# Patient Record
Sex: FEMALE | Race: WHITE | NOT HISPANIC OR LATINO | Employment: FULL TIME | URBAN - METROPOLITAN AREA
[De-identification: names, ages, dates, MRNs, and addresses within clinical notes are randomized per-mention and may not be internally consistent; named-entity substitution may affect disease eponyms.]

---

## 2017-05-15 ENCOUNTER — ALLSCRIPTS OFFICE VISIT (OUTPATIENT)
Dept: OTHER | Facility: OTHER | Age: 57
End: 2017-05-15

## 2017-05-15 DIAGNOSIS — I10 ESSENTIAL (PRIMARY) HYPERTENSION: ICD-10-CM

## 2017-05-15 DIAGNOSIS — M77.10 LATERAL EPICONDYLITIS OF ELBOW: ICD-10-CM

## 2017-05-15 DIAGNOSIS — I87.2 VENOUS INSUFFICIENCY (CHRONIC) (PERIPHERAL): ICD-10-CM

## 2017-05-15 DIAGNOSIS — Z12.31 ENCOUNTER FOR SCREENING MAMMOGRAM FOR MALIGNANT NEOPLASM OF BREAST: ICD-10-CM

## 2017-05-17 ENCOUNTER — GENERIC CONVERSION - ENCOUNTER (OUTPATIENT)
Dept: OTHER | Facility: OTHER | Age: 57
End: 2017-05-17

## 2017-11-16 ENCOUNTER — TRANSCRIBE ORDERS (OUTPATIENT)
Dept: LAB | Facility: CLINIC | Age: 57
End: 2017-11-16

## 2017-11-16 ENCOUNTER — APPOINTMENT (OUTPATIENT)
Dept: LAB | Facility: CLINIC | Age: 57
End: 2017-11-16
Payer: COMMERCIAL

## 2017-11-16 ENCOUNTER — GENERIC CONVERSION - ENCOUNTER (OUTPATIENT)
Dept: OTHER | Facility: OTHER | Age: 57
End: 2017-11-16

## 2017-11-16 DIAGNOSIS — I10 ESSENTIAL (PRIMARY) HYPERTENSION: ICD-10-CM

## 2017-11-16 DIAGNOSIS — I87.2 VENOUS INSUFFICIENCY (CHRONIC) (PERIPHERAL): ICD-10-CM

## 2017-11-16 LAB
ALBUMIN SERPL BCP-MCNC: 3.4 G/DL (ref 3.5–5)
ALP SERPL-CCNC: 63 U/L (ref 46–116)
ALT SERPL W P-5'-P-CCNC: 70 U/L (ref 12–78)
ANION GAP SERPL CALCULATED.3IONS-SCNC: 8 MMOL/L (ref 4–13)
AST SERPL W P-5'-P-CCNC: 32 U/L (ref 5–45)
BACTERIA UR QL AUTO: ABNORMAL /HPF
BILIRUB SERPL-MCNC: 0.3 MG/DL (ref 0.2–1)
BILIRUB UR QL STRIP: ABNORMAL
BUN SERPL-MCNC: 15 MG/DL (ref 5–25)
CALCIUM SERPL-MCNC: 9.1 MG/DL (ref 8.3–10.1)
CHLORIDE SERPL-SCNC: 102 MMOL/L (ref 100–108)
CHOLEST SERPL-MCNC: 172 MG/DL (ref 50–200)
CLARITY UR: CLEAR
CO2 SERPL-SCNC: 31 MMOL/L (ref 21–32)
COLOR UR: YELLOW
CREAT SERPL-MCNC: 0.8 MG/DL (ref 0.6–1.3)
GFR SERPL CREATININE-BSD FRML MDRD: 83 ML/MIN/1.73SQ M
GLUCOSE P FAST SERPL-MCNC: 108 MG/DL (ref 65–99)
GLUCOSE UR STRIP-MCNC: NEGATIVE MG/DL
HDLC SERPL-MCNC: 47 MG/DL (ref 40–60)
HGB UR QL STRIP.AUTO: ABNORMAL
KETONES UR STRIP-MCNC: NEGATIVE MG/DL
LDLC SERPL CALC-MCNC: 78 MG/DL (ref 0–100)
LEUKOCYTE ESTERASE UR QL STRIP: NEGATIVE
NITRITE UR QL STRIP: NEGATIVE
NON-SQ EPI CELLS URNS QL MICRO: ABNORMAL /HPF
PH UR STRIP.AUTO: 5 [PH] (ref 4.5–8)
POTASSIUM SERPL-SCNC: 3.1 MMOL/L (ref 3.5–5.3)
PROT SERPL-MCNC: 6.6 G/DL (ref 6.4–8.2)
PROT UR STRIP-MCNC: ABNORMAL MG/DL
RBC #/AREA URNS AUTO: ABNORMAL /HPF
SODIUM SERPL-SCNC: 141 MMOL/L (ref 136–145)
SP GR UR STRIP.AUTO: >=1.03 (ref 1–1.03)
TRIGL SERPL-MCNC: 233 MG/DL
UROBILINOGEN UR QL STRIP.AUTO: 0.2 E.U./DL
WBC #/AREA URNS AUTO: ABNORMAL /HPF

## 2017-11-16 PROCEDURE — 36415 COLL VENOUS BLD VENIPUNCTURE: CPT

## 2017-11-16 PROCEDURE — 80061 LIPID PANEL: CPT

## 2017-11-16 PROCEDURE — 80053 COMPREHEN METABOLIC PANEL: CPT

## 2017-11-16 PROCEDURE — 81001 URINALYSIS AUTO W/SCOPE: CPT

## 2017-11-20 ENCOUNTER — ALLSCRIPTS OFFICE VISIT (OUTPATIENT)
Dept: OTHER | Facility: OTHER | Age: 57
End: 2017-11-20

## 2017-11-27 DIAGNOSIS — R31.9 HEMATURIA: ICD-10-CM

## 2017-11-27 DIAGNOSIS — E87.6 HYPOKALEMIA: ICD-10-CM

## 2017-11-27 DIAGNOSIS — R73.01 IMPAIRED FASTING GLUCOSE: ICD-10-CM

## 2017-11-28 NOTE — PROGRESS NOTES
Assessment    1  History of Current every day smoker (305 1) (F17 200)   · 1/2 pack per day  2  Hematuria (599 70) (R31 9)   3  Hypokalemia (276 8) (E87 6)   4  Impaired fasting glucose (790 21) (R73 01)    Plan  Hematuria    · 1 Mahin Fernandes MD, Carmen Medel  (Urology) Co-Management  *  Status: Active  Requested for: 02JMW4858  Care Summary provided  : Yes  Hypokalemia    · (1) POTASSIUM; Status:Active; Requested for:27Nov2017;   Impaired fasting glucose    · (1) HEMOGLOBIN A1C; Status:Active; Requested for:27Nov2017;   Need for influenza vaccination    · Fluzone Quadrivalent 0 5 ML Intramuscular Suspension Prefilled Syringe  Other screening mammogram    · * MAMMO SCREENING BILATERAL W CAD; Status:Temporary Deferral - Pt requests deferral;   1/20/2018    Discussion/Summary  Discussion Summary:   See urology for hematuriabloodworkper patients requesthealthy and exercise  Chief Complaint  Chief Complaint Chronic Condition [facility]: Patient is here today for follow up of chronic conditions described in HPI  History of Present Illness  HPI: patient is here for a regular follow upunder controlmammogram  bw done, potassium was lowhas recurring hematuria, no symptoms      Review of Systems  Complete-Female:  Constitutional: no fever,-- not feeling poorly,-- no chills-- and-- not feeling tired  Eyes: no eye pain,-- no eyesight problems,-- eyes not red-- and-- no purulent discharge from the eyes  ENT: no earache,-- no nosebleeds,-- no hearing loss-- and-- no nasal discharge  Cardiovascular: the heart rate was not slow,-- no chest pain,-- the heart rate was not fast-- and-- no palpitations  Respiratory: no shortness of breath,-- no cough,-- no wheezing-- and-- no shortness of breath during exertion  Gastrointestinal: no abdominal pain,-- no nausea,-- no constipation-- and-- no diarrhea  Genitourinary: no dysuria  Musculoskeletal: no arthralgias,-- no joint swelling,-- no myalgias-- and-- no joint stiffness  Integumentary: no rashes,-- no itching,-- no skin lesions-- and-- no skin wound  Neurological: no headache,-- no numbness,-- no confusion,-- no dizziness,-- no convulsions-- and-- no fainting  Psychiatric: no anxiety,-- no sleep disturbances-- and-- no depression  Active Problems  1  Abdominal pain (789 00) (R10 9)   2  Abnormal blood chemistry (790 6) (R79 9)   3  Anxiety disorder (300 00) (F41 9)   4  Asthma (493 90) (J45 909)   5  Atypical chest pain (786 59) (R07 89)   6  Backache (724 5) (M54 9)   7  Blurry vision (368 8) (H53 8)   8  Cervical cancer screening (V76 2) (Z12 4)   9  Cervical spinal stenosis (723 0) (M48 02)   10  Cervical spinal stenosis (723 0) (M48 02)   11  Cervicalgia (723 1) (M54 2)   12  Change in stool habits (787 99) (R19 4)   13  Chest pain on respiration (786 52) (R07 1)   14  Encounter for screening colonoscopy (V76 51) (Z12 11)   15  Epicondylitis, lateral (tennis elbow) (726 32) (M77 10)   16  Gallbladder disease (575 9) (K82 9)   17  Headache (784 0) (R51)   18  Hematuria (599 70) (R31 9)   19  History of gastroesophageal reflux (GERD) (V12 79) (Z87 19)   20  Hypertension (401 9) (I10)   21  Limb pain (729 5) (M79 609)   22  Other screening mammogram (V76 12) (Z12 31)   23  Postoperative examination (V67 00) (Z09)   24  Urinary tract infection (599 0) (N39 0)   25  Venous insufficiency (459 81) (I87 2)   26  Visit for pre-operative examination (V7 84) (Z01 818)   27  Visit for routine gyn exam (V7 31) (Z01 419)    Past Medical History  1  History of heartburn (V12 79) (Z87 898)   2  History of hypertension (V12 59) (Z86 79)   3  History of hypertension (V12 59) (Z86 79)   4  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)  Active Problems And Past Medical History Reviewed: The active problems and past medical history were reviewed and updated today  Surgical History  1  History of Arthroscopy Knee Left   2  History of  Section   3   History of Elbow Surgery Repair   4  History of Knee Arthroscopy (Therapeutic)   5  History of Neck Surgery  Surgical History Reviewed: The surgical history was reviewed and updated today  Family History  Mother    1  Family history of Brain Cancer (V16 8)   2  Family history of Mother  At Age [de-identified]  Sister    1  Family history of Breast Cancer (V16 3)   4  Family history of Breast Cancer (V16 3)  Maternal Aunt    5  Family history of Breast Cancer (V16 3)   6  Family history of Breast Cancer (V16 3)  Family History    7  Family history of Epilepsy And Recurrent Seizures (V17 2)   8  Family history of Maternal Grandfather Is    5  Family history of Maternal Grandmother Is    8  Family history of Paternal Grandfather Is    6  Family history of Paternal Grandmother Is   Family History Reviewed: The family history was reviewed and updated today  Social History     · Denied: History of Drug Use   · Marital History - Currently    · Never Drank Alcohol   · Working Full Time  Social History Reviewed: The social history was reviewed and updated today  The social history was reviewed and is unchanged  Current Meds   1  AmLODIPine Besylate 2 5 MG Oral Tablet; take 1 tablet by mouth every day; Therapy: 00HPX6099 to (Grace Lozada)  Requested for: 25IRN0607; Last Rx:2017 Ordered   2  Atenolol 50 MG Oral Tablet; take 1 tablet every day; Therapy: 2012 to (Evaluate:27Rtb9934)  Requested for: 12TNO0091; Last Rx:2017 Ordered   3  Escitalopram Oxalate 10 MG Oral Tablet; take 1 tablet every day; Therapy: 00Hfq0085 to (Evaluate:40Eiq0308)  Requested for: 54AEE8721; Last Rx:2017 Ordered   4  HydroCHLOROthiazide 12 5 MG Oral Capsule; TAKE ONE CAPSULE BY MOUTH EVERY DAY; Therapy: 70YGN6888 to (Evaluate:40Sei2422)  Requested for: 85Bas0665; Last Rx:2017 Ordered   5  Multivitamins Oral Capsule; TAKE 1 CAPSULE DAILY; Therapy: 49XCM4485 to Recorded   6   Omeprazole 20 MG Oral Capsule Delayed Release; TAKE 1 CAPSULE DAILY EVERY MORNING BEFORE BREAKFAST; Therapy: 07SRL7501 to (Evaluate:11Nov2017)  Requested for: 37YWN2655; Last Rx:32Qce2215 Ordered  Medication List Reviewed: The medication list was reviewed and updated today  Allergies  1  Robaxin TABS   2  Tramadol  3  No Known Environmental Allergies   4  No Known Food Allergies    Vitals  Vital Signs    Recorded: 20Nov2017 03:44PM   Heart Rate 78   Respiration 16   Systolic 067   Diastolic 99   Height 5 ft 8 in   Weight 180 lb 0 2 oz   BMI Calculated 27 37   BSA Calculated 1 95   O2 Saturation 98       Physical Exam   Constitutional  General appearance: No acute distress, well appearing and well nourished  Eyes  Conjunctiva and lids: No swelling, erythema or discharge  Pupils and irises: Equal, round and reactive to light  Ears, Nose, Mouth, and Throat  External inspection of ears and nose: Normal    Otoscopic examination: Tympanic membranes translucent with normal light reflex  Canals patent without erythema  Nasal mucosa, septum, and turbinates: Normal without edema or erythema  Oropharynx: Normal with no erythema, edema, exudate or lesions  Pulmonary  Respiratory effort: No increased work of breathing or signs of respiratory distress  Auscultation of lungs: Clear to auscultation  Cardiovascular  Palpation of heart: Normal PMI, no thrills  Auscultation of heart: Normal rate and rhythm, normal S1 and S2, without murmurs  Examination of extremities for edema and/or varicosities: Normal    Carotid pulses: Normal    Abdomen  Abdomen: Non-tender, no masses  Liver and spleen: No hepatomegaly or splenomegaly  Lymphatic  Palpation of lymph nodes in neck: No lymphadenopathy  Musculoskeletal  Gait and station: Normal    Digits and nails: Normal without clubbing or cyanosis  Inspection/palpation of joints, bones, and muscles: Normal    Skin  Skin and subcutaneous tissue: Normal without rashes or lesions  Neurologic  Cranial nerves: Cranial nerves 2-12 intact  Psychiatric  Orientation to person, place, and time: Normal    Mood and affect: Normal          Results/Data  PHQ-2 Adult Depression Screening 20Nov2017 03:47PM User, Vikram     Test Name Result Flag Reference   PHQ-2 Adult Depression Score 0       Over the last two weeks, how often have you been bothered by any of the following problems? Little interest or pleasure in doing things: Not at all - 0 Feeling down, depressed, or hopeless: Not at all - 0   PHQ-2 Adult Depression Screening Negative         Health Management  Cervical cancer screening   (1) THIN PREP PAP WITH IMAGING; every 1 year; Next Due: 92Yhk3102; Overdue    Future Appointments    Date/Time Provider Specialty Site   12/04/2017 02:45 PM HARMAN Ivy   Urology [facility] [address]       Signatures   Electronically signed by : Brandon Mills; Nov 27 2017 10:42AM EST                       (Author)    Electronically signed by : HARMAN Hinson ; Nov 27 2017 10:46AM EST                       (Review)

## 2017-12-04 ENCOUNTER — ALLSCRIPTS OFFICE VISIT (OUTPATIENT)
Dept: OTHER | Facility: OTHER | Age: 57
End: 2017-12-04

## 2017-12-04 LAB
BILIRUB UR QL STRIP: NORMAL
CLARITY UR: NORMAL
COLOR UR: YELLOW
GLUCOSE (HISTORICAL): NORMAL
HGB UR QL STRIP.AUTO: NORMAL
KETONES UR STRIP-MCNC: NORMAL MG/DL
LEUKOCYTE ESTERASE UR QL STRIP: NORMAL
NITRITE UR QL STRIP: NORMAL
PH UR STRIP.AUTO: 5 [PH]
PROT UR STRIP-MCNC: NORMAL MG/DL
SP GR UR STRIP.AUTO: 1.01
UROBILINOGEN UR QL STRIP.AUTO: NORMAL

## 2017-12-06 NOTE — CONSULTS
Assessment  1  Hematuria (599 70) (R31 9)    Plan  Hematuria    · Cystourethroscopy - POC; Status:Active - Perform Order; Requested for:63Wmb9065;    Perform: In Office; 425 6350; Ordered;Ordered By:Rosenthal, Severo Pae;   · Urine Dip Non-Automated- POC; Status:Complete - Retrospective By ProtocolAuthorization;   Done: 89RRZ3604 02:57PM   Performed: In Office; 425 6350; Last Updated Giovanni Jamison; 12/4/2017 2:59:16 PM;Ordered;For:Hematuria; Ordered By:Carson Lomeli;   · US KIDNEY AND BLADDER; Status:Active; Requested for:61Dat3132;    Perform:[facility]; Due:04Xxm9717; Last Updated By:Sam Hudson; 12/4/2017 3:24:13 PM;Ordered;Ordered By:Rosenthal, Severo Pae; Discussion/Summary  Discussion Summary:   Microscopic hematuria, current smoker  discussed risk factors and hematuria evaluation  She understands and agrees with workup  I would check an ultrasound of the urinary tract in addition to cystoscopy  We discussed smoking cessation and she has decided to quit  Patient's Capacity to Self-Care: Patient is able to Self-Care  Goals and Barriers: The patient has the current Goals: Rule out malignancy  The patent has the current Barriers: None  Self Referrals:   Self Referrals: Adilene Lewis      Chief Complaint  Chief Complaint Free Text Note Form: Patient presents for hematuria      History of Present Illness  HPI: 81-year-old woman referred for evaluation of microscopic hematuria  She denies any gross hematuria  She occasionally sees darker urine but is not sure whether this is blood  She is a former and current smoker  She reports history of kidney stone about 8 years ago  No urinary tract infections  She had an evaluation which sounds to be for hematuria about 8 years ago including cystoscopy  This was apparently normal  She has no other complaints at this time        Review of Systems  Complete-Female Urology:  Constitutional: No fever, no chills, feels well, no tiredness, no recent weight gain or weight loss  Respiratory: No complaints of shortness of breath, no wheezing, no cough, no SOB on exertion, no orthopnea, no PND  Cardiovascular: No complaints of slow heart rate, no fast heart rate, no chest pain, no palpitations, no leg claudication, no lower extremity edema  Gastrointestinal: No complaints of abdominal pain, no constipation, no nausea or vomiting, no diarrhea, no bloody stools  Genitourinary: Empty sensation-- and-- stream quality good, but-- as noted in HPI,-- no dysuria,-- no urinary hesitancy,-- no feelings of urinary urgency,-- no incontinence,-- no hematuria-- and-- no nocturia  Musculoskeletal: No complaints of arthralgias, no myalgias, no joint swelling or stiffness, no limb pain or swelling  Integumentary: No complaints of skin rash or lesions, no itching, no skin wounds, no breast pain or lump  Hematologic/Lymphatic: No complaints of swollen glands, no swollen glands in the neck, does not bleed easily, does not bruise easily  Neurological: No complaints of headache, no confusion, no convulsions, no numbness, no dizziness or fainting, no tingling, no limb weakness, no difficulty walking  ROS Reviewed:   ROS reviewed  Active Problems  1  Abdominal pain (789 00) (R10 9)   2  Abnormal blood chemistry (790 6) (R79 9)   3  Anxiety disorder (300 00) (F41 9)   4  Asthma (493 90) (J45 909)   5  Atypical chest pain (786 59) (R07 89)   6  Backache (724 5) (M54 9)   7  Blurry vision (368 8) (H53 8)   8  Cervical cancer screening (V76 2) (Z12 4)   9  Cervical spinal stenosis (723 0) (M48 02)   10  Cervical spinal stenosis (723 0) (M48 02)   11  Cervicalgia (723 1) (M54 2)   12  Change in stool habits (787 99) (R19 4)   13  Chest pain on respiration (786 52) (R07 1)   14  Encounter for screening colonoscopy (V76 51) (Z12 11)   15  Epicondylitis, lateral (tennis elbow) (726 32) (M77 10)   16  Gallbladder disease (575 9) (K82 9)   17  Headache (784 0) (R51)   18  Hematuria (599 70) (R31 9)   19  History of gastroesophageal reflux (GERD) (V12 79) (Z87 19)   20  Hypertension (401 9) (I10)   21  Hypokalemia (276 8) (E87 6)   22  Impaired fasting glucose (790 21) (R73 01)   23  Limb pain (729 5) (M79 609)   24  Need for influenza vaccination (V04 81) (Z23)   25  Other screening mammogram (V76 12) (Z12 31)   26  Postoperative examination (V67 00) (Z09)   27  Urinary tract infection (599 0) (N39 0)   28  Venous insufficiency (459 81) (I87 2)   29  Visit for pre-operative examination (V72 84) (Z01 818)   30  Visit for routine gyn exam (V72 31) (Z01 419)    Past Medical History  1  History of arthritis (V13 4) (Z87 39)   2  History of heartburn (V12 79) (Z87 898)   3  History of hypertension (V12 59) (Z86 79)   4  History of hypertension (V12 59) (Z86 79)   5  History of kidney stones (V13 01) (Z87 442)   6  History of orthopedic surgery (V45 89) (Z98 890)   7  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)   8  History of Spinal fusion, 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)  Active Problems And Past Medical History Reviewed: The active problems and past medical history were reviewed and updated today  Surgical History  1  History of Arthroscopy Knee Left   2  History of  Section   3  History of Elbow Surgery Repair   4  History of Knee Arthroscopy (Therapeutic)   5  History of Neck Surgery  Surgical History Reviewed: The surgical history was reviewed and updated today  Family History  Mother    1  Family history of Brain Cancer (V16 8)   2  Family history of Mother  At Age [de-identified]  Sister    1  Family history of Breast Cancer (V16 3)   4  Family history of Breast Cancer (V16 3)  Maternal Aunt    5  Family history of Breast Cancer (V16 3)   6  Family history of Breast Cancer (V16 3)  Family History    7  Family history of Epilepsy And Recurrent Seizures (V17 2)   8  Family history of Maternal Grandfather Is    5   Family history of Maternal Grandmother Is    8  Family history of Paternal Grandfather Is    6  Family history of Paternal Grandmother Is   Family History Reviewed: The family history was reviewed and updated today  Social History     · Current every day smoker (305 1) (F17 200)   · Denied: History of Drug Use   · Marital History - Currently    · Never Drank Alcohol   · Working Full Time  Social History Reviewed: The social history was reviewed and updated today  Current Meds   1  AmLODIPine Besylate 2 5 MG Oral Tablet; take 1 tablet by mouth every day; Therapy: 85USV5879 to (Rupa Hernandez)  Requested for: 45LNV9874; Last Rx:2017 Ordered   2  Atenolol 50 MG Oral Tablet; take 1 tablet every day; Therapy: 2012 to (Evaluate:95Cds2979)  Requested for: 52PQX2471; Last Rx:2017 Ordered   3  Escitalopram Oxalate 10 MG Oral Tablet; take 1 tablet every day; Therapy: 98Joa0805 to (Evaluate:00Cmg7990)  Requested for: 48YLP5966; Last Rx:2017 Ordered   4  HydroCHLOROthiazide 12 5 MG Oral Capsule; TAKE ONE CAPSULE BY MOUTH EVERY DAY; Therapy: 98FQE2972 to (Evaluate:84Nnh1222)  Requested for: 18Hve2082; Last Rx:14Otg3188 Ordered   5  Multivitamins Oral Capsule; TAKE 1 CAPSULE DAILY; Therapy: 82XQT1079 to Recorded   6  Omeprazole 20 MG Oral Capsule Delayed Release; TAKE 1 CAPSULE DAILY EVERY MORNING BEFORE BREAKFAST; Therapy: 40OVL6285 to (Evaluate:2017)  Requested for: 05ZOC7116; Last Rx:00Slf0573 Ordered  Medication List Reviewed: The medication list was reviewed and updated today  Allergies  1  Robaxin TABS   2  Tramadol  3  No Known Environmental Allergies   4  No Known Food Allergies    Vitals  Vital Signs    Recorded: 64VSI5776 02:58PM   Height 5 ft 8 in   Weight 179 lb    BMI Calculated 27 22   BSA Calculated 1 95       Physical Exam   Constitutional  General appearance: No acute distress, well appearing and well nourished     Pulmonary  Respiratory effort: No increased work of breathing or signs of respiratory distress  Cardiovascular  Examination of extremities for edema and/or varicosities: Normal    Abdomen  Abdomen: Non-tender, no masses  Musculoskeletal  Gait and station: Normal    Skin  Skin and subcutaneous tissue: Normal without rashes or lesions  Lymphatic  Palpation of lymph nodes in groin: No lymphadenopathy  Neurologic  Sensation: No sensory loss  Additional Exam:   no CVA tenderness  Results/Data  Urine Dip Non-Automated- POC 19VYZ5212 02:57PM Rosalinda Garibay     Test Name Result Flag Reference   Color Yellow       Clarity Transparent     Leukocytes +++     Nitrite -     Blood +++     Bilirubin -     Urobilinogen -     Protein -     Ph 5 0     Specific Gravity 1 010     Ketone -     Glucose -         Future Appointments    Date/Time Provider Specialty Site   12/29/2017 08:30 AM HARMAN Sanchez   Urology [facility] [address]       Signatures   Electronically signed by : HARMAN Noriega ; Dec  4 2017  4:26PM EST                       (Author)

## 2017-12-18 ENCOUNTER — APPOINTMENT (OUTPATIENT)
Dept: LAB | Age: 57
End: 2017-12-18
Payer: COMMERCIAL

## 2017-12-18 ENCOUNTER — TRANSCRIBE ORDERS (OUTPATIENT)
Dept: ADMINISTRATIVE | Age: 57
End: 2017-12-18

## 2017-12-18 ENCOUNTER — HOSPITAL ENCOUNTER (OUTPATIENT)
Dept: RADIOLOGY | Age: 57
Discharge: HOME | End: 2017-12-18
Payer: COMMERCIAL

## 2017-12-18 DIAGNOSIS — R31.9 HEMATURIA: ICD-10-CM

## 2017-12-18 DIAGNOSIS — E87.6 HYPOKALEMIA: ICD-10-CM

## 2017-12-18 DIAGNOSIS — R73.01 IMPAIRED FASTING GLUCOSE: ICD-10-CM

## 2017-12-18 LAB
EST. AVERAGE GLUCOSE BLD GHB EST-MCNC: 100 MG/DL
HBA1C MFR BLD: 5.1 % (ref 4.2–6.3)
POTASSIUM SERPL-SCNC: 3.7 MMOL/L (ref 3.5–5.3)

## 2017-12-18 PROCEDURE — 84132 ASSAY OF SERUM POTASSIUM: CPT

## 2017-12-18 PROCEDURE — 36415 COLL VENOUS BLD VENIPUNCTURE: CPT

## 2017-12-18 PROCEDURE — 76770 US EXAM ABDO BACK WALL COMP: CPT

## 2017-12-18 PROCEDURE — 83036 HEMOGLOBIN GLYCOSYLATED A1C: CPT

## 2017-12-19 ENCOUNTER — GENERIC CONVERSION - ENCOUNTER (OUTPATIENT)
Dept: OTHER | Facility: OTHER | Age: 57
End: 2017-12-19

## 2017-12-21 ENCOUNTER — GENERIC CONVERSION - ENCOUNTER (OUTPATIENT)
Dept: OTHER | Facility: OTHER | Age: 57
End: 2017-12-21

## 2017-12-29 ENCOUNTER — LAB REQUISITION (OUTPATIENT)
Dept: LAB | Facility: HOSPITAL | Age: 57
End: 2017-12-29
Payer: COMMERCIAL

## 2017-12-29 ENCOUNTER — ALLSCRIPTS OFFICE VISIT (OUTPATIENT)
Dept: OTHER | Facility: OTHER | Age: 57
End: 2017-12-29

## 2017-12-29 ENCOUNTER — TRANSCRIBE ORDERS (OUTPATIENT)
Dept: ADMINISTRATIVE | Facility: HOSPITAL | Age: 57
End: 2017-12-29

## 2017-12-29 DIAGNOSIS — C90.00 MYELOMA KIDNEY (HCC): Primary | ICD-10-CM

## 2017-12-29 DIAGNOSIS — N08 MYELOMA KIDNEY (HCC): Primary | ICD-10-CM

## 2017-12-29 DIAGNOSIS — R31.9 HEMATURIA: ICD-10-CM

## 2017-12-29 LAB
CLARITY UR: NORMAL
COLOR UR: YELLOW
GLUCOSE (HISTORICAL): NORMAL
HGB UR QL STRIP.AUTO: NORMAL
KETONES UR STRIP-MCNC: NORMAL MG/DL
LEUKOCYTE ESTERASE UR QL STRIP: NORMAL
NITRITE UR QL STRIP: NORMAL
PH UR STRIP.AUTO: 5 [PH]
PROT UR STRIP-MCNC: NORMAL MG/DL
SP GR UR STRIP.AUTO: 1.03

## 2017-12-29 PROCEDURE — 87077 CULTURE AEROBIC IDENTIFY: CPT | Performed by: UROLOGY

## 2017-12-29 PROCEDURE — 87186 SC STD MICRODIL/AGAR DIL: CPT | Performed by: UROLOGY

## 2017-12-29 PROCEDURE — 87086 URINE CULTURE/COLONY COUNT: CPT | Performed by: UROLOGY

## 2017-12-31 LAB
BACTERIA UR CULT: ABNORMAL
BACTERIA UR CULT: ABNORMAL

## 2018-01-02 ENCOUNTER — HOSPITAL ENCOUNTER (OUTPATIENT)
Dept: MAMMOGRAPHY | Facility: HOSPITAL | Age: 58
Discharge: HOME | End: 2018-01-02
Payer: COMMERCIAL

## 2018-01-02 DIAGNOSIS — Z12.31 ENCOUNTER FOR SCREENING MAMMOGRAM FOR MALIGNANT NEOPLASM OF BREAST: ICD-10-CM

## 2018-01-02 PROCEDURE — 77067 SCR MAMMO BI INCL CAD: CPT

## 2018-01-04 ENCOUNTER — HOSPITAL ENCOUNTER (OUTPATIENT)
Dept: RADIOLOGY | Age: 58
Discharge: HOME | End: 2018-01-04
Payer: COMMERCIAL

## 2018-01-04 DIAGNOSIS — N28.89 OTHER SPECIFIED DISORDERS OF KIDNEY AND URETER: ICD-10-CM

## 2018-01-04 DIAGNOSIS — R10.2 PELVIC AND PERINEAL PAIN: ICD-10-CM

## 2018-01-04 PROCEDURE — 74178 CT ABD&PLV WO CNTR FLWD CNTR: CPT

## 2018-01-04 RX ADMIN — IOHEXOL 100 ML: 350 INJECTION, SOLUTION INTRAVENOUS at 15:16

## 2018-01-10 ENCOUNTER — ALLSCRIPTS OFFICE VISIT (OUTPATIENT)
Dept: OTHER | Facility: OTHER | Age: 58
End: 2018-01-10

## 2018-01-11 ENCOUNTER — ALLSCRIPTS OFFICE VISIT (OUTPATIENT)
Dept: OTHER | Facility: OTHER | Age: 58
End: 2018-01-11

## 2018-01-12 NOTE — PROGRESS NOTES
Plan   Pelvic pain in female    · * US PELVIS COMPLETE (TRANSABDOMINAL AND TRANSVAGINAL); Status:Hold For -    Scheduling; Requested PABLO:[id number]; Perform:[facility]; BDU:[id number];OLYLLPF; For:Pelvic pain in female; Ordered By:Ronnell Hall; Visit for routine gyn exam    · (B) PAP WITH HPV PLUS; Status:Active; Requested RIQ:[id number]; Perform:[employer]; OBH:[id number]; For:Visit for routine gyn exam; Ordered By:Nichelle Hall;  : 2004    Discussion/Summary   health maintenance visit healthy adult female Currently, she eats a healthy diet  the risks and benefits of cervical cancer screening were discussed HPV and Pap Co-testing Done Today Testing was done today for declines  Breast cancer screening: the risks and benefits of breast cancer screening were discussed, monthly self breast exam was advised and mammogram is current  Colorectal cancer screening: the risks and benefits of colorectal cancer screening were discussed and colorectal cancer screening is current  Osteoporosis screening: bone mineral density testing is not indicated  The immunizations are up to date  Advice and education were given regarding nutrition, weight bearing exercise, calcium supplements and vitamin D supplements  Patient discussion: discussed with the patient  Reviewed normal exam today  Pap with HPV done today  normal breast exam today  Monthly SBE, yearly mammograms  send for pelvic ultrasound to evaluate pelvic pain that has progressively gotten worse over the last month  Reviewed with patient may be linked to urinary/kidney problems or a GI problem  coconut oil for vaginal dryness, not safe with condoms  continue to follow up with urologist to evaluate urinary problems  up with PCP for regular check ups and blood work  in one year for annual exam or sooner as needed  The patient has the current Goals: Have pelvic ultrasound performed   The patent has the current Barriers: None              Patient is able to Self-Care  The treatment plan was reviewed with the patient/guardian  The patient/guardian understands and agrees with the treatment plan                Self Referrals: No      Chief Complaint   Pt here for annual well woman exam      History of Present Illness   HPI: Pt is a new patient here for annual well woman exam  Last exam 2015 Pt states no history of abnormal paps in past  Will do pap with HPV today  post menopausal with last menses being 13 years ago  Pt denies any vaginal bleeding since  Pt states she was able to get through menopause with relatively minimal symptoms  Pt denies any hot flashes, night sweats, or mood swings  in a mutually exclusive relationship with a male partner () and denies the need for STD testing today  practices SBE monthly  Last mammogram 1/2/18 - [facility] 1 negative, routine screening in one year, pt has a strong family history of breast cancer  complain of pelvic pain in lower pelvic area for the last month states it is midline pelvic pain, nothing makes it worse or better, was relieved of pain by NSAIDS but was advised can cause GI upset so she has since stopped taking NSAIDS  Pt states she feels as though the pain has always been there for years but over the last month has been worse  Pt describes it as a cramping/contraction like feeling  Described as feeling like she is in labor  states has been seeing urologist for blood in urine, no other urinary symptoms, being evaluated for kidney stones  Has appointment with urologist tomorrow  Pt also has complaints of nausea and diarrhea which they believe to be related to kidney stone, if not will be seeing GI   colonoscopy pt stated was 3 years ago and due for another colonoscopy in 2 years, last on file 2010  follows with PCP for regular check ups and blood work             GYN , Adult Female [facility]: The patient is being seen for a health maintenance evaluation   The last health maintenance visit was 2 year(s) ago  Social History: Household members include spouse  She is   Work status: working full time-- and-- occupation:   The patient is a current cigarette smoker and has never used smokeless tobacco  She has smoked for 15 year(s)  She is ready to quit using tobacco, is quitting on her own and has significatly cut back since /GI problems, working on quitting  She reports frequent alcohol use and drinking 5 drinks per week  The patient has no concerns about alcohol abuse  She has never used illicit drugs  General Health: The patient's health since the last visit is described as good  She has regular dental visits  -- She denies vision problems  -- She denies hearing loss  Immunizations status: up to date  Lifestyle:  She consumes a diverse and healthy diet  -- She does not have any weight concerns  -- She does not exercise regularly  -- She uses tobacco  The patient is a current cigarette smoker  Tobacco Use Duration: 1 cigarette pack(s) per day-- and-- 15 pack year(s) of cigarette use  She is ready to quit using tobacco-- and-- does not want resources to help with quitting  Smoking cessation handouts not given  -- She consumes alcohol  She reports frequent alcohol use-- and-- drinking 5 drinks per week  Alcohol concern: The patient has no concerns about alcohol abuse  -- She denies drug use  Reproductive health: the patient is postmenopausal--   she reports no menstrual problems  Menstrual history:  age at menarche was 16  LMP: the last menstrual period was 2005  -- she uses no contraception  -- she is sexually active  She is monogamous with a male partner-- and-- denies any issues  -- pregnancy history: G 3P 1,-- 1  Screening: cancer screening reviewed and current  metabolic screening reviewed and current  risk screening reviewed and current        Review of Systems   postmenopausal bleeding, but-- no pelvic pressure,-- no vaginal pain,-- no vaginal discharge,-- no vaginal itching,-- no vaginal lump or mass,-- no vaginal odor,-- no nonmenstrual bleeding,-- no vulvar pain,-- no vulvar itching,-- no vulvar lump or mass,-- no labial swelling,-- no dysuria,-- no bladder pain,-- no burning sensation during urination,-- no change in urinary frequency,-- no feelings of urinary urgency,-- no flank pain,-- no abnormal urethral discharge,-- urine is not foul-smelling-- and-- no urinary hesitancy--       The patient presents with complaints of gradual onset of constant episodes of moderate bilateral pelvic pain, described as dull  Symptoms are improved by non-opioid analgesics  Symptoms are worsening  The patient presents with complaints of hematuria (per patient be evaluating by urologist)                  Constitutional: No fever, no chills, feels well, no tiredness, no recent weight gain or loss  Cardiovascular: no complaints of slow or fast heart rate, no chest pain, no palpitations, no leg claudication or lower extremity edema  Respiratory: no complaints of shortness of breath, no wheezing, no dyspnea on exertion, no orthopnea or PND  Breasts: no complaints of breast pain, breast lump or nipple discharge  Gastrointestinal: no complaints of abdominal pain, no constipation, no nausea or diarrhea, no vomiting, no bloody stools  Genitourinary: no complaints of dysuria, no incontinence, no pelvic pain, no dysmenorrhea, no vaginal discharge or abnormal vaginal bleeding  Musculoskeletal: no complaints of arthralgia, no myalgia, no joint swelling or stiffness, no limb pain or swelling  Integumentary: no complaints of skin rash or lesion, no itching or dry skin, no skin wounds  Neurological: no complaints of headache, no confusion, no numbness or tingling, no dizziness or fainting  ROS reviewed  OB History   Pregnancy History (Brief):      Prior pregnancies: : 3   Para: 1 (full-term)-- and-- 1 (living)      Delivery type:  section    C/S 5 Male      Active Problems   1  Abdominal pain (789 00) (R10 9)   2  Abnormal blood chemistry (790 6) (R79 9)   3  Anxiety disorder (300 00) (F41 9)   4  Asthma (493 90) (J45 909)   5  Atypical chest pain (786 59) (R07 89)   6  Backache (724 5) (M54 9)   7  Blurry vision (368 8) (H53 8)   8  Cervical cancer screening (V76 2) (Z12 4)   9  Cervical spinal stenosis (723 0) (M48 02)   10  Cervical spinal stenosis (723 0) (M48 02)   11  Cervicalgia (723 1) (M54 2)   12  Change in stool habits (787 99) (R19 4)   13  Chest pain on respiration (786 52) (R07 1)   14  Encounter for screening colonoscopy (V76 51) (Z12 11)   15  Epicondylitis, lateral (tennis elbow) (726 32) (M77 10)   16  Gallbladder disease (575 9) (K82 9)   17  Headache (784 0) (R51)   18  Hematuria (599 70) (R31 9)   19  History of gastroesophageal reflux (GERD) (V12 79) (Z87 19)   20  Hypertension (401 9) (I10)   21  Hypokalemia (276 8) (E87 6)   22  Impaired fasting glucose (790 21) (R73 01)   23  Limb pain (729 5) (M79 609)   24  Need for influenza vaccination (V04 81) (Z23)   25  Other screening mammogram (V76 12) (Z12 31)   26  Pelvic pain in female (625 9) (R10 2)   27  Postoperative examination (V67 00) (Z09)   28  Renal mass (593 9) (N28 89)   29  Urinary tract infection (599 0) (N39 0)   30  Venous insufficiency (459 81) (I87 2)   31  Visit for pre-operative examination (V72 84) (Z01 818)   32   Visit for routine gyn exam (V72 31) (Z01 419)    Past Medical History    · History of arthritis (V13 4) (Z87 39)   · History of heartburn (V12 79) (P45 055)   · History of hypertension (V12 59) (Z86 79)   · History of hypertension (V12 59) (Z86 79)   · History of kidney stones (V13 01) (Z87 442)   · History of orthopedic surgery (V45 89) (Z98 890)   · 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)   · History of Spinal fusion, 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)     The active problems and past medical history were reviewed and updated today  Surgical History    · History of Arthroscopy Knee Left   · History of  Section   · History of Diagnostic Cystoscopy   · History of Elbow Surgery Repair   · History of Knee Arthroscopy (Therapeutic)   · History of Neck Surgery     The surgical history was reviewed and updated today  Family History   Mother    · Family history of Brain Cancer (V16 8)   · Family history of Mother  At Age [de-identified]  Sister    · Family history of Breast Cancer (V16 3)   · Family history of Breast Cancer (V16 3)  Aunt    · Family history of malignant neoplasm of breast (V16 3) (Z80 3)  Maternal Aunt    · Family history of Breast Cancer (V16 3)   · Family history of Breast Cancer (V16 3)   · Family history of malignant neoplasm of breast (V16 3) (Z80 3)  Family History    · Family history of Epilepsy And Recurrent Seizures (V17 2)   · Family history of Maternal Grandfather Is    · Family history of Maternal Grandmother Is    · Family history of Paternal Grandfather Is    · Family history of Paternal Grandmother Is      The family history was reviewed and updated today  Social History    · Current every day smoker (305 1) (F17 200)   · Denied: History of Drug Use   · Marital History - Currently    · Never Drank Alcohol   · Working Full Time   · [address] [employer]  The social history was reviewed and updated today  The social history was reviewed and is unchanged  Current Meds    1  AmLODIPine Besylate 2 5 MG Oral Tablet; take 1 tablet by mouth every day; Therapy: 40QCQ9250 to (Evaluate:2018)  Requested for: 82QXD8541; Last     Rx:2018 Ordered   2  Atenolol 50 MG Oral Tablet; take 1 tablet every day; Therapy: 12LRO8049 to (Evaluate:2018)  Requested for: 26ZNO3278; Last     Rx:2018 Ordered   3   Escitalopram Oxalate 10 MG Oral Tablet; take 1 tablet every day; Therapy: 47Hrm1655 to (Evaluate:08Qzj3317)  Requested for: 35IRA8676; Last     PP:04KVS6731 Ordered   4  HydroCHLOROthiazide 12 5 MG Oral Capsule; TAKE ONE CAPSULE BY MOUTH     EVERY DAY; Therapy: 12BQR6317 to (Evaluate:72Aqx4487)  Requested for: 48CTC7841; Last     Rx:06Nkl4376 Ordered   5  Multivitamins Oral Capsule; TAKE 1 CAPSULE DAILY; Therapy: 17KEE1961 to Recorded   6  Omeprazole 20 MG Oral Capsule Delayed Release; TAKE 1 CAPSULE DAILY EVERY     MORNING BEFORE BREAKFAST; Therapy: 61HKL6878 to (Evaluate:11Nov2017)  Requested for: 47RFB0351; Last     Rx:25Hzg9552 Ordered    Allergies   1  Robaxin TABS   2  Tramadol  3  No Known Environmental Allergies   4  No Known Food Allergies    Vitals    Recorded: 47XAD9662 22:47JD   Systolic 101, LUE, Sitting   Diastolic 98, LUE, Sitting   Height 5 ft 8 in   Weight 179 lb    BMI Calculated 27 22   BSA Calculated 1 95     Physical Exam        Constitutional      General appearance: No acute distress, well appearing and well nourished  Neck      Neck: Normal, supple, trachea midline, no masses  Thyroid: Normal, no thyromegaly  Pulmonary      Respiratory effort: No increased work of breathing or signs of respiratory distress  Auscultation of lungs: Clear to auscultation  Cardiovascular      Auscultation of heart: Normal rate and rhythm, normal S1 and S2, no murmurs  Peripheral vascular exam: Normal pulses Throughout  Genitourinary      External genitalia: Normal and no lesions appreciated  Vagina: Normal, no lesions or dryness appreciated  Urethra: Normal        Urethral meatus: Normal        Bladder: Normal, soft, non-tender and no prolapse or masses appreciated  Cervix: Normal, no palpable masses  Uterus: Normal, non-tender, not enlarged, and no palpable masses  Adnexa/parametria: Normal, non-tender and no fullness or masses appreciated         Anus, perineum, and rectum: Normal sphincter tone, no masses, and no prolapse  Chest      Breasts: Normal and no dimpling or skin changes noted  Abdomen      Abdomen: Normal, non-tender, and no organomegaly noted  Liver and spleen: No hepatomegaly or splenomegaly  Examination for hernias: No hernias appreciated  Stool sample for occult blood: Negative  Lymphatic      Palpation of lymph nodes in neck, axillae, groin and/or other locations: No lymphadenopathy or masses noted  Skin      Skin and subcutaneous tissue: Normal skin turgor and no rashes  Palpation of skin and subcutaneous tissue: Normal        Psychiatric      Orientation to person, place, and time: Normal        Mood and affect: Normal        Attending Note   Collaborating Physician Note: Collaborating Note: I agree with the Advanced Practitioner note   I discussed the case with the Advanced Practitioner and reviewed the AP note      Future Appointments      Date/Time Provider Specialty Site   01/11/2018 02:00 PM Janette Ferguson [address] [place] [address] [address]rology [address]     Signatures    Electronically signed by : Colman Olszewski, [address] [place] [address]; Neil 10 2018  4:41PM EST                       (Author)     Electronically signed by : Yuriy Salcido MD; Neil 10 2018  6:27PM EST                       (Author)

## 2018-01-13 VITALS
HEART RATE: 78 BPM | OXYGEN SATURATION: 98 % | HEIGHT: 68 IN | WEIGHT: 180.01 LBS | DIASTOLIC BLOOD PRESSURE: 99 MMHG | BODY MASS INDEX: 27.28 KG/M2 | SYSTOLIC BLOOD PRESSURE: 140 MMHG | RESPIRATION RATE: 16 BRPM

## 2018-01-13 VITALS
BODY MASS INDEX: 27.44 KG/M2 | WEIGHT: 181.04 LBS | RESPIRATION RATE: 16 BRPM | HEART RATE: 64 BPM | DIASTOLIC BLOOD PRESSURE: 78 MMHG | SYSTOLIC BLOOD PRESSURE: 138 MMHG | HEIGHT: 68 IN

## 2018-01-15 LAB
HPV 18 (HISTORICAL): NOT DETECTED
HPV HIGH RISK 16/18 (HISTORICAL): NOT DETECTED
HPV16 (HISTORICAL): NOT DETECTED
PAP (HISTORICAL): NORMAL

## 2018-01-17 ENCOUNTER — GENERIC CONVERSION - ENCOUNTER (OUTPATIENT)
Dept: OTHER | Facility: OTHER | Age: 58
End: 2018-01-17

## 2018-01-17 NOTE — PROGRESS NOTES
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)    Plan   Microhematuria    · Follow-up visit in 1 year Evaluation and Treatment  Follow-up  Status: Hold For -    Scheduling  Requested for: 33FLQ6857   Ordered; For: Microhematuria; Ordered By: Janette Ferguson Performed:  Due: 75WES5604   · (1) URINALYSIS (will reflex a microscopy if leukocytes, occult blood, protein or nitrites are    not within normal limits); Status:Hold For - Exact Date; Requested for:Approx [id number];       Perform:[facility]; TWQ:99NFC5653;UGENNBA; For:Microhematuria; Ordered By:Jensen Anaya; Discussion/Summary   Discussion Summary:    Microscopic hematuria, angiomyolipoma   is a 66 y/o female being managed by Dr Rubin Coelho  Her CT scan was reviewed and the renal masses seen on prior renal ultrasound are consistent with angiomyolipomas  There were no other abnormal findings  She has now completed a full negative hematuria workup  She will return in 1 year for follow-up with urinalysis  She was instructed to call with any issues  All questions answered  Chief Complaint   Chief Complaint Free Text Note Form: Patient presents for hematuria and renal mass      History of Present Illness   HPI: 52-year-old female recently evaluated for microscopic hematuria presents today to review her recent CT scan results  She states her initial workup was completed for diffuse abdominal pain  She denies any gross hematuria  She denies any lower urinary tract symptoms or dysuria  She underwent a full hematuria workup with no abnormal findings  Her renal ultrasound did show bilateral renal masses  This prompted obtaining a CT scan  Review of Systems   Complete-Female Urology:      Constitutional: No fever, no chills, feels well, no tiredness, no recent weight gain or weight loss  Respiratory: No complaints of shortness of breath, no wheezing, no cough, no SOB on exertion, no orthopnea, no PND  Cardiovascular: No complaints of slow heart rate, no fast heart rate, no chest pain, no palpitations, no leg claudication, no lower extremity edema  Gastrointestinal: No complaints of abdominal pain, no constipation, no nausea or vomiting, no diarrhea, no bloody stools  Genitourinary: Empty sensation-- and-- stream quality good, but-- no dysuria,-- no urinary hesitancy,-- no feelings of urinary urgency,-- no incontinence,-- no hematuria-- and-- no nocturia  Musculoskeletal: No complaints of arthralgias, no myalgias, no joint swelling or stiffness, no limb pain or swelling  Integumentary: No complaints of skin rash or lesions, no itching, no skin wounds, no breast pain or lump  Hematologic/Lymphatic: No complaints of swollen glands, no swollen glands in the neck, does not bleed easily, does not bruise easily  Neurological: No complaints of headache, no confusion, no convulsions, no numbness, no dizziness or fainting, no tingling, no limb weakness, no difficulty walking  ROS Reviewed:    ROS reviewed  Active Problems   1  Abdominal pain (789 00) (R10 9)   2  Abnormal blood chemistry (790 6) (R79 9)   3  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)   4  Anxiety disorder (300 00) (F41 9)   5  Asthma (493 90) (J45 909)   6  Atypical chest pain (786 59) (R07 89)   7  Backache (724 5) (M54 9)   8  Blurry vision (368 8) (H53 8)   9  Cervical cancer screening (V76 2) (Z12 4)   10  Cervical spinal stenosis (723 0) (M48 02)   11  Cervical spinal stenosis (723 0) (M48 02)   12  Cervicalgia (723 1) (M54 2)   13  Change in stool habits (787 99) (R19 4)   14  Chest pain on respiration (786 52) (R07 1)   15  Encounter for screening colonoscopy (V76 51) (Z12 11)   16  Epicondylitis, lateral (tennis elbow) (726 32) (M77 10)   17  Gallbladder disease (575 9) (K82 9)   18  Headache (784 0) (R51)   19  History of gastroesophageal reflux (GERD) (V12 79) (Z87 19)   20  Hypertension (401 9) (I10)   21  Hypokalemia (276 8) (E87 6)   22  Impaired fasting glucose (790 21) (R73 01)   23  Limb pain (729 5) (M79 609)   24  Microhematuria (599 72) (R31 29)   25  Need for influenza vaccination (V04 81) (Z23)   26  Other screening mammogram (V76 12) (Z12 31)   27  Pelvic pain in female (625 9) (R10 2)   28  Postoperative examination (V67 00) (Z09)   29  Urinary tract infection (599 0) (N39 0)   30  Venous insufficiency (459 81) (I87 2)   31  Visit for pre-operative examination (V72 84) (Z01 818)   32  Visit for routine gyn exam (V72 31) (Z01 419)    Past Medical History   1  History of arthritis (V13 4) (Z87 39)   2  History of heartburn (V12 79) (Z87 898)   3  History of hypertension (V12 59) (Z86 79)   4  History of hypertension (V12 59) (Z86 79)   5  History of kidney stones (V13 01) (Z87 442)   6  History of orthopedic surgery (V45 89) (Z98 890)   7  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)   8  History of Spinal fusion, 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)  Active Problems And Past Medical History Reviewed: The active problems and past medical history were reviewed and updated today  Surgical History   1  History of Arthroscopy Knee Left   2  History of  Section   3  History of Diagnostic Cystoscopy   4  History of Elbow Surgery Repair   5  History of Knee Arthroscopy (Therapeutic)   6  History of Neck Surgery  Surgical History Reviewed: The surgical history was reviewed and updated today  Family History   Mother    1  Family history of Brain Cancer (V16 8)   2  Family history of Mother  At Age [de-identified]  Sister    1  Family history of Breast Cancer (V16 3)   4  Family history of Breast Cancer (V16 3)  Aunt    5  Family history of malignant neoplasm of breast (V16 3) (Z80 3)  Maternal Aunt    6  Family history of Breast Cancer (V16 3)   7  Family history of Breast Cancer (V16 3)   8  Family history of malignant neoplasm of breast (V16 3) (Z80 3)  Family History    9   Family history of Epilepsy And Recurrent Seizures (V17 2)   10  Family history of Maternal Grandfather Is    6  Family history of Maternal Grandmother Is    15  Family history of Paternal Grandfather Is    15  Family history of Paternal Grandmother Is   Family History Reviewed: The family history was reviewed and updated today  Social History    · Current every day smoker (305 1) (F17 200)   · Denied: History of Drug Use   · Marital History - Currently    · Never Drank Alcohol   · Working Full Time  Social History Reviewed: The social history was reviewed and updated today  The social history was reviewed and is unchanged  Current Meds    1  AmLODIPine Besylate 2 5 MG Oral Tablet; take 1 tablet by mouth every day; Therapy: 98LRV2712 to (Evaluate:2018)  Requested for: 27WUH3913; Last     Rx:2018 Ordered   2  Atenolol 50 MG Oral Tablet; take 1 tablet every day; Therapy: 04VQB6137 to (Evaluate:2018)  Requested for: 44AYB6292; Last     Rx:2018 Ordered   3  Escitalopram Oxalate 10 MG Oral Tablet; take 1 tablet every day; Therapy: 51Zok6580 to (Evaluate:2018)  Requested for: 31SEP1143; Last     BS:13TCT1532 Ordered   4  HydroCHLOROthiazide 12 5 MG Oral Capsule; TAKE ONE CAPSULE BY MOUTH EVERY     DAY; Therapy: 66EOB2701 to (Evaluate:2018)  Requested for: 07VAW8655; Last     Rx:70Onc7881 Ordered   5  Multivitamins Oral Capsule; TAKE 1 CAPSULE DAILY; Therapy: 96GRT9344 to Recorded   6  Omeprazole 20 MG Oral Capsule Delayed Release; TAKE 1 CAPSULE DAILY EVERY     MORNING BEFORE BREAKFAST; Therapy: 47UIH9178 to (Evaluate:2017)  Requested for: 01CFR5711; Last     Rx:22Hwu8962 Ordered  Medication List Reviewed: The medication list was reviewed and updated today  Allergies   1  Robaxin TABS   2  Tramadol  3  No Known Environmental Allergies   4   No Known Food Allergies    Vitals   Vital Signs    Recorded: 04TQD8811 02:23PM Heart Rate 70   Systolic 871   Diastolic 84   Height 5 ft 8 in   Weight 179 lb 4 oz   BMI Calculated 27 26   BSA Calculated 1 95     Physical Exam        Constitutional      General appearance: No acute distress, well appearing and well nourished  Pulmonary      Respiratory effort: No increased work of breathing or signs of respiratory distress  Cardiovascular      Palpation of heart: Normal PMI, no thrills  Examination of extremities for edema and/or varicosities: Normal        Abdomen      Abdomen: Non-tender, no masses  Musculoskeletal      Gait and station: Normal        Skin      Skin and subcutaneous tissue: Normal without rashes or lesions         Signatures    Electronically signed by : Juarez Rivera, [address] [place] [address]; Jan 11 2018  2:35PM EST                       (Author)     Electronically signed by : HARMAN Lazaro ; Jan 16 2018  9:04AM EST

## 2018-01-19 ENCOUNTER — HOSPITAL ENCOUNTER (OUTPATIENT)
Dept: RADIOLOGY | Age: 58
Discharge: HOME | End: 2018-01-19
Payer: COMMERCIAL

## 2018-01-19 DIAGNOSIS — R10.2 PELVIC AND PERINEAL PAIN: ICD-10-CM

## 2018-01-19 PROCEDURE — 76830 TRANSVAGINAL US NON-OB: CPT

## 2018-01-19 PROCEDURE — 76856 US EXAM PELVIC COMPLETE: CPT

## 2018-01-23 VITALS
HEART RATE: 70 BPM | BODY MASS INDEX: 26.83 KG/M2 | HEIGHT: 68 IN | DIASTOLIC BLOOD PRESSURE: 100 MMHG | SYSTOLIC BLOOD PRESSURE: 160 MMHG | WEIGHT: 177 LBS

## 2018-01-23 VITALS
WEIGHT: 179.25 LBS | SYSTOLIC BLOOD PRESSURE: 162 MMHG | HEIGHT: 68 IN | HEART RATE: 70 BPM | DIASTOLIC BLOOD PRESSURE: 84 MMHG | BODY MASS INDEX: 27.17 KG/M2

## 2018-01-23 VITALS — HEIGHT: 68 IN | WEIGHT: 179 LBS | BODY MASS INDEX: 27.13 KG/M2

## 2018-01-23 VITALS
HEIGHT: 68 IN | DIASTOLIC BLOOD PRESSURE: 98 MMHG | BODY MASS INDEX: 27.13 KG/M2 | SYSTOLIC BLOOD PRESSURE: 160 MMHG | WEIGHT: 179 LBS

## 2018-01-23 NOTE — RESULT NOTES
Message   Notify the pt normal mammogram no evidence of malignancy; see ob/gyn for routine exam and recheck screening mammogram in 1 year ; follow up with me as scheduled        Verified Results  * [facility] SCREENING BILATERAL W CAD 02Jan2018 06:11AM Lupe Avila Order Number: [id number]    - Patient Instructions: To schedule this appointment, please contact Central Scheduling at [phone]  Do not wear any perfume, powder, lotion or deodorant on breast or underarm area  Please bring your doctors order, referral (if needed) and insurance information with you on the day of the test  Failure to bring this information may result in this test being rescheduled  Arrive 15 minutes prior to your appointment time to register  On the day of your test, please bring any prior mammogram or breast studies with you that were not performed at a [facility]  Failure to bring prior exams may result in your test needing to be rescheduled  Test Name Result Flag Reference   MAMMO SCREENING BILATERAL W CAD (Report)     Patient History:   Patient is postmenopausal    Family history of breast cancer at age 48 in sister,    premenopausal breast cancer at age 45 in sister, breast cancer at   age 61 in maternal aunt, breast cancer at age 61 in maternal    aunt, breast cancer at age 61 in maternal aunt, breast cancer at    age 50 in maternal cousin, breast cancer at age 52 in maternal    cousin, breast cancer at age 46 in maternal cousin, breast cancer   at age 48 or over in paternal grandmother, prostate cancer at    age 54 in maternal uncle  Benign - WICUS BREAST GUIDANCE of the right breast, February 16, 2009  Benign -WBUS BREAST GUIDANCE of the left breast, February 16, 2009  Patient is an every day smoker  Patient's BMI is 28 9  Reason for exam: screening, asymptomatic       Mammo Screening Bilateral W CAD: January 2, 2018 - Check In #:    [de-identified]   Bilateral MLO and CC view(s) were taken  Technologist: PRINCESS Medel (R)(M)   Prior study comparison: March 13, 2015, bilateral digital    screening mammogram performed at [facility] 22 January 21, 2013, bilateral digital screening mammogram    performed at [facility] 22 November 9, 2011, bilateral WB digitl bilat nik, performed at [address] [place] [address]  There are scattered fibroglandular densities  No dominant soft tissue mass, architectural distortion or    suspicious calcifications are noted in either breast  The skin    and nipple contours are within normal limits  No evidence of malignancy  No significant changes when compared with prior studies  ACR BI-RADSï¾® Assessments: BiRad:1 - Negative     Recommendation:   Routine screening mammogram of both breasts in 1 year  Analyzed by CAD     The patient is scheduled in a reminder system for screening    mammography  8-10% of cancers will be missed on mammography  Management of a    palpable abnormality must be based on clinical grounds  Patients   will be notified of their results via letter from our facility  Accredited by [employer] of [employer] and FDA  Transcription Location: [facility] 98: [id number]     Risk Value(s):   Tyrer-Cuzick 10 Year: 10 300%, Tyrer-Cuzick Lifetime: 29 500%,    Myriad Table: 5 6%, YAAKOV 5 Year: 7 5%, NCI Lifetime: 39 7%, MRS    : Based on personal and/or family history,    consideration of hereditary risk assessment may be warranted     Signed by:   Nenita Wei MD   1/2/18       Plan  Need for influenza vaccination    · Fluzone Quadrivalent 0 5 ML Intramuscular Suspension Prefilled Syringe  Other screening mammogram    · * MAMMO SCREENING BILATERAL W CAD; Status:Temporary Deferral - Pt requests  deferral;    1/20/2018

## 2018-01-23 NOTE — MISCELLANEOUS
Message  Patient called requesting U/S results  Recent U/S showed bilateral renal hyperechoic lesions  Patient aware, will follow up as scheduled with Dr Jung Rangel    1  Abdominal pain (789 00) (R10 9)   2  Abnormal blood chemistry (790 6) (R79 9)   3  Anxiety disorder (300 00) (F41 9)   4  Asthma (493 90) (J45 909)   5  Atypical chest pain (786 59) (R07 89)   6  Backache (724 5) (M54 9)   7  Blurry vision (368 8) (H53 8)   8  Cervical cancer screening (V76 2) (Z12 4)   9  Cervical spinal stenosis (723 0) (M48 02)   10  Cervical spinal stenosis (723 0) (M48 02)   11  Cervicalgia (723 1) (M54 2)   12  Change in stool habits (787 99) (R19 4)   13  Chest pain on respiration (786 52) (R07 1)   14  Encounter for screening colonoscopy (V76 51) (Z12 11)   15  Epicondylitis, lateral (tennis elbow) (726 32) (M77 10)   16  Gallbladder disease (575 9) (K82 9)   17  Headache (784 0) (R51)   18  Hematuria (599 70) (R31 9)   19  History of gastroesophageal reflux (GERD) (V12 79) (Z87 19)   20  Hypertension (401 9) (I10)   21  Hypokalemia (276 8) (E87 6)   22  Impaired fasting glucose (790 21) (R73 01)   23  Limb pain (729 5) (M79 609)   24  Need for influenza vaccination (V04 81) (Z23)   25  Other screening mammogram (V76 12) (Z12 31)   26  Postoperative examination (V67 00) (Z09)   27  Urinary tract infection (599 0) (N39 0)   28  Venous insufficiency (459 81) (I87 2)   29  Visit for pre-operative examination (V72 84) (Z01 818)   30  Visit for routine gyn exam (V72 31) (Z01 419)    Current Meds   1  AmLODIPine Besylate 2 5 MG Oral Tablet (Norvasc); take 1 tablet by mouth every day; Therapy: 84JQJ9307 to (Evaluate:01Dtg6803)  Requested for: 02Iwt2482; Last   Rx:89Pzv0938 Ordered   2  Atenolol 50 MG Oral Tablet; take 1 tablet every day; Therapy: 19QNI4768 to (Evaluate:10Jan2018)  Requested for: 36Knr5535; Last   Rx:16Dgm6036 Ordered   3   Escitalopram Oxalate 10 MG Oral Tablet (Lexapro); take 1 tablet every day; Therapy: 08Apr2013 to (Evaluate:10Jan2018)  Requested for: 19Tsi3918; Last   Rx:44Smn2193 Ordered   4  HydroCHLOROthiazide 12 5 MG Oral Capsule; TAKE ONE CAPSULE BY MOUTH   EVERY DAY; Therapy: 92QMO2375 to (Evaluate:38Jqa5104)  Requested for: 98PPE2754; Last   Rx:69Scu5981 Ordered   5  Multivitamins Oral Capsule; TAKE 1 CAPSULE DAILY; Therapy: 70STT4598 to Recorded   6  Omeprazole 20 MG Oral Capsule Delayed Release; TAKE 1 CAPSULE DAILY EVERY   MORNING BEFORE BREAKFAST; Therapy: 15UVA4517 to (Evaluate:11Nov2017)  Requested for: 43PFO3595; Last   Rx:57Jrl0753 Ordered    Allergies    1  Robaxin TABS   2  Tramadol    3  No Known Environmental Allergies   4   No Known Food Allergies    Signatures   Electronically signed by : Alva Sherman RN; Dec 21 2017  4:15PM EST                       (Author)

## 2018-01-23 NOTE — MISCELLANEOUS
Provider Comments  Provider Comments:   Pt was a NOS  LMOM to call back and make new appointment        Signatures   Electronically signed by : Sharee Cook DO; Jan 17 2018  9:17PM EST                       (Author)

## 2018-01-23 NOTE — RESULT NOTES
Verified Results  (1) POTASSIUM 31FOZ1331 02:52PM [employer] [facility] Order Number: [id number]     Test Name Result Flag Reference   POTASSIUM 3 7 mmol/L  3 5-5 3     (1) HEMOGLOBIN A1C 44HQV0064 02:52PM [employer] [facility] [facility] Order Number: [id number]     Test Name Result Flag Reference   HEMOGLOBIN A1C 5 1 %  4 2-6 3   EST  AVG   GLUCOSE 100 mg/dl

## 2018-02-07 DIAGNOSIS — I10 ESSENTIAL HYPERTENSION: Primary | ICD-10-CM

## 2018-02-07 RX ORDER — HYDROCHLOROTHIAZIDE 12.5 MG/1
CAPSULE, GELATIN COATED ORAL
Qty: 30 CAPSULE | Refills: 1 | Status: SHIPPED | OUTPATIENT
Start: 2018-02-07 | End: 2018-04-05 | Stop reason: SDUPTHER

## 2018-02-12 DIAGNOSIS — I10 ESSENTIAL HYPERTENSION: Primary | ICD-10-CM

## 2018-02-12 RX ORDER — ESCITALOPRAM OXALATE 10 MG/1
TABLET ORAL
Qty: 30 TABLET | Refills: 0 | Status: SHIPPED | OUTPATIENT
Start: 2018-02-12 | End: 2018-03-12 | Stop reason: SDUPTHER

## 2018-02-12 RX ORDER — AMLODIPINE BESYLATE 2.5 MG/1
TABLET ORAL
Qty: 30 TABLET | Refills: 0 | Status: SHIPPED | OUTPATIENT
Start: 2018-02-12 | End: 2018-03-12 | Stop reason: SDUPTHER

## 2018-02-12 RX ORDER — ATENOLOL 50 MG/1
TABLET ORAL
Qty: 30 TABLET | Refills: 0 | Status: SHIPPED | OUTPATIENT
Start: 2018-02-12 | End: 2018-03-12 | Stop reason: SDUPTHER

## 2018-03-12 DIAGNOSIS — I10 ESSENTIAL HYPERTENSION: ICD-10-CM

## 2018-03-12 RX ORDER — ATENOLOL 50 MG/1
TABLET ORAL
Qty: 30 TABLET | Refills: 0 | Status: SHIPPED | OUTPATIENT
Start: 2018-03-12 | End: 2018-04-14 | Stop reason: SDUPTHER

## 2018-03-12 RX ORDER — ESCITALOPRAM OXALATE 10 MG/1
TABLET ORAL
Qty: 30 TABLET | Refills: 0 | Status: SHIPPED | OUTPATIENT
Start: 2018-03-12 | End: 2018-04-14 | Stop reason: SDUPTHER

## 2018-03-12 RX ORDER — AMLODIPINE BESYLATE 2.5 MG/1
TABLET ORAL
Qty: 30 TABLET | Refills: 0 | Status: SHIPPED | OUTPATIENT
Start: 2018-03-12 | End: 2018-04-14 | Stop reason: SDUPTHER

## 2018-04-05 DIAGNOSIS — I10 ESSENTIAL HYPERTENSION: ICD-10-CM

## 2018-04-05 RX ORDER — HYDROCHLOROTHIAZIDE 12.5 MG/1
CAPSULE, GELATIN COATED ORAL
Qty: 30 CAPSULE | Refills: 1 | Status: SHIPPED | OUTPATIENT
Start: 2018-04-05 | End: 2018-07-11 | Stop reason: SDUPTHER

## 2018-04-14 DIAGNOSIS — I10 ESSENTIAL HYPERTENSION: ICD-10-CM

## 2018-04-15 RX ORDER — ESCITALOPRAM OXALATE 10 MG/1
TABLET ORAL
Qty: 30 TABLET | Refills: 0 | Status: SHIPPED | OUTPATIENT
Start: 2018-04-15 | End: 2018-05-16 | Stop reason: SDUPTHER

## 2018-04-15 RX ORDER — AMLODIPINE BESYLATE 2.5 MG/1
TABLET ORAL
Qty: 30 TABLET | Refills: 0 | Status: SHIPPED | OUTPATIENT
Start: 2018-04-15 | End: 2018-05-16 | Stop reason: SDUPTHER

## 2018-04-15 RX ORDER — ATENOLOL 50 MG/1
TABLET ORAL
Qty: 30 TABLET | Refills: 0 | Status: SHIPPED | OUTPATIENT
Start: 2018-04-15 | End: 2018-05-16 | Stop reason: SDUPTHER

## 2018-05-16 DIAGNOSIS — I10 ESSENTIAL HYPERTENSION: ICD-10-CM

## 2018-05-16 RX ORDER — ESCITALOPRAM OXALATE 10 MG/1
TABLET ORAL
Qty: 30 TABLET | Refills: 0 | Status: SHIPPED | OUTPATIENT
Start: 2018-05-16 | End: 2018-06-15 | Stop reason: SDUPTHER

## 2018-05-16 RX ORDER — ATENOLOL 50 MG/1
TABLET ORAL
Qty: 30 TABLET | Refills: 0 | Status: SHIPPED | OUTPATIENT
Start: 2018-05-16 | End: 2018-06-15 | Stop reason: SDUPTHER

## 2018-05-16 RX ORDER — AMLODIPINE BESYLATE 2.5 MG/1
TABLET ORAL
Qty: 30 TABLET | Refills: 0 | Status: SHIPPED | OUTPATIENT
Start: 2018-05-16 | End: 2018-06-15 | Stop reason: SDUPTHER

## 2018-06-15 DIAGNOSIS — I10 ESSENTIAL HYPERTENSION: ICD-10-CM

## 2018-06-15 RX ORDER — ATENOLOL 50 MG/1
TABLET ORAL
Qty: 30 TABLET | Refills: 0 | Status: SHIPPED | OUTPATIENT
Start: 2018-06-15 | End: 2018-07-13 | Stop reason: SDUPTHER

## 2018-06-15 RX ORDER — AMLODIPINE BESYLATE 2.5 MG/1
TABLET ORAL
Qty: 30 TABLET | Refills: 0 | Status: SHIPPED | OUTPATIENT
Start: 2018-06-15 | End: 2018-07-13 | Stop reason: SDUPTHER

## 2018-06-15 RX ORDER — ESCITALOPRAM OXALATE 10 MG/1
TABLET ORAL
Qty: 30 TABLET | Refills: 0 | Status: SHIPPED | OUTPATIENT
Start: 2018-06-15 | End: 2018-07-13 | Stop reason: SDUPTHER

## 2018-07-11 DIAGNOSIS — I10 ESSENTIAL HYPERTENSION: ICD-10-CM

## 2018-07-11 RX ORDER — HYDROCHLOROTHIAZIDE 12.5 MG/1
CAPSULE, GELATIN COATED ORAL
Qty: 30 CAPSULE | Refills: 1 | Status: SHIPPED | OUTPATIENT
Start: 2018-07-11 | End: 2018-09-09 | Stop reason: SDUPTHER

## 2018-07-13 DIAGNOSIS — I10 ESSENTIAL HYPERTENSION: ICD-10-CM

## 2018-07-13 RX ORDER — AMLODIPINE BESYLATE 2.5 MG/1
TABLET ORAL
Qty: 30 TABLET | Refills: 0 | Status: SHIPPED | OUTPATIENT
Start: 2018-07-13 | End: 2018-08-14 | Stop reason: SDUPTHER

## 2018-07-13 RX ORDER — ESCITALOPRAM OXALATE 10 MG/1
TABLET ORAL
Qty: 30 TABLET | Refills: 0 | Status: SHIPPED | OUTPATIENT
Start: 2018-07-13 | End: 2018-08-14 | Stop reason: SDUPTHER

## 2018-07-13 RX ORDER — ATENOLOL 50 MG/1
TABLET ORAL
Qty: 30 TABLET | Refills: 0 | Status: SHIPPED | OUTPATIENT
Start: 2018-07-13 | End: 2018-08-14 | Stop reason: SDUPTHER

## 2018-07-26 ENCOUNTER — TRANSCRIBE ORDERS (OUTPATIENT)
Dept: NEUROSURGERY | Facility: CLINIC | Age: 58
End: 2018-07-26

## 2018-07-26 DIAGNOSIS — M54.2 NECK PAIN: Primary | ICD-10-CM

## 2018-08-01 ENCOUNTER — TRANSCRIBE ORDERS (OUTPATIENT)
Dept: NEUROSURGERY | Facility: CLINIC | Age: 58
End: 2018-08-01

## 2018-08-01 ENCOUNTER — OFFICE VISIT (OUTPATIENT)
Dept: NEUROSURGERY | Facility: CLINIC | Age: 58
End: 2018-08-01
Payer: COMMERCIAL

## 2018-08-01 VITALS
HEART RATE: 76 BPM | SYSTOLIC BLOOD PRESSURE: 138 MMHG | BODY MASS INDEX: 26.98 KG/M2 | RESPIRATION RATE: 16 BRPM | WEIGHT: 178 LBS | TEMPERATURE: 97.9 F | HEIGHT: 68 IN | DIASTOLIC BLOOD PRESSURE: 90 MMHG

## 2018-08-01 DIAGNOSIS — R29.898 WEAKNESS OF LEFT HAND: ICD-10-CM

## 2018-08-01 DIAGNOSIS — M54.12 RADICULOPATHY, CERVICAL: ICD-10-CM

## 2018-08-01 DIAGNOSIS — Z01.818 PRE-PROCEDURAL EXAMINATION: Primary | ICD-10-CM

## 2018-08-01 DIAGNOSIS — M54.2 CERVICALGIA: Primary | ICD-10-CM

## 2018-08-01 DIAGNOSIS — Z98.890 H/O CERVICAL SPINE SURGERY: ICD-10-CM

## 2018-08-01 DIAGNOSIS — M54.2 NECK PAIN: ICD-10-CM

## 2018-08-01 PROCEDURE — 99203 OFFICE O/P NEW LOW 30 MIN: CPT | Performed by: PHYSICIAN ASSISTANT

## 2018-08-01 NOTE — LETTER
August 1, 2018     Kermit Perez  [address] [facility] [address] [place] [address]    Patient: Medina Patten   YOB: 1960   Date of Visit: 8/1/2018       Dear Dr Daniel Chacko:     Medina Patten self referred herself for evaluation  Below are my notes for this consultation  If you have questions, please do not hesitate to call me  I look forward to following your patient along with you  Sincerely,        Sujatha Grimes PA-C        CC: No Recipients  Sujatha Grimes PA-C  8/1/2018  8:21 AM  Sign at close encounter  Assessment/Plan:    Very pleasant 81-year-old female, presents with complaint of left neck pain, numbness and tingling of her 4th and 5th digits of her left hand, headache, weakness of her left hand  Occasional pain radiating into her left arm  She reports the onset of these symptoms over the last 6 months with progressively worsened  She also makes notes that her headaches and lightheaded sensation which she had had associated with this condition in the past has returned  She reports no traumatic event associated with the onset of these new symptoms  She reports she was seen at" [facility]" for bronchitis, they did chest x-ray and contacted her suggesting she should be re-evaluated by Neurosurgery, as she had "broken loosening screws" about the area of her prior neck surgery  She reports regardless of activity sitting standing or recumbent is there is no improvement resolution of pain  She points to her left trapezius and midline of her left posterior neck is an area of pain  She reports she has weakness when she compared her left hand to her right hand  She is right-hand dominant  She denies gait or balance disturbance, bowel or bladder incontinence  She denies difficulty with dropping things, difficulty managing knife and fork, difficulty with buttons  She reports range of motion to her neck remains unchanged from her postoperative range of motion      On examination today reflexes are intact and symmetric for the upper extremities  Motor examination for the upper extremities is 5 x 5 with the exception of her  strength in her left hand, which is 4 5 x 5 (slightly decreased) sensation is intact throughout the upper extremities for sharp and dull  Her neck is supple there is some diffuse tenderness palpation over the left trapezius  She has had no management since the onset of these symptoms approximately 6 months ago  She takes no regular medications for this than occasional Aleve  Course of physical therapy is advised and consult has been placed  Consultation with pain management is advised and a consult has requested with Dr Georgette Gabriel  Imaging to include flexion-extension films of the cervical spine have been requested  MRI cervical spine with without contrast is also requested  Further follow-up with Neurosurgery is planned post imaging/therapy  Diagnoses and all orders for this visit:    Cervicalgia  -     MRI cervical spine with and without contrast; Future  -     XR spine cervical complete 6+ vw flex/ext/obl; Future  -     Ambulatory referral to Physical Therapy; Future  -     Ambulatory referral to Pain Management; Future    Neck pain  -     Ambulatory referral to Neurosurgery  -     MRI cervical spine with and without contrast; Future  -     XR spine cervical complete 6+ vw flex/ext/obl; Future  -     Ambulatory referral to Physical Therapy; Future  -     Ambulatory referral to Pain Management; Future    Radiculopathy, cervical  -     MRI cervical spine with and without contrast; Future  -     XR spine cervical complete 6+ vw flex/ext/obl; Future  -     Ambulatory referral to Physical Therapy; Future  -     Ambulatory referral to Pain Management; Future    Weakness of left hand  -     MRI cervical spine with and without contrast; Future  -     XR spine cervical complete 6+ vw flex/ext/obl;  Future  -     Ambulatory referral to Physical Therapy; Future  -     Ambulatory referral to Pain Management; Future    H/O cervical spine surgery  -     MRI cervical spine with and without contrast; Future  -     XR spine cervical complete 6+ vw flex/ext/obl; Future  -     Ambulatory referral to Physical Therapy; Future  -     Ambulatory referral to Pain Management; Future    Other orders  -     Pediatric Multivitamins-Fl (MULTIVITAMINS/FL PO); Take 1 capsule by mouth daily          No Follow-up on file  Subjective:      Patient ID: Sergio Sutton is a 63 y o  female  Very pleasant 60-year-old female, self-referred, known to the practice as noted below  She had initial presentation to [facility] early December 2013, with complaint of chronic neck pain and left arm pain/left hand pain which with progressively worsening and referred headaches  She had initial conservative therapy including epidural steroid injections and medical management she also had a left ulnar nerve decompression without improvement  She had surgery; C3-T1 posterior lateral mass screw instrumentation and fusion  C3-C4 and C7-T1 laminectomy decompression  C5-C6 and C6-C7 bilateral facet osteotomies for correction of cervical deformity, loss of cervical lordosis  Local autograft harvest for posterior lateral fusion  This was performed by Dr Jaqui Pak 12/24/13    With her last imaging, cervical spine, 6/27/14 and clinical visit 6/30/14 Dr Jaqui Pak, revealed internal fixators with appropriate placement there was some question of a T1 screw fracture which Dr Jaqui Pak felt was of no clinical significance as there was evidence of appropriate fusion/stabilization, and the patient reported she was doing very well, pain-free, occasional trapezius spasm  At this point Dr Jaqui Pak released her to return to all usual activities without restriction    She continues to follow with her primary care provider for management of hypertension, GERD, and anxiety depression      She continues to smoke at least a half a pack of cigarettes a day and at least a 20+ year history of this  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  The following portions of the patient's history were reviewed and updated as appropriate: allergies, current medications, past family history, past medical history, past social history and past surgical history  Review of Systems   HENT: Negative  Eyes: Negative  Respiratory: Negative  Cardiovascular: Negative  Gastrointestinal: Negative  Endocrine: Negative  Genitourinary: Negative  Musculoskeletal: Positive for neck pain (RADIATES TO LEFT ARM AND HAND) and neck stiffness  Skin: Negative  Neurological: Positive for weakness (LEFT HAND), light-headedness, numbness (AND TINGLING LEFT ARM  AND HAND) and headaches  Hematological: Negative  Psychiatric/Behavioral: Negative  Objective:    Physical Exam   Constitutional: She is oriented to person, place, and time  She appears well-developed and well-nourished  HENT:   Head: Normocephalic and atraumatic  Neck: Normal range of motion  Neck supple  Cardiovascular: Normal rate, regular rhythm and normal heart sounds  Pulmonary/Chest: Effort normal and breath sounds normal    Musculoskeletal: Normal range of motion  Neurological: She is alert and oriented to person, place, and time  She has normal reflexes  Gait normal    Reflex Scores:       Tricep reflexes are 2+ on the right side and 2+ on the left side  Bicep reflexes are 2+ on the right side and 2+ on the left side  Skin: Skin is warm and dry  Surgical site; posterior neck, well-healed   Psychiatric: She has a normal mood and affect  Neurologic Exam     Mental Status   Oriented to person, place, and time     Level of consciousness: alert    Motor Exam   Muscle bulk: normal  Overall muscle tone: normal  Right arm pronator drift: absent  Left arm pronator drift: absent    Strength   Right deltoid: 5/5  Left deltoid: 5/5  Right biceps: 5/5  Left biceps: 5/5  Right triceps: 5/5  Left triceps: 5/5  Right wrist flexion: 5/5  Left wrist flexion: 5/5  Right wrist extension: 5/5  Left wrist extension: 5/5  Right interossei: 5/5  Left interossei: 4/5    Sensory Exam   Light touch normal    Pinprick normal      Gait, Coordination, and Reflexes     Gait  Gait: normal    Reflexes   Right biceps: 2+  Left biceps: 2+  Right triceps: 2+  Left triceps: 2+  Right Henao: absent  Left Henao: absent

## 2018-08-01 NOTE — PROGRESS NOTES
Assessment/Plan:    Very pleasant 60-year-old female, presents with complaint of left neck pain, numbness and tingling of her 4th and 5th digits of her left hand, headache, weakness of her left hand  Occasional pain radiating into her left arm  She reports the onset of these symptoms over the last 6 months with progressively worsened  She also makes notes that her headaches and lightheaded sensation which she had had associated with this condition in the past has returned  She reports no traumatic event associated with the onset of these new symptoms  She reports she was seen at" [facility]" for bronchitis, they did chest x-ray and contacted her suggesting she should be re-evaluated by Neurosurgery, as she had "broken loosening screws" about the area of her prior neck surgery  She reports regardless of activity sitting standing or recumbent is there is no improvement resolution of pain  She points to her left trapezius and midline of her left posterior neck is an area of pain  She reports she has weakness when she compared her left hand to her right hand  She is right-hand dominant  She denies gait or balance disturbance, bowel or bladder incontinence  She denies difficulty with dropping things, difficulty managing knife and fork, difficulty with buttons  She reports range of motion to her neck remains unchanged from her postoperative range of motion  On examination today reflexes are intact and symmetric for the upper extremities  Motor examination for the upper extremities is 5 x 5 with the exception of her  strength in her left hand, which is 4 5 x 5 (slightly decreased) sensation is intact throughout the upper extremities for sharp and dull  Her neck is supple there is some diffuse tenderness palpation over the left trapezius  She has had no management since the onset of these symptoms approximately 6 months ago    She takes no regular medications for this than occasional Aleve     Course of physical therapy is advised and consult has been placed  Consultation with pain management is advised and a consult has requested with Dr Elfego Beverly  Imaging to include flexion-extension films of the cervical spine have been requested  MRI cervical spine with without contrast is also requested  Further follow-up with Neurosurgery is planned post imaging/therapy  Diagnoses and all orders for this visit:    Cervicalgia  -     MRI cervical spine with and without contrast; Future  -     XR spine cervical complete 6+ vw flex/ext/obl; Future  -     Ambulatory referral to Physical Therapy; Future  -     Ambulatory referral to Pain Management; Future    Neck pain  -     Ambulatory referral to Neurosurgery  -     MRI cervical spine with and without contrast; Future  -     XR spine cervical complete 6+ vw flex/ext/obl; Future  -     Ambulatory referral to Physical Therapy; Future  -     Ambulatory referral to Pain Management; Future    Radiculopathy, cervical  -     MRI cervical spine with and without contrast; Future  -     XR spine cervical complete 6+ vw flex/ext/obl; Future  -     Ambulatory referral to Physical Therapy; Future  -     Ambulatory referral to Pain Management; Future    Weakness of left hand  -     MRI cervical spine with and without contrast; Future  -     XR spine cervical complete 6+ vw flex/ext/obl; Future  -     Ambulatory referral to Physical Therapy; Future  -     Ambulatory referral to Pain Management; Future    H/O cervical spine surgery  -     MRI cervical spine with and without contrast; Future  -     XR spine cervical complete 6+ vw flex/ext/obl; Future  -     Ambulatory referral to Physical Therapy; Future  -     Ambulatory referral to Pain Management; Future    Other orders  -     Pediatric Multivitamins-Fl (MULTIVITAMINS/FL PO); Take 1 capsule by mouth daily          No Follow-up on file  Subjective:      Patient ID: Teresa Pérez is a 63 y o  female  Very pleasant 52-year-old female, self-referred, known to the practice as noted below  She had initial presentation to [address] [place] [address] early December 2013, with complaint of chronic neck pain and left arm pain/left hand pain which with progressively worsening and referred headaches  She had initial conservative therapy including epidural steroid injections and medical management she also had a left ulnar nerve decompression without improvement  She had surgery; C3-T1 posterior lateral mass screw instrumentation and fusion  C3-C4 and C7-T1 laminectomy decompression  C5-C6 and C6-C7 bilateral facet osteotomies for correction of cervical deformity, loss of cervical lordosis  Local autograft harvest for posterior lateral fusion  This was performed by Dr Arlet Morales 12/24/13    With her last imaging, cervical spine, 6/27/14 and clinical visit 6/30/14 Dr Arlet Morales, revealed internal fixators with appropriate placement there was some question of a T1 screw fracture which Dr Arlet Morales felt was of no clinical significance as there was evidence of appropriate fusion/stabilization, and the patient reported she was doing very well, pain-free, occasional trapezius spasm  At this point Dr Arlet Morales released her to return to all usual activities without restriction    She continues to follow with her primary care provider for management of hypertension, GERD, and anxiety depression  She continues to smoke at least a half a pack of cigarettes a day and at least a 20+ year history of this  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  The following portions of the patient's history were reviewed and updated as appropriate: allergies, current medications, past family history, past medical history, past social history and past surgical history  Review of Systems   HENT: Negative  Eyes: Negative  Respiratory: Negative  Cardiovascular: Negative  Gastrointestinal: Negative  Endocrine: Negative  Genitourinary: Negative  Musculoskeletal: Positive for neck pain (RADIATES TO LEFT ARM AND HAND) and neck stiffness  Skin: Negative  Neurological: Positive for weakness (LEFT HAND), light-headedness, numbness (AND TINGLING LEFT ARM  AND HAND) and headaches  Hematological: Negative  Psychiatric/Behavioral: Negative  Objective:    Physical Exam   Constitutional: She is oriented to person, place, and time  She appears well-developed and well-nourished  HENT:   Head: Normocephalic and atraumatic  Neck: Normal range of motion  Neck supple  Cardiovascular: Normal rate, regular rhythm and normal heart sounds  Pulmonary/Chest: Effort normal and breath sounds normal    Musculoskeletal: Normal range of motion  Neurological: She is alert and oriented to person, place, and time  She has normal reflexes  Gait normal    Reflex Scores:       Tricep reflexes are 2+ on the right side and 2+ on the left side  Bicep reflexes are 2+ on the right side and 2+ on the left side  Skin: Skin is warm and dry  Surgical site; posterior neck, well-healed   Psychiatric: She has a normal mood and affect  Neurologic Exam     Mental Status   Oriented to person, place, and time     Level of consciousness: alert    Motor Exam   Muscle bulk: normal  Overall muscle tone: normal  Right arm pronator drift: absent  Left arm pronator drift: absent    Strength   Right deltoid: 5/5  Left deltoid: 5/5  Right biceps: 5/5  Left biceps: 5/5  Right triceps: 5/5  Left triceps: 5/5  Right wrist flexion: 5/5  Left wrist flexion: 5/5  Right wrist extension: 5/5  Left wrist extension: 5/5  Right interossei: 5/5  Left interossei: 4/5    Sensory Exam   Light touch normal    Pinprick normal      Gait, Coordination, and Reflexes     Gait  Gait: normal    Reflexes   Right biceps: 2+  Left biceps: 2+  Right triceps: 2+  Left triceps: 2+  Right Henao: absent  Left Henao: absent

## 2018-08-14 DIAGNOSIS — I10 ESSENTIAL HYPERTENSION: ICD-10-CM

## 2018-08-14 RX ORDER — AMLODIPINE BESYLATE 2.5 MG/1
TABLET ORAL
Qty: 30 TABLET | Refills: 0 | Status: SHIPPED | OUTPATIENT
Start: 2018-08-14 | End: 2018-09-10 | Stop reason: SDUPTHER

## 2018-08-14 RX ORDER — ATENOLOL 50 MG/1
TABLET ORAL
Qty: 30 TABLET | Refills: 0 | Status: SHIPPED | OUTPATIENT
Start: 2018-08-14 | End: 2018-09-10 | Stop reason: SDUPTHER

## 2018-08-14 RX ORDER — ESCITALOPRAM OXALATE 10 MG/1
TABLET ORAL
Qty: 30 TABLET | Refills: 0 | Status: SHIPPED | OUTPATIENT
Start: 2018-08-14 | End: 2018-09-10 | Stop reason: SDUPTHER

## 2018-09-09 DIAGNOSIS — I10 ESSENTIAL HYPERTENSION: ICD-10-CM

## 2018-09-09 RX ORDER — HYDROCHLOROTHIAZIDE 12.5 MG/1
CAPSULE, GELATIN COATED ORAL
Qty: 30 CAPSULE | Refills: 1 | Status: SHIPPED | OUTPATIENT
Start: 2018-09-09 | End: 2018-11-14 | Stop reason: SDUPTHER

## 2018-09-10 DIAGNOSIS — I10 ESSENTIAL HYPERTENSION: ICD-10-CM

## 2018-09-10 RX ORDER — AMLODIPINE BESYLATE 2.5 MG/1
TABLET ORAL
Qty: 30 TABLET | Refills: 0 | Status: SHIPPED | OUTPATIENT
Start: 2018-09-10 | End: 2018-10-20 | Stop reason: SDUPTHER

## 2018-09-10 RX ORDER — ATENOLOL 50 MG/1
TABLET ORAL
Qty: 30 TABLET | Refills: 0 | Status: SHIPPED | OUTPATIENT
Start: 2018-09-10 | End: 2018-10-19 | Stop reason: SDUPTHER

## 2018-09-10 RX ORDER — ESCITALOPRAM OXALATE 10 MG/1
TABLET ORAL
Qty: 30 TABLET | Refills: 0 | Status: SHIPPED | OUTPATIENT
Start: 2018-09-10 | End: 2018-10-19 | Stop reason: SDUPTHER

## 2018-10-19 DIAGNOSIS — I10 ESSENTIAL HYPERTENSION: ICD-10-CM

## 2018-10-20 DIAGNOSIS — I10 ESSENTIAL HYPERTENSION: ICD-10-CM

## 2018-10-20 RX ORDER — ATENOLOL 50 MG/1
TABLET ORAL
Qty: 30 TABLET | Refills: 0 | Status: SHIPPED | OUTPATIENT
Start: 2018-10-20 | End: 2018-11-17 | Stop reason: SDUPTHER

## 2018-10-20 RX ORDER — ESCITALOPRAM OXALATE 10 MG/1
TABLET ORAL
Qty: 30 TABLET | Refills: 0 | Status: SHIPPED | OUTPATIENT
Start: 2018-10-20 | End: 2018-11-17 | Stop reason: SDUPTHER

## 2018-10-20 RX ORDER — AMLODIPINE BESYLATE 2.5 MG/1
TABLET ORAL
Qty: 30 TABLET | Refills: 0 | Status: SHIPPED | OUTPATIENT
Start: 2018-10-20 | End: 2018-11-17 | Stop reason: SDUPTHER

## 2018-11-14 DIAGNOSIS — I10 ESSENTIAL HYPERTENSION: ICD-10-CM

## 2018-11-14 RX ORDER — HYDROCHLOROTHIAZIDE 12.5 MG/1
CAPSULE, GELATIN COATED ORAL
Qty: 30 CAPSULE | Refills: 1 | Status: SHIPPED | OUTPATIENT
Start: 2018-11-14 | End: 2018-12-14 | Stop reason: SDUPTHER

## 2018-11-17 DIAGNOSIS — I10 ESSENTIAL HYPERTENSION: ICD-10-CM

## 2018-11-18 RX ORDER — ESCITALOPRAM OXALATE 10 MG/1
TABLET ORAL
Qty: 30 TABLET | Refills: 0 | Status: SHIPPED | OUTPATIENT
Start: 2018-11-18 | End: 2018-12-14 | Stop reason: SDUPTHER

## 2018-11-18 RX ORDER — AMLODIPINE BESYLATE 2.5 MG/1
TABLET ORAL
Qty: 30 TABLET | Refills: 0 | Status: SHIPPED | OUTPATIENT
Start: 2018-11-18 | End: 2018-12-14 | Stop reason: SDUPTHER

## 2018-11-18 RX ORDER — ATENOLOL 50 MG/1
TABLET ORAL
Qty: 30 TABLET | Refills: 0 | Status: SHIPPED | OUTPATIENT
Start: 2018-11-18 | End: 2018-12-14 | Stop reason: SDUPTHER

## 2018-11-19 NOTE — TELEPHONE ENCOUNTER
Kylie, please advise when patient is to be seen since you last saw in 11/2017  Thank you  Patient is requesting refills

## 2018-12-14 ENCOUNTER — OFFICE VISIT (OUTPATIENT)
Dept: INTERNAL MEDICINE CLINIC | Facility: CLINIC | Age: 58
End: 2018-12-14
Payer: COMMERCIAL

## 2018-12-14 VITALS
RESPIRATION RATE: 16 BRPM | SYSTOLIC BLOOD PRESSURE: 150 MMHG | WEIGHT: 174.2 LBS | DIASTOLIC BLOOD PRESSURE: 100 MMHG | OXYGEN SATURATION: 97 % | TEMPERATURE: 98.4 F | HEART RATE: 81 BPM | HEIGHT: 68 IN | BODY MASS INDEX: 26.4 KG/M2

## 2018-12-14 DIAGNOSIS — I10 ESSENTIAL HYPERTENSION: ICD-10-CM

## 2018-12-14 DIAGNOSIS — Z00.00 HEALTHCARE MAINTENANCE: Primary | ICD-10-CM

## 2018-12-14 DIAGNOSIS — R19.7 DIARRHEA, UNSPECIFIED TYPE: ICD-10-CM

## 2018-12-14 DIAGNOSIS — Z12.39 SCREENING FOR MALIGNANT NEOPLASM OF BREAST: ICD-10-CM

## 2018-12-14 DIAGNOSIS — R79.89 LOW VITAMIN D LEVEL: ICD-10-CM

## 2018-12-14 PROCEDURE — 99396 PREV VISIT EST AGE 40-64: CPT | Performed by: NURSE PRACTITIONER

## 2018-12-14 RX ORDER — AMLODIPINE BESYLATE 2.5 MG/1
2.5 TABLET ORAL DAILY
Qty: 90 TABLET | Refills: 0 | Status: SHIPPED | OUTPATIENT
Start: 2018-12-14 | End: 2019-03-16 | Stop reason: SDUPTHER

## 2018-12-14 RX ORDER — ATENOLOL 50 MG/1
50 TABLET ORAL DAILY
Qty: 90 TABLET | Refills: 0 | Status: SHIPPED | OUTPATIENT
Start: 2018-12-14 | End: 2019-03-16 | Stop reason: SDUPTHER

## 2018-12-14 RX ORDER — HYDROCHLOROTHIAZIDE 12.5 MG/1
12.5 CAPSULE, GELATIN COATED ORAL DAILY
Qty: 90 CAPSULE | Refills: 0 | Status: SHIPPED | OUTPATIENT
Start: 2018-12-14 | End: 2020-09-25 | Stop reason: SDUPTHER

## 2018-12-14 RX ORDER — ESCITALOPRAM OXALATE 10 MG/1
10 TABLET ORAL DAILY
Qty: 90 TABLET | Refills: 0 | Status: SHIPPED | OUTPATIENT
Start: 2018-12-14 | End: 2019-03-16 | Stop reason: SDUPTHER

## 2018-12-14 NOTE — PROGRESS NOTES
NAME: Quinten Farah  AGE: 63 y o  SEX: female  : 1960     DATE: 18    Assessment and Plan     Problem List Items Addressed This Visit     None      Visit Diagnoses     Healthcare maintenance    -  Primary    Relevant Orders    Comprehensive metabolic panel    Lipid panel    CBC and differential    Essential hypertension        Relevant Medications    hydrochlorothiazide (MICROZIDE) 12 5 mg capsule    escitalopram (LEXAPRO) 10 mg tablet    atenolol (TENORMIN) 50 mg tablet    amLODIPine (NORVASC) 2 5 mg tablet    Other Relevant Orders    Comprehensive metabolic panel    Lipid panel    CBC and differential    Screening for malignant neoplasm of breast        Relevant Orders    Mammo screening bilateral w cad    Low vitamin D level        Relevant Orders    Vitamin D 25 hydroxy    Diarrhea, unspecified type        declines seeing GI for now, will try antidiarrheal diet, cut back on fat and dairy and no raw foods  imodium prn is ok            · Patient Counseling:   · Nutrition: Stressed importance of a well balanced diet, moderation of sodium/saturated fat, caloric balance and sufficient intake of fiber  · Exercise: Stressed the importance of regular exercise with a goal of 150 minutes per week  · Dental Health: Discussed daily flossing and brushing and regular dental visits   ·   · Discussed the patient's BMI with him    The BMI is above average; BMI management plan is completed     Follow up in a year      Chief Complaint     Chief Complaint   Patient presents with    Well Check     Patient here for annual wellness exam          History of Present Illness     HPI    Well Adult Physical   Patient here for a comprehensive physical exam     She also co chronic diarrhea, saw GI a long time ago for "gallbladder issues" no nausea or vomiting  she has epigastric pain and diarrhea whenever she eats    Diet and Physical Activity  Diet: well balanced diet  Weight concerns: Patient is overweight (BMI 25 0-29  9)  Exercise: frequently      Depression Screen  PHQ-9 Depression Screening    PHQ-9:    Frequency of the following problems over the past two weeks:       Little interest or pleasure in doing things:  0 - not at all  Feeling down, depressed, or hopeless:  0 - not at all  PHQ-2 Score:  0         General Health  Hearing: Normal:  bilateral  Vision: no vision problems  Dental: regular dental visits        The following portions of the patient's history were reviewed and updated as appropriate: allergies, current medications, past family history, past medical history, past social history, past surgical history and problem list     Review of Systems   Review of Systems   Constitutional: Negative  HENT: Negative  Eyes: Negative  Respiratory: Negative  Cardiovascular: Negative  Gastrointestinal: Negative  Musculoskeletal: Negative  Neurological: Negative            Past Medical History     Past Medical History:   Diagnosis Date    Cervical disc disorder          Past Surgical History     Past Surgical History:   Procedure Laterality Date    CERVICAL FUSION  2013    cC3-C4, C4-C5, C5 C6, C6-C7, C7-T1     SECTION      ELBOW SURGERY      KNEE SURGERY Bilateral 20YRS AGO       Social History     Social History     Social History    Marital status: /Civil Union     Spouse name: N/A    Number of children: N/A    Years of education: N/A     Social History Main Topics    Smoking status: Current Some Day Smoker     Packs/day: 0 50     Years: 20 00    Smokeless tobacco: Never Used    Alcohol use Yes      Comment: SOCIAL    Drug use: Unknown    Sexual activity: Not Asked     Other Topics Concern    None     Social History Narrative    None         Family History     Family History   Problem Relation Age of Onset    Cancer Mother     Heart disease Father          Current Medications     Current Outpatient Prescriptions:     amLODIPine (NORVASC) 2 5 mg tablet, Take 1 tablet (2 5 mg total) by mouth daily, Disp: 90 tablet, Rfl: 0    atenolol (TENORMIN) 50 mg tablet, Take 1 tablet (50 mg total) by mouth daily, Disp: 90 tablet, Rfl: 0    escitalopram (LEXAPRO) 10 mg tablet, Take 1 tablet (10 mg total) by mouth daily, Disp: 90 tablet, Rfl: 0    hydrochlorothiazide (MICROZIDE) 12 5 mg capsule, Take 1 capsule (12 5 mg total) by mouth daily, Disp: 90 capsule, Rfl: 0    Pediatric Multivitamins-Fl (MULTIVITAMINS/FL PO), Take 1 capsule by mouth daily, Disp: , Rfl:     Allergies   No Known Allergies    Objective     Vitals:    12/14/18 0923   BP: 150/100   Pulse: 81   Resp: 16   Temp: 98 4 °F (36 9 °C)   SpO2: 97%       Physical Exam  Physical Exam   Constitutional: She is oriented to person, place, and time  She appears well-developed and well-nourished  HENT:   Head: Normocephalic and atraumatic  Eyes: Pupils are equal, round, and reactive to light  Conjunctivae are normal    Neck: Normal range of motion  Neck supple  Cardiovascular: Normal rate and regular rhythm  Pulmonary/Chest: Effort normal and breath sounds normal    Abdominal: Soft  Bowel sounds are normal    Neurological: She is alert and oriented to person, place, and time  Nursing note and vitals reviewed  Health Maintenance     Health Maintenance   Topic    Hepatitis C Screening     Pneumococcal PPSV23 Medium Risk Adult (1 of 1 - PPSV23)    PAP SMEAR     Depression Screening PHQ     CRC Screening: Colonoscopy     DTaP,Tdap,and Td Vaccines (2 - Td)    INFLUENZA VACCINE        Immunization status: up to date and documented    Immunization History   Administered Date(s) Administered     Influenza (IM) Preservative Free 11/01/2012    Influenza 11/20/2017, 11/22/2018    Influenza Quadrivalent Preservative Free 3 years and older IM 11/20/2017    Influenza TIV (IM) 11/22/2013    Tdap 03/16/2011

## 2019-01-12 ENCOUNTER — LAB (OUTPATIENT)
Dept: LAB | Age: 59
End: 2019-01-12
Payer: COMMERCIAL

## 2019-01-12 DIAGNOSIS — Z00.00 HEALTHCARE MAINTENANCE: ICD-10-CM

## 2019-01-12 DIAGNOSIS — R79.89 LOW VITAMIN D LEVEL: ICD-10-CM

## 2019-01-12 DIAGNOSIS — I10 ESSENTIAL HYPERTENSION: ICD-10-CM

## 2019-01-12 LAB
25(OH)D3 SERPL-MCNC: 11.1 NG/ML (ref 30–100)
ALBUMIN SERPL BCP-MCNC: 3.6 G/DL (ref 3.5–5)
ALP SERPL-CCNC: 53 U/L (ref 46–116)
ALT SERPL W P-5'-P-CCNC: 23 U/L (ref 12–78)
ANION GAP SERPL CALCULATED.3IONS-SCNC: 10 MMOL/L (ref 4–13)
AST SERPL W P-5'-P-CCNC: 20 U/L (ref 5–45)
BASOPHILS # BLD AUTO: 0.04 THOUSANDS/ΜL (ref 0–0.1)
BASOPHILS NFR BLD AUTO: 0 % (ref 0–1)
BILIRUB SERPL-MCNC: 0.85 MG/DL (ref 0.2–1)
BUN SERPL-MCNC: 11 MG/DL (ref 5–25)
CALCIUM SERPL-MCNC: 8.5 MG/DL (ref 8.3–10.1)
CHLORIDE SERPL-SCNC: 102 MMOL/L (ref 100–108)
CHOLEST SERPL-MCNC: 269 MG/DL (ref 50–200)
CO2 SERPL-SCNC: 28 MMOL/L (ref 21–32)
CREAT SERPL-MCNC: 0.74 MG/DL (ref 0.6–1.3)
EOSINOPHIL # BLD AUTO: 0.03 THOUSAND/ΜL (ref 0–0.61)
EOSINOPHIL NFR BLD AUTO: 0 % (ref 0–6)
ERYTHROCYTE [DISTWIDTH] IN BLOOD BY AUTOMATED COUNT: 12 % (ref 11.6–15.1)
GFR SERPL CREATININE-BSD FRML MDRD: 90 ML/MIN/1.73SQ M
GLUCOSE P FAST SERPL-MCNC: 106 MG/DL (ref 65–99)
HCT VFR BLD AUTO: 41 % (ref 34.8–46.1)
HDLC SERPL-MCNC: 49 MG/DL (ref 40–60)
HGB BLD-MCNC: 14.8 G/DL (ref 11.5–15.4)
IMM GRANULOCYTES # BLD AUTO: 0.01 THOUSAND/UL (ref 0–0.2)
IMM GRANULOCYTES NFR BLD AUTO: 0 % (ref 0–2)
LYMPHOCYTES # BLD AUTO: 2.33 THOUSANDS/ΜL (ref 0.6–4.47)
LYMPHOCYTES NFR BLD AUTO: 26 % (ref 14–44)
MCH RBC QN AUTO: 36.2 PG (ref 26.8–34.3)
MCHC RBC AUTO-ENTMCNC: 36.1 G/DL (ref 31.4–37.4)
MCV RBC AUTO: 100 FL (ref 82–98)
MONOCYTES # BLD AUTO: 0.83 THOUSAND/ΜL (ref 0.17–1.22)
MONOCYTES NFR BLD AUTO: 9 % (ref 4–12)
NEUTROPHILS # BLD AUTO: 5.71 THOUSANDS/ΜL (ref 1.85–7.62)
NEUTS SEG NFR BLD AUTO: 65 % (ref 43–75)
NONHDLC SERPL-MCNC: 220 MG/DL
NRBC BLD AUTO-RTO: 0 /100 WBCS
PLATELET # BLD AUTO: 268 THOUSANDS/UL (ref 149–390)
PMV BLD AUTO: 9.1 FL (ref 8.9–12.7)
POTASSIUM SERPL-SCNC: 3.3 MMOL/L (ref 3.5–5.3)
PROT SERPL-MCNC: 6.8 G/DL (ref 6.4–8.2)
RBC # BLD AUTO: 4.09 MILLION/UL (ref 3.81–5.12)
SODIUM SERPL-SCNC: 140 MMOL/L (ref 136–145)
TRIGL SERPL-MCNC: 837 MG/DL
WBC # BLD AUTO: 8.95 THOUSAND/UL (ref 4.31–10.16)

## 2019-01-12 PROCEDURE — 36415 COLL VENOUS BLD VENIPUNCTURE: CPT

## 2019-01-12 PROCEDURE — 82306 VITAMIN D 25 HYDROXY: CPT

## 2019-01-12 PROCEDURE — 80061 LIPID PANEL: CPT

## 2019-01-12 PROCEDURE — 85025 COMPLETE CBC W/AUTO DIFF WBC: CPT

## 2019-01-12 PROCEDURE — 80053 COMPREHEN METABOLIC PANEL: CPT

## 2019-01-15 DIAGNOSIS — E78.1 HYPERTRIGLYCERIDEMIA: ICD-10-CM

## 2019-01-15 DIAGNOSIS — R79.89 LOW VITAMIN D LEVEL: ICD-10-CM

## 2019-01-15 DIAGNOSIS — E87.6 LOW BLOOD POTASSIUM: Primary | ICD-10-CM

## 2019-01-15 RX ORDER — ERGOCALCIFEROL 1.25 MG/1
50000 CAPSULE ORAL WEEKLY
Qty: 12 CAPSULE | Refills: 0 | Status: SHIPPED | OUTPATIENT
Start: 2019-01-15 | End: 2019-04-16 | Stop reason: SDUPTHER

## 2019-01-15 RX ORDER — OMEGA-3-ACID ETHYL ESTERS 1 G/1
2 CAPSULE, LIQUID FILLED ORAL 2 TIMES DAILY
Qty: 120 CAPSULE | Refills: 0 | Status: SHIPPED | OUTPATIENT
Start: 2019-01-15 | End: 2021-02-03

## 2019-03-16 DIAGNOSIS — I10 ESSENTIAL HYPERTENSION: ICD-10-CM

## 2019-03-18 RX ORDER — ATENOLOL 50 MG/1
50 TABLET ORAL DAILY
Qty: 90 TABLET | Refills: 0 | Status: SHIPPED | OUTPATIENT
Start: 2019-03-18 | End: 2019-06-14 | Stop reason: SDUPTHER

## 2019-03-18 RX ORDER — ESCITALOPRAM OXALATE 10 MG/1
10 TABLET ORAL DAILY
Qty: 90 TABLET | Refills: 0 | Status: SHIPPED | OUTPATIENT
Start: 2019-03-18 | End: 2019-06-14 | Stop reason: SDUPTHER

## 2019-03-18 RX ORDER — AMLODIPINE BESYLATE 2.5 MG/1
2.5 TABLET ORAL DAILY
Qty: 90 TABLET | Refills: 0 | Status: SHIPPED | OUTPATIENT
Start: 2019-03-18 | End: 2019-06-14 | Stop reason: SDUPTHER

## 2019-04-05 ENCOUNTER — HOSPITAL ENCOUNTER (OUTPATIENT)
Dept: RADIOLOGY | Age: 59
Discharge: HOME | End: 2019-04-05
Payer: COMMERCIAL

## 2019-04-05 VITALS — WEIGHT: 172 LBS | HEIGHT: 68 IN | BODY MASS INDEX: 26.07 KG/M2

## 2019-04-05 DIAGNOSIS — Z12.39 SCREENING FOR MALIGNANT NEOPLASM OF BREAST: ICD-10-CM

## 2019-04-05 PROCEDURE — 77067 SCR MAMMO BI INCL CAD: CPT

## 2019-04-16 DIAGNOSIS — R79.89 LOW VITAMIN D LEVEL: ICD-10-CM

## 2019-04-16 RX ORDER — ERGOCALCIFEROL 1.25 MG/1
CAPSULE ORAL
Qty: 12 CAPSULE | Refills: 0 | Status: SHIPPED | OUTPATIENT
Start: 2019-04-16 | End: 2019-07-12 | Stop reason: SDUPTHER

## 2019-04-18 DIAGNOSIS — I10 ESSENTIAL HYPERTENSION: ICD-10-CM

## 2019-04-18 RX ORDER — HYDROCHLOROTHIAZIDE 12.5 MG/1
CAPSULE, GELATIN COATED ORAL
Qty: 30 CAPSULE | Refills: 1 | Status: SHIPPED | OUTPATIENT
Start: 2019-04-18 | End: 2019-06-14 | Stop reason: SDUPTHER

## 2019-06-14 DIAGNOSIS — I10 ESSENTIAL HYPERTENSION: ICD-10-CM

## 2019-06-15 RX ORDER — AMLODIPINE BESYLATE 2.5 MG/1
TABLET ORAL
Qty: 90 TABLET | Refills: 0 | Status: SHIPPED | OUTPATIENT
Start: 2019-06-15 | End: 2019-07-14 | Stop reason: SDUPTHER

## 2019-06-15 RX ORDER — ESCITALOPRAM OXALATE 10 MG/1
TABLET ORAL
Qty: 90 TABLET | Refills: 0 | Status: SHIPPED | OUTPATIENT
Start: 2019-06-15 | End: 2019-07-14 | Stop reason: SDUPTHER

## 2019-06-15 RX ORDER — HYDROCHLOROTHIAZIDE 12.5 MG/1
CAPSULE, GELATIN COATED ORAL
Qty: 30 CAPSULE | Refills: 1 | Status: SHIPPED | OUTPATIENT
Start: 2019-06-15 | End: 2019-07-14 | Stop reason: SDUPTHER

## 2019-06-15 RX ORDER — ATENOLOL 50 MG/1
TABLET ORAL
Qty: 90 TABLET | Refills: 0 | Status: SHIPPED | OUTPATIENT
Start: 2019-06-15 | End: 2019-07-14 | Stop reason: SDUPTHER

## 2019-07-12 DIAGNOSIS — R79.89 LOW VITAMIN D LEVEL: ICD-10-CM

## 2019-07-12 RX ORDER — ERGOCALCIFEROL 1.25 MG/1
CAPSULE ORAL
Qty: 4 CAPSULE | Refills: 2 | Status: SHIPPED | OUTPATIENT
Start: 2019-07-12 | End: 2019-08-07 | Stop reason: SDUPTHER

## 2019-07-14 DIAGNOSIS — I10 ESSENTIAL HYPERTENSION: ICD-10-CM

## 2019-07-14 RX ORDER — HYDROCHLOROTHIAZIDE 12.5 MG/1
CAPSULE, GELATIN COATED ORAL
Qty: 30 CAPSULE | Refills: 1 | Status: SHIPPED | OUTPATIENT
Start: 2019-07-14 | End: 2019-08-09 | Stop reason: SDUPTHER

## 2019-07-14 RX ORDER — ATENOLOL 50 MG/1
TABLET ORAL
Qty: 30 TABLET | Refills: 2 | Status: SHIPPED | OUTPATIENT
Start: 2019-07-14 | End: 2019-10-12 | Stop reason: SDUPTHER

## 2019-07-14 RX ORDER — AMLODIPINE BESYLATE 2.5 MG/1
TABLET ORAL
Qty: 30 TABLET | Refills: 2 | Status: SHIPPED | OUTPATIENT
Start: 2019-07-14 | End: 2019-10-10 | Stop reason: SDUPTHER

## 2019-07-14 RX ORDER — ESCITALOPRAM OXALATE 10 MG/1
TABLET ORAL
Qty: 30 TABLET | Refills: 2 | Status: SHIPPED | OUTPATIENT
Start: 2019-07-14 | End: 2019-10-09 | Stop reason: SDUPTHER

## 2019-08-07 DIAGNOSIS — R79.89 LOW VITAMIN D LEVEL: ICD-10-CM

## 2019-08-07 RX ORDER — ERGOCALCIFEROL 1.25 MG/1
CAPSULE ORAL
Qty: 4 CAPSULE | Refills: 2 | Status: SHIPPED | OUTPATIENT
Start: 2019-08-07 | End: 2021-02-22 | Stop reason: SDUPTHER

## 2019-08-09 DIAGNOSIS — I10 ESSENTIAL HYPERTENSION: ICD-10-CM

## 2019-08-09 RX ORDER — HYDROCHLOROTHIAZIDE 12.5 MG/1
CAPSULE, GELATIN COATED ORAL
Qty: 30 CAPSULE | Refills: 1 | Status: SHIPPED | OUTPATIENT
Start: 2019-08-09 | End: 2019-09-11 | Stop reason: SDUPTHER

## 2019-09-11 DIAGNOSIS — I10 ESSENTIAL HYPERTENSION: ICD-10-CM

## 2019-09-11 RX ORDER — HYDROCHLOROTHIAZIDE 12.5 MG/1
CAPSULE, GELATIN COATED ORAL
Qty: 30 CAPSULE | Refills: 1 | Status: SHIPPED | OUTPATIENT
Start: 2019-09-11 | End: 2019-10-12 | Stop reason: SDUPTHER

## 2019-10-09 DIAGNOSIS — I10 ESSENTIAL HYPERTENSION: ICD-10-CM

## 2019-10-09 RX ORDER — ESCITALOPRAM OXALATE 10 MG/1
TABLET ORAL
Qty: 90 TABLET | Refills: 0 | Status: SHIPPED | OUTPATIENT
Start: 2019-10-09 | End: 2020-01-02

## 2019-10-10 DIAGNOSIS — I10 ESSENTIAL HYPERTENSION: ICD-10-CM

## 2019-10-10 RX ORDER — AMLODIPINE BESYLATE 2.5 MG/1
TABLET ORAL
Qty: 90 TABLET | Refills: 0 | Status: SHIPPED | OUTPATIENT
Start: 2019-10-10 | End: 2020-01-02

## 2019-10-12 DIAGNOSIS — I10 ESSENTIAL HYPERTENSION: ICD-10-CM

## 2019-10-12 RX ORDER — HYDROCHLOROTHIAZIDE 12.5 MG/1
CAPSULE, GELATIN COATED ORAL
Qty: 30 CAPSULE | Refills: 1 | Status: SHIPPED | OUTPATIENT
Start: 2019-10-12 | End: 2019-11-11 | Stop reason: SDUPTHER

## 2019-10-12 RX ORDER — ATENOLOL 50 MG/1
TABLET ORAL
Qty: 90 TABLET | Refills: 0 | Status: SHIPPED | OUTPATIENT
Start: 2019-10-12 | End: 2020-01-02

## 2019-11-11 DIAGNOSIS — I10 ESSENTIAL HYPERTENSION: ICD-10-CM

## 2019-11-11 RX ORDER — HYDROCHLOROTHIAZIDE 12.5 MG/1
CAPSULE, GELATIN COATED ORAL
Qty: 30 CAPSULE | Refills: 1 | Status: SHIPPED | OUTPATIENT
Start: 2019-11-11 | End: 2019-12-08 | Stop reason: SDUPTHER

## 2019-12-08 DIAGNOSIS — I10 ESSENTIAL HYPERTENSION: ICD-10-CM

## 2019-12-09 RX ORDER — HYDROCHLOROTHIAZIDE 12.5 MG/1
CAPSULE, GELATIN COATED ORAL
Qty: 30 CAPSULE | Refills: 1 | Status: SHIPPED | OUTPATIENT
Start: 2019-12-09 | End: 2020-01-07

## 2020-01-01 DIAGNOSIS — I10 ESSENTIAL HYPERTENSION: ICD-10-CM

## 2020-01-02 RX ORDER — AMLODIPINE BESYLATE 2.5 MG/1
TABLET ORAL
Qty: 90 TABLET | Refills: 0 | Status: SHIPPED | OUTPATIENT
Start: 2020-01-02 | End: 2020-04-01

## 2020-01-02 RX ORDER — ATENOLOL 50 MG/1
TABLET ORAL
Qty: 90 TABLET | Refills: 0 | Status: SHIPPED | OUTPATIENT
Start: 2020-01-02 | End: 2020-04-01

## 2020-01-02 RX ORDER — ESCITALOPRAM OXALATE 10 MG/1
TABLET ORAL
Qty: 90 TABLET | Refills: 0 | Status: SHIPPED | OUTPATIENT
Start: 2020-01-02 | End: 2020-04-01

## 2020-01-06 ENCOUNTER — OFFICE VISIT (OUTPATIENT)
Dept: PULMONOLOGY | Facility: CLINIC | Age: 60
End: 2020-01-06
Payer: COMMERCIAL

## 2020-01-06 VITALS
BODY MASS INDEX: 24.55 KG/M2 | HEIGHT: 68 IN | SYSTOLIC BLOOD PRESSURE: 142 MMHG | TEMPERATURE: 98.1 F | OXYGEN SATURATION: 97 % | HEART RATE: 71 BPM | WEIGHT: 162 LBS | DIASTOLIC BLOOD PRESSURE: 80 MMHG

## 2020-01-06 DIAGNOSIS — J45.20 MILD INTERMITTENT ASTHMA WITHOUT COMPLICATION: ICD-10-CM

## 2020-01-06 DIAGNOSIS — R09.82 ALLERGIC RHINITIS WITH POSTNASAL DRIP: Primary | ICD-10-CM

## 2020-01-06 DIAGNOSIS — K21.9 GASTROESOPHAGEAL REFLUX DISEASE, ESOPHAGITIS PRESENCE NOT SPECIFIED: ICD-10-CM

## 2020-01-06 DIAGNOSIS — Z72.0 TOBACCO ABUSE: ICD-10-CM

## 2020-01-06 DIAGNOSIS — R05.9 COUGH: ICD-10-CM

## 2020-01-06 DIAGNOSIS — J30.9 ALLERGIC RHINITIS WITH POSTNASAL DRIP: Primary | ICD-10-CM

## 2020-01-06 DIAGNOSIS — G47.33 OSA (OBSTRUCTIVE SLEEP APNEA): ICD-10-CM

## 2020-01-06 PROCEDURE — 3079F DIAST BP 80-89 MM HG: CPT | Performed by: INTERNAL MEDICINE

## 2020-01-06 PROCEDURE — 99406 BEHAV CHNG SMOKING 3-10 MIN: CPT | Performed by: INTERNAL MEDICINE

## 2020-01-06 PROCEDURE — 3008F BODY MASS INDEX DOCD: CPT | Performed by: INTERNAL MEDICINE

## 2020-01-06 PROCEDURE — 3077F SYST BP >= 140 MM HG: CPT | Performed by: INTERNAL MEDICINE

## 2020-01-06 PROCEDURE — 99244 OFF/OP CNSLTJ NEW/EST MOD 40: CPT | Performed by: INTERNAL MEDICINE

## 2020-01-06 RX ORDER — ALBUTEROL SULFATE 90 UG/1
AEROSOL, METERED RESPIRATORY (INHALATION)
COMMUNITY
Start: 2019-12-07 | End: 2020-09-01 | Stop reason: SDUPTHER

## 2020-01-06 RX ORDER — FLUTICASONE PROPIONATE 50 MCG
1 SPRAY, SUSPENSION (ML) NASAL DAILY
Qty: 1 BOTTLE | Refills: 6 | Status: SHIPPED | OUTPATIENT
Start: 2020-01-06 | End: 2020-10-19

## 2020-01-06 RX ORDER — BENZONATATE 100 MG/1
CAPSULE ORAL
COMMUNITY
Start: 2019-12-16 | End: 2020-09-01

## 2020-01-06 NOTE — ASSESSMENT & PLAN NOTE
Start Flonase b i d , in the future during her allergy season to consider Singulair and antihistamine

## 2020-01-06 NOTE — ASSESSMENT & PLAN NOTE
Counseled more than 5 minutes to quit smoking  Patient did not want to use nicotine patches due to hypertension issues before, also she did not want Chantix  She denies history of depression or suicidal thoughts  I offered nicotine gum and she will try that over-the-counter  We discussed how to have a plan and make a decision and set a date to quit, and how to deal with craving/anxiety related to smoking cessation

## 2020-01-06 NOTE — PROGRESS NOTES
Consultation - Pulmonary Medicine   Tabitha Paulino 63 y o  female MRN: [id number]        Physician Requesting Consult: Leti Rubin PA-C at patient [facility]  Reason for Consult: cough    Cough  Most likely this is prolonged post viral infectious cough  But also to consider postnasal drips  I will start with Flonase and evaluate, if no improvement the next step will be inhaled corticosteroids with or without LABA  And also the cough is persistent then we will check CT scan  Mild intermittent asthma  Continue current albuterol p r n  Allergic rhinitis with postnasal drip  Start Flonase b i d , in the future during her allergy season to consider Singulair and antihistamine    NATE (obstructive sleep apnea)  Will send patient for home sleep study    GERD (gastroesophageal reflux disease)  Currently controlled with omeprazole    Tobacco abuse  Counseled more than 5 minutes to quit smoking  Patient did not want to use nicotine patches due to hypertension issues before, also she did not want Chantix  She denies history of depression or suicidal thoughts  I offered nicotine gum and she will try that over-the-counter  We discussed how to have a plan and make a decision and set a date to quit, and how to deal with craving/anxiety related to smoking cessation  Diagnoses and all orders for this visit:    Allergic rhinitis with postnasal drip  -     fluticasone (FLONASE) 50 mcg/act nasal spray; 1 spray into each nostril daily    Tobacco abuse  -     Complete PFT with post bronchodilator; Future    Gastroesophageal reflux disease, esophagitis presence not specified    NATE (obstructive sleep apnea)  -     Home Study; Future    Cough  -     Complete PFT with post bronchodilator; Future    Mild intermittent asthma without complication  -     Complete PFT with post bronchodilator;  Future    Other orders  -     albuterol (PROVENTIL HFA,VENTOLIN HFA) 90 mcg/act inhaler  -     benzonatate (TESSALON PERLES) 100 mg capsule  -     Omeprazole 20 MG TBDD; Take by mouth      ______________________________________________________________________    HPI:    Sanjeev Phillips is a 63 y o  female who presents for evaluation of cough  Patient started to have cough in mid November and at that time it was productive of clear sputum but associated with mild wheezing, body aches, mild shortness of breath only when she coughs without any chest pain or fever or chills, and she mentioned that few other coworkers were coughing whole time  She recalls some exposure to black mold on the window and she was cleaning that but not inhaled  Since then she was treated with 2 courses of prednisone and 2 courses of antibiotics including doxycycline and Levaquin  She improved significantly and her symptoms resolved except for the cough and she continues currently to have mild intermittent cough with some sputum production with variable colors  Denies hemoptysis  Denies fever or chills or night sweats  Denies shortness of breath or dyspnea on exertion  Denies weight loss  Patient has history of mild intermittent asthma p r n  Albuterol that was diagnosed several years ago  She denies any exacerbation over asthma before  She also as seasonal allergies with postnasal drips specially in the spring and summer and currently she has postnasal drips  She is not on any medications for that  Patient has GERD that is controlled with omeprazole  She has hypertension but she is not on lisinopril or other Ace inhibitors  Patient has no history of connective tissue disease or rheumatological disease  Patient has history of obstructive sleep apnea with 2 sleep studies remotely but she did not use CPAP  She continues to have significant snoring and nocturnal choking and awakenings, she has restless leg syndrome, she denies excessive daytime sleepiness though    Patient smokes cigarettes, she smoked for about 28 years, on average 1 pack day, currently she is cutting down  Review of Systems:  Review of Systems   Constitutional: Negative  HENT: Negative  As HPI   Eyes: Negative  Respiratory:        As HPI   Cardiovascular: Negative  Gastrointestinal: Negative  Endocrine: Negative  Genitourinary: Negative  Musculoskeletal: Negative  Skin: Negative  Allergic/Immunologic: Negative  Neurological: Negative  Hematological: Negative  Psychiatric/Behavioral: Negative          Current Medications:    Current Outpatient Medications:     albuterol (PROVENTIL HFA,VENTOLIN HFA) 90 mcg/act inhaler, , Disp: , Rfl:     amLODIPine (NORVASC) 2 5 mg tablet, TAKE 1 TABLET BY MOUTH EVERY DAY, Disp: 90 tablet, Rfl: 0    atenolol (TENORMIN) 50 mg tablet, TAKE 1 TABLET BY MOUTH EVERY DAY, Disp: 90 tablet, Rfl: 0    benzonatate (TESSALON PERLES) 100 mg capsule, , Disp: , Rfl:     ergocalciferol (VITAMIN D2) 50,000 units, TAKE ONE CAPSULE BY MOUTH ONE TIME PER WEEK, Disp: 4 capsule, Rfl: 2    escitalopram (LEXAPRO) 10 mg tablet, TAKE 1 TABLET BY MOUTH EVERY DAY, Disp: 90 tablet, Rfl: 0    hydrochlorothiazide (MICROZIDE) 12 5 mg capsule, Take 1 capsule (12 5 mg total) by mouth daily, Disp: 90 capsule, Rfl: 0    hydrochlorothiazide (MICROZIDE) 12 5 mg capsule, TAKE ONE CAPSULE BY MOUTH EVERY DAY, Disp: 30 capsule, Rfl: 1    omega-3-acid ethyl esters (LOVAZA) 1 g capsule, Take 2 capsules (2 g total) by mouth 2 (two) times a day, Disp: 120 capsule, Rfl: 0    Omeprazole 20 MG TBDD, Take by mouth, Disp: , Rfl:     Pediatric Multivitamins-Fl (MULTIVITAMINS/FL PO), Take 1 capsule by mouth daily, Disp: , Rfl:     Historical Information   Past Medical History:   Diagnosis Date    Cervical disc disorder     Stenosis of cervical spine region      Past Surgical History:   Procedure Laterality Date    BREAST BIOPSY Left 2009    core    BREAST BIOPSY Right 2009    core    CERVICAL FUSION  12/2013    cC3-C4, C4-C5, C5 C6, C6-C7, C7-T1     SECTION  1985    ELBOW SURGERY      KNEE SURGERY Bilateral 20YRS AGO     Social History   Social History     Tobacco Use   Smoking Status Former Smoker    Packs/day: 0 50    Years: 20 00    Pack years: 10 00    Types: Cigarettes    Last attempt to quit: 2000    Years since quittin 0   Smokeless Tobacco Never Used       Occupational history:  No occupational exposure    Family History:   Family History   Problem Relation Age of Onset    Cancer Mother     Heart disease Father     Breast cancer Sister 48    Breast cancer Sister 45    Breast cancer Maternal Aunt 61    Breast cancer Maternal Aunt 61    Breast cancer Maternal Aunt 61    Breast cancer Cousin 50    Breast cancer Cousin 52    Breast cancer Cousin     Prostate cancer Maternal Uncle 55         PhysicalExamination:  Vitals:   /80 (BP Location: Left arm, Patient Position: Sitting, Cuff Size: Standard)   Pulse 71   Temp 98 1 °F (36 7 °C) (Tympanic)   Ht 5' 8" (1 727 m)   Wt 73 5 kg (162 lb)   SpO2 97%   BMI 24 63 kg/m²     General: alert, not in acute distress  HEENT: PERRL, no icteric sclera or cyanosis, no thrush  Neck:  Supple, no lymphadenopathy or thyromegaly, no JVD  Lungs:  Equal breath sounds and clear auscultations bilaterally, no wheezing or crackles  Heart: S1S2 regular, no murmures or gallops  Abdomen: soft, non-tender, bowel sounds  present  Extrimities: no edema, no clubbing or cyanosis  Neuro: Alert and oriented x 3, no focal neurodeficits   Skin: intact, no rashes      Diagnostic Data:  Labs:   I personally reviewed the most recent laboratory data pertinent to today's visit    Lab Results   Component Value Date    WBC 8 95 2019    HGB 14 8 2019    HCT 41 0 2019     (H) 2019     2019     Lab Results   Component Value Date    GLUCOSE 89 2013    CALCIUM 8 5 2019     2013    K 3 3 (L) 2019    CO2 28 2019     2019 BUN 11 01/12/2019    CREATININE 0 74 01/12/2019     No results found for: IGE  Lab Results   Component Value Date    ALT 23 01/12/2019    AST 20 01/12/2019    ALKPHOS 53 01/12/2019           Imaging:  I personally reviewed the images on the [facility] system pertinent to today's visit  Chest x-ray from December 2019 reviewed on a CD brought by patient:  Clear lungs    Chest x-ray from July 2018 reviewed on CD brought by patient:  Raghu Owens MD

## 2020-01-06 NOTE — ASSESSMENT & PLAN NOTE
Most likely this is prolonged post viral infectious cough  But also to consider postnasal drips  I will start with Flonase and evaluate, if no improvement the next step will be inhaled corticosteroids with or without LABA  And also the cough is persistent then we will check CT scan

## 2020-01-06 NOTE — LETTER
January 6, 2020     Kermit Hauser  [address] [facility] [address]    Patient: Vic Ellis   YOB: 1960   Date of Visit: 1/6/2020       Dear Dr Jena Roy: Thank you for referring Dave Briceno to me for evaluation  Below are my notes for this consultation  If you have questions, please do not hesitate to call me  I look forward to following your patient along with you  Sincerely,        Arslan Ruelas MD        CC: Patient Larissa Hayes MD  1/6/2020  3:03 PM  Sign at close encounter    Consultation - Pulmonary Medicine   Vic Ellis 63 y o  female MRN: [id number]        Physician Requesting Consult: Pal Barnes PA-C at patient [facility]  Reason for Consult: cough    Cough  Most likely this is prolonged post viral infectious cough  But also to consider postnasal drips  I will start with Flonase and evaluate, if no improvement the next step will be inhaled corticosteroids with or without LABA  And also the cough is persistent then we will check CT scan  Mild intermittent asthma  Continue current albuterol p r n  Allergic rhinitis with postnasal drip  Start Flonase b i d , in the future during her allergy season to consider Singulair and antihistamine    NATE (obstructive sleep apnea)  Will send patient for home sleep study    GERD (gastroesophageal reflux disease)  Currently controlled with omeprazole    Tobacco abuse  Counseled more than 5 minutes to quit smoking  Patient did not want to use nicotine patches due to hypertension issues before, also she did not want Chantix  She denies history of depression or suicidal thoughts  I offered nicotine gum and she will try that over-the-counter  We discussed how to have a plan and make a decision and set a date to quit, and how to deal with craving/anxiety related to smoking cessation        Diagnoses and all orders for this visit:    Allergic rhinitis with postnasal drip  -     fluticasone (FLONASE) 50 mcg/act nasal spray; 1 spray into each nostril daily    Tobacco abuse  -     Complete PFT with post bronchodilator; Future    Gastroesophageal reflux disease, esophagitis presence not specified    NATE (obstructive sleep apnea)  -     Home Study; Future    Cough  -     Complete PFT with post bronchodilator; Future    Mild intermittent asthma without complication  -     Complete PFT with post bronchodilator; Future    Other orders  -     albuterol (PROVENTIL HFA,VENTOLIN HFA) 90 mcg/act inhaler  -     benzonatate (TESSALON PERLES) 100 mg capsule  -     Omeprazole 20 MG TBDD; Take by mouth      ______________________________________________________________________    HPI:    Pratibha Iglesias is a 63 y o  female who presents for evaluation of cough  Patient started to have cough in mid November and at that time it was productive of clear sputum but associated with mild wheezing, body aches, mild shortness of breath only when she coughs without any chest pain or fever or chills, and she mentioned that few other coworkers were coughing whole time  She recalls some exposure to black mold on the window and she was cleaning that but not inhaled  Since then she was treated with 2 courses of prednisone and 2 courses of antibiotics including doxycycline and Levaquin  She improved significantly and her symptoms resolved except for the cough and she continues currently to have mild intermittent cough with some sputum production with variable colors  Denies hemoptysis  Denies fever or chills or night sweats  Denies shortness of breath or dyspnea on exertion  Denies weight loss  Patient has history of mild intermittent asthma p r n  Albuterol that was diagnosed several years ago  She denies any exacerbation over asthma before  She also as seasonal allergies with postnasal drips specially in the spring and summer and currently she has postnasal drips  She is not on any medications for that    Patient has GERD that is controlled with omeprazole  She has hypertension but she is not on lisinopril or other Ace inhibitors  Patient has no history of connective tissue disease or rheumatological disease  Patient has history of obstructive sleep apnea with 2 sleep studies remotely but she did not use CPAP  She continues to have significant snoring and nocturnal choking and awakenings, she has restless leg syndrome, she denies excessive daytime sleepiness though  Patient smokes cigarettes, she smoked for about 28 years, on average 1 pack day, currently she is cutting down  Review of Systems:  Review of Systems   Constitutional: Negative  HENT: Negative  As HPI   Eyes: Negative  Respiratory:        As HPI   Cardiovascular: Negative  Gastrointestinal: Negative  Endocrine: Negative  Genitourinary: Negative  Musculoskeletal: Negative  Skin: Negative  Allergic/Immunologic: Negative  Neurological: Negative  Hematological: Negative  Psychiatric/Behavioral: Negative          Current Medications:    Current Outpatient Medications:     albuterol (PROVENTIL HFA,VENTOLIN HFA) 90 mcg/act inhaler, , Disp: , Rfl:     amLODIPine (NORVASC) 2 5 mg tablet, TAKE 1 TABLET BY MOUTH EVERY DAY, Disp: 90 tablet, Rfl: 0    atenolol (TENORMIN) 50 mg tablet, TAKE 1 TABLET BY MOUTH EVERY DAY, Disp: 90 tablet, Rfl: 0    benzonatate (TESSALON PERLES) 100 mg capsule, , Disp: , Rfl:     ergocalciferol (VITAMIN D2) 50,000 units, TAKE ONE CAPSULE BY MOUTH ONE TIME PER WEEK, Disp: 4 capsule, Rfl: 2    escitalopram (LEXAPRO) 10 mg tablet, TAKE 1 TABLET BY MOUTH EVERY DAY, Disp: 90 tablet, Rfl: 0    hydrochlorothiazide (MICROZIDE) 12 5 mg capsule, Take 1 capsule (12 5 mg total) by mouth daily, Disp: 90 capsule, Rfl: 0    hydrochlorothiazide (MICROZIDE) 12 5 mg capsule, TAKE ONE CAPSULE BY MOUTH EVERY DAY, Disp: 30 capsule, Rfl: 1    omega-3-acid ethyl esters (LOVAZA) 1 g capsule, Take 2 capsules (2 g total) by mouth 2 (two) times a day, Disp: 120 capsule, Rfl: 0    Omeprazole 20 MG TBDD, Take by mouth, Disp: , Rfl:     Pediatric Multivitamins-Fl (MULTIVITAMINS/FL PO), Take 1 capsule by mouth daily, Disp: , Rfl:     Historical Information   Past Medical History:   Diagnosis Date    Cervical disc disorder     Stenosis of cervical spine region      Past Surgical History:   Procedure Laterality Date    BREAST BIOPSY Left 2009    core    BREAST BIOPSY Right 2009    core    CERVICAL FUSION  2013    cC3-C4, C4-C5, C5 C6, C6-C7, C7-T1     SECTION      ELBOW SURGERY      KNEE SURGERY Bilateral 20YRS AGO     Social History   Social History     Tobacco Use   Smoking Status Former Smoker    Packs/day: 0 50    Years: 20 00    Pack years: 10 00    Types: Cigarettes    Last attempt to quit: 2000    Years since quittin 0   Smokeless Tobacco Never Used       Occupational history:  No occupational exposure    Family History:   Family History   Problem Relation Age of Onset    Cancer Mother     Heart disease Father     Breast cancer Sister 48    Breast cancer Sister 45    Breast cancer Maternal Aunt 61    Breast cancer Maternal Aunt 61    Breast cancer Maternal Aunt 61    Breast cancer Cousin 50    Breast cancer Cousin 52    Breast cancer Cousin     Prostate cancer Maternal Uncle 55         PhysicalExamination:  Vitals:   /80 (BP Location: Left arm, Patient Position: Sitting, Cuff Size: Standard)   Pulse 71   Temp 98 1 °F (36 7 °C) (Tympanic)   Ht 5' 8" (1 727 m)   Wt 73 5 kg (162 lb)   SpO2 97%   BMI 24 63 kg/m²      General: alert, not in acute distress  HEENT: PERRL, no icteric sclera or cyanosis, no thrush  Neck:  Supple, no lymphadenopathy or thyromegaly, no JVD  Lungs:  Equal breath sounds and clear auscultations bilaterally, no wheezing or crackles  Heart: S1S2 regular, no murmures or gallops  Abdomen: soft, non-tender, bowel sounds  present  Extrimities: no edema, no clubbing or cyanosis  Neuro: Alert and oriented x 3, no focal neurodeficits   Skin: intact, no rashes      Diagnostic Data:  Labs:   I personally reviewed the most recent laboratory data pertinent to today's visit    Lab Results   Component Value Date    WBC 8 95 01/12/2019    HGB 14 8 01/12/2019    HCT 41 0 01/12/2019     (H) 01/12/2019     01/12/2019     Lab Results   Component Value Date    GLUCOSE 89 12/12/2013    CALCIUM 8 5 01/12/2019     12/12/2013    K 3 3 (L) 01/12/2019    CO2 28 01/12/2019     01/12/2019    BUN 11 01/12/2019    CREATININE 0 74 01/12/2019     No results found for: IGE  Lab Results   Component Value Date    ALT 23 01/12/2019    AST 20 01/12/2019    ALKPHOS 53 01/12/2019           Imaging:  I personally reviewed the images on the [facility] system pertinent to today's visit  Chest x-ray from December 2019 reviewed on a CD brought by patient:  Clear lungs    Chest x-ray from July 2018 reviewed on CD brought by patient:  Aretha Blanc MD

## 2020-01-07 DIAGNOSIS — I10 ESSENTIAL HYPERTENSION: ICD-10-CM

## 2020-01-07 RX ORDER — HYDROCHLOROTHIAZIDE 12.5 MG/1
CAPSULE, GELATIN COATED ORAL
Qty: 30 CAPSULE | Refills: 0 | Status: SHIPPED | OUTPATIENT
Start: 2020-01-07 | End: 2020-01-29

## 2020-01-09 ENCOUNTER — TELEPHONE (OUTPATIENT)
Dept: SLEEP CENTER | Facility: CLINIC | Age: 60
End: 2020-01-09

## 2020-01-09 NOTE — TELEPHONE ENCOUNTER
----- Message from Tor Arzate MD sent at 1/8/2020  2:19 PM EST -----  Approved  ----- Message -----  From: Alyssa Mcardle: 1/8/2020   1:56 PM EST  To: Sleep Medicine [facility] [place] [facility], #    PLEASE REVIEW FOR APPROVAL OR [phone] [facility]

## 2020-01-24 ENCOUNTER — HOSPITAL ENCOUNTER (OUTPATIENT)
Dept: PULMONOLOGY | Facility: HOSPITAL | Age: 60
Discharge: HOME | End: 2020-01-24
Attending: INTERNAL MEDICINE

## 2020-01-24 RX ORDER — ALBUTEROL SULFATE 2.5 MG/3ML
2.5 SOLUTION RESPIRATORY (INHALATION) ONCE
Status: DISCONTINUED | OUTPATIENT
Start: 2020-01-24 | End: 2020-01-28 | Stop reason: HOSPADM

## 2020-01-29 DIAGNOSIS — I10 ESSENTIAL HYPERTENSION: ICD-10-CM

## 2020-01-29 RX ORDER — HYDROCHLOROTHIAZIDE 12.5 MG/1
CAPSULE, GELATIN COATED ORAL
Qty: 30 CAPSULE | Refills: 0 | Status: SHIPPED | OUTPATIENT
Start: 2020-01-29 | End: 2020-02-29

## 2020-02-29 DIAGNOSIS — I10 ESSENTIAL HYPERTENSION: ICD-10-CM

## 2020-02-29 RX ORDER — HYDROCHLOROTHIAZIDE 12.5 MG/1
CAPSULE, GELATIN COATED ORAL
Qty: 30 CAPSULE | Refills: 0 | Status: SHIPPED | OUTPATIENT
Start: 2020-02-29 | End: 2020-03-14

## 2020-03-14 DIAGNOSIS — I10 ESSENTIAL HYPERTENSION: ICD-10-CM

## 2020-03-14 RX ORDER — HYDROCHLOROTHIAZIDE 12.5 MG/1
CAPSULE, GELATIN COATED ORAL
Qty: 90 CAPSULE | Refills: 1 | Status: SHIPPED | OUTPATIENT
Start: 2020-03-14 | End: 2020-09-01 | Stop reason: SDUPTHER

## 2020-04-01 ENCOUNTER — TELEPHONE (OUTPATIENT)
Dept: PULMONOLOGY | Facility: CLINIC | Age: 60
End: 2020-04-01

## 2020-04-01 DIAGNOSIS — I10 ESSENTIAL HYPERTENSION: ICD-10-CM

## 2020-04-01 RX ORDER — AMLODIPINE BESYLATE 2.5 MG/1
TABLET ORAL
Qty: 90 TABLET | Refills: 0 | Status: SHIPPED | OUTPATIENT
Start: 2020-04-01 | End: 2020-06-27

## 2020-04-01 RX ORDER — ATENOLOL 50 MG/1
TABLET ORAL
Qty: 90 TABLET | Refills: 0 | Status: SHIPPED | OUTPATIENT
Start: 2020-04-01 | End: 2020-06-27

## 2020-04-01 RX ORDER — ESCITALOPRAM OXALATE 10 MG/1
TABLET ORAL
Qty: 90 TABLET | Refills: 0 | Status: SHIPPED | OUTPATIENT
Start: 2020-04-01 | End: 2020-06-27

## 2020-05-08 DIAGNOSIS — I10 ESSENTIAL HYPERTENSION: ICD-10-CM

## 2020-05-08 RX ORDER — HYDROCHLOROTHIAZIDE 12.5 MG/1
CAPSULE, GELATIN COATED ORAL
Qty: 30 CAPSULE | Refills: 0 | Status: SHIPPED | OUTPATIENT
Start: 2020-05-08 | End: 2020-06-10

## 2020-05-28 ENCOUNTER — HOSPITAL ENCOUNTER (OUTPATIENT)
Dept: RADIOLOGY | Age: 60
Discharge: HOME | End: 2020-05-28
Payer: COMMERCIAL

## 2020-05-28 VITALS — HEIGHT: 68 IN | BODY MASS INDEX: 25.01 KG/M2 | WEIGHT: 165 LBS

## 2020-05-28 DIAGNOSIS — Z12.31 ENCOUNTER FOR SCREENING MAMMOGRAM FOR MALIGNANT NEOPLASM OF BREAST: ICD-10-CM

## 2020-05-28 PROCEDURE — 77067 SCR MAMMO BI INCL CAD: CPT

## 2020-05-28 PROCEDURE — 77063 BREAST TOMOSYNTHESIS BI: CPT

## 2020-06-10 DIAGNOSIS — I10 ESSENTIAL HYPERTENSION: ICD-10-CM

## 2020-06-10 RX ORDER — HYDROCHLOROTHIAZIDE 12.5 MG/1
CAPSULE, GELATIN COATED ORAL
Qty: 30 CAPSULE | Refills: 0 | Status: SHIPPED | OUTPATIENT
Start: 2020-06-10 | End: 2020-08-11

## 2020-06-27 DIAGNOSIS — I10 ESSENTIAL HYPERTENSION: ICD-10-CM

## 2020-06-27 RX ORDER — AMLODIPINE BESYLATE 2.5 MG/1
TABLET ORAL
Qty: 90 TABLET | Refills: 0 | Status: SHIPPED | OUTPATIENT
Start: 2020-06-27 | End: 2020-09-25

## 2020-06-27 RX ORDER — ATENOLOL 50 MG/1
TABLET ORAL
Qty: 90 TABLET | Refills: 0 | Status: SHIPPED | OUTPATIENT
Start: 2020-06-27 | End: 2020-09-25

## 2020-06-27 RX ORDER — ESCITALOPRAM OXALATE 10 MG/1
TABLET ORAL
Qty: 90 TABLET | Refills: 0 | Status: SHIPPED | OUTPATIENT
Start: 2020-06-27 | End: 2020-09-25

## 2020-08-11 DIAGNOSIS — I10 ESSENTIAL HYPERTENSION: ICD-10-CM

## 2020-08-11 RX ORDER — HYDROCHLOROTHIAZIDE 12.5 MG/1
CAPSULE, GELATIN COATED ORAL
Qty: 30 CAPSULE | Refills: 0 | Status: SHIPPED | OUTPATIENT
Start: 2020-08-11 | End: 2020-09-01 | Stop reason: SDUPTHER

## 2020-08-31 ENCOUNTER — TELEPHONE (OUTPATIENT)
Dept: INTERNAL MEDICINE CLINIC | Facility: CLINIC | Age: 60
End: 2020-08-31

## 2020-08-31 NOTE — TELEPHONE ENCOUNTER
covid screening---patient has diarrhea  She has had it for a long time and it has not gone away  She was treated here in the past for it and it did not help  The patient is now losing weight

## 2020-09-01 ENCOUNTER — TELEPHONE (OUTPATIENT)
Dept: INTERNAL MEDICINE CLINIC | Facility: CLINIC | Age: 60
End: 2020-09-01

## 2020-09-01 ENCOUNTER — OFFICE VISIT (OUTPATIENT)
Dept: INTERNAL MEDICINE CLINIC | Facility: CLINIC | Age: 60
End: 2020-09-01
Payer: COMMERCIAL

## 2020-09-01 VITALS
DIASTOLIC BLOOD PRESSURE: 86 MMHG | WEIGHT: 153.4 LBS | OXYGEN SATURATION: 97 % | RESPIRATION RATE: 16 BRPM | HEART RATE: 76 BPM | BODY MASS INDEX: 24.08 KG/M2 | SYSTOLIC BLOOD PRESSURE: 146 MMHG | HEIGHT: 67 IN | TEMPERATURE: 97.1 F

## 2020-09-01 DIAGNOSIS — Z23 NEED FOR VACCINATION: ICD-10-CM

## 2020-09-01 DIAGNOSIS — Z00.00 ANNUAL PHYSICAL EXAM: ICD-10-CM

## 2020-09-01 DIAGNOSIS — E78.1 HIGH BLOOD TRIGLYCERIDES: ICD-10-CM

## 2020-09-01 DIAGNOSIS — K21.9 GERD (GASTROESOPHAGEAL REFLUX DISEASE): ICD-10-CM

## 2020-09-01 DIAGNOSIS — J45.909 ASTHMA: ICD-10-CM

## 2020-09-01 DIAGNOSIS — R19.7 DIARRHEA: Primary | ICD-10-CM

## 2020-09-01 DIAGNOSIS — Z72.0 TOBACCO ABUSE: ICD-10-CM

## 2020-09-01 DIAGNOSIS — E55.9 VITAMIN D DEFICIENCY: ICD-10-CM

## 2020-09-01 PROBLEM — R63.4 UNINTENTIONAL WEIGHT LOSS: Status: ACTIVE | Noted: 2020-09-01

## 2020-09-01 PROCEDURE — 90682 RIV4 VACC RECOMBINANT DNA IM: CPT

## 2020-09-01 PROCEDURE — 99214 OFFICE O/P EST MOD 30 MIN: CPT | Performed by: GENERAL ACUTE CARE HOSPITAL

## 2020-09-01 PROCEDURE — 3725F SCREEN DEPRESSION PERFORMED: CPT | Performed by: GENERAL ACUTE CARE HOSPITAL

## 2020-09-01 PROCEDURE — 90471 IMMUNIZATION ADMIN: CPT

## 2020-09-01 RX ORDER — PNV NO.95/FERROUS FUM/FOLIC AC 28MG-0.8MG
1 TABLET ORAL
COMMUNITY
End: 2020-11-16

## 2020-09-01 RX ORDER — ALBUTEROL SULFATE 90 UG/1
1 AEROSOL, METERED RESPIRATORY (INHALATION) EVERY 4 HOURS PRN
Qty: 1 INHALER | Refills: 3 | Status: SHIPPED | OUTPATIENT
Start: 2020-09-01 | End: 2021-05-02

## 2020-09-01 RX ORDER — FAMOTIDINE 20 MG/1
20 TABLET, FILM COATED ORAL 2 TIMES DAILY
Qty: 60 TABLET | Refills: 0 | Status: SHIPPED | OUTPATIENT
Start: 2020-09-01 | End: 2020-10-16

## 2020-09-01 NOTE — ASSESSMENT & PLAN NOTE
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

## 2020-09-01 NOTE — TELEPHONE ENCOUNTER
Spoke with the pt, she stated she literally has 10 to 12 stools per day for greater than two years,  it recently has increased to anything she eats or drinks for the past two years

## 2020-09-01 NOTE — ASSESSMENT & PLAN NOTE
· Lipid panel ordered  · Hemoglobin A1c ordered  · HIV screening ordered  · Hepatitis C screening ordered  · CBC, CMP ordered  · Patient reports mammogram and pap smear are up to date  · Will discuss lung cancer screening at next appointment given patient is a current smoker and over the age of 55

## 2020-09-01 NOTE — PROGRESS NOTES
Assessment/Plan:    Diarrhea  · Patient reports chronic history of diarrhea  · Patient followed with Dr Yonathan Elizabeth, GI in the past for esophogeal strictures, GERD and occasional diarrhea (max 6 BMs a day)  Most recent visit on record in 2013  EGD at the time found no dysplasia or sign of malignancy  · Last reported antibiotic use in December 2019  · Patient takes omeprazole daily for GERD, which can cause chronic diarrhea    Plan:  · Refer to GI  · Labwork ordered which includes - CBC, CMP, stool studies  · Stop taking omeprazole  · Take Pepcid instead  · Follow up in 4 weeks    Need for vaccination  · Patient received her annual flu shot today    High blood triglycerides  · Triglycerides 837 in 1/2019  · Cholesterol 269  · Repeat lipid panel, patient understands this is a fasting test    Vitamin D deficiency  · Vitamin D level was low in 1/2019  · Will recheck vitamin D labwork  · Patient currently taking 2000 U daily     Annual physical exam  · Lipid panel ordered  · Hemoglobin A1c ordered  · HIV screening ordered  · Hepatitis C screening ordered  · CBC, CMP ordered  · Patient reports mammogram and pap smear are up to date  · Will discuss lung cancer screening at next appointment given patient is a current smoker and over the age of 55  Tobacco abuse  · Patient interested in quitting smoking  · She is interested in speaking about Chantix but would like to wait for a future appointment    Asthma  · Feels that symptoms are under control  · Patient did request a refill of albuterol inhaler    Unintentional weight loss  · Reported 30 pound weight loss in the past year, denies chills or night sweats  · Likely secondary to diarrhea  · However, if GI workup is negative, will have to investigate other causes of unintentional weight loss       Diagnoses and all orders for this visit:    Diarrhea  -     CBC and differential; Future  -     Comprehensive metabolic panel; Future  -     TSH w/Reflex;  Future  -     Stool Enteric Bacterial Panel by PCR; Future  -     Ova and parasite examination; Future  -     Celiac Disease Antibody Profile; Future  -     Calprotectin,Fecal; Future  -     Pancreatic elastase, fecal; Future  -     C difficile Toxins A+B, EIA; Future  -     Ambulatory referral to Gastroenterology; Future    High blood triglycerides  -     Lipid panel; Future    Vitamin D deficiency  -     Vitamin D 25 hydroxy; Future    Annual physical exam  -     Hemoglobin A1C; Future  -     HIV 1/2 ANTIGEN/ANTIBODY (4TH GENERATION) W REFLEX [facility]; Future  -     Hepatitis C Ab W/Refl To HCV RNA, Qn, PCR; Future    GERD (gastroesophageal reflux disease)  -     famotidine (PEPCID) 20 mg tablet; Take 1 tablet (20 mg total) by mouth 2 (two) times a day    Asthma  -     albuterol (PROVENTIL HFA,VENTOLIN HFA) 90 mcg/act inhaler; Inhale 1 puff every 4 (four) hours as needed for wheezing    Need for vaccination  -     influenza vaccine, quadrivalent, recombinant, PF, 0 5 mL, for patients 18 yr+ (FLUBLOK)    Tobacco abuse    Other orders  -     Ferrous Sulfate (Iron) 325 (65 Fe) MG TABS; Take 1 tablet by mouth          Subjective:      Patient ID: Thong Menchaca is a 62 y o  female  She presents to the office with complaints of worsening chronic diarrhea  Patient reports several years of intermittent diarrhea  However, around December, 2019, she became ill with fever, coughing, and headache  She was seen at Urgent Care at the time and possibly given antibiotics  Since then, she states her diarrhea has steadily been worsening  She is currently having 10-12 bowel movements a day  She states she consistently needs to use the rest room around 15 minutes after eating  She has had instances where she is unable to make it to the restroom and has soiled her clothing  She also reports a 30 pound weight loss within the past year  She states denser foods like red meat and fibrous vegetables cause her more pain  She denies any blood in her stool   She states she last saw Dr Leanne Woodward, a gastroenterologist, several years ago  Our records show most recent visit in 2013 where EGD found esophogeal strictures but no dysphagia or malignancy  She denies any nausea, vomiting, hematuria, night sweats, chills, dizziness or dysuria  The following portions of the patient's history were reviewed and updated as appropriate: current medications, past family history, past medical history, past social history, past surgical history and problem list     Review of Systems   Constitutional: Positive for appetite change and unexpected weight change  Negative for chills, diaphoresis and fever  Respiratory: Negative for shortness of breath  Cardiovascular: Negative for chest pain and leg swelling  Gastrointestinal: Positive for abdominal pain and diarrhea  Negative for anal bleeding, blood in stool, nausea and vomiting  Genitourinary: Negative for dysuria and hematuria  Musculoskeletal: Negative for myalgias  Skin: Negative for rash  Neurological: Negative for dizziness and headaches  Psychiatric/Behavioral: Negative for agitation  Objective:      /86   Pulse 76   Temp (!) 97 1 °F (36 2 °C) (Temporal)   Resp 16   Ht 5' 7" (1 702 m)   Wt 69 6 kg (153 lb 6 4 oz)   SpO2 97%   BMI 24 03 kg/m²          Physical Exam  Constitutional:       Appearance: Normal appearance  HENT:      Mouth/Throat:      Mouth: Mucous membranes are dry  Cardiovascular:      Rate and Rhythm: Normal rate and regular rhythm  Pulmonary:      Effort: Pulmonary effort is normal       Breath sounds: Normal breath sounds  Abdominal:      General: Bowel sounds are increased  Palpations: Abdomen is soft  Tenderness: There is abdominal tenderness in the left upper quadrant  Musculoskeletal:         General: No swelling or tenderness  Skin:     General: Skin is warm and dry  Neurological:      General: No focal deficit present        Mental Status: She is alert and oriented to person, place, and time     Psychiatric:         Mood and Affect: Mood normal          Behavior: Behavior normal

## 2020-09-01 NOTE — ASSESSMENT & PLAN NOTE
· Vitamin D level was low in 1/2019  · Will recheck vitamin D labwork  · Patient currently taking 2000 U daily

## 2020-09-01 NOTE — ASSESSMENT & PLAN NOTE
· Triglycerides 837 in 1/2019  · Cholesterol 269  · Repeat lipid panel, patient understands this is a fasting test

## 2020-09-01 NOTE — ASSESSMENT & PLAN NOTE
· Patient interested in quitting smoking  · She is interested in speaking about Chantix but would like to wait for a future appointment

## 2020-09-01 NOTE — PATIENT INSTRUCTIONS
Please get blood work done  You will need to fast for the lipid panel  You will need to give a stool sample  Please arrange an appointment with Gastroenterology as soon as possible  An outpatient referral to GI has been provided to you  Stop taking omeprazole  Please take Pepcid instead

## 2020-09-02 NOTE — ASSESSMENT & PLAN NOTE
· Reported 30 pound weight loss in the past year, denies chills or night sweats  · Likely secondary to diarrhea  · However, if GI workup is negative, will have to investigate other causes of unintentional weight loss

## 2020-09-03 ENCOUNTER — APPOINTMENT (OUTPATIENT)
Dept: LAB | Age: 60
End: 2020-09-03
Payer: COMMERCIAL

## 2020-09-03 ENCOUNTER — TELEPHONE (OUTPATIENT)
Dept: INTERNAL MEDICINE CLINIC | Facility: CLINIC | Age: 60
End: 2020-09-03

## 2020-09-03 DIAGNOSIS — Z00.00 ANNUAL PHYSICAL EXAM: ICD-10-CM

## 2020-09-03 DIAGNOSIS — E55.9 VITAMIN D DEFICIENCY: ICD-10-CM

## 2020-09-03 DIAGNOSIS — E87.6 HYPOKALEMIA: ICD-10-CM

## 2020-09-03 DIAGNOSIS — R19.7 DIARRHEA: ICD-10-CM

## 2020-09-03 DIAGNOSIS — D53.9 MACROCYTIC ANEMIA: Primary | ICD-10-CM

## 2020-09-03 DIAGNOSIS — E87.6 HYPOKALEMIA: Primary | ICD-10-CM

## 2020-09-03 DIAGNOSIS — E78.1 HIGH BLOOD TRIGLYCERIDES: ICD-10-CM

## 2020-09-03 LAB
25(OH)D3 SERPL-MCNC: 42.9 NG/ML (ref 30–100)
ALBUMIN SERPL BCP-MCNC: 3.3 G/DL (ref 3.5–5)
ALP SERPL-CCNC: 90 U/L (ref 46–116)
ALT SERPL W P-5'-P-CCNC: 23 U/L (ref 12–78)
ANION GAP SERPL CALCULATED.3IONS-SCNC: 8 MMOL/L (ref 4–13)
AST SERPL W P-5'-P-CCNC: 57 U/L (ref 5–45)
BASOPHILS # BLD AUTO: 0.02 THOUSANDS/ΜL (ref 0–0.1)
BASOPHILS NFR BLD AUTO: 0 % (ref 0–1)
BILIRUB SERPL-MCNC: 0.81 MG/DL (ref 0.2–1)
BUN SERPL-MCNC: 7 MG/DL (ref 5–25)
CALCIUM SERPL-MCNC: 8.6 MG/DL (ref 8.3–10.1)
CHLORIDE SERPL-SCNC: 98 MMOL/L (ref 100–108)
CHOLEST SERPL-MCNC: 201 MG/DL (ref 50–200)
CO2 SERPL-SCNC: 36 MMOL/L (ref 21–32)
CREAT SERPL-MCNC: 0.53 MG/DL (ref 0.6–1.3)
EOSINOPHIL # BLD AUTO: 0.06 THOUSAND/ΜL (ref 0–0.61)
EOSINOPHIL NFR BLD AUTO: 1 % (ref 0–6)
ERYTHROCYTE [DISTWIDTH] IN BLOOD BY AUTOMATED COUNT: 13.7 % (ref 11.6–15.1)
EST. AVERAGE GLUCOSE BLD GHB EST-MCNC: 88 MG/DL
GFR SERPL CREATININE-BSD FRML MDRD: 104 ML/MIN/1.73SQ M
GLUCOSE P FAST SERPL-MCNC: 91 MG/DL (ref 65–99)
HBA1C MFR BLD: 4.7 %
HCT VFR BLD AUTO: 33.3 % (ref 34.8–46.1)
HDLC SERPL-MCNC: 50 MG/DL
HGB BLD-MCNC: 11.7 G/DL (ref 11.5–15.4)
IMM GRANULOCYTES # BLD AUTO: 0.02 THOUSAND/UL (ref 0–0.2)
IMM GRANULOCYTES NFR BLD AUTO: 0 % (ref 0–2)
LYMPHOCYTES # BLD AUTO: 1.79 THOUSANDS/ΜL (ref 0.6–4.47)
LYMPHOCYTES NFR BLD AUTO: 34 % (ref 14–44)
MAGNESIUM SERPL-MCNC: 1.3 MG/DL (ref 1.6–2.6)
MCH RBC QN AUTO: 36.9 PG (ref 26.8–34.3)
MCHC RBC AUTO-ENTMCNC: 35.1 G/DL (ref 31.4–37.4)
MCV RBC AUTO: 105 FL (ref 82–98)
MONOCYTES # BLD AUTO: 0.45 THOUSAND/ΜL (ref 0.17–1.22)
MONOCYTES NFR BLD AUTO: 9 % (ref 4–12)
NEUTROPHILS # BLD AUTO: 2.98 THOUSANDS/ΜL (ref 1.85–7.62)
NEUTS SEG NFR BLD AUTO: 56 % (ref 43–75)
NONHDLC SERPL-MCNC: 151 MG/DL
NRBC BLD AUTO-RTO: 0 /100 WBCS
PLATELET # BLD AUTO: 262 THOUSANDS/UL (ref 149–390)
PMV BLD AUTO: 9.5 FL (ref 8.9–12.7)
POTASSIUM SERPL-SCNC: 2.5 MMOL/L (ref 3.5–5.3)
PROT SERPL-MCNC: 6 G/DL (ref 6.4–8.2)
RBC # BLD AUTO: 3.17 MILLION/UL (ref 3.81–5.12)
SODIUM SERPL-SCNC: 142 MMOL/L (ref 136–145)
TRIGL SERPL-MCNC: 487 MG/DL
TSH SERPL DL<=0.05 MIU/L-ACNC: 3.07 UIU/ML (ref 0.36–3.74)
WBC # BLD AUTO: 5.32 THOUSAND/UL (ref 4.31–10.16)

## 2020-09-03 PROCEDURE — 87493 C DIFF AMPLIFIED PROBE: CPT

## 2020-09-03 PROCEDURE — 87045 FECES CULTURE AEROBIC BACT: CPT

## 2020-09-03 PROCEDURE — 80053 COMPREHEN METABOLIC PANEL: CPT

## 2020-09-03 PROCEDURE — 87389 HIV-1 AG W/HIV-1&-2 AB AG IA: CPT

## 2020-09-03 PROCEDURE — 80061 LIPID PANEL: CPT

## 2020-09-03 PROCEDURE — 84443 ASSAY THYROID STIM HORMONE: CPT

## 2020-09-03 PROCEDURE — 36415 COLL VENOUS BLD VENIPUNCTURE: CPT

## 2020-09-03 PROCEDURE — 82784 ASSAY IGA/IGD/IGG/IGM EACH: CPT

## 2020-09-03 PROCEDURE — 85025 COMPLETE CBC W/AUTO DIFF WBC: CPT

## 2020-09-03 PROCEDURE — 87046 STOOL CULTR AEROBIC BACT EA: CPT

## 2020-09-03 PROCEDURE — 83036 HEMOGLOBIN GLYCOSYLATED A1C: CPT

## 2020-09-03 PROCEDURE — 87427 SHIGA-LIKE TOXIN AG IA: CPT

## 2020-09-03 PROCEDURE — 83516 IMMUNOASSAY NONANTIBODY: CPT

## 2020-09-03 PROCEDURE — 87522 HEPATITIS C REVRS TRNSCRPJ: CPT

## 2020-09-03 PROCEDURE — 82306 VITAMIN D 25 HYDROXY: CPT

## 2020-09-03 PROCEDURE — 87209 SMEAR COMPLEX STAIN: CPT

## 2020-09-03 PROCEDURE — 86255 FLUORESCENT ANTIBODY SCREEN: CPT

## 2020-09-03 PROCEDURE — 83735 ASSAY OF MAGNESIUM: CPT

## 2020-09-03 PROCEDURE — 87177 OVA AND PARASITES SMEARS: CPT

## 2020-09-03 RX ORDER — POTASSIUM CHLORIDE 20 MEQ/1
40 TABLET, EXTENDED RELEASE ORAL DAILY
Qty: 30 TABLET | Refills: 5 | Status: SHIPPED | OUTPATIENT
Start: 2020-09-03 | End: 2020-10-19

## 2020-09-04 ENCOUNTER — TELEPHONE (OUTPATIENT)
Dept: OTHER | Facility: HOSPITAL | Age: 60
End: 2020-09-04

## 2020-09-04 DIAGNOSIS — R19.7 DIARRHEA: ICD-10-CM

## 2020-09-04 DIAGNOSIS — E83.42 HYPOMAGNESEMIA: Primary | ICD-10-CM

## 2020-09-04 LAB
C DIFF TOX GENS STL QL NAA+PROBE: NEGATIVE
ENDOMYSIUM IGA SER QL: NEGATIVE
GLIADIN PEPTIDE IGA SER-ACNC: 2 UNITS (ref 0–19)
GLIADIN PEPTIDE IGG SER-ACNC: 3 UNITS (ref 0–19)
HIV 1+2 AB+HIV1 P24 AG SERPL QL IA: NORMAL
IGA SERPL-MCNC: 94 MG/DL (ref 87–352)
TTG IGA SER-ACNC: <2 U/ML (ref 0–3)
TTG IGG SER-ACNC: <2 U/ML (ref 0–5)

## 2020-09-04 RX ORDER — LOPERAMIDE HYDROCHLORIDE 2 MG/1
2 TABLET ORAL 4 TIMES DAILY PRN
Qty: 30 TABLET | Refills: 0
Start: 2020-09-04 | End: 2020-09-05 | Stop reason: SDUPTHER

## 2020-09-04 NOTE — TELEPHONE ENCOUNTER
Magnesium came back low  Ordered patient magnesium supplements and recheck labwork next week  C-diff negative  Also ordered loperamide  Called and spoke to patient  Advised her to take magnesium, which can sometimes cause worsening diarrhea  Explained she can switch from BID to daily dose if this happens and try loperamide  Patient is agreeable to plan

## 2020-09-05 ENCOUNTER — NURSE TRIAGE (OUTPATIENT)
Dept: OTHER | Facility: OTHER | Age: 60
End: 2020-09-05

## 2020-09-05 ENCOUNTER — DOCUMENTATION (OUTPATIENT)
Dept: INTERNAL MEDICINE CLINIC | Facility: CLINIC | Age: 60
End: 2020-09-05

## 2020-09-05 DIAGNOSIS — R19.7 DIARRHEA, UNSPECIFIED TYPE: ICD-10-CM

## 2020-09-05 DIAGNOSIS — E83.42 HYPOMAGNESEMIA: ICD-10-CM

## 2020-09-05 LAB
HCV RNA SERPL NAA+PROBE-ACNC: NORMAL IU/ML
TEST INFORMATION: NORMAL

## 2020-09-05 RX ORDER — LOPERAMIDE HYDROCHLORIDE 2 MG/1
2 TABLET ORAL 4 TIMES DAILY PRN
Qty: 30 TABLET | Refills: 0 | Status: SHIPPED | OUTPATIENT
Start: 2020-09-05 | End: 2020-10-28 | Stop reason: SDUPTHER

## 2020-09-05 NOTE — TELEPHONE ENCOUNTER
Regarding: Prescriptions  ----- Message from Melvi Bernabe sent at 9/5/2020 12:51 PM EDT -----  "My doctor was supposed to call in 2 medications yesterday that she wanted me to start right away  I went to [facility] to them  and they're not there " It's in her chart and it's classified as print, not e scribed   JOEL

## 2020-09-05 NOTE — TELEPHONE ENCOUNTER
Per Dr Tatianna Lloyd, she sent in the prescriptions for imodium and magnesium oxide, to the preferred pharmacy of the patient  Made pt aware and verbalized understanding

## 2020-09-05 NOTE — TELEPHONE ENCOUNTER
Reason for Disposition   [1] Caller has URGENT medication question about med that PCP or specialist prescribed AND [2] triager unable to answer question    Answer Assessment - Initial Assessment Questions  1  NAME of MEDICATION: "What medicine are you calling about?"      Magnesium and immodium     2  QUESTION: "What is your question?"      "I was supposed to start 2 different medications however they weren't at the pharmacy"     3  PRESCRIBING HCP: "Who prescribed it?" Reason: if prescribed by specialist, call should be referred to that group  [facility]    4  SYMPTOMS: "Do you have any symptoms?"      Diarrhea     5   SEVERITY: If symptoms are present, ask "Are they mild, moderate or severe?"      Mild    Protocols used: MEDICATION QUESTION CALL-ADULT-

## 2020-09-08 ENCOUNTER — APPOINTMENT (OUTPATIENT)
Dept: LAB | Age: 60
End: 2020-09-08
Payer: COMMERCIAL

## 2020-09-08 ENCOUNTER — TRANSCRIBE ORDERS (OUTPATIENT)
Dept: LAB | Age: 60
End: 2020-09-08

## 2020-09-08 ENCOUNTER — TELEPHONE (OUTPATIENT)
Dept: OTHER | Facility: HOSPITAL | Age: 60
End: 2020-09-08

## 2020-09-08 DIAGNOSIS — D53.9 MACROCYTIC ANEMIA: ICD-10-CM

## 2020-09-08 DIAGNOSIS — E87.6 HYPOKALEMIA: ICD-10-CM

## 2020-09-08 DIAGNOSIS — E83.42 HYPOMAGNESEMIA: ICD-10-CM

## 2020-09-08 DIAGNOSIS — E87.6 HYPOKALEMIA: Primary | ICD-10-CM

## 2020-09-08 LAB
MAGNESIUM SERPL-MCNC: 1.8 MG/DL (ref 1.6–2.6)
O+P STL CONC: NORMAL
POTASSIUM SERPL-SCNC: 3.1 MMOL/L (ref 3.5–5.3)

## 2020-09-08 PROCEDURE — 36415 COLL VENOUS BLD VENIPUNCTURE: CPT

## 2020-09-08 PROCEDURE — 83735 ASSAY OF MAGNESIUM: CPT

## 2020-09-08 PROCEDURE — 84132 ASSAY OF SERUM POTASSIUM: CPT

## 2020-09-08 NOTE — TELEPHONE ENCOUNTER
Called patient around 5:00 PM with update on her magnesium and potassium labs today  Advised her to continue taking potassium and that she could decrease her magnesium dose to daily rather than BID  Explained we would get repeat lab work in 1 week  She expresses understanding  She states she is doing well and saw her GI physician today who has ordered other testing  Also explained we ordered folate and B12 lab tests due to macrocytic anemia found on recent CBC

## 2020-09-10 ENCOUNTER — TRANSCRIBE ORDERS (OUTPATIENT)
Dept: ADMINISTRATIVE | Age: 60
End: 2020-09-10

## 2020-09-10 ENCOUNTER — APPOINTMENT (OUTPATIENT)
Dept: LAB | Age: 60
End: 2020-09-10
Payer: COMMERCIAL

## 2020-09-10 DIAGNOSIS — R19.7 DIARRHEA OF PRESUMED INFECTIOUS ORIGIN: Primary | ICD-10-CM

## 2020-09-10 DIAGNOSIS — D53.9 MACROCYTIC ANEMIA: ICD-10-CM

## 2020-09-10 DIAGNOSIS — R19.7 DIARRHEA: ICD-10-CM

## 2020-09-10 DIAGNOSIS — R19.7 DIARRHEA OF PRESUMED INFECTIOUS ORIGIN: ICD-10-CM

## 2020-09-10 PROCEDURE — 82656 EL-1 FECAL QUAL/SEMIQ: CPT

## 2020-09-10 PROCEDURE — 83993 ASSAY FOR CALPROTECTIN FECAL: CPT

## 2020-09-17 LAB — CALPROTECTIN STL-MCNT: 28 UG/G (ref 0–120)

## 2020-09-18 LAB — ELASTASE PANC STL-MCNT: >500 UG ELAST./G

## 2020-09-23 ENCOUNTER — TELEPHONE (OUTPATIENT)
Dept: OTHER | Facility: HOSPITAL | Age: 60
End: 2020-09-23

## 2020-09-25 DIAGNOSIS — K21.9 GERD (GASTROESOPHAGEAL REFLUX DISEASE): ICD-10-CM

## 2020-09-25 DIAGNOSIS — I10 ESSENTIAL HYPERTENSION: ICD-10-CM

## 2020-09-25 RX ORDER — AMLODIPINE BESYLATE 2.5 MG/1
TABLET ORAL
Qty: 90 TABLET | Refills: 1 | Status: SHIPPED | OUTPATIENT
Start: 2020-09-25 | End: 2021-02-22 | Stop reason: SDUPTHER

## 2020-09-25 RX ORDER — ESCITALOPRAM OXALATE 10 MG/1
TABLET ORAL
Qty: 90 TABLET | Refills: 1 | Status: SHIPPED | OUTPATIENT
Start: 2020-09-25 | End: 2021-02-22 | Stop reason: SDUPTHER

## 2020-09-25 RX ORDER — ATENOLOL 50 MG/1
TABLET ORAL
Qty: 90 TABLET | Refills: 1 | Status: SHIPPED | OUTPATIENT
Start: 2020-09-25 | End: 2021-02-22 | Stop reason: SDUPTHER

## 2020-09-25 RX ORDER — HYDROCHLOROTHIAZIDE 12.5 MG/1
12.5 CAPSULE, GELATIN COATED ORAL DAILY
Qty: 90 CAPSULE | Refills: 1 | Status: SHIPPED | OUTPATIENT
Start: 2020-09-25 | End: 2021-02-22 | Stop reason: SDUPTHER

## 2020-09-27 DIAGNOSIS — E83.42 HYPOMAGNESEMIA: ICD-10-CM

## 2020-09-28 ENCOUNTER — TRANSCRIBE ORDERS (OUTPATIENT)
Dept: ADMINISTRATIVE | Age: 60
End: 2020-09-28

## 2020-09-28 ENCOUNTER — APPOINTMENT (OUTPATIENT)
Dept: LAB | Age: 60
End: 2020-09-28
Payer: COMMERCIAL

## 2020-09-28 DIAGNOSIS — R10.13 ABDOMINAL PAIN, EPIGASTRIC: ICD-10-CM

## 2020-09-28 DIAGNOSIS — D64.9 ANEMIA, UNSPECIFIED TYPE: ICD-10-CM

## 2020-09-28 DIAGNOSIS — F10.20 ACUTE ALCOHOLISM (HCC): Primary | ICD-10-CM

## 2020-09-28 DIAGNOSIS — F10.20 ACUTE ALCOHOLISM (HCC): ICD-10-CM

## 2020-09-28 DIAGNOSIS — R19.7 DIARRHEA OF PRESUMED INFECTIOUS ORIGIN: ICD-10-CM

## 2020-09-28 LAB
AMYLASE SERPL-CCNC: 17 IU/L (ref 25–115)
FERRITIN SERPL-MCNC: 398 NG/ML (ref 8–388)
FOLATE SERPL-MCNC: 2.6 NG/ML (ref 3.1–17.5)
INR PPP: 1.09 (ref 0.84–1.19)
IRON SERPL-MCNC: 167 UG/DL (ref 50–170)
LIPASE SERPL-CCNC: 42 U/L (ref 73–393)
MAGNESIUM SERPL-MCNC: 1.5 MG/DL (ref 1.6–2.6)
POTASSIUM SERPL-SCNC: 4.1 MMOL/L (ref 3.5–5.3)
PROTHROMBIN TIME: 14.1 SECONDS (ref 11.6–14.5)
TRANSFERRIN SERPL-MCNC: 256 MG/DL (ref 200–400)
VIT B12 SERPL-MCNC: 167 PG/ML (ref 100–900)

## 2020-09-28 PROCEDURE — 84132 ASSAY OF SERUM POTASSIUM: CPT

## 2020-09-28 PROCEDURE — 82728 ASSAY OF FERRITIN: CPT

## 2020-09-28 PROCEDURE — 82150 ASSAY OF AMYLASE: CPT

## 2020-09-28 PROCEDURE — 85610 PROTHROMBIN TIME: CPT

## 2020-09-28 PROCEDURE — 84466 ASSAY OF TRANSFERRIN: CPT

## 2020-09-28 PROCEDURE — 83540 ASSAY OF IRON: CPT

## 2020-09-28 PROCEDURE — 82746 ASSAY OF FOLIC ACID SERUM: CPT

## 2020-09-28 PROCEDURE — 36415 COLL VENOUS BLD VENIPUNCTURE: CPT

## 2020-09-28 PROCEDURE — 83918 ORGANIC ACIDS TOTAL QUANT: CPT

## 2020-09-28 PROCEDURE — 83690 ASSAY OF LIPASE: CPT

## 2020-09-28 PROCEDURE — 83735 ASSAY OF MAGNESIUM: CPT

## 2020-09-28 PROCEDURE — 82607 VITAMIN B-12: CPT

## 2020-09-29 RX ORDER — MAGNESIUM OXIDE 400 MG/1
TABLET ORAL
Qty: 60 TABLET | Refills: 0 | Status: SHIPPED | OUTPATIENT
Start: 2020-09-29 | End: 2020-10-19

## 2020-10-01 ENCOUNTER — OFFICE VISIT (OUTPATIENT)
Dept: INTERNAL MEDICINE CLINIC | Facility: CLINIC | Age: 60
End: 2020-10-01
Payer: COMMERCIAL

## 2020-10-01 VITALS
OXYGEN SATURATION: 96 % | BODY MASS INDEX: 23.86 KG/M2 | HEART RATE: 68 BPM | SYSTOLIC BLOOD PRESSURE: 126 MMHG | HEIGHT: 67 IN | RESPIRATION RATE: 16 BRPM | WEIGHT: 152 LBS | TEMPERATURE: 97.6 F | DIASTOLIC BLOOD PRESSURE: 84 MMHG

## 2020-10-01 DIAGNOSIS — R19.7 DIARRHEA: Primary | ICD-10-CM

## 2020-10-01 DIAGNOSIS — K21.9 GASTROESOPHAGEAL REFLUX DISEASE, UNSPECIFIED WHETHER ESOPHAGITIS PRESENT: ICD-10-CM

## 2020-10-01 DIAGNOSIS — E78.1 HIGH BLOOD TRIGLYCERIDES: ICD-10-CM

## 2020-10-01 DIAGNOSIS — Z72.0 TOBACCO ABUSE: ICD-10-CM

## 2020-10-01 DIAGNOSIS — Z71.6 ENCOUNTER FOR SMOKING CESSATION COUNSELING: ICD-10-CM

## 2020-10-01 PROBLEM — D64.9 ANEMIA: Status: ACTIVE | Noted: 2020-10-01

## 2020-10-01 PROCEDURE — 4004F PT TOBACCO SCREEN RCVD TLK: CPT | Performed by: INTERNAL MEDICINE

## 2020-10-01 PROCEDURE — 3079F DIAST BP 80-89 MM HG: CPT | Performed by: INTERNAL MEDICINE

## 2020-10-01 PROCEDURE — 99213 OFFICE O/P EST LOW 20 MIN: CPT | Performed by: INTERNAL MEDICINE

## 2020-10-01 PROCEDURE — 3074F SYST BP LT 130 MM HG: CPT | Performed by: INTERNAL MEDICINE

## 2020-10-01 RX ORDER — VARENICLINE TARTRATE 25 MG
KIT ORAL
Qty: 53 TABLET | Refills: 0 | Status: SHIPPED | OUTPATIENT
Start: 2020-10-01

## 2020-10-02 ENCOUNTER — APPOINTMENT (OUTPATIENT)
Dept: LAB | Age: 60
End: 2020-10-02
Payer: COMMERCIAL

## 2020-10-02 DIAGNOSIS — R19.7 DIARRHEA: ICD-10-CM

## 2020-10-02 DIAGNOSIS — R19.7 DIARRHEA OF PRESUMED INFECTIOUS ORIGIN: ICD-10-CM

## 2020-10-02 LAB — HEMOCCULT STL QL IA: NEGATIVE

## 2020-10-02 PROCEDURE — G0328 FECAL BLOOD SCRN IMMUNOASSAY: HCPCS

## 2020-10-03 LAB
METHYLMALONATE SERPL-SCNC: 131 NMOL/L (ref 0–378)
SL AMB DISCLAIMER: NORMAL

## 2020-10-15 ENCOUNTER — TRANSCRIBE ORDERS (OUTPATIENT)
Dept: ADMINISTRATIVE | Facility: HOSPITAL | Age: 60
End: 2020-10-15

## 2020-10-15 DIAGNOSIS — R10.13 EPIGASTRIC PAIN: Primary | ICD-10-CM

## 2020-10-15 DIAGNOSIS — F10.230 ALCOHOL DEPENDENCE WITH UNCOMPLICATED WITHDRAWAL (HCC): ICD-10-CM

## 2020-10-15 DIAGNOSIS — R63.4 ABNORMAL WEIGHT LOSS: ICD-10-CM

## 2020-10-16 RX ORDER — FAMOTIDINE 20 MG/1
TABLET, FILM COATED ORAL
Qty: 60 TABLET | Refills: 0 | Status: SHIPPED | OUTPATIENT
Start: 2020-10-16 | End: 2020-10-18

## 2020-10-18 DIAGNOSIS — E87.6 HYPOKALEMIA: ICD-10-CM

## 2020-10-18 DIAGNOSIS — E83.42 HYPOMAGNESEMIA: ICD-10-CM

## 2020-10-18 DIAGNOSIS — K21.9 GERD (GASTROESOPHAGEAL REFLUX DISEASE): ICD-10-CM

## 2020-10-18 RX ORDER — FAMOTIDINE 20 MG/1
TABLET, FILM COATED ORAL
Qty: 60 TABLET | Refills: 0 | Status: SHIPPED | OUTPATIENT
Start: 2020-10-18 | End: 2020-12-03

## 2020-10-19 DIAGNOSIS — R09.82 ALLERGIC RHINITIS WITH POSTNASAL DRIP: ICD-10-CM

## 2020-10-19 DIAGNOSIS — J30.9 ALLERGIC RHINITIS WITH POSTNASAL DRIP: ICD-10-CM

## 2020-10-19 RX ORDER — POTASSIUM CHLORIDE 1500 MG/1
TABLET, FILM COATED, EXTENDED RELEASE ORAL
Qty: 180 TABLET | Refills: 0 | Status: SHIPPED | OUTPATIENT
Start: 2020-10-19 | End: 2021-02-22 | Stop reason: SDUPTHER

## 2020-10-19 RX ORDER — MAGNESIUM OXIDE 400 MG/1
TABLET ORAL
Qty: 60 TABLET | Refills: 0 | Status: SHIPPED | OUTPATIENT
Start: 2020-10-19 | End: 2020-10-29

## 2020-10-19 RX ORDER — FLUTICASONE PROPIONATE 50 MCG
SPRAY, SUSPENSION (ML) NASAL
Qty: 48 ML | Refills: 2 | Status: SHIPPED | OUTPATIENT
Start: 2020-10-19

## 2020-10-23 ENCOUNTER — LAB (OUTPATIENT)
Dept: LAB | Age: 60
End: 2020-10-23
Payer: COMMERCIAL

## 2020-10-23 ENCOUNTER — HOSPITAL ENCOUNTER (OUTPATIENT)
Dept: RADIOLOGY | Age: 60
Discharge: HOME | End: 2020-10-23
Payer: COMMERCIAL

## 2020-10-23 DIAGNOSIS — R63.4 ABNORMAL WEIGHT LOSS: ICD-10-CM

## 2020-10-23 DIAGNOSIS — R10.13 EPIGASTRIC PAIN: ICD-10-CM

## 2020-10-23 DIAGNOSIS — F10.230 ALCOHOL DEPENDENCE WITH UNCOMPLICATED WITHDRAWAL (HCC): ICD-10-CM

## 2020-10-23 DIAGNOSIS — R19.7 DIARRHEA: ICD-10-CM

## 2020-10-23 LAB
CRP SERPL QL: <3 MG/L
MAGNESIUM SERPL-MCNC: 1.3 MG/DL (ref 1.6–2.6)
POTASSIUM SERPL-SCNC: 4 MMOL/L (ref 3.5–5.3)

## 2020-10-23 PROCEDURE — 76830 TRANSVAGINAL US NON-OB: CPT

## 2020-10-23 PROCEDURE — 76856 US EXAM PELVIC COMPLETE: CPT

## 2020-10-23 PROCEDURE — 36415 COLL VENOUS BLD VENIPUNCTURE: CPT

## 2020-10-23 PROCEDURE — 83735 ASSAY OF MAGNESIUM: CPT

## 2020-10-23 PROCEDURE — 84446 ASSAY OF VITAMIN E: CPT

## 2020-10-23 PROCEDURE — 84597 ASSAY OF VITAMIN K: CPT

## 2020-10-23 PROCEDURE — 84590 ASSAY OF VITAMIN A: CPT

## 2020-10-23 PROCEDURE — 76700 US EXAM ABDOM COMPLETE: CPT

## 2020-10-23 PROCEDURE — 86140 C-REACTIVE PROTEIN: CPT

## 2020-10-23 PROCEDURE — 84132 ASSAY OF SERUM POTASSIUM: CPT

## 2020-10-26 DIAGNOSIS — E83.42 HYPOMAGNESEMIA: Primary | ICD-10-CM

## 2020-10-28 DIAGNOSIS — R19.7 DIARRHEA, UNSPECIFIED TYPE: ICD-10-CM

## 2020-10-28 RX ORDER — LOPERAMIDE HYDROCHLORIDE 2 MG/1
2 TABLET ORAL 4 TIMES DAILY PRN
Qty: 30 TABLET | Refills: 0 | Status: SHIPPED | OUTPATIENT
Start: 2020-10-28

## 2020-10-29 ENCOUNTER — TELEPHONE (OUTPATIENT)
Dept: OBGYN CLINIC | Facility: CLINIC | Age: 60
End: 2020-10-29

## 2020-10-29 DIAGNOSIS — E83.42 HYPOMAGNESEMIA: ICD-10-CM

## 2020-10-29 RX ORDER — MAGNESIUM OXIDE 400 MG/1
TABLET ORAL
Qty: 60 TABLET | Refills: 0 | Status: SHIPPED | OUTPATIENT
Start: 2020-10-29 | End: 2021-02-22 | Stop reason: SDUPTHER

## 2020-11-01 LAB
A-TOCOPHEROL VIT E SERPL-MCNC: 30.8 MG/L (ref 7–25.1)
GAMMA TOCOPHEROL SERPL-MCNC: 4.8 MG/L (ref 0.5–5.5)
VIT A SERPL-MCNC: 124.9 UG/DL (ref 20.1–62)

## 2020-11-04 ENCOUNTER — TELEPHONE (OUTPATIENT)
Dept: INTERNAL MEDICINE CLINIC | Facility: CLINIC | Age: 60
End: 2020-11-04

## 2020-11-04 LAB — PHYTONADIONE SERPL-MCNC: 1.17 NG/ML (ref 0.13–1.88)

## 2020-11-16 ENCOUNTER — OFFICE VISIT (OUTPATIENT)
Dept: INTERNAL MEDICINE CLINIC | Facility: CLINIC | Age: 60
End: 2020-11-16
Payer: COMMERCIAL

## 2020-11-16 VITALS
HEART RATE: 65 BPM | SYSTOLIC BLOOD PRESSURE: 138 MMHG | DIASTOLIC BLOOD PRESSURE: 80 MMHG | HEIGHT: 67 IN | OXYGEN SATURATION: 98 % | WEIGHT: 153.6 LBS | TEMPERATURE: 97.9 F | RESPIRATION RATE: 16 BRPM | BODY MASS INDEX: 24.11 KG/M2

## 2020-11-16 DIAGNOSIS — R16.0 ENLARGED LIVER: ICD-10-CM

## 2020-11-16 DIAGNOSIS — E53.8 LOW FOLIC ACID: Primary | ICD-10-CM

## 2020-11-16 DIAGNOSIS — R79.89 HIGH SERUM VITAMIN E: ICD-10-CM

## 2020-11-16 DIAGNOSIS — E53.8 LOW SERUM VITAMIN B12: ICD-10-CM

## 2020-11-16 DIAGNOSIS — K90.9 DIARRHEA DUE TO MALABSORPTION: ICD-10-CM

## 2020-11-16 DIAGNOSIS — E87.6 HYPOKALEMIA: ICD-10-CM

## 2020-11-16 DIAGNOSIS — R19.7 DIARRHEA DUE TO MALABSORPTION: ICD-10-CM

## 2020-11-16 DIAGNOSIS — E67.0 HIGH VITAMIN A LEVEL: ICD-10-CM

## 2020-11-16 PROCEDURE — 4004F PT TOBACCO SCREEN RCVD TLK: CPT | Performed by: INTERNAL MEDICINE

## 2020-11-16 PROCEDURE — 99214 OFFICE O/P EST MOD 30 MIN: CPT | Performed by: INTERNAL MEDICINE

## 2020-11-16 PROCEDURE — 3075F SYST BP GE 130 - 139MM HG: CPT | Performed by: INTERNAL MEDICINE

## 2020-11-16 PROCEDURE — 3079F DIAST BP 80-89 MM HG: CPT | Performed by: INTERNAL MEDICINE

## 2020-11-16 PROCEDURE — 3008F BODY MASS INDEX DOCD: CPT | Performed by: INTERNAL MEDICINE

## 2020-11-19 ENCOUNTER — DOCUMENTATION (OUTPATIENT)
Dept: INTERNAL MEDICINE CLINIC | Facility: CLINIC | Age: 60
End: 2020-11-19

## 2020-12-03 DIAGNOSIS — K21.9 GERD (GASTROESOPHAGEAL REFLUX DISEASE): ICD-10-CM

## 2020-12-03 RX ORDER — FAMOTIDINE 20 MG/1
TABLET, FILM COATED ORAL
Qty: 60 TABLET | Refills: 0 | Status: SHIPPED | OUTPATIENT
Start: 2020-12-03 | End: 2020-12-20

## 2020-12-17 DIAGNOSIS — K21.9 GERD (GASTROESOPHAGEAL REFLUX DISEASE): ICD-10-CM

## 2020-12-20 RX ORDER — FAMOTIDINE 20 MG/1
TABLET, FILM COATED ORAL
Qty: 180 TABLET | Refills: 1 | Status: SHIPPED | OUTPATIENT
Start: 2020-12-20 | End: 2021-08-17

## 2021-01-29 ENCOUNTER — ANNUAL EXAM (OUTPATIENT)
Dept: OBGYN CLINIC | Facility: CLINIC | Age: 61
End: 2021-01-29
Payer: COMMERCIAL

## 2021-01-29 VITALS
WEIGHT: 157 LBS | DIASTOLIC BLOOD PRESSURE: 98 MMHG | HEIGHT: 67 IN | SYSTOLIC BLOOD PRESSURE: 148 MMHG | BODY MASS INDEX: 24.64 KG/M2

## 2021-01-29 DIAGNOSIS — N83.202 OVARIAN CYST, LEFT: ICD-10-CM

## 2021-01-29 DIAGNOSIS — R23.8 VESICULAR RASH: ICD-10-CM

## 2021-01-29 DIAGNOSIS — Z01.419 GYNECOLOGIC EXAM NORMAL: Primary | ICD-10-CM

## 2021-01-29 PROBLEM — K22.10 EROSIVE ESOPHAGITIS: Status: ACTIVE | Noted: 2020-09-08

## 2021-01-29 PROBLEM — Z12.31 OTHER SCREENING MAMMOGRAM: Status: ACTIVE | Noted: 2021-01-29

## 2021-01-29 PROBLEM — K22.4 ESOPHAGEAL DYSMOTILITY: Status: ACTIVE | Noted: 2020-01-06

## 2021-01-29 PROBLEM — K64.9 HEMORRHOIDS: Status: ACTIVE | Noted: 2020-09-08

## 2021-01-29 PROBLEM — Z01.818 VISIT FOR PRE-OPERATIVE EXAMINATION: Status: ACTIVE | Noted: 2021-01-29

## 2021-01-29 PROBLEM — E78.00 PURE HYPERCHOLESTEROLEMIA: Status: ACTIVE | Noted: 2020-09-08

## 2021-01-29 PROBLEM — Z23 NEED FOR INFLUENZA VACCINATION: Status: ACTIVE | Noted: 2021-01-29

## 2021-01-29 PROBLEM — K58.9 IRRITABLE BOWEL SYNDROME: Status: ACTIVE | Noted: 2020-09-08

## 2021-01-29 PROBLEM — K57.30 DIVERTICULAR DISEASE OF COLON: Status: ACTIVE | Noted: 2020-09-08

## 2021-01-29 PROBLEM — Z09 POSTOPERATIVE EXAMINATION: Status: ACTIVE | Noted: 2021-01-29

## 2021-01-29 PROBLEM — Z12.4 CERVICAL CANCER SCREENING: Status: ACTIVE | Noted: 2021-01-29

## 2021-01-29 PROBLEM — F10.20 ALCOHOLISM (HCC): Status: ACTIVE | Noted: 2020-09-08

## 2021-01-29 PROBLEM — Z87.19 HISTORY OF GASTROESOPHAGEAL REFLUX (GERD): Status: ACTIVE | Noted: 2021-01-29

## 2021-01-29 PROCEDURE — 87798 DETECT AGENT NOS DNA AMP: CPT | Performed by: PHYSICIAN ASSISTANT

## 2021-01-29 PROCEDURE — S0612 ANNUAL GYNECOLOGICAL EXAMINA: HCPCS | Performed by: PHYSICIAN ASSISTANT

## 2021-01-29 RX ORDER — VALACYCLOVIR HYDROCHLORIDE 1 G/1
1000 TABLET, FILM COATED ORAL 3 TIMES DAILY
Qty: 21 TABLET | Refills: 0 | Status: SHIPPED | OUTPATIENT
Start: 2021-01-29 | End: 2022-06-13

## 2021-01-29 NOTE — PATIENT INSTRUCTIONS
[phone], appt with genetic counselor  Vesicular rash below breast  Viral culture done today  Will start patient on Valtrex 1000mg three times a day to cover for shingles  Recommend patient follow up with PCP

## 2021-01-29 NOTE — PROGRESS NOTES
Assessment/Plan   Problem List Items Addressed This Visit        Other    Gynecologic exam normal - Primary     Pap guidelines reviewed  Pap deferred secondary to negative pap and HPV in 2018 in this low risk patient  Will plan to repeat pelvic ultrasound to evaluate ovarian cyst  Script given  Has mammogram scheduled  Reviewed multiple family members with breast cancer  Recommend patient follow up with genetic counselor to discuss testing  Reviewed with patient even though her sister tested negative patient could still be a gene carrier  Reviewed rash underneath right breast  Concerning for Herpes Zoster (Shingles)  Will plan to start Valtrex 1000 mg TID x 7 days  Recommend follow up with PCP  Viral culture done today  Return to office for annual or as needed  Other Visit Diagnoses     Ovarian cyst, left        Relevant Orders    US pelvis complete w transvaginal    Vesicular rash        Relevant Medications    valACYclovir (VALTREX) 1,000 mg tablet    Other Relevant Orders    Varicella Zoster Virus DNA,PCR          Subjective:     Patient ID: Ace Rivas is a 62 y o  y o  female  HPI  61 yo seen for annual exam  LMP: 2005 no bleeding since  Patient reports rash started under her breast yesterday  Noticed painful burning today  Denies fever, chills  Has not had shingles vaccine, did have varicella as child  Denies bowel or bladder issues  Pelvic ultrasound done 10/23/2020 showed submucosal fibroid measuring 11mm  Small simple left ovarian cyst measuring 1 4 x 1 5 x 1 6cm  Patient does notice left pelvic pain  Family history significant for 2 sisters with breast cancer one of then diagnosed at age 45 the other 48  Also has many maternal aunts and cousins with breast cancer  One of her sisters who does not have cancer tested negative for BRCA  Patient has not been tested  Unsure if her sisters with breast cancer were tested  Last pap: 1/10/2018 NILM (-)HRHPV     Last mammogram: 5/28/2020 BIRADS 2: Benign  The following portions of the patient's history were reviewed and updated as appropriate:   She  has a past medical history of Cervical disc disorder and Stenosis of cervical spine region    She   Patient Active Problem List    Diagnosis Date Noted    Gynecologic exam normal 02/03/2021    Cervical cancer screening 01/29/2021    History of gastroesophageal reflux (GERD) 01/29/2021    Need for influenza vaccination 01/29/2021    Other screening mammogram 01/29/2021    Postoperative examination 01/29/2021    Visit for pre-operative examination 01/29/2021    Enlarged liver 53/52/2068    Low folic acid 66/33/8627    High vitamin A level 11/16/2020    High serum vitamin E 11/16/2020    Low serum vitamin B12 11/16/2020    Anemia 10/01/2020    Alcoholism ([address] ) 09/08/2020    Diverticular disease of colon 09/08/2020    Erosive esophagitis 09/08/2020    Hemorrhoids 09/08/2020    Irritable bowel syndrome 09/08/2020    Pure hypercholesterolemia 09/08/2020    Visit for routine gyn exam 09/01/2020    Vitamin D deficiency 09/01/2020    High blood triglycerides 09/01/2020    Diarrhea 09/01/2020    Need for vaccination 09/01/2020    Unintentional weight loss 09/01/2020    Mild intermittent asthma 01/06/2020    Esophageal dysmotility 01/06/2020    Allergic rhinitis with postnasal drip 01/06/2020    Tobacco abuse 01/06/2020    NATE (obstructive sleep apnea) 01/06/2020    Cough 01/06/2020    Pelvic pain in female 01/10/2018    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    Hypokalemia 11/20/2017    Impaired fasting glucose 11/20/2017    Epicondylitis, lateral (tennis elbow) 05/15/2017    Venous insufficiency 05/15/2017    Limb pain 12/02/2013    Abdominal pain 11/22/2013    Change in stool habits 05/13/2013    Gallbladder disease 05/13/2013    Anxiety disorder 04/08/2013    Cervical spinal stenosis 01/09/2013    Microhematuria 01/02/2013    Chest pain on respiration 12/07/2012    Asthma 2012    Hypertension 2012    Thoracic and lumbosacral neuritis 2012    Urinary tract infection 2012     She  has a past surgical history that includes Cervical fusion (2013);  section (); Knee surgery (Bilateral, 20YRS AGO); Elbow surgery (); Breast biopsy (Left, ); and Breast biopsy (Right, )  Her family history includes BRCA1 Negative in her sister; BRCA2 Negative in her sister; Brain cancer (age of onset: [de-identified]) in her mother; Breast cancer (age of onset: 45) in her sister; Breast cancer (age of onset: 50) in her cousin; Breast cancer (age of onset: 52) in her cousin and cousin; Breast cancer (age of onset: 48) in her sister; Breast cancer (age of onset: 61) in her maternal aunt, maternal aunt, and maternal aunt; Cancer in her mother; Heart disease in her father; No Known Problems in her maternal aunt, maternal aunt, maternal aunt, maternal aunt, maternal grandfather, maternal grandmother, paternal grandfather, paternal grandmother, sister, and son; Prostate cancer (age of onset: 54) in her maternal uncle  She  reports that she has been smoking cigarettes  She has a 15 00 pack-year smoking history  She has never used smokeless tobacco  She reports current alcohol use  She reports previous drug use    Current Outpatient Medications   Medication Sig Dispense Refill    albuterol (PROVENTIL HFA,VENTOLIN HFA) 90 mcg/act inhaler Inhale 1 puff every 4 (four) hours as needed for wheezing 1 Inhaler 3    amLODIPine (NORVASC) 2 5 mg tablet TAKE 1 TABLET BY MOUTH EVERY DAY 90 tablet 1    atenolol (TENORMIN) 50 mg tablet TAKE 1 TABLET BY MOUTH EVERY DAY 90 tablet 1    ergocalciferol (VITAMIN D2) 50,000 units TAKE ONE CAPSULE BY MOUTH ONE TIME PER WEEK (Patient taking differently: 2,000 Units ) 4 capsule 2    escitalopram (LEXAPRO) 10 mg tablet TAKE 1 TABLET BY MOUTH EVERY DAY 90 tablet 1    famotidine (PEPCID) 20 mg tablet TAKE 1 TABLET BY MOUTH TWICE A  tablet 1    fluticasone (FLONASE) 50 mcg/act nasal spray SPRAY 1 SPRAY INTO EACH NOSTRIL EVERY DAY 48 mL 2    hydrochlorothiazide (MICROZIDE) 12 5 mg capsule Take 1 capsule (12 5 mg total) by mouth daily 90 capsule 1    loperamide (IMODIUM A-D) 2 MG tablet Take 1 tablet (2 mg total) by mouth 4 (four) times a day as needed for diarrhea 30 tablet 0    magnesium oxide (MAG-OX) 400 mg tablet TAKE 1 TABLET BY MOUTH TWICE A DAY 60 tablet 0    Pediatric Multivitamins-Fl (MULTIVITAMINS/FL PO) Take 1 capsule by mouth daily      Potassium Chloride ER 20 MEQ TBCR TAKE 2 TABLETS BY MOUTH DAILY 150 tablet 0    folic acid (FOLVITE) 1 mg tablet       valACYclovir (VALTREX) 1,000 mg tablet Take 1 tablet (1,000 mg total) by mouth 3 (three) times a day for 7 days 21 tablet 0    varenicline (CHANTIX MOODY) 0 5 MG X 11 & 1 MG X 42 tablet Take one 0 5 mg tablet by mouth once daily for 3 days, then one 0 5 mg tablet by mouth twice daily for 4 days, then one 1 mg tablet by mouth twice daily  (Patient not taking: Reported on 2021) 53 tablet 0     No current facility-administered medications for this visit  She is allergic to methocarbamol and tramadol       Menstrual History:  OB History        1    Para   1    Term   1            AB        Living           SAB        TAB        Ectopic        Multiple        Live Births                      No LMP recorded  Patient is postmenopausal          Review of Systems   Constitutional: Negative for fatigue, fever and unexpected weight change  HENT: Negative for dental problem and sinus pressure  Eyes: Negative for visual disturbance  Respiratory: Negative for cough, shortness of breath and wheezing  Cardiovascular: Negative for chest pain  Gastrointestinal: Negative for abdominal pain, blood in stool, constipation, diarrhea, nausea and vomiting  Endocrine: Negative for polydipsia  Genitourinary: Positive for pelvic pain   Negative for difficulty urinating, dyspareunia, dysuria, frequency, hematuria and urgency  Musculoskeletal: Negative for arthralgias and back pain  Neurological: Negative for dizziness, seizures, light-headedness and headaches  Psychiatric/Behavioral: Negative for suicidal ideas  The patient is not nervous/anxious  Objective:  Vitals:    01/29/21 1036   BP: 148/98   BP Location: Left arm   Patient Position: Sitting   Cuff Size: Standard   Weight: 71 2 kg (157 lb)   Height: 5' 7" (1 702 m)      Physical Exam  Constitutional:       Appearance: Normal appearance  She is well-developed  Genitourinary:      Pelvic exam was performed with patient supine  Vulva, urethra, bladder, vagina, uterus and rectum normal       No vulval condylomata, lesion, tenderness, ulcerations, Bartholin's cyst or rash noted  No signs of labial injury  No labial fusion  No inguinal adenopathy present in the right or left side  No urethral prolapse, pain, swelling, tenderness, caruncle, mass or diverticulum present  Bladder is not distended or tender  No signs of injury or lesions in the vagina  No vaginal discharge, erythema, tenderness or bleeding  No cervical motion tenderness, discharge or lesion  Uterus is not enlarged, tender, irregular or mobile  No uterine mass detected  No right or left adnexal mass present  Right adnexa not tender or full  Left adnexa not tender or full  Rectum:      Guaiac result negative  No rectal mass, anal fissure, tenderness, external hemorrhoid, internal hemorrhoid or abnormal anal tone  HENT:      Head: Normocephalic and atraumatic  Neck:      Thyroid: No thyromegaly  Cardiovascular:      Rate and Rhythm: Normal rate and regular rhythm  Heart sounds: Normal heart sounds  No murmur  No friction rub  No gallop  Pulmonary:      Effort: Pulmonary effort is normal  No respiratory distress  Breath sounds: Normal breath sounds   No wheezing  Chest:      Breasts: Breasts are symmetrical          Right: Skin change present  No swelling, bleeding, inverted nipple, mass, nipple discharge or tenderness  Left: Normal  No swelling, bleeding, inverted nipple, mass, nipple discharge, skin change or tenderness  Abdominal:      General: There is no distension  Palpations: Abdomen is soft  There is no mass  Tenderness: There is no abdominal tenderness  There is no guarding or rebound  Hernia: No hernia is present  Lymphadenopathy:      Cervical: No cervical adenopathy  Upper Body:      Right upper body: No supraclavicular, axillary or pectoral adenopathy  Left upper body: No supraclavicular, axillary or pectoral adenopathy  Lower Body: No right inguinal adenopathy  No left inguinal adenopathy  Neurological:      Mental Status: She is alert and oriented to person, place, and time  Skin:     General: Skin is warm and dry     Psychiatric:         Behavior: Behavior normal

## 2021-02-03 PROBLEM — D17.9 ANGIOMYOLIPOMA: Status: ACTIVE | Noted: 2017-12-29

## 2021-02-03 PROBLEM — I87.2 VENOUS INSUFFICIENCY: Status: ACTIVE | Noted: 2017-05-15

## 2021-02-03 PROBLEM — Z01.419 GYNECOLOGIC EXAM NORMAL: Status: ACTIVE | Noted: 2021-02-03

## 2021-02-03 PROBLEM — E87.6 HYPOKALEMIA: Status: ACTIVE | Noted: 2017-11-20

## 2021-02-03 PROBLEM — R73.01 IMPAIRED FASTING GLUCOSE: Status: ACTIVE | Noted: 2017-11-20

## 2021-02-03 PROBLEM — M77.10 EPICONDYLITIS, LATERAL (TENNIS ELBOW): Status: ACTIVE | Noted: 2017-05-15

## 2021-02-03 PROBLEM — R10.2 PELVIC PAIN IN FEMALE: Status: ACTIVE | Noted: 2018-01-10

## 2021-02-03 NOTE — ASSESSMENT & PLAN NOTE
Pap guidelines reviewed  Pap deferred secondary to negative pap and HPV in 2018 in this low risk patient  Will plan to repeat pelvic ultrasound to evaluate ovarian cyst  Script given  Has mammogram scheduled  Reviewed multiple family members with breast cancer  Recommend patient follow up with genetic counselor to discuss testing  Reviewed with patient even though her sister tested negative patient could still be a gene carrier  Reviewed rash underneath right breast  Concerning for Herpes Zoster (Shingles)  Will plan to start Valtrex 1000 mg TID x 7 days  Recommend follow up with PCP  Viral culture done today  Return to office for annual or as needed

## 2021-02-04 LAB — VZV DNA SPEC QL NAA+PROBE: NEGATIVE

## 2021-02-22 DIAGNOSIS — I10 ESSENTIAL HYPERTENSION: ICD-10-CM

## 2021-02-22 DIAGNOSIS — E87.6 HYPOKALEMIA: ICD-10-CM

## 2021-02-22 DIAGNOSIS — E83.42 HYPOMAGNESEMIA: ICD-10-CM

## 2021-02-22 DIAGNOSIS — R79.89 LOW VITAMIN D LEVEL: ICD-10-CM

## 2021-02-22 RX ORDER — AMLODIPINE BESYLATE 2.5 MG/1
2.5 TABLET ORAL DAILY
Qty: 90 TABLET | Refills: 0 | Status: SHIPPED | OUTPATIENT
Start: 2021-02-22 | End: 2021-12-05

## 2021-02-22 RX ORDER — MAGNESIUM OXIDE 400 MG/1
1 TABLET ORAL 2 TIMES DAILY
Qty: 60 TABLET | Refills: 0 | Status: SHIPPED | OUTPATIENT
Start: 2021-02-22 | End: 2021-04-06

## 2021-02-22 RX ORDER — ATENOLOL 50 MG/1
50 TABLET ORAL DAILY
Qty: 90 TABLET | Refills: 0 | Status: SHIPPED | OUTPATIENT
Start: 2021-02-22 | End: 2021-06-28

## 2021-02-22 RX ORDER — HYDROCHLOROTHIAZIDE 12.5 MG/1
12.5 CAPSULE, GELATIN COATED ORAL DAILY
Qty: 90 CAPSULE | Refills: 0 | Status: SHIPPED | OUTPATIENT
Start: 2021-02-22 | End: 2021-03-19

## 2021-02-22 RX ORDER — ESCITALOPRAM OXALATE 10 MG/1
10 TABLET ORAL DAILY
Qty: 90 TABLET | Refills: 0 | Status: SHIPPED | OUTPATIENT
Start: 2021-02-22 | End: 2021-08-09

## 2021-02-23 DIAGNOSIS — E55.9 VITAMIN D DEFICIENCY: Primary | ICD-10-CM

## 2021-02-23 RX ORDER — POTASSIUM CHLORIDE 1500 MG/1
2 TABLET, FILM COATED, EXTENDED RELEASE ORAL DAILY
Qty: 180 TABLET | Refills: 0 | Status: SHIPPED | OUTPATIENT
Start: 2021-02-23 | End: 2021-08-17

## 2021-02-23 RX ORDER — ERGOCALCIFEROL 1.25 MG/1
50000 CAPSULE ORAL WEEKLY
Qty: 4 CAPSULE | Refills: 0 | Status: SHIPPED | OUTPATIENT
Start: 2021-02-23 | End: 2021-03-19

## 2021-02-23 NOTE — TELEPHONE ENCOUNTER
Dr Kristen Grewal can you send the other scripts to the pharmacy I made pt aware that she will need to do a Vit D Level prior to the script being filled,

## 2021-02-23 NOTE — TELEPHONE ENCOUNTER
LM for pt that Dr Katia Cano would like a Vitamin D level completed before ordering the Vitamin D2 prescription  The order is in your chart  Please call with any questions

## 2021-03-10 DIAGNOSIS — Z23 ENCOUNTER FOR IMMUNIZATION: ICD-10-CM

## 2021-03-19 DIAGNOSIS — R79.89 LOW VITAMIN D LEVEL: ICD-10-CM

## 2021-03-19 DIAGNOSIS — I10 ESSENTIAL HYPERTENSION: ICD-10-CM

## 2021-03-19 RX ORDER — HYDROCHLOROTHIAZIDE 12.5 MG/1
CAPSULE, GELATIN COATED ORAL
Qty: 90 CAPSULE | Refills: 1 | Status: SHIPPED | OUTPATIENT
Start: 2021-03-19 | End: 2021-11-12

## 2021-03-19 RX ORDER — ERGOCALCIFEROL 1.25 MG/1
CAPSULE ORAL
Qty: 4 CAPSULE | Refills: 0 | Status: SHIPPED | OUTPATIENT
Start: 2021-03-19

## 2021-04-02 DIAGNOSIS — R79.89 LOW VITAMIN D LEVEL: ICD-10-CM

## 2021-04-05 RX ORDER — ERGOCALCIFEROL 1.25 MG/1
CAPSULE ORAL
Qty: 12 CAPSULE | Refills: 1 | OUTPATIENT
Start: 2021-04-05

## 2021-04-06 DIAGNOSIS — E83.42 HYPOMAGNESEMIA: ICD-10-CM

## 2021-04-06 RX ORDER — MAGNESIUM OXIDE 400 MG/1
TABLET ORAL
Qty: 60 TABLET | Refills: 0 | Status: SHIPPED | OUTPATIENT
Start: 2021-04-06 | End: 2021-05-08

## 2021-05-01 DIAGNOSIS — J45.909 ASTHMA: ICD-10-CM

## 2021-05-02 RX ORDER — ALBUTEROL SULFATE 90 UG/1
1 AEROSOL, METERED RESPIRATORY (INHALATION) EVERY 4 HOURS PRN
Qty: 6.7 G | Refills: 3 | Status: SHIPPED | OUTPATIENT
Start: 2021-05-02 | End: 2021-12-13

## 2021-05-08 DIAGNOSIS — E83.42 HYPOMAGNESEMIA: ICD-10-CM

## 2021-05-08 RX ORDER — MAGNESIUM OXIDE 400 MG/1
TABLET ORAL
Qty: 60 TABLET | Refills: 0 | Status: SHIPPED | OUTPATIENT
Start: 2021-05-08 | End: 2021-07-06

## 2021-05-12 ENCOUNTER — VBI (OUTPATIENT)
Dept: ADMINISTRATIVE | Facility: OTHER | Age: 61
End: 2021-05-12

## 2021-06-10 ENCOUNTER — TELEPHONE (OUTPATIENT)
Dept: OBGYN CLINIC | Facility: CLINIC | Age: 61
End: 2021-06-10

## 2021-06-10 NOTE — TELEPHONE ENCOUNTER
Patient LMOM that she is having PMB  Has not had a period for 15 years and she starting bleeding today  Would like a call back

## 2021-06-15 ENCOUNTER — PROCEDURE VISIT (OUTPATIENT)
Dept: OBGYN CLINIC | Facility: CLINIC | Age: 61
End: 2021-06-15
Payer: COMMERCIAL

## 2021-06-15 VITALS
HEIGHT: 67 IN | WEIGHT: 157 LBS | DIASTOLIC BLOOD PRESSURE: 86 MMHG | SYSTOLIC BLOOD PRESSURE: 142 MMHG | BODY MASS INDEX: 24.64 KG/M2

## 2021-06-15 DIAGNOSIS — N92.4 MENOPAUSAL BLEEDING: Primary | ICD-10-CM

## 2021-06-15 PROCEDURE — 58100 BIOPSY OF UTERUS LINING: CPT | Performed by: PHYSICIAN ASSISTANT

## 2021-06-15 PROCEDURE — 88305 TISSUE EXAM BY PATHOLOGIST: CPT | Performed by: PATHOLOGY

## 2021-06-15 NOTE — PROGRESS NOTES
Assessment/Plan:    No problem-specific Assessment & Plan notes found for this encounter  Diagnoses and all orders for this visit:    Menopausal bleeding  -     Endometrial biopsy  -     Tissue Exam          Subjective:      Patient ID: Wyatt Leiva is a 62 y o  female  Pt presents today with episode of  bleeding  LMP 2005  Pt began Friday with light bleeding noted mostly when she wiped  It has lauren consistently off and on since then  No wrosening  She does also complain of mild cramping that began with the bleeding as well  H/o pelvic US done 10/20 and found to have submucosal fibroid  AS follows:     UTERUS:  The uterus is anteverted in position, measuring 7 4 x 2 8 x 4 1 cm    11 mm submucosal leiomyoma is present  The cervix shows no suspicious abnormality      ENDOMETRIUM:    Normal caliber of 3 mm  Homogeneous and normal in appearance      OVARIES/ADNEXA:  Right ovary:  2 2 x 1 3 x 1 6 cm  No suspicious right ovarian abnormality  Doppler flow within normal limits      Left ovary is poorly visualized however there is a simple appearing 1 4 x 1 5 x 1 6 cm adnexal cyst present      No suspicious adnexal mass or loculated collections  There is no free fluid      IMPRESSION:     Submucosal uterine leiomyoma      Small simple left adnexal cyst   Annual ultrasound follow-up recommended  We did discuss possible etiologies of vaginal bleeding  We did review that it may have been precipitated from the submucosal fibroid,  But that we can't rule out any more concerning causes unless we proceed with the endometrial biopsy  Pt agrees to procedure  Pt counseled on need for endometrial biopsy  Aware of risk of risk of infection, uterine perforation and scarring, inability to obtain a sufficient sample and possible need for additional procedures  Pt aware I will call her and review results once available and determine follow up plan as needed  Pt agrees to procedure   All questions answered  Will plan to f/u based on results  Reviewed w pt possible etiologies including hormonal surge and normal endometrial proliferation, hyperplasia and carcinoma  The following portions of the patient's history were reviewed and updated as appropriate: allergies, current medications, past family history, past medical history, past social history, past surgical history and problem list     Review of Systems   Constitutional: Negative  Respiratory: Negative  Cardiovascular: Negative  Gastrointestinal: Negative  Genitourinary: Negative  Musculoskeletal: Negative  Neurological: Negative  Objective:      /86 (BP Location: Left arm, Patient Position: Sitting, Cuff Size: Standard)   Ht 5' 7" (1 702 m)   Wt 71 2 kg (157 lb)   BMI 24 59 kg/m²          Physical Exam  Constitutional:       Appearance: She is normal weight  HENT:      Head: Normocephalic and atraumatic  Cardiovascular:      Rate and Rhythm: Normal rate and regular rhythm  Pulmonary:      Effort: Pulmonary effort is normal       Breath sounds: Normal breath sounds  Genitourinary:     General: Normal vulva  Neurological:      Mental Status: She is alert  Endometrial biopsy    Date/Time: 6/15/2021 11:35 AM  Performed by: Breezy Horowitz PA-C  Authorized by: Breezy Horowitz PA-C   Universal Protocol:  Consent: Verbal consent obtained  Consent given by: patient  Patient understanding: patient states understanding of the procedure being performed  Patient consent: the patient's understanding of the procedure matches consent given  Relevant documents: relevant documents present and verified  Test results: test results available and properly labeled  Radiology Images displayed and confirmed   If images not available, report reviewed: imaging studies available  Patient identity confirmed: verbally with patient      Indication:     Indications: Post-menopausal bleeding      Chronicity of post-menopausal bleeding:  New    Progression of post-menopausal bleeding:  Unchanged  Procedure:     Procedure: endometrial biopsy with Pipelle      A bivalve speculum was placed in the vagina: yes      Cervix cleaned and prepped: yes      A paracervical block was performed: no      An intracervical block was performed: no      The cervix was dilated: yes      Uterus sounded: yes      Uterus sound depth (cm):  7 5    Specimen collected: specimen collected and sent to pathology      Patient tolerated procedure well with no complications: yes    Comments:     Procedure comments:  Pt counseled on need for endometrial biopsy  Aware of risk of risk of infection, uterine perforation and scarring, inability to obtain a sufficient sample and possible need for additional procedures  Pt aware I will call her and review results once available and determine follow up plan as needed  Pt agrees to procedure  All questions answered

## 2021-06-28 DIAGNOSIS — I10 ESSENTIAL HYPERTENSION: ICD-10-CM

## 2021-06-28 RX ORDER — ATENOLOL 50 MG/1
TABLET ORAL
Qty: 90 TABLET | Refills: 0 | Status: SHIPPED | OUTPATIENT
Start: 2021-06-28 | End: 2021-09-28

## 2021-07-05 DIAGNOSIS — E83.42 HYPOMAGNESEMIA: ICD-10-CM

## 2021-07-06 RX ORDER — MAGNESIUM OXIDE 400 MG/1
TABLET ORAL
Qty: 60 TABLET | Refills: 0 | Status: SHIPPED | OUTPATIENT
Start: 2021-07-06 | End: 2021-08-17

## 2021-08-07 DIAGNOSIS — I10 ESSENTIAL HYPERTENSION: ICD-10-CM

## 2021-08-09 RX ORDER — ESCITALOPRAM OXALATE 10 MG/1
TABLET ORAL
Qty: 90 TABLET | Refills: 0 | Status: SHIPPED | OUTPATIENT
Start: 2021-08-09 | End: 2021-11-09

## 2021-08-09 NOTE — TELEPHONE ENCOUNTER
Requested medication(s) are due for refill today: Yes  Patient has already received a courtesy refill: Yes  Other reason request has been forwarded to provider: Patient needs appointment  Please schedule

## 2021-08-17 DIAGNOSIS — K21.9 GERD (GASTROESOPHAGEAL REFLUX DISEASE): ICD-10-CM

## 2021-08-17 DIAGNOSIS — E87.6 HYPOKALEMIA: ICD-10-CM

## 2021-08-17 DIAGNOSIS — E83.42 HYPOMAGNESEMIA: ICD-10-CM

## 2021-08-17 RX ORDER — POTASSIUM CHLORIDE 1500 MG/1
TABLET, FILM COATED, EXTENDED RELEASE ORAL
Qty: 180 TABLET | Refills: 0 | Status: SHIPPED | OUTPATIENT
Start: 2021-08-17

## 2021-08-17 RX ORDER — MAGNESIUM OXIDE 400 MG/1
TABLET ORAL
Qty: 60 TABLET | Refills: 0 | Status: SHIPPED | OUTPATIENT
Start: 2021-08-17

## 2021-08-17 RX ORDER — FAMOTIDINE 20 MG/1
TABLET, FILM COATED ORAL
Qty: 180 TABLET | Refills: 1 | Status: SHIPPED | OUTPATIENT
Start: 2021-08-17 | End: 2022-03-02

## 2021-08-23 ENCOUNTER — OFFICE VISIT (OUTPATIENT)
Dept: INTERNAL MEDICINE CLINIC | Facility: CLINIC | Age: 61
End: 2021-08-23
Payer: COMMERCIAL

## 2021-08-23 VITALS
HEART RATE: 73 BPM | BODY MASS INDEX: 24.17 KG/M2 | TEMPERATURE: 97.3 F | SYSTOLIC BLOOD PRESSURE: 138 MMHG | WEIGHT: 154 LBS | OXYGEN SATURATION: 98 % | DIASTOLIC BLOOD PRESSURE: 78 MMHG | HEIGHT: 67 IN

## 2021-08-23 DIAGNOSIS — Z23 ENCOUNTER FOR IMMUNIZATION: ICD-10-CM

## 2021-08-23 DIAGNOSIS — E55.9 VITAMIN D DEFICIENCY: Primary | ICD-10-CM

## 2021-08-23 DIAGNOSIS — I10 ESSENTIAL HYPERTENSION: ICD-10-CM

## 2021-08-23 DIAGNOSIS — Z12.31 BREAST CANCER SCREENING BY MAMMOGRAM: ICD-10-CM

## 2021-08-23 DIAGNOSIS — Z00.00 ANNUAL PHYSICAL EXAM: ICD-10-CM

## 2021-08-23 DIAGNOSIS — Z13.6 ENCOUNTER FOR SCREENING FOR CARDIOVASCULAR DISORDERS: ICD-10-CM

## 2021-08-23 DIAGNOSIS — E83.42 HYPOMAGNESEMIA: ICD-10-CM

## 2021-08-23 DIAGNOSIS — Z12.11 SCREENING FOR COLORECTAL CANCER: ICD-10-CM

## 2021-08-23 DIAGNOSIS — Z12.12 SCREENING FOR COLORECTAL CANCER: ICD-10-CM

## 2021-08-23 DIAGNOSIS — Z72.0 TOBACCO ABUSE: ICD-10-CM

## 2021-08-23 DIAGNOSIS — Z12.4 SCREENING FOR CERVICAL CANCER: ICD-10-CM

## 2021-08-23 PROCEDURE — 3725F SCREEN DEPRESSION PERFORMED: CPT | Performed by: INTERNAL MEDICINE

## 2021-08-23 PROCEDURE — 3008F BODY MASS INDEX DOCD: CPT | Performed by: INTERNAL MEDICINE

## 2021-08-23 PROCEDURE — 99396 PREV VISIT EST AGE 40-64: CPT | Performed by: INTERNAL MEDICINE

## 2021-08-23 NOTE — PROGRESS NOTES
Assessment/Plan:    Annual physical exam  · Patient will call back to schedule TDAP and Shingrix if insurance allows her to get it at our office  She will schedule flu shot  · Ordered mammogram  · Ordered lung cancer screening CT given > 20 pack year history of smoking, still current smoker  · Advised follow up with GI for diarrhea and colonoscopy (planned for this October)   · Will provide nicotine gum to help with smoking cessation   · Ordered annual screening lab work CBC, CMP, lipid panel as well as magnesium and vitamin D given history of deficiency (likely in the setting of chronic diarrhea)       Diagnoses and all orders for this visit:    Vitamin D deficiency  -     Vitamin D 25 hydroxy; Future    Screening for cervical cancer    Encounter for immunization    Screening for colorectal cancer  -     Ambulatory referral to Gastroenterology; Future    Essential hypertension    Encounter for screening for cardiovascular disorders  -     Comprehensive metabolic panel; Future  -     Lipid panel; Future    Breast cancer screening by mammogram  -     Mammo screening bilateral w 3d & cad; Future    Tobacco abuse  -     nicotine polacrilex (NICORETTE) 2 mg gum; Chew 1 each (2 mg total) as needed for smoking cessation  -     CT lung screening program; Future    Hypomagnesemia  -     Magnesium; Future    Annual physical exam  -     CBC and Platelet; Future    Other orders  -     Cancel: TDAP VACCINE GREATER THAN OR EQUAL TO 8YO IM  -     Cancel: Ambulatory referral to Gastroenterology; Future  -     Cancel: Ambulatory referral to Obstetrics / Gynecology; Future        Subjective:      Patient ID: Britt Ely is a 62 y o  female  Patient presents today for an annual wellness examination  Her health this year has been fair  She has stopped drinking alcohol  She does smoke 3-4 cigarettes a day  She is interested in quitting and would be willing to try nicotine gum      She is still having diarrhea 6 times a day since our last evaluation  She is scheduled to see her GI doctor this October for colonoscopy  I advised her to follow up with him  She was not able to get all of her lab work for diarrhea work up due to financial reasons yet  She follows with OB/GYN  She was treated this year for fibroids  She got back COVID vaccinations  She plans to get her flu shot this year  She will call back to schedule a TDAP shot  She will see if her insurance will allow her to get a shingrix shot in our office  If not, she can get the shot at her pharmacy  She follows with a dentist yearly  She has an eye doctor she can schedule appointments with  She is due for a mammogram this year  In addition, patient has smoked since her 25s  She > 20 pack year history and still currently smokes  She used to smoke 1 pack a day but now more recently has decreased this to 3-4 cigarettes daily  She is eligible for lung cancer screening CT  The following portions of the patient's history were reviewed and updated as appropriate: allergies, current medications, past family history, past medical history, past social history, past surgical history and problem list     Review of Systems   Constitutional: Negative for chills and fever  Respiratory: Positive for wheezing  Cardiovascular: Negative for chest pain  Gastrointestinal: Positive for diarrhea  Negative for abdominal pain, blood in stool, nausea and vomiting  Genitourinary: Negative for difficulty urinating and dysuria  Musculoskeletal: Negative for back pain and myalgias  Skin: Negative for rash  Neurological: Negative for dizziness and headaches  Psychiatric/Behavioral: Negative for agitation  The patient is not nervous/anxious            Objective:      /78   Pulse 73   Temp (!) 97 3 °F (36 3 °C) (Temporal)   Ht 5' 7" (1 702 m)   Wt 69 9 kg (154 lb)   SpO2 98%   BMI 24 12 kg/m²        Physical Exam  Constitutional:       Appearance: Normal appearance  Cardiovascular:      Rate and Rhythm: Normal rate and regular rhythm  Pulmonary:      Effort: Pulmonary effort is normal       Breath sounds: Normal breath sounds  Abdominal:      General: Bowel sounds are normal       Palpations: Abdomen is soft  Tenderness: There is no abdominal tenderness  Musculoskeletal:      Cervical back: Neck supple  Right lower leg: No edema  Left lower leg: No edema  Skin:     General: Skin is warm and dry  Neurological:      General: No focal deficit present  Mental Status: She is alert and oriented to person, place, and time     Psychiatric:         Mood and Affect: Mood normal          Behavior: Behavior normal

## 2021-08-25 PROBLEM — Z00.00 ANNUAL PHYSICAL EXAM: Status: ACTIVE | Noted: 2021-08-25

## 2021-08-25 NOTE — ASSESSMENT & PLAN NOTE
· Patient will call back to schedule TDAP and Shingrix if insurance allows her to get it at our office  She will schedule flu shot    · Ordered mammogram  · Ordered lung cancer screening CT given > 20 pack year history of smoking, still current smoker  · Advised follow up with GI for diarrhea and colonoscopy (planned for this October)   · Will provide nicotine gum to help with smoking cessation   · Ordered annual screening lab work CBC, CMP, lipid panel as well as magnesium and vitamin D given history of deficiency (likely in the setting of chronic diarrhea)

## 2021-09-27 DIAGNOSIS — Z72.0 TOBACCO ABUSE: ICD-10-CM

## 2021-09-28 DIAGNOSIS — I10 ESSENTIAL HYPERTENSION: ICD-10-CM

## 2021-09-28 RX ORDER — ATENOLOL 50 MG/1
TABLET ORAL
Qty: 90 TABLET | Refills: 0 | Status: SHIPPED | OUTPATIENT
Start: 2021-09-28 | End: 2021-12-12

## 2021-11-01 ENCOUNTER — VBI (OUTPATIENT)
Dept: ADMINISTRATIVE | Facility: OTHER | Age: 61
End: 2021-11-01

## 2021-11-09 DIAGNOSIS — I10 ESSENTIAL HYPERTENSION: ICD-10-CM

## 2021-11-09 RX ORDER — ESCITALOPRAM OXALATE 10 MG/1
TABLET ORAL
Qty: 90 TABLET | Refills: 0 | Status: SHIPPED | OUTPATIENT
Start: 2021-11-09 | End: 2022-01-31

## 2021-11-12 DIAGNOSIS — I10 ESSENTIAL HYPERTENSION: ICD-10-CM

## 2021-11-12 RX ORDER — HYDROCHLOROTHIAZIDE 12.5 MG/1
CAPSULE, GELATIN COATED ORAL
Qty: 90 CAPSULE | Refills: 1 | Status: SHIPPED | OUTPATIENT
Start: 2021-11-12 | End: 2022-04-23

## 2021-12-05 DIAGNOSIS — I10 ESSENTIAL HYPERTENSION: ICD-10-CM

## 2021-12-05 RX ORDER — AMLODIPINE BESYLATE 2.5 MG/1
TABLET ORAL
Qty: 90 TABLET | Refills: 1 | Status: SHIPPED | OUTPATIENT
Start: 2021-12-05 | End: 2022-05-30

## 2021-12-12 DIAGNOSIS — I10 ESSENTIAL HYPERTENSION: ICD-10-CM

## 2021-12-12 DIAGNOSIS — Z72.0 TOBACCO ABUSE: ICD-10-CM

## 2021-12-12 DIAGNOSIS — J45.909 ASTHMA: ICD-10-CM

## 2021-12-12 RX ORDER — ATENOLOL 50 MG/1
TABLET ORAL
Qty: 90 TABLET | Refills: 0 | Status: SHIPPED | OUTPATIENT
Start: 2021-12-12 | End: 2022-03-09

## 2021-12-13 RX ORDER — ALBUTEROL SULFATE 90 UG/1
1 AEROSOL, METERED RESPIRATORY (INHALATION) EVERY 4 HOURS PRN
Qty: 1 G | Refills: 3 | Status: SHIPPED | OUTPATIENT
Start: 2021-12-13 | End: 2022-04-13

## 2021-12-21 ENCOUNTER — VBI (OUTPATIENT)
Dept: ADMINISTRATIVE | Facility: OTHER | Age: 61
End: 2021-12-21

## 2021-12-22 ENCOUNTER — VBI (OUTPATIENT)
Dept: ADMINISTRATIVE | Facility: OTHER | Age: 61
End: 2021-12-22

## 2022-01-31 DIAGNOSIS — I10 ESSENTIAL HYPERTENSION: ICD-10-CM

## 2022-01-31 RX ORDER — ESCITALOPRAM OXALATE 10 MG/1
TABLET ORAL
Qty: 90 TABLET | Refills: 0 | Status: SHIPPED | OUTPATIENT
Start: 2022-01-31 | End: 2022-04-13

## 2022-03-02 DIAGNOSIS — K21.9 GERD (GASTROESOPHAGEAL REFLUX DISEASE): ICD-10-CM

## 2022-03-02 RX ORDER — FAMOTIDINE 20 MG/1
TABLET, FILM COATED ORAL
Qty: 180 TABLET | Refills: 1 | Status: SHIPPED | OUTPATIENT
Start: 2022-03-02

## 2022-03-05 DIAGNOSIS — I10 ESSENTIAL HYPERTENSION: ICD-10-CM

## 2022-03-09 RX ORDER — ATENOLOL 50 MG/1
TABLET ORAL
Qty: 90 TABLET | Refills: 0 | Status: SHIPPED | OUTPATIENT
Start: 2022-03-09 | End: 2022-05-28

## 2022-03-10 DIAGNOSIS — Z72.0 TOBACCO ABUSE: ICD-10-CM

## 2022-03-15 ENCOUNTER — VBI (OUTPATIENT)
Dept: ADMINISTRATIVE | Facility: OTHER | Age: 62
End: 2022-03-15

## 2022-04-13 DIAGNOSIS — J45.909 ASTHMA: ICD-10-CM

## 2022-04-13 DIAGNOSIS — I10 ESSENTIAL HYPERTENSION: ICD-10-CM

## 2022-04-13 RX ORDER — ALBUTEROL SULFATE 90 UG/1
AEROSOL, METERED RESPIRATORY (INHALATION)
Qty: 1 G | Refills: 3 | Status: SHIPPED | OUTPATIENT
Start: 2022-04-13

## 2022-04-13 RX ORDER — ESCITALOPRAM OXALATE 10 MG/1
10 TABLET ORAL DAILY
Qty: 30 TABLET | Refills: 0 | Status: SHIPPED | OUTPATIENT
Start: 2022-04-13 | End: 2022-05-09

## 2022-05-08 DIAGNOSIS — I10 ESSENTIAL HYPERTENSION: ICD-10-CM

## 2022-05-09 RX ORDER — ESCITALOPRAM OXALATE 10 MG/1
TABLET ORAL
Qty: 30 TABLET | Refills: 0 | Status: SHIPPED | OUTPATIENT
Start: 2022-05-09 | End: 2022-06-10

## 2022-05-22 DIAGNOSIS — I10 ESSENTIAL HYPERTENSION: ICD-10-CM

## 2022-05-23 RX ORDER — HYDROCHLOROTHIAZIDE 12.5 MG/1
CAPSULE, GELATIN COATED ORAL
Qty: 30 CAPSULE | Refills: 0 | Status: SHIPPED | OUTPATIENT
Start: 2022-05-23 | End: 2022-06-13

## 2022-05-27 DIAGNOSIS — I10 ESSENTIAL HYPERTENSION: ICD-10-CM

## 2022-05-28 RX ORDER — ATENOLOL 50 MG/1
TABLET ORAL
Qty: 90 TABLET | Refills: 0 | Status: SHIPPED | OUTPATIENT
Start: 2022-05-28

## 2022-06-07 ENCOUNTER — VBI (OUTPATIENT)
Dept: ADMINISTRATIVE | Facility: OTHER | Age: 62
End: 2022-06-07

## 2022-06-08 DIAGNOSIS — I10 ESSENTIAL HYPERTENSION: ICD-10-CM

## 2022-06-10 ENCOUNTER — TELEPHONE (OUTPATIENT)
Dept: INTERNAL MEDICINE CLINIC | Facility: CLINIC | Age: 62
End: 2022-06-10

## 2022-06-10 ENCOUNTER — TELEPHONE (OUTPATIENT)
Dept: OTHER | Facility: OTHER | Age: 62
End: 2022-06-10

## 2022-06-10 RX ORDER — ESCITALOPRAM OXALATE 10 MG/1
TABLET ORAL
Qty: 90 TABLET | Refills: 1 | Status: SHIPPED | OUTPATIENT
Start: 2022-06-10

## 2022-06-10 NOTE — TELEPHONE ENCOUNTER
See previous message and this one:    Patient LM reporting the medication is prednisone not percocet, she wasn't sure why she said percocet

## 2022-06-10 NOTE — TELEPHONE ENCOUNTER
Patient went to [facility] for gout a few weeks ago  They prescribed her percocet but it is not helping her at all  She is in a lot of pain  She is scheduled for an appointment on Monday but is asking if something different can be prescribed for the gout          Please advise

## 2022-06-11 ENCOUNTER — APPOINTMENT (OUTPATIENT)
Dept: RADIOLOGY | Age: 62
End: 2022-06-11
Payer: COMMERCIAL

## 2022-06-11 ENCOUNTER — OFFICE VISIT (OUTPATIENT)
Dept: URGENT CARE | Age: 62
End: 2022-06-11
Payer: COMMERCIAL

## 2022-06-11 VITALS
HEART RATE: 78 BPM | TEMPERATURE: 98.8 F | BODY MASS INDEX: 24.01 KG/M2 | DIASTOLIC BLOOD PRESSURE: 79 MMHG | RESPIRATION RATE: 16 BRPM | HEIGHT: 67 IN | SYSTOLIC BLOOD PRESSURE: 175 MMHG | WEIGHT: 153 LBS

## 2022-06-11 DIAGNOSIS — M79.672 LEFT FOOT PAIN: Primary | ICD-10-CM

## 2022-06-11 DIAGNOSIS — M79.672 LEFT FOOT PAIN: ICD-10-CM

## 2022-06-11 DIAGNOSIS — L03.116 CELLULITIS OF LEFT FOOT: ICD-10-CM

## 2022-06-11 PROCEDURE — 99213 OFFICE O/P EST LOW 20 MIN: CPT

## 2022-06-11 PROCEDURE — 73620 X-RAY EXAM OF FOOT: CPT

## 2022-06-11 RX ORDER — CEPHALEXIN 500 MG/1
500 CAPSULE ORAL EVERY 6 HOURS SCHEDULED
Qty: 28 CAPSULE | Refills: 0 | Status: SHIPPED | OUTPATIENT
Start: 2022-06-11 | End: 2022-06-18

## 2022-06-11 NOTE — PROGRESS NOTES
[employer] [employer]        NAME: Ginger Payne is a 65 y o  female  : 1960    MRN: [id number]  DATE: 2022  TIME: 8:54 AM    Assessment and Plan   Left foot pain [M79 [id number]]  1  Left foot pain  XR foot 2 vw left    Ambulatory Referral to Podiatry   2  Cellulitis of left foot  cephalexin (KEFLEX) 500 mg capsule   X-ray reviewed, no acute osseous abnormality noted, awaiting official read  Gout versus cellulitis of left foot, due to worsening erythema and swelling of left foot, will initiate Keflex  Little utility anticipated with use of colchicine at this time as symptoms have progressed for 3 weeks  Patient advised to alternate Tylenol and ibuprofen as needed for discomfort, as well as to continue rest, elevation and ice application  Consult for Podiatry in place  Patient Instructions   Report to emergency department if worsening of symptoms including:  -fever greater than 100 4  -worsening erythema or swelling  Pain control:  -May trial indomethacin which has already been prescribed, but do not take in addition to ibuprofen  -May alternate NSAIDs with Tylenol  Follow up with PCP in 3-5 days  Proceed to  ER if symptoms worsen  Chief Complaint   No chief complaint on file  History of Present Illness       Patient is a 69-year-old female who presents for evaluation of left foot pain and swelling  She reports that symptoms began with tenderness at the base of her left great toe approximately 3 weeks ago, at which point she presented to [place], where she was diagnosed with gout and prescribed a week long prescription of prednisone  She has since finished the prescription, but reports worsening pain and swelling which has spread from her great toe throughout her foot  She now reports tenderness to the lateral aspect of her foot as well as her ankle  She reports a limp, and denies taking anything for the pain, but has used ice with some relief    She reports that she was given indomethacin for her recent knee surgery, but has not taken that as of yet  She also denies fever, injury to the foot  Review of Systems   Review of Systems   Constitutional: Negative for activity change, chills, fatigue and fever  HENT: Negative for congestion, rhinorrhea, sinus pain and sore throat  Eyes: Negative  Respiratory: Negative for apnea, cough, choking, chest tightness, shortness of breath, wheezing and stridor  Cardiovascular: Negative for chest pain, palpitations and leg swelling  Gastrointestinal: Negative  Endocrine: Negative  Genitourinary: Negative  Musculoskeletal: Positive for arthralgias and joint swelling  Negative for back pain  Skin: Positive for color change  Negative for pallor, rash and wound  Allergic/Immunologic: Negative  Negative for environmental allergies  Neurological: Negative for dizziness, weakness, light-headedness, numbness and headaches  Hematological: Negative  Psychiatric/Behavioral: Negative            Current Medications       Current Outpatient Medications:     cephalexin (KEFLEX) 500 mg capsule, Take 1 capsule (500 mg total) by mouth every 6 (six) hours for 7 days, Disp: 28 capsule, Rfl: 0    albuterol (PROVENTIL HFA,VENTOLIN HFA) 90 mcg/act inhaler, INHALE 1 PUFF EVERY 4 HOURS AS NEEDED FOR WHEEZING , Disp: 1 g, Rfl: 3    amLODIPine (NORVASC) 2 5 mg tablet, TAKE 1 TABLET BY MOUTH EVERY DAY, Disp: 90 tablet, Rfl: 1    atenolol (TENORMIN) 50 mg tablet, TAKE 1 TABLET BY MOUTH EVERY DAY, Disp: 90 tablet, Rfl: 0    ergocalciferol (VITAMIN D2) 50,000 units, TAKE 1 CAPSULE BY MOUTH ONE TIME PER WEEK, Disp: 4 capsule, Rfl: 0    escitalopram (LEXAPRO) 10 mg tablet, TAKE 1 TABLET BY MOUTH EVERY DAY, Disp: 90 tablet, Rfl: 1    famotidine (PEPCID) 20 mg tablet, TAKE 1 TABLET BY MOUTH TWICE A DAY, Disp: 180 tablet, Rfl: 1    fluticasone (FLONASE) 50 mcg/act nasal spray, SPRAY 1 SPRAY INTO EACH NOSTRIL EVERY DAY, Disp: 48 mL, Rfl: 2   folic acid (FOLVITE) 1 mg tablet, , Disp: , Rfl:     hydrochlorothiazide (MICROZIDE) 12 5 mg capsule, TAKE 1 CAPSULE BY MOUTH EVERY DAY, Disp: 30 capsule, Rfl: 0    indomethacin (INDOCIN) 25 mg capsule, Take 25 mg by mouth, Disp: , Rfl:     loperamide (IMODIUM A-D) 2 MG tablet, Take 1 tablet (2 mg total) by mouth 4 (four) times a day as needed for diarrhea (Patient not taking: Reported on 6/15/2021), Disp: 30 tablet, Rfl: 0    magnesium oxide (MAG-OX) 400 mg tablet, TAKE 1 TABLET BY MOUTH TWICE A DAY, Disp: 60 tablet, Rfl: 0    nicotine polacrilex (NICORETTE) 2 mg gum, CHEW 1 EACH (2 MG TOTAL) AS NEEDED FOR SMOKING CESSATION, Disp: 100 each, Rfl: 2    Pediatric Multivitamins-Fl (MULTIVITAMINS/FL PO), Take 1 capsule by mouth daily (Patient not taking: Reported on 8/23/2021), Disp: , Rfl:     Potassium Chloride ER 20 MEQ TBCR, TAKE 2 TABLETS BY MOUTH DAILY, Disp: 180 tablet, Rfl: 0    valACYclovir (VALTREX) 1,000 mg tablet, Take 1 tablet (1,000 mg total) by mouth 3 (three) times a day for 7 days, Disp: 21 tablet, Rfl: 0    varenicline (CHANTIX MOODY) 0 5 MG X 11 & 1 MG X 42 tablet, Take one 0 5 mg tablet by mouth once daily for 3 days, then one 0 5 mg tablet by mouth twice daily for 4 days, then one 1 mg tablet by mouth twice daily   (Patient not taking: Reported on 1/29/2021), Disp: 53 tablet, Rfl: 0    Current Allergies     Allergies as of 06/11/2022 - Reviewed 08/23/2021   Allergen Reaction Noted    Methocarbamol Shortness Of Breath and Swelling 01/06/2020    Tramadol Shortness Of Breath and Swelling 01/06/2020            The following portions of the patient's history were reviewed and updated as appropriate: allergies, current medications, past family history, past medical history, past social history, past surgical history and problem list      Past Medical History:   Diagnosis Date    Cervical disc disorder     Stenosis of cervical spine region        Past Surgical History:   Procedure Laterality Date  BREAST BIOPSY Left 2009    core    BREAST BIOPSY Right 2009    core    CERVICAL FUSION  2013    cC3-C4, C4-C5, C5 C6, C6-C7, C7-T1     SECTION  1985    ELBOW SURGERY      KNEE SURGERY Bilateral 20YRS AGO       Family History   Problem Relation Age of Onset    Cancer Mother     Brain cancer Mother [de-identified]    Heart disease Father     Breast cancer Sister 48    Breast cancer Sister 45    Breast cancer Maternal Aunt 61    Breast cancer Maternal Aunt 61    Breast cancer Maternal Aunt 61    Breast cancer Cousin 50    Breast cancer Cousin 52    Breast cancer Cousin 52    Prostate cancer Maternal Uncle 54    No Known Problems Son     No Known Problems Maternal Grandmother     No Known Problems Maternal Grandfather     No Known Problems Paternal Grandmother     No Known Problems Paternal Grandfather     BRCA1 Negative Sister     BRCA2 Negative Sister     No Known Problems Sister     No Known Problems Maternal Aunt     No Known Problems Maternal Aunt     No Known Problems Maternal Aunt     No Known Problems Maternal Aunt          Medications have been verified  Objective   BP (!) 175/79   Pulse 78   Temp 98 8 °F (37 1 °C)   Resp 16   Ht 5' 7" (1 702 m)   Wt 69 4 kg (153 lb)   BMI 23 96 kg/m²        Physical Exam     Physical Exam  Vitals and nursing note reviewed  Constitutional:       General: She is not in acute distress  Appearance: Normal appearance  She is not ill-appearing, toxic-appearing or diaphoretic  HENT:      Head: Normocephalic and atraumatic  Right Ear: Tympanic membrane normal       Left Ear: Tympanic membrane normal       Nose: Nose normal  No congestion or rhinorrhea  Mouth/Throat:      Mouth: Mucous membranes are moist    Eyes:      Extraocular Movements: Extraocular movements intact  Conjunctiva/sclera: Conjunctivae normal       Pupils: Pupils are equal, round, and reactive to light     Cardiovascular:      Rate and Rhythm: Normal rate and regular rhythm  Pulses: Normal pulses  Dorsalis pedis pulses are 2+ on the left side  Heart sounds: Normal heart sounds  No murmur heard  No friction rub  No gallop  Pulmonary:      Effort: Pulmonary effort is normal  No respiratory distress  Breath sounds: Normal breath sounds  No stridor  No wheezing, rhonchi or rales  Abdominal:      General: Bowel sounds are normal       Palpations: Abdomen is soft  Tenderness: There is no abdominal tenderness  There is no guarding or rebound  Musculoskeletal:         General: Normal range of motion  Cervical back: Normal range of motion and neck supple  No rigidity or tenderness  Feet:      Left foot:      Skin integrity: Erythema present  Toenail Condition: Left toenails are normal       Comments: There is generalized mild edema of the left foot, with full range of motion  There is tenderness to palpation of the dorso-lateral aspect of left foot as well as ankle  Skin:     General: Skin is warm and dry  Capillary Refill: Capillary refill takes less than 2 seconds  Neurological:      General: No focal deficit present  Mental Status: She is alert and oriented to person, place, and time  Cranial Nerves: No cranial nerve deficit     Psychiatric:         Mood and Affect: Mood normal          Behavior: Behavior normal

## 2022-06-13 ENCOUNTER — OFFICE VISIT (OUTPATIENT)
Dept: INTERNAL MEDICINE CLINIC | Facility: CLINIC | Age: 62
End: 2022-06-13
Payer: COMMERCIAL

## 2022-06-13 VITALS
HEIGHT: 67 IN | RESPIRATION RATE: 16 BRPM | SYSTOLIC BLOOD PRESSURE: 140 MMHG | OXYGEN SATURATION: 98 % | BODY MASS INDEX: 25.55 KG/M2 | HEART RATE: 62 BPM | DIASTOLIC BLOOD PRESSURE: 80 MMHG | WEIGHT: 162.8 LBS

## 2022-06-13 DIAGNOSIS — R19.7 DIARRHEA DUE TO MALABSORPTION: Primary | ICD-10-CM

## 2022-06-13 DIAGNOSIS — I10 PRIMARY HYPERTENSION: ICD-10-CM

## 2022-06-13 DIAGNOSIS — K90.9 DIARRHEA DUE TO MALABSORPTION: Primary | ICD-10-CM

## 2022-06-13 DIAGNOSIS — M79.672 LEFT FOOT PAIN: ICD-10-CM

## 2022-06-13 DIAGNOSIS — E78.49 OTHER HYPERLIPIDEMIA: ICD-10-CM

## 2022-06-13 DIAGNOSIS — R53.83 FATIGUE, UNSPECIFIED TYPE: ICD-10-CM

## 2022-06-13 DIAGNOSIS — Z72.0 TOBACCO ABUSE: ICD-10-CM

## 2022-06-13 PROBLEM — M79.673 FOOT PAIN: Status: ACTIVE | Noted: 2022-06-13

## 2022-06-13 PROCEDURE — 99214 OFFICE O/P EST MOD 30 MIN: CPT | Performed by: INTERNAL MEDICINE

## 2022-06-13 PROCEDURE — 3008F BODY MASS INDEX DOCD: CPT | Performed by: INTERNAL MEDICINE

## 2022-06-13 PROCEDURE — 4004F PT TOBACCO SCREEN RCVD TLK: CPT | Performed by: INTERNAL MEDICINE

## 2022-06-13 RX ORDER — OXYCODONE HYDROCHLORIDE AND ACETAMINOPHEN 5; 325 MG/1; MG/1
TABLET ORAL
COMMUNITY
Start: 2022-03-21

## 2022-06-13 RX ORDER — BUPROPION HYDROCHLORIDE 150 MG/1
TABLET, EXTENDED RELEASE ORAL
Qty: 63 TABLET | Refills: 2 | Status: SHIPPED | OUTPATIENT
Start: 2022-06-13 | End: 2022-07-19

## 2022-06-13 RX ORDER — MELOXICAM 15 MG/1
15 TABLET ORAL DAILY
COMMUNITY
Start: 2022-05-27 | End: 2022-06-13

## 2022-06-13 NOTE — ASSESSMENT & PLAN NOTE
Tried chantixx and gum, but didn't like it  Currently smokes 1/4 packs a day   Wants to quit by July 1st  Will place ambulatory referral to smoking cessation program  Will start the patient on Wellbutrin    Recommended to decrease Lexapro to 5 mg daily for a week and then discontinue, and start taking Wellbutrin after

## 2022-06-13 NOTE — PROGRESS NOTES
Assessment/Plan:    Tobacco abuse  Tried chantixx and gum, but didn't like it  Currently smokes 1/4 packs a day   Wants to quit by July 1st  Will place ambulatory referral to smoking cessation program  Will start the patient on Wellbutrin  Recommended to decrease Lexapro to 5 mg daily for a week and then discontinue, and start taking Wellbutrin after     Diarrhea  Chronic, loose  Did not followed with GI as recommended in the past   Will check Fecal fat test, for malabsorption suspicion 2/2 chronic pancreatitis( said the the stool is often greasy, floating)  Follow up with GI       Fatigue  Generalized fatigue  Has NATE but non compliant with CPAP, however denied daytime sleepiness, headache  Hx of alcohol abuse, will check CMP, CBC, TSH  Foot pain  Left foot that started 3 weeks ago, [facility] and started on steroids oral for gout suspicion  Patient has hx of pseudogout  Reported improvement since, but because of pain persistence she was seen a few days ago at urgent care, and was started on keflex for cellulitis suspicion  Physical exam today revealed mild warmth and redness over the 1st metatarsal phalangeal joint, mild swelling  Suspect patient had gout/pseudogout  Because antibiotics were started, continue as ordered  Will check uric acid      Hypertension  Hydrochlorothiazide was discontinued a few days ago per urgent care, because of gout concern  Blood pressure stable in the office today  Will discontinue hydrochlorothiazide and will monitor blood pressure       Diagnoses and all orders for this visit:    Diarrhea due to malabsorption  -     Fecal fat, quantitative; Future    Other hyperlipidemia  -     Lipid Panel with Direct LDL reflex; Future    Fatigue, unspecified type  -     CBC and differential; Future  -     Comprehensive metabolic panel; Future  -     TSH, 3rd generation with Free T4 reflex;  Future    Primary hypertension    Left foot pain  -     Uric acid; Future    Tobacco abuse  -     buPROPion (Wellbutrin SR) 150 mg 12 hr tablet; Take 1 tablet a day for 3 days, after that take one tablet twice a day  Quit day number 8  -     Ambulatory Referral to Smoking Cessation Program; Future    Other orders  -     oxyCODONE-acetaminophen (PERCOCET) 5-325 mg per tablet; TAKE 1 TABLET BY MOUTH EVERY 6 HOURS AS NEEDED FOR MODERATE PAIN (PAIN SCORE 4-6)  MAX 4 TABS/DAY  -     Discontinue: meloxicam (MOBIC) 15 mg tablet; Take 15 mg by mouth daily          Subjective:      Patient ID: Sachin Dodd is a 65 y o  female, who came in the office for left foot pain, fatigue, smoking cessation and hypertension follow-up  Patient has history of pseudogout in the knees, reported that 3 weeks ago started to have left foot pain, [facility]  Because of persistent pain patient was seen a few days ago at urgent care and was started on Keflex for cellulitis suspicion  Patient reported improvement  Patient mentioned she has generalized fatigue, reported that has NATE and is not compliant with CPAP but she is feeling well rested during the day and denied daytime sleepiness  Denied depression  Has chronic diarrhea, had some workup in the past also was recommended to follow-up with GI which patient never did  Mentioned she had some weight loss, unintentional   Has history of alcohol abuse but she stopped a while ago because of enlarged liver  Currently a smoker, reported she smokes 1/4 of a pack a day, tried needing gum, nicotine patch and Chantix before    She is determined to quit smoking and agreed with referral for smoking cessation program     HPI    The following portions of the patient's history were reviewed and updated as appropriate: allergies, current medications, past family history, past medical history, past social history, past surgical history and problem list     Review of Systems   Constitutional: Positive for fatigue and unexpected weight change  Negative for chills and fever  Respiratory: Negative for chest tightness and shortness of breath  Cardiovascular: Negative for chest pain  Gastrointestinal: Positive for diarrhea  Negative for abdominal pain, blood in stool and constipation  Genitourinary: Negative for difficulty urinating and hematuria  Psychiatric/Behavioral: Negative for self-injury, sleep disturbance and suicidal ideas  All other systems reviewed and are negative  Objective:      /80   Pulse 62   Resp 16   Ht 5' 7" (1 702 m)   Wt 73 8 kg (162 lb 12 8 oz)   SpO2 98%   BMI 25 50 kg/m²            Physical Exam  Vitals reviewed  Constitutional:       General: She is not in acute distress  Appearance: She is not diaphoretic  Eyes:      General: No scleral icterus  Right eye: No discharge  Left eye: No discharge  Cardiovascular:      Rate and Rhythm: Normal rate and regular rhythm  Pulmonary:      Effort: Pulmonary effort is normal  No respiratory distress  Breath sounds: No wheezing or rales  Abdominal:      General: There is no distension  Palpations: Abdomen is soft  Tenderness: There is no abdominal tenderness  There is no guarding  Comments: Liver palpable    Musculoskeletal:      Right lower leg: No edema  Left lower leg: No edema  Skin:     General: Skin is warm  Comments: Mild erythema, warmth and swelling on the left foot  Neurological:      Mental Status: She is alert  Psychiatric:         Mood and Affect: Mood normal          Behavior: Behavior normal          Thought Content:  Thought content normal          Judgment: Judgment normal            Emotional and Mental Well-being, Sleep, Connectedness Assessment and Intervention:    Sleep/stress assessment performed    Depression and anxiety screening performed and reviewed      Tobacco and Toxic Substance Assessment and Intervention:     Tobacco use screening performed Alcohol and drug use screening performed    Tobacco cessation program referral given    Tobacco cessation medication protocol initiated    Tobacco cessation counseling provided

## 2022-06-14 NOTE — ASSESSMENT & PLAN NOTE
Chronic, loose  Did not followed with GI as recommended in the past   Will check Fecal fat test, for malabsorption suspicion 2/2 chronic pancreatitis( said the the stool is often greasy, floating)     Follow up with GI

## 2022-06-14 NOTE — ASSESSMENT & PLAN NOTE
Left foot that started 3 weeks ago, [facility] and started on steroids oral for gout suspicion  Patient has hx of pseudogout  Reported improvement since, but because of pain persistence she was seen a few days ago at urgent care, and was started on keflex for cellulitis suspicion  Physical exam today revealed mild warmth and redness over the 1st metatarsal phalangeal joint, mild swelling  Suspect patient had gout/pseudogout  Because antibiotics were started, continue as ordered     Will check uric acid

## 2022-06-14 NOTE — ASSESSMENT & PLAN NOTE
Generalized fatigue  Has NATE but non compliant with CPAP, however denied daytime sleepiness, headache   Hx of alcohol abuse, will check CMP, CBC, TSH

## 2022-06-14 NOTE — ASSESSMENT & PLAN NOTE
Hydrochlorothiazide was discontinued a few days ago per urgent care, because of gout concern  Blood pressure stable in the office today    Will discontinue hydrochlorothiazide and will monitor blood pressure

## 2022-06-24 ENCOUNTER — PATIENT OUTREACH (OUTPATIENT)
Dept: OTHER | Facility: CLINIC | Age: 62
End: 2022-06-24

## 2022-07-19 DIAGNOSIS — Z72.0 TOBACCO ABUSE: ICD-10-CM

## 2022-07-19 RX ORDER — BUPROPION HYDROCHLORIDE 150 MG/1
TABLET, EXTENDED RELEASE ORAL
Qty: 189 TABLET | Refills: 1 | Status: SHIPPED | OUTPATIENT
Start: 2022-07-19

## 2022-08-08 ENCOUNTER — VBI (OUTPATIENT)
Dept: ADMINISTRATIVE | Facility: OTHER | Age: 62
End: 2022-08-08

## 2022-08-11 ENCOUNTER — VBI (OUTPATIENT)
Dept: ADMINISTRATIVE | Facility: OTHER | Age: 62
End: 2022-08-11

## 2022-08-27 DIAGNOSIS — Z72.0 TOBACCO ABUSE: ICD-10-CM

## 2022-08-27 DIAGNOSIS — I10 ESSENTIAL HYPERTENSION: ICD-10-CM

## 2022-08-27 DIAGNOSIS — K21.9 GERD (GASTROESOPHAGEAL REFLUX DISEASE): ICD-10-CM

## 2022-08-28 RX ORDER — ATENOLOL 50 MG/1
TABLET ORAL
Qty: 90 TABLET | Refills: 0 | Status: SHIPPED | OUTPATIENT
Start: 2022-08-28

## 2022-08-28 RX ORDER — FAMOTIDINE 20 MG/1
TABLET, FILM COATED ORAL
Qty: 180 TABLET | Refills: 1 | Status: SHIPPED | OUTPATIENT
Start: 2022-08-28

## 2022-08-29 ENCOUNTER — NEW PATIENT (OUTPATIENT)
Dept: URBAN - METROPOLITAN AREA CLINIC 6 | Facility: CLINIC | Age: 62
End: 2022-08-29

## 2022-08-29 DIAGNOSIS — H02.831: ICD-10-CM

## 2022-08-29 DIAGNOSIS — D31.32: ICD-10-CM

## 2022-08-29 DIAGNOSIS — H02.834: ICD-10-CM

## 2022-08-29 DIAGNOSIS — H43.813: ICD-10-CM

## 2022-08-29 DIAGNOSIS — H25.813: ICD-10-CM

## 2022-08-29 PROCEDURE — 99204 OFFICE O/P NEW MOD 45 MIN: CPT

## 2022-08-29 ASSESSMENT — VISUAL ACUITY
OD_SC: J3
OD_PH: 20/70
OD_SC: 20/100
OS_SC: J5
OS_PH: 20/30
OS_SC: 20/40

## 2022-08-29 ASSESSMENT — TONOMETRY
OS_IOP_MMHG: 17
OD_IOP_MMHG: 15

## 2022-08-30 PROBLEM — Z98.1 S/P CERVICAL SPINAL FUSION: Status: ACTIVE | Noted: 2022-08-30

## 2022-08-31 ENCOUNTER — TELEPHONE (OUTPATIENT)
Dept: NEUROSURGERY | Facility: CLINIC | Age: 62
End: 2022-08-31

## 2022-10-01 DIAGNOSIS — J45.909 ASTHMA: ICD-10-CM

## 2022-10-01 RX ORDER — ALBUTEROL SULFATE 90 UG/1
AEROSOL, METERED RESPIRATORY (INHALATION)
Qty: 6.7 G | Refills: 3 | Status: SHIPPED | OUTPATIENT
Start: 2022-10-01

## 2022-10-04 ENCOUNTER — TELEPHONE (OUTPATIENT)
Dept: NEUROSURGERY | Facility: CLINIC | Age: 62
End: 2022-10-04

## 2022-10-12 PROBLEM — Z12.31 OTHER SCREENING MAMMOGRAM: Status: RESOLVED | Noted: 2021-01-29 | Resolved: 2022-10-12

## 2022-10-12 PROBLEM — Z01.419 GYNECOLOGIC EXAM NORMAL: Status: RESOLVED | Noted: 2021-02-03 | Resolved: 2022-10-12

## 2022-10-12 PROBLEM — Z01.419 VISIT FOR ROUTINE GYN EXAM: Status: RESOLVED | Noted: 2020-09-01 | Resolved: 2022-10-12

## 2022-10-12 PROBLEM — Z12.4 CERVICAL CANCER SCREENING: Status: RESOLVED | Noted: 2021-01-29 | Resolved: 2022-10-12

## 2022-10-17 ENCOUNTER — PRE-OP CATARACT MEASUREMENTS (OUTPATIENT)
Dept: URBAN - METROPOLITAN AREA CLINIC 6 | Facility: CLINIC | Age: 62
End: 2022-10-17

## 2022-10-17 DIAGNOSIS — H25.813: ICD-10-CM

## 2022-10-17 DIAGNOSIS — H02.834: ICD-10-CM

## 2022-10-17 DIAGNOSIS — H02.831: ICD-10-CM

## 2022-10-17 DIAGNOSIS — H43.813: ICD-10-CM

## 2022-10-17 DIAGNOSIS — D31.32: ICD-10-CM

## 2022-10-17 PROCEDURE — 92136 OPHTHALMIC BIOMETRY: CPT

## 2022-10-17 PROCEDURE — 92012 INTRM OPH EXAM EST PATIENT: CPT

## 2022-10-17 ASSESSMENT — TONOMETRY
OS_IOP_MMHG: 17
OD_IOP_MMHG: 16

## 2022-10-17 ASSESSMENT — VISUAL ACUITY
OU_SC: J7
OS_PH: 20/60
OD_PH: 20/100

## 2022-11-01 ENCOUNTER — CONSULT (OUTPATIENT)
Dept: INTERNAL MEDICINE CLINIC | Facility: CLINIC | Age: 62
End: 2022-11-01

## 2022-11-01 VITALS
OXYGEN SATURATION: 95 % | HEIGHT: 67 IN | BODY MASS INDEX: 25.68 KG/M2 | WEIGHT: 163.6 LBS | SYSTOLIC BLOOD PRESSURE: 184 MMHG | HEART RATE: 71 BPM | DIASTOLIC BLOOD PRESSURE: 92 MMHG

## 2022-11-01 DIAGNOSIS — I10 ESSENTIAL HYPERTENSION: ICD-10-CM

## 2022-11-01 DIAGNOSIS — R10.9 FLANK PAIN: ICD-10-CM

## 2022-11-01 DIAGNOSIS — Z01.818 PRE-OP EVALUATION: Primary | ICD-10-CM

## 2022-11-01 RX ORDER — AMLODIPINE BESYLATE 2.5 MG/1
5 TABLET ORAL DAILY
Qty: 90 TABLET | Refills: 0 | Status: SHIPPED | OUTPATIENT
Start: 2022-11-01

## 2022-11-01 RX ORDER — BESIFLOXACIN 6 MG/ML
SUSPENSION OPHTHALMIC
COMMUNITY
Start: 2022-10-18

## 2022-11-01 RX ORDER — BROMFENAC SODIUM 0.7 MG/ML
SOLUTION/ DROPS OPHTHALMIC
COMMUNITY
Start: 2022-10-18

## 2022-11-01 NOTE — ASSESSMENT & PLAN NOTE
Blood pressure in the office today 184/92 mmHg, meeting criteria for hypertensive urgency  Patient denies any symptoms  Mentioned she has 8/10 left flank pain, that is similar with the renal colic she had year ago  BP in the office in the past was 140/90 mmHg and 175 (SBP)  Suspect that her elevated BP is not only pain related, but also has uncontrolled BP at baseline  Compliant with BP meds   Rechecked /100 mmHg right, 170/100 mmHg left   Will increase norvasc to 5 mg daily   Follow up in 2-3 days  If BP normalized, give clearance for surgery   If still elevated, might increase norvasc to 10 mg daily
Cataract surgery scheduled for 11/8 and 11/29  Patient currently with hypertensive urgency  Is not cleared for the surgery until BP improves 
Reported l flank pain fir the past 3 days, that is persistent, with anterior radiation  Bean Jewel is similar to the colic pain she had in the past  Had CVA tenderness on the left side, also pain with SI joint palpation  Unclear if this is renal colic or nerve impingement     Will check CT abdomen and plevis  Check CMP and cbc ( ordered a few months ago)   F/u in 2-3 days   Tylenol prn, lidocaine patch
Yes

## 2022-11-01 NOTE — PATIENT INSTRUCTIONS
Check CMP and CBC   Take amlodipine 5 mg daily instead of 2 5 mg   Have CT abdomen and pelvis done   Take tylenol as needed for pain   Use lidocaine patch for your back

## 2022-11-01 NOTE — PROGRESS NOTES
Assessment/Plan:    Hypertension  Blood pressure in the office today 184/92 mmHg, meeting criteria for hypertensive urgency  Patient denies any symptoms  Mentioned she has 8/10 left flank pain, that is similar with the renal colic she had year ago  BP in the office in the past was 140/90 mmHg and 175 (SBP)  Suspect that her elevated BP is not only pain related, but also has uncontrolled BP at baseline  Compliant with BP meds   Rechecked /100 mmHg right, 170/100 mmHg left   Will increase norvasc to 5 mg daily   Follow up in 2-3 days  If BP normalized, give clearance for surgery  If still elevated, might increase norvasc to 10 mg daily     Left flank pain  Reported l flank pain fir the past 3 days, that is persistent, with anterior radiation  Lyndsay Siemens is similar to the colic pain she had in the past  Had CVA tenderness on the left side, also pain with SI joint palpation  Unclear if this is renal colic or nerve impingement  Will check CT abdomen and plevis  Check CMP and cbc ( ordered a few months ago)   F/u in 2-3 days   Tylenol prn, lidocaine patch     Pre-op evaluation  Cataract surgery scheduled for 11/8 and 11/29  Patient currently with hypertensive urgency  Is not cleared for the surgery until BP improves  Diagnoses and all orders for this visit:    Pre-op evaluation    Flank pain  -     CT abdomen pelvis wo contrast; Future    Essential hypertension  -     amLODIPine (NORVASC) 2 5 mg tablet; Take 2 tablets (5 mg total) by mouth daily    Other orders  -     Besivance 0 6 % SUSP  -     Prolensa 0 07 % SOLN          Subjective:      Patient ID: Awilda Graham is a [facility] 89 y o  female, came in the office today for pre- op evaluation  Patient had elevated BP on presentation also recheck BP was still 180 mmHg  Also she mentioned she have a left flank pain in the past 3 days that is persistent and similar with the renal colic she had years ago  Denied hematuria, chills, fever, N/V       Because of the BP, patient is not cleared for the surgery     HPI    The following portions of the patient's history were reviewed and updated as appropriate: allergies, current medications, past family history, past medical history, past social history, past surgical history and problem list     Review of Systems   Constitutional: Negative for activity change, appetite change, fever and unexpected weight change  Respiratory: Negative for choking, chest tightness and shortness of breath  Cardiovascular: Negative for chest pain, palpitations and leg swelling  Gastrointestinal: Positive for abdominal pain and diarrhea (chronic )  Negative for blood in stool, constipation, nausea and vomiting  Genitourinary: Negative for difficulty urinating and hematuria  Neurological: Negative for dizziness, light-headedness and headaches  All other systems reviewed and are negative          Objective:      BP (!) 184/92   Pulse 71   Ht 5' 7" (1 702 m)   Wt 74 2 kg (163 lb 9 6 oz)   SpO2 95%   BMI 25 62 kg/m²

## 2022-11-01 NOTE — PROGRESS NOTES
INTERNAL MEDICINE PRE-OPERATIVE EVALUATION  [facility]    NAME: Mony Campos  AGE: 65 y o  SEX: female  : 1960     DATE: 2022     Internal Medicine Pre-Operative Evaluation:     Chief Complaint: Pre-operative Evaluation     Surgery: cataract   Anticipated Date of Surgery: 2022 right eye, 2022- left eye   Referring Provider: No ref  provider found        History of Present Illness:     Mony Campos is a 65 y o  female who presents to the office today for a preoperative consultation at the request of surgeon, dr Andrei Wilson  who plans on performing cataract surgery on 2022 and 2022  Planned anesthesia is monitored anesthesia   Patient has a bleeding risk of: no recent abnormal bleeding and use of Ca-channel blockers (see med list)  Patient does not have objections to receiving blood products if needed  Current anti-platelet/anti-coagulation medications that the patient is prescribed includes: not on any anticoagulation or antiplatlet meds   Assessment of Chronic Conditions:   - Asthma: and hypertension , NATE      Assessment of Cardiac Risk:  · Denies unstable or severe angina or MI in the last 6 weeks or history of stent placement in the last year   · Denies decompensated heart failure (e g  New onset heart failure, NYHA functional class IV heart failure, or worsening existing heart failure)  · Denies significant arrhythmias such as high grade AV block, symptomatic ventricular arrhythmia, newly recognized ventricular tachycardia, supraventricular tachycardia with resting heart rate >100, or symptomatic bradycardia  · Denies severe heart valve disease including aortic stenosis or symptomatic mitral stenosis     Exercise Capacity:  · Able to walk 4 blocks without symptoms?: Yes  · Able to walk 2 flights without symptoms?: Yes    Prior Anesthesia Reactions: No     Personal history of venous thromboembolic disease? No    History of steroid use for >2 weeks within last year? No     STOP-BANG Sleep Apnea Screening Questionnaire:      Do you SNORE loudly (louder than talking or loud enough to be heard through closed doors)? Yes = 1 point   Do you often feel TIRED, fatigued, or sleepy during daytime? Yes = 1 point   Has anyone OBSERVED you stop breathing during your sleep? Yes = 1 point   Do you have or are you being treated for high blood pressure? Yes = 1 point   BMI more than 35 kg/m2? No = 0 point   AGE over 48years old? Yes = 1 point   NECK circumference > 16 inches (40 cm)? No = 0 point   Male GENDER? No = 0 point   TOTAL SCORE 5 = HIGH risk of NATE       Review of Systems:     Review of Systems   Constitutional: Negative for activity change, appetite change, fatigue, fever and unexpected weight change  Respiratory: Negative for cough, chest tightness and shortness of breath  Cardiovascular: Negative for chest pain, palpitations and leg swelling  Gastrointestinal: Positive for abdominal pain and diarrhea  Negative for constipation, nausea and vomiting  Genitourinary: Negative for difficulty urinating and hematuria  Neurological: Negative for dizziness, light-headedness and headaches  All other systems reviewed and are negative         Problem List:     Patient Active Problem List   Diagnosis   • Mild intermittent asthma   • Esophageal dysmotility   • Allergic rhinitis with postnasal drip   • Tobacco abuse   • NATE (obstructive sleep apnea)   • Cough   • Vitamin D deficiency   • High blood triglycerides   • Diarrhea   • Asthma   • Need for vaccination   • Unintentional weight loss   • Anemia   • Enlarged liver   • Low folic acid   • High vitamin A level   • High serum vitamin E   • Low serum vitamin B12   • Hypokalemia   • Alcoholism (HCC)   • Anxiety disorder   • Cervical spinal stenosis   • Diverticular disease of colon   • Erosive esophagitis   • Hemorrhoids   • History of gastroesophageal reflux (GERD)   • Hypertension   • Irritable bowel syndrome   • Need for influenza vaccination   • Postoperative examination   • Pure hypercholesterolemia   • Thoracic and lumbosacral neuritis   • Pre-op evaluation   • Angiomyolipoma   • Chest pain on respiration   • Left flank pain   • Change in stool habits   • Epicondylitis, lateral (tennis elbow)   • Gallbladder disease   • Limb pain   • Impaired fasting glucose   • Microhematuria   • Pelvic pain in female   • Venous insufficiency   • Annual physical exam   • Fatigue   • Foot pain   • S/P cervical spinal fusion        Allergies: Allergies   Allergen Reactions   • Methocarbamol Shortness Of Breath and Swelling   • Tramadol Shortness Of Breath and Swelling        Current Medications:       Current Outpatient Medications:   •  albuterol (PROVENTIL HFA,VENTOLIN HFA) 90 mcg/act inhaler, INHALE 1 PUFF EVERY 4 HOURS AS NEEDED FOR WHEEZING, Disp: 6 7 g, Rfl: 3  •  amLODIPine (NORVASC) 2 5 mg tablet, Take 2 tablets (5 mg total) by mouth daily, Disp: 90 tablet, Rfl: 0  •  atenolol (TENORMIN) 50 mg tablet, TAKE 1 TABLET BY MOUTH EVERY DAY, Disp: 90 tablet, Rfl: 0  •  buPROPion (WELLBUTRIN SR) 150 mg 12 hr tablet, TAKE 1 TABLET A DAY FOR 3 DAYS, AFTER THAT TAKE ONE TABLET TWICE A DAY   QUIT DAY NUMBER 8, Disp: 189 tablet, Rfl: 1  •  ergocalciferol (VITAMIN D2) 50,000 units, TAKE 1 CAPSULE BY MOUTH ONE TIME PER WEEK, Disp: 4 capsule, Rfl: 0  •  escitalopram (LEXAPRO) 10 mg tablet, TAKE 1 TABLET BY MOUTH EVERY DAY, Disp: 90 tablet, Rfl: 1  •  famotidine (PEPCID) 20 mg tablet, TAKE 1 TABLET BY MOUTH TWICE A DAY, Disp: 180 tablet, Rfl: 1  •  Besivance 0 6 % SUSP, , Disp: , Rfl:   •  doxycycline hyclate (VIBRAMYCIN) 100 mg capsule, , Disp: , Rfl:   •  fluticasone (FLONASE) 50 mcg/act nasal spray, SPRAY 1 SPRAY INTO EACH NOSTRIL EVERY DAY (Patient not taking: Reported on 11/1/2022), Disp: 48 mL, Rfl: 2  •  folic acid (FOLVITE) 1 mg tablet, , Disp: , Rfl:   •  loperamide (IMODIUM A-D) 2 MG tablet, Take 1 tablet (2 mg total) by mouth 4 (four) times a day as needed for diarrhea (Patient not taking: No sig reported), Disp: 30 tablet, Rfl: 0  •  magnesium oxide (MAG-OX) 400 mg tablet, TAKE 1 TABLET BY MOUTH TWICE A DAY (Patient not taking: Reported on 2022), Disp: 60 tablet, Rfl: 0  •  nicotine polacrilex (NICORETTE) 2 mg gum, CHEW 1 EACH (2 MG TOTAL) AS NEEDED FOR SMOKING CESSATION (Patient not taking: Reported on 2022), Disp: 100 each, Rfl: 2  •  oxyCODONE-acetaminophen (PERCOCET) 5-325 mg per tablet, TAKE 1 TABLET BY MOUTH EVERY 6 HOURS AS NEEDED FOR MODERATE PAIN (PAIN SCORE 4-6)  MAX 4 TABS/DAY (Patient not taking: Reported on 2022), Disp: , Rfl:   •  Pediatric Multivitamins-Fl (MULTIVITAMINS/FL PO), Take 1 capsule by mouth daily (Patient not taking: No sig reported), Disp: , Rfl:   •  Potassium Chloride ER 20 MEQ TBCR, TAKE 2 TABLETS BY MOUTH DAILY (Patient not taking: Reported on 2022), Disp: 180 tablet, Rfl: 0  •  Prolensa 0 07 % SOLN, , Disp: , Rfl:   •  varenicline (CHANTIX MOODY) 0 5 MG X 11 & 1 MG X 42 tablet, Take one 0 5 mg tablet by mouth once daily for 3 days, then one 0 5 mg tablet by mouth twice daily for 4 days, then one 1 mg tablet by mouth twice daily   (Patient not taking: No sig reported), Disp: 53 tablet, Rfl: 0     Past History:     Past Medical History:   Diagnosis Date   • Cervical disc disorder    • Stenosis of cervical spine region         Past Surgical History:   Procedure Laterality Date   • BREAST BIOPSY Left 2009    core   • BREAST BIOPSY Right 2009    core   • CERVICAL FUSION  2013    cC3-C4, C4-C5, C5 C6, C6-C7, C7-T1   •  SECTION     • ELBOW SURGERY     • KNEE SURGERY Bilateral 20YRS AGO        Family History   Problem Relation Age of Onset   • Cancer Mother    • Brain cancer Mother [de-identified]   • Heart disease Father    • Breast cancer Sister 48   • Breast cancer Sister 45   • Breast cancer Maternal Aunt 60   • Breast cancer Maternal Aunt 61 • Breast cancer Maternal Aunt 60   • Breast cancer Cousin 50   • Breast cancer Cousin 52   • Breast cancer Cousin 52   • Prostate cancer Maternal Uncle 54   • No Known Problems Son    • No Known Problems Maternal Grandmother    • No Known Problems Maternal Grandfather    • No Known Problems Paternal Grandmother    • No Known Problems Paternal Grandfather    • BRCA1 Negative Sister    • BRCA2 Negative Sister    • No Known Problems Sister    • No Known Problems Maternal Aunt    • No Known Problems Maternal Aunt    • No Known Problems Maternal Aunt    • No Known Problems Maternal Aunt         Social History     Socioeconomic History   • Marital status: /Civil Union     Spouse name: Not on file   • Number of children: Not on file   • Years of education: Not on file   • Highest education level: Not on file   Occupational History   • Not on file   Tobacco Use   • Smoking status: Current Every Day Smoker     Packs/day: 0 30     Types: Cigarettes     Start date: 0   • Smokeless tobacco: Never Used   Vaping Use   • Vaping Use: Never used   Substance and Sexual Activity   • Alcohol use: Yes     Comment: SOCIAL   • Drug use: Not Currently   • Sexual activity: Yes     Partners: Male   Other Topics Concern   • Not on file   Social History Narrative   • Not on file     Social Determinants of Health     Financial Resource Strain: Not on file   Food Insecurity: Not on file   Transportation Needs: Not on file   Physical Activity: Not on file   Stress: Not on file   Social Connections: Not on file   Intimate Partner Violence: Not on file   Housing Stability: Not on file        Physical Exam:      BP (!) 184/92   Pulse 71   Ht 5' 7" (1 702 m)   Wt 74 2 kg (163 lb 9 6 oz)   SpO2 95%   BMI 25 62 kg/m²     Physical Exam  Vitals reviewed  Constitutional:       General: She is not in acute distress  Appearance: Normal appearance  She is normal weight  She is not diaphoretic     HENT:      Head: Normocephalic and atraumatic  Eyes:      General: No scleral icterus  Right eye: No discharge  Left eye: No discharge  Cardiovascular:      Rate and Rhythm: Normal rate and regular rhythm  Heart sounds: No murmur heard  No friction rub  No gallop  Pulmonary:      Effort: No respiratory distress  Breath sounds: Normal breath sounds  No wheezing, rhonchi or rales  Abdominal:      General: There is no distension  Palpations: Abdomen is soft  Tenderness: There is no abdominal tenderness  There is no guarding or rebound  Musculoskeletal:      Right lower leg: No edema  Left lower leg: No edema  Skin:     General: Skin is warm  Neurological:      Mental Status: She is alert and oriented to person, place, and time  Psychiatric:         Mood and Affect: Mood normal          Behavior: Behavior normal          Thought Content: Thought content normal          Judgment: Judgment normal            Data:     Pre-operative work-up    Laboratory Results: no recent labs     EKG: none available     Chest x-ray: no recent CXR     Previous cardiopulmonary studies within the past year:  · Echocardiogram: none   · Cardiac Catheterization: never   · Stress Test: none   · Pulmonary Function Testing: none        Assessment:     1  Pre-op evaluation     2  Flank pain  CT abdomen pelvis wo contrast   3  Essential hypertension  amLODIPine (NORVASC) 2 5 mg tablet        Plan:     65 y o  female with planned surgery:  Cataract surgery  Cardiac Risk Estimation: per the Revised Cardiac Risk Index (Circ  100:1043, 1999), the patient's risk factors for cardiac complications include hypertension , putting her in: RCI RISK CLASS II (1 risk factor, risk of major cardiac compl  appr  1 3%)  1  Further preoperative workup as follows:   - Complete blood count  - Basic metabolic profile    2   Medication Management/Recommendations:   - None, continue medication regimen including morning of surgery, with sip of water    3  Prophylaxis for cardiac events with perioperative beta-blockers: not indicated  4  Patient requires further consultation with: None    Clearance  Patient is  Not cleared for the surgery until BP improves        Anahy Caraballo MD  [facility] [address]  Phone#  [phone]  Fax#  [phone]  is

## 2022-11-04 ENCOUNTER — TELEPHONE (OUTPATIENT)
Dept: INTERNAL MEDICINE CLINIC | Facility: CLINIC | Age: 62
End: 2022-11-04

## 2022-11-07 ENCOUNTER — HOSPITAL ENCOUNTER (OUTPATIENT)
Dept: RADIOLOGY | Age: 62
Discharge: HOME | End: 2022-11-07

## 2022-11-07 DIAGNOSIS — R10.9 FLANK PAIN: ICD-10-CM

## 2022-11-10 ENCOUNTER — TELEPHONE (OUTPATIENT)
Dept: INTERNAL MEDICINE CLINIC | Facility: CLINIC | Age: 62
End: 2022-11-10

## 2022-11-10 ENCOUNTER — OFFICE VISIT (OUTPATIENT)
Dept: INTERNAL MEDICINE CLINIC | Facility: CLINIC | Age: 62
End: 2022-11-10

## 2022-11-10 VITALS
HEART RATE: 72 BPM | OXYGEN SATURATION: 98 % | SYSTOLIC BLOOD PRESSURE: 148 MMHG | DIASTOLIC BLOOD PRESSURE: 92 MMHG | BODY MASS INDEX: 25.9 KG/M2 | WEIGHT: 165 LBS | HEIGHT: 67 IN | RESPIRATION RATE: 16 BRPM

## 2022-11-10 DIAGNOSIS — Z23 ENCOUNTER FOR IMMUNIZATION: Primary | ICD-10-CM

## 2022-11-10 DIAGNOSIS — K76.0 FATTY LIVER: ICD-10-CM

## 2022-11-11 PROBLEM — K76.0 FATTY LIVER: Status: ACTIVE | Noted: 2022-11-11

## 2022-11-12 NOTE — PROGRESS NOTES
Assessment/Plan:    Pre-operative clearance  - Pre op for bilateral cataract surgery  - recent increased in amlodipine from 2 5 to 5 mg QD for better bp control, states compliance  - at the moment of this encounter bp 148/92   - no other symptoms  - clear from IM standpoint for Procedure, papework filled and faxed to patients physician per her request       Fatty liver  - noticed in CT scan, in a patient with a background of recurrent alcohol consumption  - prior imaging US showed severe liver enlargement , moderate hepatic steatosis   - per patient last drink 2 days ago ( couple of shots)  - currently asymptomatic    Plan  US elastography ordered  Educate and emphasize the importance of alcohol abstinence given the above, patient verbalized understanding, will follow up compliance       Diagnoses and all orders for this visit:    Encounter for immunization    Fatty liver  -     US elastography; Future        Subjective:      Patient ID: Sorin Benites is a 65 y o  female  Mrs Navjot Freedman today for evaluation of Blood pressure readings after medication dose modification  Mrs Navjot Freedman was evaluated at the office for a pre- op clearance for cataract surgery, at the time she was noted to be in hypertensive urgency for which she was not clearead from IM standpoint for the procedure, she was also exhibiting significant lumbar pain for which further imaging was ordered and she would like to discuss her results  The following portions of the patient's history were reviewed and updated as appropriate: allergies, current medications, past family history, past medical history, past social history, past surgical history and problem list     Review of Systems      Objective:      /92   Pulse 72   Resp 16   Ht 5' 7" (1 702 m)   Wt 74 8 kg (165 lb)   SpO2 98%   BMI 25 84 kg/m²        Physical Exam  Vitals and nursing note reviewed  Constitutional:       General: She is not in acute distress  Appearance: Normal appearance  She is not ill-appearing, toxic-appearing or diaphoretic  HENT:      Head: Normocephalic and atraumatic  Right Ear: Tympanic membrane normal       Left Ear: Tympanic membrane normal       Nose: Nose normal       Mouth/Throat:      Mouth: Mucous membranes are moist       Pharynx: Oropharynx is clear  Eyes:      Extraocular Movements: Extraocular movements intact  Conjunctiva/sclera: Conjunctivae normal       Pupils: Pupils are equal, round, and reactive to light  Cardiovascular:      Rate and Rhythm: Normal rate  Pulses: Normal pulses  Heart sounds: No murmur heard  No gallop  Pulmonary:      Effort: Pulmonary effort is normal  No respiratory distress  Breath sounds: Normal breath sounds  No wheezing or rales  Chest:      Chest wall: No tenderness  Abdominal:      General: Bowel sounds are normal  There is distension  Palpations: Abdomen is soft  Tenderness: There is no abdominal tenderness  Musculoskeletal:         General: Normal range of motion  Cervical back: Normal range of motion  No rigidity  Skin:     General: Skin is warm  Capillary Refill: Capillary refill takes 2 to 3 seconds  Neurological:      Mental Status: She is alert and oriented to person, place, and time  Psychiatric:         Mood and Affect: Mood normal          Behavior: Behavior normal          Thought Content:  Thought content normal          Judgment: Judgment normal          Nutrition Assessment and Intervention:     Reviewed food recall journal      Physical Activity Assessment and Intervention:    Activity journal reviewed      Emotional and Mental Well-being, Sleep, Connectedness Assessment and Intervention:    Sleep/stress assessment performed      Tobacco and Toxic Substance Assessment and Intervention:     Tobacco use screening performed    Alcohol and drug use screening performed

## 2022-11-12 NOTE — ASSESSMENT & PLAN NOTE
- Pre op for bilateral cataract surgery  - recent increased in amlodipine from 2 5 to 5 mg QD for better bp control, states compliance  - at the moment of this encounter bp 148/92   - no other symptoms  - clear from IM standpoint for Procedure, papework filled and faxed to patients physician per her request

## 2022-11-12 NOTE — ASSESSMENT & PLAN NOTE
- noticed in CT scan, in a patient with a background of recurrent alcohol consumption  - prior imaging US showed severe liver enlargement , moderate hepatic steatosis   - per patient last drink 2 days ago ( couple of shots)  - currently asymptomatic    Plan  US elastography ordered  Educate and emphasize the importance of alcohol abstinence given the above, patient verbalized understanding, will follow up compliance

## 2022-11-29 ENCOUNTER — SURGERY/PROCEDURE (OUTPATIENT)
Dept: URBAN - METROPOLITAN AREA SURGICAL CENTER 6 | Facility: SURGICAL CENTER | Age: 62
End: 2022-11-29

## 2022-11-29 DIAGNOSIS — H25.811: ICD-10-CM

## 2022-11-29 DIAGNOSIS — I10 ESSENTIAL HYPERTENSION: ICD-10-CM

## 2022-11-29 PROCEDURE — 66984 XCAPSL CTRC RMVL W/O ECP: CPT

## 2022-11-29 RX ORDER — ATENOLOL 50 MG/1
TABLET ORAL
Qty: 90 TABLET | Refills: 0 | Status: SHIPPED | OUTPATIENT
Start: 2022-11-29

## 2022-11-29 RX ORDER — ESCITALOPRAM OXALATE 10 MG/1
TABLET ORAL
Qty: 90 TABLET | Refills: 1 | Status: SHIPPED | OUTPATIENT
Start: 2022-11-29

## 2022-11-30 ENCOUNTER — 1 DAY POST-OP (OUTPATIENT)
Dept: URBAN - METROPOLITAN AREA CLINIC 6 | Facility: CLINIC | Age: 62
End: 2022-11-30

## 2022-11-30 DIAGNOSIS — H02.834: ICD-10-CM

## 2022-11-30 DIAGNOSIS — H02.831: ICD-10-CM

## 2022-11-30 DIAGNOSIS — H25.812: ICD-10-CM

## 2022-11-30 DIAGNOSIS — Z96.1: ICD-10-CM

## 2022-11-30 DIAGNOSIS — D31.32: ICD-10-CM

## 2022-11-30 DIAGNOSIS — H43.813: ICD-10-CM

## 2022-11-30 PROCEDURE — 99024 POSTOP FOLLOW-UP VISIT: CPT

## 2022-11-30 ASSESSMENT — VISUAL ACUITY
OD_PH: 20/70
OD_SC: 20/80

## 2022-11-30 ASSESSMENT — TONOMETRY
OS_IOP_MMHG: 15
OD_IOP_MMHG: 12

## 2022-12-07 ENCOUNTER — 1 WEEK POST-OP (OUTPATIENT)
Dept: URBAN - METROPOLITAN AREA CLINIC 6 | Facility: CLINIC | Age: 62
End: 2022-12-07

## 2022-12-07 DIAGNOSIS — Z96.1: ICD-10-CM

## 2022-12-07 DIAGNOSIS — H25.812: ICD-10-CM

## 2022-12-07 PROCEDURE — 92136 OPHTHALMIC BIOMETRY: CPT | Mod: 26

## 2022-12-07 PROCEDURE — 99024 POSTOP FOLLOW-UP VISIT: CPT | Mod: 26

## 2022-12-07 ASSESSMENT — VISUAL ACUITY
OS_SC: 20/70
OD_SC: 20/50-2

## 2022-12-07 ASSESSMENT — TONOMETRY
OS_IOP_MMHG: 13
OD_IOP_MMHG: 10

## 2022-12-08 DIAGNOSIS — I10 ESSENTIAL HYPERTENSION: ICD-10-CM

## 2022-12-08 RX ORDER — AMLODIPINE BESYLATE 2.5 MG/1
TABLET ORAL
Qty: 180 TABLET | Refills: 1 | Status: SHIPPED | OUTPATIENT
Start: 2022-12-08 | End: 2022-12-08

## 2022-12-09 RX ORDER — AMLODIPINE BESYLATE 5 MG/1
TABLET ORAL
Qty: 1 TABLET | Refills: 0 | Status: SHIPPED | OUTPATIENT
Start: 2022-12-09 | End: 2022-12-12 | Stop reason: SDUPTHER

## 2022-12-12 ENCOUNTER — VBI (OUTPATIENT)
Dept: ADMINISTRATIVE | Facility: OTHER | Age: 62
End: 2022-12-12

## 2022-12-12 DIAGNOSIS — I10 ESSENTIAL HYPERTENSION: ICD-10-CM

## 2022-12-12 RX ORDER — AMLODIPINE BESYLATE 5 MG/1
5 TABLET ORAL DAILY
Qty: 90 TABLET | Refills: 1 | Status: SHIPPED | OUTPATIENT
Start: 2022-12-12

## 2022-12-14 ENCOUNTER — TELEPHONE (OUTPATIENT)
Dept: INTERNAL MEDICINE CLINIC | Facility: CLINIC | Age: 62
End: 2022-12-14

## 2022-12-14 NOTE — TELEPHONE ENCOUNTER
Patient is scheduled to have her second surgery for her eye on 12/20  Patient was originally cleared on 11/1 for the first eye  Asking if you can addend the previous note of if patient needs to be cleared again for surgery          Please advise

## 2022-12-19 NOTE — TELEPHONE ENCOUNTER
[facility] calling in regards to this again, surgery is tomorrow, whomever needs to remove cosign please do and whomever needs to put addendum please do

## 2022-12-20 ENCOUNTER — SURGERY/PROCEDURE (OUTPATIENT)
Dept: URBAN - METROPOLITAN AREA SURGICAL CENTER 6 | Facility: SURGICAL CENTER | Age: 62
End: 2022-12-20

## 2022-12-20 DIAGNOSIS — H25.812: ICD-10-CM

## 2022-12-20 PROCEDURE — 66984 XCAPSL CTRC RMVL W/O ECP: CPT | Mod: 79,LT

## 2022-12-21 ENCOUNTER — 1 DAY POST-OP (OUTPATIENT)
Dept: URBAN - METROPOLITAN AREA CLINIC 6 | Facility: CLINIC | Age: 62
End: 2022-12-21

## 2022-12-21 DIAGNOSIS — Z96.1: ICD-10-CM

## 2022-12-21 PROCEDURE — 99024 POSTOP FOLLOW-UP VISIT: CPT

## 2022-12-21 ASSESSMENT — VISUAL ACUITY
OS_PH: 20/50-1
OS_SC: 20/60
OD_SC: 20/30-1

## 2022-12-21 ASSESSMENT — TONOMETRY
OD_IOP_MMHG: 10
OS_IOP_MMHG: 11

## 2023-01-01 ENCOUNTER — APPOINTMENT (EMERGENCY)
Dept: RADIOLOGY | Facility: HOSPITAL | Age: 63
DRG: 436 | End: 2023-01-01
Payer: COMMERCIAL

## 2023-01-01 ENCOUNTER — APPOINTMENT (OUTPATIENT)
Dept: RADIATION ONCOLOGY | Facility: HOSPITAL | Age: 63
End: 2023-01-01
Attending: RADIOLOGY
Payer: COMMERCIAL

## 2023-01-01 ENCOUNTER — TELEPHONE (OUTPATIENT)
Dept: PALLIATIVE MEDICINE | Facility: CLINIC | Age: 63
End: 2023-01-01

## 2023-01-01 ENCOUNTER — APPOINTMENT (INPATIENT)
Dept: CT IMAGING | Facility: HOSPITAL | Age: 63
DRG: 436 | End: 2023-01-01
Payer: COMMERCIAL

## 2023-01-01 ENCOUNTER — HOSPITAL ENCOUNTER (OUTPATIENT)
Dept: INFUSION CENTER | Facility: CLINIC | Age: 63
Discharge: HOME | End: 2023-06-15

## 2023-01-01 ENCOUNTER — APPOINTMENT (OUTPATIENT)
Dept: RADIATION ONCOLOGY | Facility: HOSPITAL | Age: 63
End: 2023-01-01
Payer: COMMERCIAL

## 2023-01-01 ENCOUNTER — TELEPHONE (OUTPATIENT)
Dept: HEMATOLOGY ONCOLOGY | Facility: CLINIC | Age: 63
End: 2023-01-01

## 2023-01-01 ENCOUNTER — APPOINTMENT (INPATIENT)
Dept: RADIOLOGY | Facility: HOSPITAL | Age: 63
DRG: 436 | End: 2023-01-01
Payer: COMMERCIAL

## 2023-01-01 ENCOUNTER — TELEPHONE (OUTPATIENT)
Dept: OTHER | Facility: OTHER | Age: 63
End: 2023-01-01

## 2023-01-01 ENCOUNTER — TELEPHONE (OUTPATIENT)
Dept: NEUROLOGY | Facility: CLINIC | Age: 63
End: 2023-01-01

## 2023-01-01 ENCOUNTER — APPOINTMENT (INPATIENT)
Dept: GASTROENTEROLOGY | Facility: HOSPITAL | Age: 63
DRG: 436 | End: 2023-01-01
Payer: COMMERCIAL

## 2023-01-01 ENCOUNTER — HOSPICE ADMISSION (OUTPATIENT)
Dept: HOME HOSPICE | Facility: HOSPICE | Age: 63
End: 2023-01-01
Payer: COMMERCIAL

## 2023-01-01 ENCOUNTER — HOME CARE VISIT (OUTPATIENT)
Dept: HOME HOSPICE | Facility: HOSPICE | Age: 63
End: 2023-01-01
Payer: COMMERCIAL

## 2023-01-01 ENCOUNTER — HOSPITAL ENCOUNTER (INPATIENT)
Facility: HOSPITAL | Age: 63
LOS: 3 days | DRG: 436 | End: 2023-06-17
Attending: EMERGENCY MEDICINE | Admitting: INTERNAL MEDICINE
Payer: COMMERCIAL

## 2023-01-01 ENCOUNTER — APPOINTMENT (INPATIENT)
Dept: ULTRASOUND IMAGING | Facility: HOSPITAL | Age: 63
DRG: 436 | End: 2023-01-01
Payer: COMMERCIAL

## 2023-01-01 ENCOUNTER — ANESTHESIA EVENT (INPATIENT)
Dept: GASTROENTEROLOGY | Facility: HOSPITAL | Age: 63
DRG: 436 | End: 2023-01-01
Payer: COMMERCIAL

## 2023-01-01 ENCOUNTER — TELEPHONE (OUTPATIENT)
Dept: HEMATOLOGY ONCOLOGY | Facility: MEDICAL CENTER | Age: 63
End: 2023-01-01

## 2023-01-01 ENCOUNTER — ANESTHESIA (INPATIENT)
Dept: GASTROENTEROLOGY | Facility: HOSPITAL | Age: 63
DRG: 436 | End: 2023-01-01
Payer: COMMERCIAL

## 2023-01-01 ENCOUNTER — TRANSITIONAL CARE MANAGEMENT (OUTPATIENT)
Dept: INTERNAL MEDICINE CLINIC | Facility: CLINIC | Age: 63
End: 2023-01-01

## 2023-01-01 VITALS
TEMPERATURE: 97.7 F | HEART RATE: 68 BPM | OXYGEN SATURATION: 91 % | BODY MASS INDEX: 25.88 KG/M2 | WEIGHT: 164.9 LBS | SYSTOLIC BLOOD PRESSURE: 122 MMHG | RESPIRATION RATE: 24 BRPM | HEIGHT: 67 IN | DIASTOLIC BLOOD PRESSURE: 63 MMHG

## 2023-01-01 DIAGNOSIS — C43.71 MALIGNANT MELANOMA OF RIGHT LOWER EXTREMITY INCLUDING HIP (HCC): Primary | ICD-10-CM

## 2023-01-01 DIAGNOSIS — N17.9 AKI (ACUTE KIDNEY INJURY) (HCC): Primary | ICD-10-CM

## 2023-01-01 DIAGNOSIS — J18.9 PNEUMONIA DUE TO INFECTIOUS ORGANISM, UNSPECIFIED LATERALITY, UNSPECIFIED PART OF LUNG: ICD-10-CM

## 2023-01-01 DIAGNOSIS — E86.0 DEHYDRATION: ICD-10-CM

## 2023-01-01 DIAGNOSIS — R07.1 CHEST PAIN ON RESPIRATION: ICD-10-CM

## 2023-01-01 DIAGNOSIS — E83.42 HYPOMAGNESEMIA: ICD-10-CM

## 2023-01-01 DIAGNOSIS — R53.83 FATIGUE: ICD-10-CM

## 2023-01-01 DIAGNOSIS — C43.9 METASTATIC MELANOMA (HCC): ICD-10-CM

## 2023-01-01 LAB
2HR DELTA HS TROPONIN: 2 NG/L
ALBUMIN SERPL BCP-MCNC: 2.7 G/DL (ref 3.5–5)
ALBUMIN SERPL BCP-MCNC: 2.8 G/DL (ref 3.5–5)
ALBUMIN SERPL BCP-MCNC: 2.8 G/DL (ref 3.5–5)
ALBUMIN SERPL BCP-MCNC: 3.2 G/DL (ref 3.5–5)
ALBUMIN SERPL BCP-MCNC: 3.3 G/DL (ref 3.5–5)
ALP SERPL-CCNC: 119 U/L (ref 46–116)
ALP SERPL-CCNC: 237 U/L (ref 34–104)
ALP SERPL-CCNC: 296 U/L (ref 34–104)
ALP SERPL-CCNC: 298 U/L (ref 34–104)
ALP SERPL-CCNC: 301 U/L (ref 34–104)
ALT SERPL W P-5'-P-CCNC: 194 U/L (ref 7–52)
ALT SERPL W P-5'-P-CCNC: 20 U/L (ref 12–78)
ALT SERPL W P-5'-P-CCNC: 255 U/L (ref 7–52)
ALT SERPL W P-5'-P-CCNC: 52 U/L (ref 7–52)
ALT SERPL W P-5'-P-CCNC: 55 U/L (ref 7–52)
ANION GAP SERPL CALCULATED.3IONS-SCNC: 15 MMOL/L (ref 4–13)
ANION GAP SERPL CALCULATED.3IONS-SCNC: 16 MMOL/L (ref 4–13)
ANION GAP SERPL CALCULATED.3IONS-SCNC: 2 MMOL/L (ref 4–13)
ANION GAP SERPL CALCULATED.3IONS-SCNC: 21 MMOL/L (ref 4–13)
ANION GAP SERPL CALCULATED.3IONS-SCNC: 27 MMOL/L (ref 4–13)
ANION GAP SERPL CALCULATED.3IONS-SCNC: 28 MMOL/L (ref 4–13)
ANION GAP SERPL CALCULATED.3IONS-SCNC: 30 MMOL/L (ref 4–13)
APAP SERPL-MCNC: <10 UG/ML (ref 10–20)
APPEARANCE FLD: ABNORMAL
AST SERPL W P-5'-P-CCNC: 29 U/L (ref 5–45)
AST SERPL W P-5'-P-CCNC: 343 U/L (ref 13–39)
AST SERPL W P-5'-P-CCNC: 447 U/L (ref 13–39)
AST SERPL W P-5'-P-CCNC: 66 U/L (ref 13–39)
AST SERPL W P-5'-P-CCNC: 79 U/L (ref 13–39)
ATRIAL RATE: 59 BPM
BASE EX.OXY STD BLDV CALC-SCNC: 94.9 % (ref 60–80)
BASE EXCESS BLDV CALC-SCNC: -17.4 MMOL/L
BASOPHILS # BLD AUTO: 0.01 THOUSANDS/ÂΜL (ref 0–0.1)
BASOPHILS # BLD AUTO: 0.03 THOUSANDS/ÂΜL (ref 0–0.1)
BASOPHILS # BLD AUTO: 0.03 THOUSANDS/ÂΜL (ref 0–0.1)
BASOPHILS # BLD MANUAL: 0 THOUSAND/UL (ref 0–0.1)
BASOPHILS # BLD MANUAL: 0 THOUSAND/UL (ref 0–0.1)
BASOPHILS NFR BLD AUTO: 0 % (ref 0–1)
BASOPHILS NFR MAR MANUAL: 0 % (ref 0–1)
BASOPHILS NFR MAR MANUAL: 0 % (ref 0–1)
BILIRUB DIRECT SERPL-MCNC: 0.46 MG/DL (ref 0–0.2)
BILIRUB SERPL-MCNC: 0.83 MG/DL (ref 0.2–1)
BILIRUB SERPL-MCNC: 0.96 MG/DL (ref 0.2–1)
BILIRUB SERPL-MCNC: 1.01 MG/DL (ref 0.2–1)
BILIRUB SERPL-MCNC: 1.13 MG/DL (ref 0.2–1)
BILIRUB SERPL-MCNC: 1.2 MG/DL (ref 0.2–1)
BNP SERPL-MCNC: 125 PG/ML (ref 0–100)
BUN SERPL-MCNC: 57 MG/DL (ref 5–25)
BUN SERPL-MCNC: 61 MG/DL (ref 5–25)
BUN SERPL-MCNC: 7 MG/DL (ref 5–25)
BUN SERPL-MCNC: 79 MG/DL (ref 5–25)
BUN SERPL-MCNC: 92 MG/DL (ref 5–25)
BUN SERPL-MCNC: 95 MG/DL (ref 5–25)
BUN SERPL-MCNC: 97 MG/DL (ref 5–25)
CALCIUM ALBUM COR SERPL-MCNC: 8.5 MG/DL (ref 8.3–10.1)
CALCIUM ALBUM COR SERPL-MCNC: 8.6 MG/DL (ref 8.3–10.1)
CALCIUM ALBUM COR SERPL-MCNC: 9.2 MG/DL (ref 8.3–10.1)
CALCIUM ALBUM COR SERPL-MCNC: 9.3 MG/DL (ref 8.3–10.1)
CALCIUM SERPL-MCNC: 7.5 MG/DL (ref 8.4–10.2)
CALCIUM SERPL-MCNC: 7.5 MG/DL (ref 8.4–10.2)
CALCIUM SERPL-MCNC: 7.6 MG/DL (ref 8.4–10.2)
CALCIUM SERPL-MCNC: 7.8 MG/DL (ref 8.4–10.2)
CALCIUM SERPL-MCNC: 7.9 MG/DL (ref 8.4–10.2)
CALCIUM SERPL-MCNC: 8.6 MG/DL (ref 8.4–10.2)
CALCIUM SERPL-MCNC: 8.7 MG/DL (ref 8.3–10.1)
CARDIAC TROPONIN I PNL SERPL HS: 17 NG/L
CARDIAC TROPONIN I PNL SERPL HS: 19 NG/L
CHLORIDE SERPL-SCNC: 100 MMOL/L (ref 96–108)
CHLORIDE SERPL-SCNC: 102 MMOL/L (ref 96–108)
CHLORIDE SERPL-SCNC: 104 MMOL/L (ref 96–108)
CHLORIDE SERPL-SCNC: 105 MMOL/L (ref 96–108)
CHLORIDE SERPL-SCNC: 96 MMOL/L (ref 96–108)
CK SERPL-CCNC: 38 U/L (ref 26–192)
CO2 SERPL-SCNC: 10 MMOL/L (ref 21–32)
CO2 SERPL-SCNC: 11 MMOL/L (ref 21–32)
CO2 SERPL-SCNC: 12 MMOL/L (ref 21–32)
CO2 SERPL-SCNC: 20 MMOL/L (ref 21–32)
CO2 SERPL-SCNC: 23 MMOL/L (ref 21–32)
CO2 SERPL-SCNC: 32 MMOL/L (ref 21–32)
CO2 SERPL-SCNC: 8 MMOL/L (ref 21–32)
COLOR FLD: ABNORMAL
CREAT SERPL-MCNC: 0.6 MG/DL (ref 0.6–1.3)
CREAT SERPL-MCNC: 1.02 MG/DL (ref 0.6–1.3)
CREAT SERPL-MCNC: 1.14 MG/DL (ref 0.6–1.3)
CREAT SERPL-MCNC: 1.47 MG/DL (ref 0.6–1.3)
CREAT SERPL-MCNC: 2.11 MG/DL (ref 0.6–1.3)
CREAT SERPL-MCNC: 2.16 MG/DL (ref 0.6–1.3)
CREAT SERPL-MCNC: 2.2 MG/DL (ref 0.6–1.3)
EOSINOPHIL # BLD AUTO: 0 THOUSAND/ÂΜL (ref 0–0.61)
EOSINOPHIL # BLD AUTO: 0 THOUSAND/ÂΜL (ref 0–0.61)
EOSINOPHIL # BLD AUTO: 0.05 THOUSAND/ÂΜL (ref 0–0.61)
EOSINOPHIL # BLD MANUAL: 0 THOUSAND/UL (ref 0–0.4)
EOSINOPHIL # BLD MANUAL: 0 THOUSAND/UL (ref 0–0.4)
EOSINOPHIL NFR BLD AUTO: 0 % (ref 0–6)
EOSINOPHIL NFR BLD AUTO: 0 % (ref 0–6)
EOSINOPHIL NFR BLD AUTO: 1 % (ref 0–6)
EOSINOPHIL NFR BLD MANUAL: 0 % (ref 0–6)
EOSINOPHIL NFR BLD MANUAL: 0 % (ref 0–6)
EOSINOPHIL NFR FLD MANUAL: 1 %
ERYTHROCYTE [DISTWIDTH] IN BLOOD BY AUTOMATED COUNT: 12.4 % (ref 11.6–15.1)
ERYTHROCYTE [DISTWIDTH] IN BLOOD BY AUTOMATED COUNT: 12.5 % (ref 11.6–15.1)
ERYTHROCYTE [DISTWIDTH] IN BLOOD BY AUTOMATED COUNT: 12.5 % (ref 11.6–15.1)
ERYTHROCYTE [DISTWIDTH] IN BLOOD BY AUTOMATED COUNT: 13 % (ref 11.6–15.1)
ERYTHROCYTE [DISTWIDTH] IN BLOOD BY AUTOMATED COUNT: 13.3 % (ref 11.6–15.1)
ETHANOL SERPL-MCNC: <10 MG/DL
GFR SERPL CREATININE-BSD FRML MDRD: 23 ML/MIN/1.73SQ M
GFR SERPL CREATININE-BSD FRML MDRD: 23 ML/MIN/1.73SQ M
GFR SERPL CREATININE-BSD FRML MDRD: 24 ML/MIN/1.73SQ M
GFR SERPL CREATININE-BSD FRML MDRD: 37 ML/MIN/1.73SQ M
GFR SERPL CREATININE-BSD FRML MDRD: 51 ML/MIN/1.73SQ M
GFR SERPL CREATININE-BSD FRML MDRD: 59 ML/MIN/1.73SQ M
GFR SERPL CREATININE-BSD FRML MDRD: 98 ML/MIN/1.73SQ M
GLUCOSE FLD-MCNC: 68 MG/DL
GLUCOSE P FAST SERPL-MCNC: 117 MG/DL (ref 65–99)
GLUCOSE P FAST SERPL-MCNC: 92 MG/DL (ref 65–99)
GLUCOSE SERPL-MCNC: 139 MG/DL (ref 65–140)
GLUCOSE SERPL-MCNC: 143 MG/DL (ref 65–140)
GLUCOSE SERPL-MCNC: 74 MG/DL (ref 65–140)
GLUCOSE SERPL-MCNC: 85 MG/DL (ref 65–140)
GLUCOSE SERPL-MCNC: 88 MG/DL (ref 65–140)
GLUCOSE SERPL-MCNC: 92 MG/DL (ref 65–140)
HAV IGM SER QL: NORMAL
HBV CORE IGM SER QL: NORMAL
HBV SURFACE AG SER QL: NORMAL
HCO3 BLDV-SCNC: 7.5 MMOL/L (ref 24–30)
HCT VFR BLD AUTO: 38 % (ref 34.8–46.1)
HCT VFR BLD AUTO: 40.4 % (ref 34.8–46.1)
HCT VFR BLD AUTO: 41.6 % (ref 34.8–46.1)
HCT VFR BLD AUTO: 43.9 % (ref 34.8–46.1)
HCT VFR BLD AUTO: 45.3 % (ref 34.8–46.1)
HCV AB SER QL: NORMAL
HGB BLD-MCNC: 12.7 G/DL (ref 11.5–15.4)
HGB BLD-MCNC: 12.7 G/DL (ref 11.5–15.4)
HGB BLD-MCNC: 13.8 G/DL (ref 11.5–15.4)
HGB BLD-MCNC: 13.9 G/DL (ref 11.5–15.4)
HGB BLD-MCNC: 15 G/DL (ref 11.5–15.4)
HISTIOCYTES NFR FLD: 83 %
IMM GRANULOCYTES # BLD AUTO: 0.02 THOUSAND/UL (ref 0–0.2)
IMM GRANULOCYTES # BLD AUTO: 0.07 THOUSAND/UL (ref 0–0.2)
IMM GRANULOCYTES # BLD AUTO: 0.1 THOUSAND/UL (ref 0–0.2)
IMM GRANULOCYTES NFR BLD AUTO: 0 % (ref 0–2)
IMM GRANULOCYTES NFR BLD AUTO: 1 % (ref 0–2)
IMM GRANULOCYTES NFR BLD AUTO: 1 % (ref 0–2)
INR PPP: 1.85 (ref 0.84–1.19)
LACTATE SERPL-SCNC: 11.7 MMOL/L (ref 0.5–2)
LACTATE SERPL-SCNC: 12.3 MMOL/L (ref 0.5–2)
LACTATE SERPL-SCNC: 14 MMOL/L (ref 0.5–2)
LACTATE SERPL-SCNC: 15 MMOL/L (ref 0.5–2)
LACTATE SERPL-SCNC: 17.1 MMOL/L (ref 0.5–2)
LDH FLD L TO P-CCNC: 179 U/L
LDH SERPL-CCNC: 179 U/L (ref 81–234)
LDH SERPL-CCNC: 891 U/L (ref 140–271)
LYMPHOCYTES # BLD AUTO: 0.34 THOUSANDS/ÂΜL (ref 0.6–4.47)
LYMPHOCYTES # BLD AUTO: 0.59 THOUSANDS/ÂΜL (ref 0.6–4.47)
LYMPHOCYTES # BLD AUTO: 0.69 THOUSAND/UL (ref 0.6–4.47)
LYMPHOCYTES # BLD AUTO: 0.7 THOUSAND/UL (ref 0.6–4.47)
LYMPHOCYTES # BLD AUTO: 1.37 THOUSANDS/ÂΜL (ref 0.6–4.47)
LYMPHOCYTES # BLD AUTO: 5 % (ref 14–44)
LYMPHOCYTES # BLD AUTO: 6 % (ref 14–44)
LYMPHOCYTES NFR BLD AUTO: 11 %
LYMPHOCYTES NFR BLD AUTO: 18 % (ref 14–44)
LYMPHOCYTES NFR BLD AUTO: 3 % (ref 14–44)
LYMPHOCYTES NFR BLD AUTO: 4 % (ref 14–44)
LYMPHOCYTES NFR BLD AUTO: 9 %
MACROPHAGES NFR FLD: 89 %
MAGNESIUM SERPL-MCNC: 1.6 MG/DL (ref 1.9–2.7)
MAGNESIUM SERPL-MCNC: 2.3 MG/DL (ref 1.9–2.7)
MCH RBC QN AUTO: 33.8 PG (ref 26.8–34.3)
MCH RBC QN AUTO: 34.1 PG (ref 26.8–34.3)
MCH RBC QN AUTO: 34.2 PG (ref 26.8–34.3)
MCH RBC QN AUTO: 34.2 PG (ref 26.8–34.3)
MCH RBC QN AUTO: 35.7 PG (ref 26.8–34.3)
MCHC RBC AUTO-ENTMCNC: 31.4 G/DL (ref 31.4–37.4)
MCHC RBC AUTO-ENTMCNC: 31.7 G/DL (ref 31.4–37.4)
MCHC RBC AUTO-ENTMCNC: 33.1 G/DL (ref 31.4–37.4)
MCHC RBC AUTO-ENTMCNC: 33.2 G/DL (ref 31.4–37.4)
MCHC RBC AUTO-ENTMCNC: 33.4 G/DL (ref 31.4–37.4)
MCV RBC AUTO: 103 FL (ref 82–98)
MCV RBC AUTO: 103 FL (ref 82–98)
MCV RBC AUTO: 107 FL (ref 82–98)
MCV RBC AUTO: 107 FL (ref 82–98)
MCV RBC AUTO: 109 FL (ref 82–98)
MONO+MESO NFR FLD MANUAL: 3 %
MONOCYTES # BLD AUTO: 0.23 THOUSAND/UL (ref 0–1.22)
MONOCYTES # BLD AUTO: 0.68 THOUSAND/ÂΜL (ref 0.17–1.22)
MONOCYTES # BLD AUTO: 0.76 THOUSAND/ÂΜL (ref 0.17–1.22)
MONOCYTES # BLD AUTO: 1.1 THOUSAND/UL (ref 0–1.22)
MONOCYTES # BLD AUTO: 1.33 THOUSAND/ÂΜL (ref 0.17–1.22)
MONOCYTES NFR BLD AUTO: 10 % (ref 4–12)
MONOCYTES NFR BLD AUTO: 6 % (ref 4–12)
MONOCYTES NFR BLD AUTO: 9 % (ref 4–12)
MONOCYTES NFR BLD: 2 % (ref 4–12)
MONOCYTES NFR BLD: 8 % (ref 4–12)
NEUTROPHILS # BLD AUTO: 12.36 THOUSANDS/ÂΜL (ref 1.85–7.62)
NEUTROPHILS # BLD AUTO: 5.28 THOUSANDS/ÂΜL (ref 1.85–7.62)
NEUTROPHILS # BLD AUTO: 9.51 THOUSANDS/ÂΜL (ref 1.85–7.62)
NEUTROPHILS # BLD MANUAL: 10.71 THOUSAND/UL (ref 1.85–7.62)
NEUTROPHILS # BLD MANUAL: 12.01 THOUSAND/UL (ref 1.85–7.62)
NEUTS BAND NFR BLD MANUAL: 1 % (ref 0–8)
NEUTS BAND NFR BLD MANUAL: 5 % (ref 0–8)
NEUTS SEG NFR BLD AUTO: 4 %
NEUTS SEG NFR BLD AUTO: 71 % (ref 43–75)
NEUTS SEG NFR BLD AUTO: 86 % (ref 43–75)
NEUTS SEG NFR BLD AUTO: 86 % (ref 43–75)
NEUTS SEG NFR BLD AUTO: 87 % (ref 43–75)
NEUTS SEG NFR BLD AUTO: 90 % (ref 43–75)
NRBC BLD AUTO-RTO: 0 /100 WBCS
O2 CT BLDV-SCNC: 19.5 ML/DL
P AXIS: 61 DEGREES
PATHOLOGY REVIEW: NO
PCO2 BLDV: 17.9 MM HG (ref 42–50)
PH BLDV: 7.24 [PH] (ref 7.3–7.4)
PH BODY FLUID: 7.6
PLATELET # BLD AUTO: 141 THOUSANDS/UL (ref 149–390)
PLATELET # BLD AUTO: 141 THOUSANDS/UL (ref 149–390)
PLATELET # BLD AUTO: 142 THOUSANDS/UL (ref 149–390)
PLATELET # BLD AUTO: 189 THOUSANDS/UL (ref 149–390)
PLATELET # BLD AUTO: 228 THOUSANDS/UL (ref 149–390)
PLATELET BLD QL SMEAR: ABNORMAL
PLATELET BLD QL SMEAR: ADEQUATE
PMV BLD AUTO: 10.1 FL (ref 8.9–12.7)
PMV BLD AUTO: 10.5 FL (ref 8.9–12.7)
PMV BLD AUTO: 10.7 FL (ref 8.9–12.7)
PMV BLD AUTO: 9.2 FL (ref 8.9–12.7)
PMV BLD AUTO: 9.9 FL (ref 8.9–12.7)
PO2 BLDV: 109.9 MM HG (ref 35–45)
POTASSIUM SERPL-SCNC: 3.3 MMOL/L (ref 3.5–5.3)
POTASSIUM SERPL-SCNC: 3.9 MMOL/L (ref 3.5–5.3)
POTASSIUM SERPL-SCNC: 4 MMOL/L (ref 3.5–5.3)
POTASSIUM SERPL-SCNC: 4.3 MMOL/L (ref 3.5–5.3)
POTASSIUM SERPL-SCNC: 4.3 MMOL/L (ref 3.5–5.3)
POTASSIUM SERPL-SCNC: 4.4 MMOL/L (ref 3.5–5.3)
POTASSIUM SERPL-SCNC: 4.8 MMOL/L (ref 3.5–5.3)
PR INTERVAL: 120 MS
PROCALCITONIN SERPL-MCNC: 9.63 NG/ML
PROT FLD-MCNC: <2 G/DL
PROT SERPL-MCNC: 5.3 G/DL (ref 6.4–8.4)
PROT SERPL-MCNC: 5.3 G/DL (ref 6.4–8.4)
PROT SERPL-MCNC: 5.4 G/DL (ref 6.4–8.4)
PROT SERPL-MCNC: 6.2 G/DL (ref 6.4–8.4)
PROT SERPL-MCNC: 6.5 G/DL (ref 6.4–8.4)
PROTHROMBIN TIME: 21.6 SECONDS (ref 11.6–14.5)
QRS AXIS: 43 DEGREES
QRSD INTERVAL: 80 MS
QT INTERVAL: 460 MS
QTC INTERVAL: 455 MS
RBC # BLD AUTO: 3.56 MILLION/UL (ref 3.81–5.12)
RBC # BLD AUTO: 3.72 MILLION/UL (ref 3.81–5.12)
RBC # BLD AUTO: 4.03 MILLION/UL (ref 3.81–5.12)
RBC # BLD AUTO: 4.11 MILLION/UL (ref 3.81–5.12)
RBC # BLD AUTO: 4.39 MILLION/UL (ref 3.81–5.12)
RBC MORPH BLD: NORMAL
RBC MORPH BLD: NORMAL
SALICYLATES SERPL-MCNC: <5 MG/DL (ref 3–20)
SCAN RESULT: NORMAL
SITE: ABNORMAL
SODIUM SERPL-SCNC: 135 MMOL/L (ref 135–147)
SODIUM SERPL-SCNC: 135 MMOL/L (ref 135–147)
SODIUM SERPL-SCNC: 136 MMOL/L (ref 135–147)
SODIUM SERPL-SCNC: 138 MMOL/L (ref 135–147)
SODIUM SERPL-SCNC: 139 MMOL/L (ref 135–147)
SODIUM SERPL-SCNC: 139 MMOL/L (ref 135–147)
SODIUM SERPL-SCNC: 140 MMOL/L (ref 135–147)
T WAVE AXIS: 38 DEGREES
T3FREE SERPL-MCNC: 2.85 PG/ML (ref 2.5–3.9)
T4 FREE SERPL-MCNC: 0.75 NG/DL (ref 0.61–1.12)
TOTAL CELLS COUNTED SPEC: 100
TOTAL CELLS COUNTED SPEC: 100
TSH SERPL DL<=0.05 MIU/L-ACNC: 2.48 UIU/ML (ref 0.45–4.5)
VENTRICULAR RATE: 59 BPM
WBC # BLD AUTO: 10.63 THOUSAND/UL (ref 4.31–10.16)
WBC # BLD AUTO: 11.64 THOUSAND/UL (ref 4.31–10.16)
WBC # BLD AUTO: 13.8 THOUSAND/UL (ref 4.31–10.16)
WBC # BLD AUTO: 14.39 THOUSAND/UL (ref 4.31–10.16)
WBC # BLD AUTO: 7.51 THOUSAND/UL (ref 4.31–10.16)
WBC # FLD MANUAL: 643 /UL

## 2023-01-01 PROCEDURE — 84481 FREE ASSAY (FT-3): CPT

## 2023-01-01 PROCEDURE — 83615 LACTATE (LD) (LDH) ENZYME: CPT | Performed by: NURSE PRACTITIONER

## 2023-01-01 PROCEDURE — 88112 CYTOPATH CELL ENHANCE TECH: CPT | Performed by: PATHOLOGY

## 2023-01-01 PROCEDURE — 87252 VIRUS INOCULATION TISSUE: CPT | Performed by: INTERNAL MEDICINE

## 2023-01-01 PROCEDURE — 83986 ASSAY PH BODY FLUID NOS: CPT | Performed by: NURSE PRACTITIONER

## 2023-01-01 PROCEDURE — 99232 SBSQ HOSP IP/OBS MODERATE 35: CPT | Performed by: INTERNAL MEDICINE

## 2023-01-01 PROCEDURE — 93010 ELECTROCARDIOGRAM REPORT: CPT | Performed by: INTERNAL MEDICINE

## 2023-01-01 PROCEDURE — 85025 COMPLETE CBC W/AUTO DIFF WBC: CPT | Performed by: PHYSICIAN ASSISTANT

## 2023-01-01 PROCEDURE — G1004 CDSM NDSC: HCPCS

## 2023-01-01 PROCEDURE — 0B9F8ZX DRAINAGE OF RIGHT LOWER LUNG LOBE, VIA NATURAL OR ARTIFICIAL OPENING ENDOSCOPIC, DIAGNOSTIC: ICD-10-PCS | Performed by: INTERNAL MEDICINE

## 2023-01-01 PROCEDURE — 96361 HYDRATE IV INFUSION ADD-ON: CPT

## 2023-01-01 PROCEDURE — 82805 BLOOD GASES W/O2 SATURATION: CPT | Performed by: NURSE PRACTITIONER

## 2023-01-01 PROCEDURE — 96375 TX/PRO/DX INJ NEW DRUG ADDON: CPT

## 2023-01-01 PROCEDURE — 36415 COLL VENOUS BLD VENIPUNCTURE: CPT

## 2023-01-01 PROCEDURE — 96374 THER/PROPH/DIAG INJ IV PUSH: CPT

## 2023-01-01 PROCEDURE — 87070 CULTURE OTHR SPECIMN AEROBIC: CPT | Performed by: NURSE PRACTITIONER

## 2023-01-01 PROCEDURE — 77412 RADIATION TX DELIVERY LVL 3: CPT | Performed by: STUDENT IN AN ORGANIZED HEALTH CARE EDUCATION/TRAINING PROGRAM

## 2023-01-01 PROCEDURE — 77412 RADIATION TX DELIVERY LVL 3: CPT | Performed by: RADIOLOGY

## 2023-01-01 PROCEDURE — 89051 BODY FLUID CELL COUNT: CPT | Performed by: INTERNAL MEDICINE

## 2023-01-01 PROCEDURE — 94760 N-INVAS EAR/PLS OXIMETRY 1: CPT

## 2023-01-01 PROCEDURE — 82077 ASSAY SPEC XCP UR&BREATH IA: CPT | Performed by: NURSE PRACTITIONER

## 2023-01-01 PROCEDURE — 87040 BLOOD CULTURE FOR BACTERIA: CPT | Performed by: NURSE PRACTITIONER

## 2023-01-01 PROCEDURE — 83615 LACTATE (LD) (LDH) ENZYME: CPT

## 2023-01-01 PROCEDURE — 99223 1ST HOSP IP/OBS HIGH 75: CPT | Performed by: INTERNAL MEDICINE

## 2023-01-01 PROCEDURE — 77336 RADIATION PHYSICS CONSULT: CPT | Performed by: RADIOLOGY

## 2023-01-01 PROCEDURE — 94640 AIRWAY INHALATION TREATMENT: CPT

## 2023-01-01 PROCEDURE — 89051 BODY FLUID CELL COUNT: CPT | Performed by: NURSE PRACTITIONER

## 2023-01-01 PROCEDURE — 83735 ASSAY OF MAGNESIUM: CPT | Performed by: NURSE PRACTITIONER

## 2023-01-01 PROCEDURE — 99239 HOSP IP/OBS DSCHRG MGMT >30: CPT | Performed by: INTERNAL MEDICINE

## 2023-01-01 PROCEDURE — 84145 PROCALCITONIN (PCT): CPT | Performed by: NURSE PRACTITIONER

## 2023-01-01 PROCEDURE — 80053 COMPREHEN METABOLIC PANEL: CPT | Performed by: NURSE PRACTITIONER

## 2023-01-01 PROCEDURE — 77417 THER RADIOLOGY PORT IMAGE(S): CPT | Performed by: RADIOLOGY

## 2023-01-01 PROCEDURE — 99232 SBSQ HOSP IP/OBS MODERATE 35: CPT | Performed by: NURSE PRACTITIONER

## 2023-01-01 PROCEDURE — 82550 ASSAY OF CK (CPK): CPT | Performed by: NURSE PRACTITIONER

## 2023-01-01 PROCEDURE — 87205 SMEAR GRAM STAIN: CPT | Performed by: NURSE PRACTITIONER

## 2023-01-01 PROCEDURE — 99222 1ST HOSP IP/OBS MODERATE 55: CPT | Performed by: INTERNAL MEDICINE

## 2023-01-01 PROCEDURE — 80179 DRUG ASSAY SALICYLATE: CPT | Performed by: NURSE PRACTITIONER

## 2023-01-01 PROCEDURE — 31624 DX BRONCHOSCOPE/LAVAGE: CPT | Performed by: INTERNAL MEDICINE

## 2023-01-01 PROCEDURE — 85610 PROTHROMBIN TIME: CPT | Performed by: NURSE PRACTITIONER

## 2023-01-01 PROCEDURE — 80053 COMPREHEN METABOLIC PANEL: CPT | Performed by: PHYSICIAN ASSISTANT

## 2023-01-01 PROCEDURE — 88305 TISSUE EXAM BY PATHOLOGIST: CPT | Performed by: PATHOLOGY

## 2023-01-01 PROCEDURE — 71046 X-RAY EXAM CHEST 2 VIEWS: CPT

## 2023-01-01 PROCEDURE — 99285 EMERGENCY DEPT VISIT HI MDM: CPT | Performed by: EMERGENCY MEDICINE

## 2023-01-01 PROCEDURE — 94668 MNPJ CHEST WALL SBSQ: CPT

## 2023-01-01 PROCEDURE — 85027 COMPLETE CBC AUTOMATED: CPT | Performed by: PHYSICIAN ASSISTANT

## 2023-01-01 PROCEDURE — 99232 SBSQ HOSP IP/OBS MODERATE 35: CPT | Performed by: PHYSICIAN ASSISTANT

## 2023-01-01 PROCEDURE — 32555 ASPIRATE PLEURA W/ IMAGING: CPT | Performed by: RADIOLOGY

## 2023-01-01 PROCEDURE — 83735 ASSAY OF MAGNESIUM: CPT

## 2023-01-01 PROCEDURE — C9113 INJ PANTOPRAZOLE SODIUM, VIA: HCPCS | Performed by: NURSE PRACTITIONER

## 2023-01-01 PROCEDURE — 80074 ACUTE HEPATITIS PANEL: CPT | Performed by: NURSE PRACTITIONER

## 2023-01-01 PROCEDURE — 76705 ECHO EXAM OF ABDOMEN: CPT

## 2023-01-01 PROCEDURE — 80048 BASIC METABOLIC PNL TOTAL CA: CPT | Performed by: INTERNAL MEDICINE

## 2023-01-01 PROCEDURE — 88342 IMHCHEM/IMCYTCHM 1ST ANTB: CPT | Performed by: PATHOLOGY

## 2023-01-01 PROCEDURE — 84443 ASSAY THYROID STIM HORMONE: CPT

## 2023-01-01 PROCEDURE — 74176 CT ABD & PELVIS W/O CONTRAST: CPT

## 2023-01-01 PROCEDURE — 83605 ASSAY OF LACTIC ACID: CPT | Performed by: NURSE PRACTITIONER

## 2023-01-01 PROCEDURE — 32555 ASPIRATE PLEURA W/ IMAGING: CPT

## 2023-01-01 PROCEDURE — 94664 DEMO&/EVAL PT USE INHALER: CPT

## 2023-01-01 PROCEDURE — 85025 COMPLETE CBC W/AUTO DIFF WBC: CPT

## 2023-01-01 PROCEDURE — 93005 ELECTROCARDIOGRAM TRACING: CPT

## 2023-01-01 PROCEDURE — 80053 COMPREHEN METABOLIC PANEL: CPT

## 2023-01-01 PROCEDURE — 99233 SBSQ HOSP IP/OBS HIGH 50: CPT | Performed by: PHYSICIAN ASSISTANT

## 2023-01-01 PROCEDURE — 87070 CULTURE OTHR SPECIMN AEROBIC: CPT | Performed by: INTERNAL MEDICINE

## 2023-01-01 PROCEDURE — 84484 ASSAY OF TROPONIN QUANT: CPT

## 2023-01-01 PROCEDURE — 83605 ASSAY OF LACTIC ACID: CPT | Performed by: INTERNAL MEDICINE

## 2023-01-01 PROCEDURE — 99223 1ST HOSP IP/OBS HIGH 75: CPT | Performed by: NURSE PRACTITIONER

## 2023-01-01 PROCEDURE — 80048 BASIC METABOLIC PNL TOTAL CA: CPT | Performed by: PHYSICIAN ASSISTANT

## 2023-01-01 PROCEDURE — 88341 IMHCHEM/IMCYTCHM EA ADD ANTB: CPT | Performed by: PATHOLOGY

## 2023-01-01 PROCEDURE — 82945 GLUCOSE OTHER FLUID: CPT | Performed by: NURSE PRACTITIONER

## 2023-01-01 PROCEDURE — 84157 ASSAY OF PROTEIN OTHER: CPT | Performed by: NURSE PRACTITIONER

## 2023-01-01 PROCEDURE — 87798 DETECT AGENT NOS DNA AMP: CPT | Performed by: INTERNAL MEDICINE

## 2023-01-01 PROCEDURE — 99285 EMERGENCY DEPT VISIT HI MDM: CPT

## 2023-01-01 PROCEDURE — 83880 ASSAY OF NATRIURETIC PEPTIDE: CPT | Performed by: NURSE PRACTITIONER

## 2023-01-01 PROCEDURE — 80143 DRUG ASSAY ACETAMINOPHEN: CPT | Performed by: NURSE PRACTITIONER

## 2023-01-01 PROCEDURE — 84439 ASSAY OF FREE THYROXINE: CPT

## 2023-01-01 PROCEDURE — 87205 SMEAR GRAM STAIN: CPT | Performed by: INTERNAL MEDICINE

## 2023-01-01 PROCEDURE — 80076 HEPATIC FUNCTION PANEL: CPT | Performed by: NURSE PRACTITIONER

## 2023-01-01 PROCEDURE — 85027 COMPLETE CBC AUTOMATED: CPT | Performed by: NURSE PRACTITIONER

## 2023-01-01 PROCEDURE — 85007 BL SMEAR W/DIFF WBC COUNT: CPT | Performed by: PHYSICIAN ASSISTANT

## 2023-01-01 PROCEDURE — 77427 RADIATION TX MANAGEMENT X5: CPT | Performed by: RADIOLOGY

## 2023-01-01 PROCEDURE — 0W993ZZ DRAINAGE OF RIGHT PLEURAL CAVITY, PERCUTANEOUS APPROACH: ICD-10-PCS | Performed by: INTERNAL MEDICINE

## 2023-01-01 PROCEDURE — 85007 BL SMEAR W/DIFF WBC COUNT: CPT | Performed by: NURSE PRACTITIONER

## 2023-01-01 PROCEDURE — 80048 BASIC METABOLIC PNL TOTAL CA: CPT | Performed by: NURSE PRACTITIONER

## 2023-01-01 RX ORDER — ACETAMINOPHEN 325 MG/1
650 TABLET ORAL EVERY 6 HOURS PRN
Status: DISCONTINUED | OUTPATIENT
Start: 2023-01-01 | End: 2023-01-01 | Stop reason: HOSPADM

## 2023-01-01 RX ORDER — MEMANTINE HYDROCHLORIDE 5 MG/1
5 TABLET ORAL 2 TIMES DAILY
Status: DISCONTINUED | OUTPATIENT
Start: 2023-01-01 | End: 2023-01-01

## 2023-01-01 RX ORDER — ATENOLOL 25 MG/1
25 TABLET ORAL DAILY
Status: DISCONTINUED | OUTPATIENT
Start: 2023-01-01 | End: 2023-01-01

## 2023-01-01 RX ORDER — SODIUM CHLORIDE 9 MG/ML
100 INJECTION, SOLUTION INTRAVENOUS CONTINUOUS
Status: DISCONTINUED | OUTPATIENT
Start: 2023-01-01 | End: 2023-01-01

## 2023-01-01 RX ORDER — ALBUMIN, HUMAN INJ 5% 5 %
SOLUTION INTRAVENOUS CONTINUOUS PRN
Status: DISCONTINUED | OUTPATIENT
Start: 2023-01-01 | End: 2023-01-01

## 2023-01-01 RX ORDER — MORPHINE SULFATE 4 MG/ML
4 INJECTION, SOLUTION INTRAMUSCULAR; INTRAVENOUS ONCE
Status: COMPLETED | OUTPATIENT
Start: 2023-01-01 | End: 2023-01-01

## 2023-01-01 RX ORDER — BENZONATATE 100 MG/1
100 CAPSULE ORAL 3 TIMES DAILY PRN
Status: DISCONTINUED | OUTPATIENT
Start: 2023-01-01 | End: 2023-01-01 | Stop reason: HOSPADM

## 2023-01-01 RX ORDER — LIDOCAINE HYDROCHLORIDE 10 MG/ML
INJECTION, SOLUTION EPIDURAL; INFILTRATION; INTRACAUDAL; PERINEURAL AS NEEDED
Status: DISCONTINUED | OUTPATIENT
Start: 2023-01-01 | End: 2023-01-01

## 2023-01-01 RX ORDER — PROPOFOL 10 MG/ML
INJECTION, EMULSION INTRAVENOUS AS NEEDED
Status: DISCONTINUED | OUTPATIENT
Start: 2023-01-01 | End: 2023-01-01

## 2023-01-01 RX ORDER — HYDROMORPHONE HCL/PF 1 MG/ML
0.5 SYRINGE (ML) INJECTION EVERY 4 HOURS PRN
Status: DISCONTINUED | OUTPATIENT
Start: 2023-01-01 | End: 2023-01-01

## 2023-01-01 RX ORDER — SODIUM CHLORIDE 9 MG/ML
INJECTION, SOLUTION INTRAVENOUS CONTINUOUS PRN
Status: DISCONTINUED | OUTPATIENT
Start: 2023-01-01 | End: 2023-01-01

## 2023-01-01 RX ORDER — HEPARIN SODIUM 5000 [USP'U]/ML
5000 INJECTION, SOLUTION INTRAVENOUS; SUBCUTANEOUS EVERY 8 HOURS SCHEDULED
Status: DISCONTINUED | OUTPATIENT
Start: 2023-01-01 | End: 2023-01-01

## 2023-01-01 RX ORDER — MAGNESIUM SULFATE HEPTAHYDRATE 40 MG/ML
2 INJECTION, SOLUTION INTRAVENOUS ONCE
Status: COMPLETED | OUTPATIENT
Start: 2023-01-01 | End: 2023-01-01

## 2023-01-01 RX ORDER — ONDANSETRON 2 MG/ML
4 INJECTION INTRAMUSCULAR; INTRAVENOUS EVERY 6 HOURS PRN
Status: DISCONTINUED | OUTPATIENT
Start: 2023-01-01 | End: 2023-01-01 | Stop reason: HOSPADM

## 2023-01-01 RX ORDER — SODIUM CHLORIDE, SODIUM GLUCONATE, SODIUM ACETATE, POTASSIUM CHLORIDE, MAGNESIUM CHLORIDE, SODIUM PHOSPHATE, DIBASIC, AND POTASSIUM PHOSPHATE .53; .5; .37; .037; .03; .012; .00082 G/100ML; G/100ML; G/100ML; G/100ML; G/100ML; G/100ML; G/100ML
75 INJECTION, SOLUTION INTRAVENOUS CONTINUOUS
Status: DISCONTINUED | OUTPATIENT
Start: 2023-01-01 | End: 2023-01-01

## 2023-01-01 RX ORDER — METRONIDAZOLE 500 MG/100ML
500 INJECTION, SOLUTION INTRAVENOUS EVERY 8 HOURS
Status: DISCONTINUED | OUTPATIENT
Start: 2023-01-01 | End: 2023-01-01

## 2023-01-01 RX ORDER — ONDANSETRON 2 MG/ML
4 INJECTION INTRAMUSCULAR; INTRAVENOUS ONCE AS NEEDED
Status: DISCONTINUED | OUTPATIENT
Start: 2023-01-01 | End: 2023-01-01 | Stop reason: HOSPADM

## 2023-01-01 RX ORDER — AMLODIPINE BESYLATE 5 MG/1
5 TABLET ORAL DAILY
Status: DISCONTINUED | OUTPATIENT
Start: 2023-01-01 | End: 2023-01-01

## 2023-01-01 RX ORDER — FENTANYL CITRATE/PF 50 MCG/ML
50 SYRINGE (ML) INJECTION
Status: DISCONTINUED | OUTPATIENT
Start: 2023-01-01 | End: 2023-01-01 | Stop reason: HOSPADM

## 2023-01-01 RX ORDER — SODIUM CHLORIDE 9 MG/ML
20 INJECTION, SOLUTION INTRAVENOUS ONCE
Status: CANCELLED | OUTPATIENT
Start: 2023-01-01

## 2023-01-01 RX ORDER — GLYCOPYRROLATE 0.2 MG/ML
INJECTION INTRAMUSCULAR; INTRAVENOUS AS NEEDED
Status: DISCONTINUED | OUTPATIENT
Start: 2023-01-01 | End: 2023-01-01

## 2023-01-01 RX ORDER — DEXAMETHASONE 4 MG/1
4 TABLET ORAL
Status: DISCONTINUED | OUTPATIENT
Start: 2023-01-01 | End: 2023-01-01 | Stop reason: HOSPADM

## 2023-01-01 RX ORDER — SODIUM CHLORIDE 9 MG/ML
50 INJECTION, SOLUTION INTRAVENOUS CONTINUOUS
Status: DISCONTINUED | OUTPATIENT
Start: 2023-01-01 | End: 2023-01-01

## 2023-01-01 RX ORDER — HYDROMORPHONE HCL/PF 1 MG/ML
0.5 SYRINGE (ML) INJECTION EVERY 2 HOUR PRN
Status: DISCONTINUED | OUTPATIENT
Start: 2023-01-01 | End: 2023-01-01 | Stop reason: HOSPADM

## 2023-01-01 RX ORDER — ALBUTEROL SULFATE 90 UG/1
2 AEROSOL, METERED RESPIRATORY (INHALATION) EVERY 4 HOURS PRN
Status: DISCONTINUED | OUTPATIENT
Start: 2023-01-01 | End: 2023-01-01 | Stop reason: HOSPADM

## 2023-01-01 RX ORDER — ONDANSETRON 2 MG/ML
INJECTION INTRAMUSCULAR; INTRAVENOUS AS NEEDED
Status: DISCONTINUED | OUTPATIENT
Start: 2023-01-01 | End: 2023-01-01

## 2023-01-01 RX ORDER — DEXAMETHASONE SODIUM PHOSPHATE 10 MG/ML
INJECTION, SOLUTION INTRAMUSCULAR; INTRAVENOUS AS NEEDED
Status: DISCONTINUED | OUTPATIENT
Start: 2023-01-01 | End: 2023-01-01

## 2023-01-01 RX ORDER — BISACODYL 10 MG
10 SUPPOSITORY, RECTAL RECTAL DAILY
Status: DISCONTINUED | OUTPATIENT
Start: 2023-01-01 | End: 2023-01-01

## 2023-01-01 RX ORDER — LEVALBUTEROL INHALATION SOLUTION 1.25 MG/3ML
1.25 SOLUTION RESPIRATORY (INHALATION)
Status: DISCONTINUED | OUTPATIENT
Start: 2023-01-01 | End: 2023-01-01 | Stop reason: HOSPADM

## 2023-01-01 RX ORDER — OXYCODONE HYDROCHLORIDE 5 MG/1
5 TABLET ORAL EVERY 4 HOURS PRN
Status: DISCONTINUED | OUTPATIENT
Start: 2023-01-01 | End: 2023-01-01

## 2023-01-01 RX ORDER — PROPOFOL 10 MG/ML
INJECTION, EMULSION INTRAVENOUS CONTINUOUS PRN
Status: DISCONTINUED | OUTPATIENT
Start: 2023-01-01 | End: 2023-01-01

## 2023-01-01 RX ORDER — BUDESONIDE 0.5 MG/2ML
0.5 INHALANT ORAL
Status: DISCONTINUED | OUTPATIENT
Start: 2023-01-01 | End: 2023-01-01

## 2023-01-01 RX ORDER — ONDANSETRON 2 MG/ML
4 INJECTION INTRAMUSCULAR; INTRAVENOUS ONCE
Status: COMPLETED | OUTPATIENT
Start: 2023-01-01 | End: 2023-01-01

## 2023-01-01 RX ORDER — AMOXICILLIN 250 MG
1 CAPSULE ORAL 2 TIMES DAILY
Status: DISCONTINUED | OUTPATIENT
Start: 2023-01-01 | End: 2023-01-01 | Stop reason: HOSPADM

## 2023-01-01 RX ORDER — ATENOLOL 50 MG/1
50 TABLET ORAL DAILY
Status: DISCONTINUED | OUTPATIENT
Start: 2023-01-01 | End: 2023-01-01

## 2023-01-01 RX ORDER — HYDROCODONE BITARTRATE AND HOMATROPINE METHYLBROMIDE ORAL SOLUTION 5; 1.5 MG/5ML; MG/5ML
5 LIQUID ORAL EVERY 4 HOURS PRN
Status: DISCONTINUED | OUTPATIENT
Start: 2023-01-01 | End: 2023-01-01

## 2023-01-01 RX ORDER — PANTOPRAZOLE SODIUM 40 MG/10ML
40 INJECTION, POWDER, LYOPHILIZED, FOR SOLUTION INTRAVENOUS
Status: DISCONTINUED | OUTPATIENT
Start: 2023-01-01 | End: 2023-01-01

## 2023-01-01 RX ORDER — ESCITALOPRAM OXALATE 10 MG/1
10 TABLET ORAL DAILY
Status: DISCONTINUED | OUTPATIENT
Start: 2023-01-01 | End: 2023-01-01 | Stop reason: HOSPADM

## 2023-01-01 RX ORDER — GUAIFENESIN 600 MG/1
600 TABLET, EXTENDED RELEASE ORAL 2 TIMES DAILY
Status: COMPLETED | OUTPATIENT
Start: 2023-01-01 | End: 2023-01-01

## 2023-01-01 RX ORDER — FENTANYL CITRATE 50 UG/ML
INJECTION, SOLUTION INTRAMUSCULAR; INTRAVENOUS AS NEEDED
Status: DISCONTINUED | OUTPATIENT
Start: 2023-01-01 | End: 2023-01-01

## 2023-01-01 RX ORDER — DIPHENHYDRAMINE HYDROCHLORIDE AND LIDOCAINE HYDROCHLORIDE AND ALUMINUM HYDROXIDE AND MAGNESIUM HYDRO
10 KIT EVERY 6 HOURS PRN
Status: DISCONTINUED | OUTPATIENT
Start: 2023-01-01 | End: 2023-01-01 | Stop reason: HOSPADM

## 2023-01-01 RX ORDER — FUROSEMIDE 10 MG/ML
20 INJECTION INTRAMUSCULAR; INTRAVENOUS ONCE
Status: COMPLETED | OUTPATIENT
Start: 2023-01-01 | End: 2023-01-01

## 2023-01-01 RX ORDER — POLYETHYLENE GLYCOL 3350 17 G/17G
17 POWDER, FOR SOLUTION ORAL DAILY
Status: DISCONTINUED | OUTPATIENT
Start: 2023-01-01 | End: 2023-01-01 | Stop reason: HOSPADM

## 2023-01-01 RX ORDER — MIDAZOLAM HYDROCHLORIDE 2 MG/2ML
INJECTION, SOLUTION INTRAMUSCULAR; INTRAVENOUS AS NEEDED
Status: DISCONTINUED | OUTPATIENT
Start: 2023-01-01 | End: 2023-01-01

## 2023-01-01 RX ADMIN — LEVALBUTEROL HYDROCHLORIDE 1.25 MG: 1.25 SOLUTION RESPIRATORY (INHALATION) at 07:14

## 2023-01-01 RX ADMIN — MEMANTINE 5 MG: 5 TABLET ORAL at 17:24

## 2023-01-01 RX ADMIN — BISACODYL 10 MG: 10 SUPPOSITORY RECTAL at 15:37

## 2023-01-01 RX ADMIN — FUROSEMIDE 20 MG: 10 INJECTION, SOLUTION INTRAMUSCULAR; INTRAVENOUS at 18:22

## 2023-01-01 RX ADMIN — SODIUM CHLORIDE 100 ML/HR: 0.9 INJECTION, SOLUTION INTRAVENOUS at 10:07

## 2023-01-01 RX ADMIN — DEXAMETHASONE SODIUM PHOSPHATE 10 MG: 10 INJECTION, SOLUTION INTRAMUSCULAR; INTRAVENOUS at 11:49

## 2023-01-01 RX ADMIN — ESCITALOPRAM OXALATE 10 MG: 10 TABLET ORAL at 08:00

## 2023-01-01 RX ADMIN — PHENOL 1 SPRAY: 1.5 LIQUID ORAL at 22:28

## 2023-01-01 RX ADMIN — DIPHENHYDRAMINE HYDROCHLORIDE AND LIDOCAINE HYDROCHLORIDE AND ALUMINUM HYDROXIDE AND MAGNESIUM HYDRO 10 ML: KIT at 21:42

## 2023-01-01 RX ADMIN — IPRATROPIUM BROMIDE 0.5 MG: 0.5 SOLUTION RESPIRATORY (INHALATION) at 19:43

## 2023-01-01 RX ADMIN — OXYCODONE HYDROCHLORIDE 5 MG: 5 TABLET ORAL at 20:15

## 2023-01-01 RX ADMIN — HYDROMORPHONE HYDROCHLORIDE 0.5 MG: 1 INJECTION, SOLUTION INTRAMUSCULAR; INTRAVENOUS; SUBCUTANEOUS at 17:43

## 2023-01-01 RX ADMIN — HYDROMORPHONE HYDROCHLORIDE 0.5 MG: 1 INJECTION, SOLUTION INTRAMUSCULAR; INTRAVENOUS; SUBCUTANEOUS at 14:45

## 2023-01-01 RX ADMIN — ONDANSETRON 4 MG: 2 INJECTION INTRAMUSCULAR; INTRAVENOUS at 20:15

## 2023-01-01 RX ADMIN — GLYCOPYRROLATE 0.1 MG: 0.2 INJECTION, SOLUTION INTRAMUSCULAR; INTRAVENOUS at 11:35

## 2023-01-01 RX ADMIN — SODIUM BICARBONATE 125 ML/HR: 84 INJECTION, SOLUTION INTRAVENOUS at 09:24

## 2023-01-01 RX ADMIN — POLYETHYLENE GLYCOL 3350 17 G: 17 POWDER, FOR SOLUTION ORAL at 12:34

## 2023-01-01 RX ADMIN — ACETAMINOPHEN 650 MG: 325 TABLET, FILM COATED ORAL at 19:18

## 2023-01-01 RX ADMIN — ESCITALOPRAM OXALATE 10 MG: 10 TABLET ORAL at 11:17

## 2023-01-01 RX ADMIN — GUAIFENESIN 600 MG: 600 TABLET ORAL at 08:46

## 2023-01-01 RX ADMIN — HEPARIN SODIUM 5000 UNITS: 5000 INJECTION INTRAVENOUS; SUBCUTANEOUS at 21:13

## 2023-01-01 RX ADMIN — HEPARIN SODIUM 5000 UNITS: 5000 INJECTION INTRAVENOUS; SUBCUTANEOUS at 21:14

## 2023-01-01 RX ADMIN — GUAIFENESIN 600 MG: 600 TABLET ORAL at 18:22

## 2023-01-01 RX ADMIN — AMLODIPINE BESYLATE 5 MG: 5 TABLET ORAL at 08:46

## 2023-01-01 RX ADMIN — HEPARIN SODIUM 5000 UNITS: 5000 INJECTION INTRAVENOUS; SUBCUTANEOUS at 14:30

## 2023-01-01 RX ADMIN — SODIUM BICARBONATE 125 ML/HR: 84 INJECTION, SOLUTION INTRAVENOUS at 22:27

## 2023-01-01 RX ADMIN — FENTANYL CITRATE 50 MCG: 50 INJECTION, SOLUTION INTRAMUSCULAR; INTRAVENOUS at 11:52

## 2023-01-01 RX ADMIN — BUDESONIDE 0.5 MG: 0.5 INHALANT ORAL at 07:14

## 2023-01-01 RX ADMIN — ALBUMIN (HUMAN): 12.5 INJECTION, SOLUTION INTRAVENOUS at 11:40

## 2023-01-01 RX ADMIN — BUDESONIDE 0.5 MG: 0.5 INHALANT ORAL at 07:19

## 2023-01-01 RX ADMIN — PROPOFOL 100 MG: 10 INJECTION, EMULSION INTRAVENOUS at 11:46

## 2023-01-01 RX ADMIN — MAGNESIUM SULFATE HEPTAHYDRATE 2 G: 40 INJECTION, SOLUTION INTRAVENOUS at 13:27

## 2023-01-01 RX ADMIN — IPRATROPIUM BROMIDE 0.5 MG: 0.5 SOLUTION RESPIRATORY (INHALATION) at 07:19

## 2023-01-01 RX ADMIN — OXYCODONE HYDROCHLORIDE 5 MG: 5 TABLET ORAL at 12:46

## 2023-01-01 RX ADMIN — BENZONATATE 100 MG: 100 CAPSULE ORAL at 19:18

## 2023-01-01 RX ADMIN — PANTOPRAZOLE SODIUM 40 MG: 40 INJECTION, POWDER, FOR SOLUTION INTRAVENOUS at 09:06

## 2023-01-01 RX ADMIN — LEVALBUTEROL HYDROCHLORIDE 1.25 MG: 1.25 SOLUTION RESPIRATORY (INHALATION) at 06:08

## 2023-01-01 RX ADMIN — HEPARIN SODIUM 5000 UNITS: 5000 INJECTION INTRAVENOUS; SUBCUTANEOUS at 15:27

## 2023-01-01 RX ADMIN — HEPARIN SODIUM 5000 UNITS: 5000 INJECTION INTRAVENOUS; SUBCUTANEOUS at 05:51

## 2023-01-01 RX ADMIN — HEPARIN SODIUM 5000 UNITS: 5000 INJECTION INTRAVENOUS; SUBCUTANEOUS at 21:07

## 2023-01-01 RX ADMIN — Medication 2.5 MG: at 19:37

## 2023-01-01 RX ADMIN — ALBUMIN (HUMAN): 12.5 INJECTION, SOLUTION INTRAVENOUS at 11:54

## 2023-01-01 RX ADMIN — HYDROMORPHONE HYDROCHLORIDE 0.5 MG: 1 INJECTION, SOLUTION INTRAMUSCULAR; INTRAVENOUS; SUBCUTANEOUS at 09:50

## 2023-01-01 RX ADMIN — ATENOLOL 50 MG: 50 TABLET ORAL at 08:46

## 2023-01-01 RX ADMIN — MIDAZOLAM HYDROCHLORIDE 2 MG: 1 INJECTION, SOLUTION INTRAMUSCULAR; INTRAVENOUS at 11:35

## 2023-01-01 RX ADMIN — LEVALBUTEROL HYDROCHLORIDE 1.25 MG: 1.25 SOLUTION RESPIRATORY (INHALATION) at 13:00

## 2023-01-01 RX ADMIN — HEPARIN SODIUM 5000 UNITS: 5000 INJECTION INTRAVENOUS; SUBCUTANEOUS at 13:15

## 2023-01-01 RX ADMIN — PHENYLEPHRINE HYDROCHLORIDE 50 MCG/MIN: 10 INJECTION INTRAVENOUS at 11:46

## 2023-01-01 RX ADMIN — PROPOFOL 120 MCG/KG/MIN: 10 INJECTION, EMULSION INTRAVENOUS at 11:46

## 2023-01-01 RX ADMIN — MEMANTINE 5 MG: 5 TABLET ORAL at 18:22

## 2023-01-01 RX ADMIN — Medication 2.5 MG: at 15:27

## 2023-01-01 RX ADMIN — HEPARIN SODIUM 5000 UNITS: 5000 INJECTION INTRAVENOUS; SUBCUTANEOUS at 05:17

## 2023-01-01 RX ADMIN — IPRATROPIUM BROMIDE 0.5 MG: 0.5 SOLUTION RESPIRATORY (INHALATION) at 21:13

## 2023-01-01 RX ADMIN — PANTOPRAZOLE SODIUM 40 MG: 40 INJECTION, POWDER, FOR SOLUTION INTRAVENOUS at 14:30

## 2023-01-01 RX ADMIN — DEXAMETHASONE 4 MG: 4 TABLET ORAL at 18:22

## 2023-01-01 RX ADMIN — MEMANTINE 5 MG: 5 TABLET ORAL at 16:56

## 2023-01-01 RX ADMIN — HEPARIN SODIUM 5000 UNITS: 5000 INJECTION INTRAVENOUS; SUBCUTANEOUS at 05:57

## 2023-01-01 RX ADMIN — LIDOCAINE HYDROCHLORIDE 50 MG: 10 INJECTION, SOLUTION EPIDURAL; INFILTRATION; INTRACAUDAL; PERINEURAL at 11:45

## 2023-01-01 RX ADMIN — REMIFENTANIL HYDROCHLORIDE 0.13 MCG/KG/MIN: 1 INJECTION, POWDER, LYOPHILIZED, FOR SOLUTION INTRAVENOUS at 11:46

## 2023-01-01 RX ADMIN — HYDROMORPHONE HYDROCHLORIDE 0.5 MG: 1 INJECTION, SOLUTION INTRAMUSCULAR; INTRAVENOUS; SUBCUTANEOUS at 15:45

## 2023-01-01 RX ADMIN — BUDESONIDE 0.5 MG: 0.5 INHALANT ORAL at 06:08

## 2023-01-01 RX ADMIN — ONDANSETRON 4 MG: 2 INJECTION INTRAMUSCULAR; INTRAVENOUS at 10:17

## 2023-01-01 RX ADMIN — METRONIDAZOLE 500 MG: 500 INJECTION, SOLUTION INTRAVENOUS at 02:35

## 2023-01-01 RX ADMIN — DEXAMETHASONE 4 MG: 4 TABLET ORAL at 12:47

## 2023-01-01 RX ADMIN — DEXAMETHASONE 4 MG: 4 TABLET ORAL at 17:24

## 2023-01-01 RX ADMIN — OXYCODONE HYDROCHLORIDE 5 MG: 5 TABLET ORAL at 22:27

## 2023-01-01 RX ADMIN — SENNOSIDES AND DOCUSATE SODIUM 1 TABLET: 50; 8.6 TABLET ORAL at 12:35

## 2023-01-01 RX ADMIN — ONDANSETRON 4 MG: 2 INJECTION INTRAMUSCULAR; INTRAVENOUS at 11:49

## 2023-01-01 RX ADMIN — IPRATROPIUM BROMIDE 0.5 MG: 0.5 SOLUTION RESPIRATORY (INHALATION) at 07:14

## 2023-01-01 RX ADMIN — BENZONATATE 100 MG: 100 CAPSULE ORAL at 07:02

## 2023-01-01 RX ADMIN — ONDANSETRON 4 MG: 2 INJECTION INTRAMUSCULAR; INTRAVENOUS at 11:09

## 2023-01-01 RX ADMIN — GUAIFENESIN 600 MG: 600 TABLET ORAL at 08:00

## 2023-01-01 RX ADMIN — PHENOL 1 SPRAY: 1.5 LIQUID ORAL at 08:55

## 2023-01-01 RX ADMIN — IPRATROPIUM BROMIDE 0.5 MG: 0.5 SOLUTION RESPIRATORY (INHALATION) at 07:11

## 2023-01-01 RX ADMIN — ACETAMINOPHEN 650 MG: 325 TABLET, FILM COATED ORAL at 10:14

## 2023-01-01 RX ADMIN — PHENOL 1 SPRAY: 1.5 LIQUID ORAL at 07:40

## 2023-01-01 RX ADMIN — MEMANTINE 5 MG: 5 TABLET ORAL at 08:46

## 2023-01-01 RX ADMIN — IPRATROPIUM BROMIDE 0.5 MG: 0.5 SOLUTION RESPIRATORY (INHALATION) at 14:29

## 2023-01-01 RX ADMIN — BUDESONIDE 0.5 MG: 0.5 INHALANT ORAL at 19:27

## 2023-01-01 RX ADMIN — PHENOL 1 SPRAY: 1.5 LIQUID ORAL at 15:41

## 2023-01-01 RX ADMIN — LEVALBUTEROL HYDROCHLORIDE 1.25 MG: 1.25 SOLUTION RESPIRATORY (INHALATION) at 13:31

## 2023-01-01 RX ADMIN — SODIUM CHLORIDE 1000 ML: 0.9 INJECTION, SOLUTION INTRAVENOUS at 08:29

## 2023-01-01 RX ADMIN — MEMANTINE 5 MG: 5 TABLET ORAL at 11:17

## 2023-01-01 RX ADMIN — SODIUM CHLORIDE 100 ML/HR: 0.9 INJECTION, SOLUTION INTRAVENOUS at 18:06

## 2023-01-01 RX ADMIN — LEVALBUTEROL HYDROCHLORIDE 1.25 MG: 1.25 SOLUTION RESPIRATORY (INHALATION) at 19:36

## 2023-01-01 RX ADMIN — IPRATROPIUM BROMIDE 0.5 MG: 0.5 SOLUTION RESPIRATORY (INHALATION) at 13:55

## 2023-01-01 RX ADMIN — BUDESONIDE 0.5 MG: 0.5 INHALANT ORAL at 19:36

## 2023-01-01 RX ADMIN — IPRATROPIUM BROMIDE 0.5 MG: 0.5 SOLUTION RESPIRATORY (INHALATION) at 06:08

## 2023-01-01 RX ADMIN — SODIUM CHLORIDE, SODIUM GLUCONATE, SODIUM ACETATE, POTASSIUM CHLORIDE, MAGNESIUM CHLORIDE, SODIUM PHOSPHATE, DIBASIC, AND POTASSIUM PHOSPHATE 75 ML/HR: .53; .5; .37; .037; .03; .012; .00082 INJECTION, SOLUTION INTRAVENOUS at 12:35

## 2023-01-01 RX ADMIN — ONDANSETRON 4 MG: 2 INJECTION INTRAMUSCULAR; INTRAVENOUS at 21:42

## 2023-01-01 RX ADMIN — LEVALBUTEROL HYDROCHLORIDE 1.25 MG: 1.25 SOLUTION RESPIRATORY (INHALATION) at 07:19

## 2023-01-01 RX ADMIN — FENTANYL CITRATE 50 MCG: 50 INJECTION, SOLUTION INTRAMUSCULAR; INTRAVENOUS at 12:12

## 2023-01-01 RX ADMIN — DEXAMETHASONE 4 MG: 4 TABLET ORAL at 07:58

## 2023-01-01 RX ADMIN — LEVALBUTEROL HYDROCHLORIDE 1.25 MG: 1.25 SOLUTION RESPIRATORY (INHALATION) at 14:29

## 2023-01-01 RX ADMIN — IPRATROPIUM BROMIDE 0.5 MG: 0.5 SOLUTION RESPIRATORY (INHALATION) at 13:31

## 2023-01-01 RX ADMIN — METRONIDAZOLE 500 MG: 500 INJECTION, SOLUTION INTRAVENOUS at 09:36

## 2023-01-01 RX ADMIN — ONDANSETRON 4 MG: 2 INJECTION INTRAMUSCULAR; INTRAVENOUS at 12:30

## 2023-01-01 RX ADMIN — LEVALBUTEROL HYDROCHLORIDE 1.25 MG: 1.25 SOLUTION RESPIRATORY (INHALATION) at 07:11

## 2023-01-01 RX ADMIN — PHENOL 1 SPRAY: 1.5 LIQUID ORAL at 12:34

## 2023-01-01 RX ADMIN — BUDESONIDE 0.5 MG: 0.5 INHALANT ORAL at 07:11

## 2023-01-01 RX ADMIN — MORPHINE SULFATE 4 MG: 4 INJECTION INTRAVENOUS at 12:30

## 2023-01-01 RX ADMIN — SODIUM CHLORIDE 50 ML/HR: 0.9 INJECTION, SOLUTION INTRAVENOUS at 13:12

## 2023-01-01 RX ADMIN — DEXAMETHASONE 4 MG: 4 TABLET ORAL at 16:56

## 2023-01-01 RX ADMIN — SODIUM CHLORIDE: 0.9 INJECTION, SOLUTION INTRAVENOUS at 11:23

## 2023-01-01 RX ADMIN — PANTOPRAZOLE SODIUM 40 MG: 40 INJECTION, POWDER, FOR SOLUTION INTRAVENOUS at 09:16

## 2023-01-01 RX ADMIN — GUAIFENESIN 600 MG: 600 TABLET ORAL at 17:24

## 2023-01-01 RX ADMIN — LEVALBUTEROL HYDROCHLORIDE 1.25 MG: 1.25 SOLUTION RESPIRATORY (INHALATION) at 19:43

## 2023-01-01 RX ADMIN — IPRATROPIUM BROMIDE 0.5 MG: 0.5 SOLUTION RESPIRATORY (INHALATION) at 19:27

## 2023-01-01 RX ADMIN — LEVALBUTEROL HYDROCHLORIDE 1.25 MG: 1.25 SOLUTION RESPIRATORY (INHALATION) at 21:13

## 2023-01-01 RX ADMIN — LEVALBUTEROL HYDROCHLORIDE 1.25 MG: 1.25 SOLUTION RESPIRATORY (INHALATION) at 19:27

## 2023-01-01 RX ADMIN — BUDESONIDE 0.5 MG: 0.5 INHALANT ORAL at 21:13

## 2023-01-01 RX ADMIN — IPRATROPIUM BROMIDE 0.5 MG: 0.5 SOLUTION RESPIRATORY (INHALATION) at 19:36

## 2023-01-01 RX ADMIN — MEMANTINE 5 MG: 5 TABLET ORAL at 08:00

## 2023-01-01 RX ADMIN — ESCITALOPRAM OXALATE 10 MG: 10 TABLET ORAL at 08:46

## 2023-01-01 RX ADMIN — CEFEPIME 2000 MG: 2 INJECTION, POWDER, FOR SOLUTION INTRAVENOUS at 03:10

## 2023-01-01 RX ADMIN — IPRATROPIUM BROMIDE 0.5 MG: 0.5 SOLUTION RESPIRATORY (INHALATION) at 13:00

## 2023-01-01 RX ADMIN — BUDESONIDE 0.5 MG: 0.5 INHALANT ORAL at 19:43

## 2023-01-01 RX ADMIN — DEXAMETHASONE 4 MG: 4 TABLET ORAL at 08:50

## 2023-01-01 RX ADMIN — ONDANSETRON 4 MG: 2 INJECTION INTRAMUSCULAR; INTRAVENOUS at 09:50

## 2023-01-01 RX ADMIN — LEVALBUTEROL HYDROCHLORIDE 1.25 MG: 1.25 SOLUTION RESPIRATORY (INHALATION) at 13:55

## 2023-01-01 RX ADMIN — DEXAMETHASONE 4 MG: 4 TABLET ORAL at 11:17

## 2023-01-01 RX ADMIN — SODIUM CHLORIDE 1000 ML: 0.9 INJECTION, SOLUTION INTRAVENOUS at 12:30

## 2023-01-01 RX ADMIN — BENZONATATE 100 MG: 100 CAPSULE ORAL at 11:19

## 2023-02-10 ENCOUNTER — VBI (OUTPATIENT)
Dept: ADMINISTRATIVE | Facility: OTHER | Age: 63
End: 2023-02-10

## 2023-02-16 ENCOUNTER — OFFICE VISIT (OUTPATIENT)
Dept: INTERNAL MEDICINE CLINIC | Facility: CLINIC | Age: 63
End: 2023-02-16

## 2023-02-16 VITALS
BODY MASS INDEX: 24.62 KG/M2 | HEART RATE: 66 BPM | DIASTOLIC BLOOD PRESSURE: 86 MMHG | SYSTOLIC BLOOD PRESSURE: 146 MMHG | WEIGHT: 157.2 LBS | OXYGEN SATURATION: 99 %

## 2023-02-16 DIAGNOSIS — Z23 ENCOUNTER FOR IMMUNIZATION: ICD-10-CM

## 2023-02-16 DIAGNOSIS — Z12.31 ENCOUNTER FOR SCREENING MAMMOGRAM FOR BREAST CANCER: ICD-10-CM

## 2023-02-16 DIAGNOSIS — B07.0 PLANTAR WART OF RIGHT FOOT: Primary | ICD-10-CM

## 2023-02-16 DIAGNOSIS — Z12.4 SCREENING FOR CERVICAL CANCER: ICD-10-CM

## 2023-02-16 DIAGNOSIS — L03.119 CELLULITIS OF PLANTAR ASPECT OF FOOT: ICD-10-CM

## 2023-02-16 RX ORDER — CEPHALEXIN 500 MG/1
500 CAPSULE ORAL EVERY 6 HOURS SCHEDULED
Qty: 28 CAPSULE | Refills: 0 | Status: SHIPPED | OUTPATIENT
Start: 2023-02-16 | End: 2023-02-23

## 2023-02-16 NOTE — PROGRESS NOTES
Assessment/Plan:    Plantar wart of right foot  · Plantar wart- onset 2 weeks ago, tried OTC medication, tried to debride it herself with a file, now foot is swollen, tender, warm, lesion has necrotic edges and draining  · Poor wound healing in a current smoker  Wart with likely superimposed bacterial cellulitis  · Wound care performed in office with saline wash and dressing pad  · Will treat with Keflex x 7 days  · Instructed on wound care with Bactroban  · Rt foot xray to r/o osteomyelitis  · Podiatry referral  · Follow up in 2 weeks         Problem List Items Addressed This Visit        Musculoskeletal and Integument    Plantar wart of right foot - Primary     · Plantar wart- onset 2 weeks ago, tried OTC medication, tried to debride it herself with a file, now foot is swollen, tender, warm, lesion has necrotic edges and draining  · Poor wound healing in a current smoker  Wart with likely superimposed bacterial cellulitis  · Wound care performed in office with saline wash and dressing pad  · Will treat with Keflex x 7 days  · Instructed on wound care with Bactroban  · Rt foot xray to r/o osteomyelitis  · Podiatry referral  · Follow up in 2 weeks         Relevant Medications    mupirocin (BACTROBAN) 2 % ointment    Other Relevant Orders    Ambulatory Referral to Podiatry   Other Visit Diagnoses     Screening for cervical cancer        Encounter for immunization        Encounter for screening mammogram for breast cancer        Cellulitis of plantar aspect of foot        Relevant Medications    cephalexin (KEFLEX) 500 mg capsule    mupirocin (BACTROBAN) 2 % ointment    Other Relevant Orders    XR foot 3+ vw right    Ambulatory Referral to Podiatry            Subjective:      Patient ID: Chandrika Tobin is a 59 y o  female  Patient is a 59year old female who presents today to discuss social security disability  She was informed that this needs to be discussed with the social security office directly   While in the office, patient also reported a wart that had developed on her right plantar foot  She noticed it 2 weeks ago and tried OTC medication without effect  She even tried to debride it herself with a nail file; it bled after that  Over the last few days, she states that her right foot is now swollen, tender, warm and the lesion has black edges and draining pus  Denies fever or chills  She has no history of diabetes but is a current smoker  The following portions of the patient's history were reviewed and updated as appropriate: allergies, current medications, past family history, past medical history, past social history, past surgical history and problem list     Review of Systems   Constitutional: Negative for appetite change, chills and fever  Respiratory: Negative for shortness of breath  Cardiovascular: Negative for chest pain  Skin: Positive for wound  Negative for rash  Objective:    Return in about 2 weeks (around 3/2/2023) for Recheck  No results found        Allergies   Allergen Reactions   • Methocarbamol Shortness Of Breath and Swelling   • Tramadol Shortness Of Breath and Swelling       Past Medical History:   Diagnosis Date   • Cervical disc disorder    • Stenosis of cervical spine region      Past Surgical History:   Procedure Laterality Date   • BREAST BIOPSY Left 2009    core   • BREAST BIOPSY Right 2009    core   • CERVICAL FUSION  2013    cC3-C4, C4-C5, C5 C6, C6-C7, C7-T1   •  SECTION     • ELBOW SURGERY     • KNEE SURGERY Bilateral 20YRS AGO     Current Outpatient Medications on File Prior to Visit   Medication Sig Dispense Refill   • albuterol (PROVENTIL HFA,VENTOLIN HFA) 90 mcg/act inhaler INHALE 1 PUFF EVERY 4 HOURS AS NEEDED FOR WHEEZING 6 7 g 3   • amLODIPine (NORVASC) 5 mg tablet Take 1 tablet (5 mg total) by mouth daily 90 tablet 1   • atenolol (TENORMIN) 50 mg tablet TAKE 1 TABLET BY MOUTH EVERY DAY 90 tablet 0   • famotidine (PEPCID) 20 mg tablet TAKE 1 TABLET BY MOUTH TWICE A  tablet 1   • loperamide (IMODIUM A-D) 2 MG tablet Take 1 tablet (2 mg total) by mouth 4 (four) times a day as needed for diarrhea 30 tablet 0   • Besivance 0 6 % SUSP  (Patient not taking: Reported on 2/16/2023)     • buPROPion (WELLBUTRIN SR) 150 mg 12 hr tablet TAKE 1 TABLET A DAY FOR 3 DAYS, AFTER THAT TAKE ONE TABLET TWICE A DAY  QUIT DAY NUMBER 8 (Patient not taking: Reported on 2/16/2023) 189 tablet 1   • doxycycline hyclate (VIBRAMYCIN) 100 mg capsule  (Patient not taking: Reported on 11/1/2022)     • ergocalciferol (VITAMIN D2) 50,000 units TAKE 1 CAPSULE BY MOUTH ONE TIME PER WEEK (Patient not taking: Reported on 2/16/2023) 4 capsule 0   • escitalopram (LEXAPRO) 10 mg tablet TAKE 1 TABLET BY MOUTH EVERY DAY (Patient not taking: Reported on 2/16/2023) 90 tablet 1   • fluticasone (FLONASE) 50 mcg/act nasal spray SPRAY 1 SPRAY INTO EACH NOSTRIL EVERY DAY (Patient not taking: Reported on 76/6/9621) 48 mL 2   • folic acid (FOLVITE) 1 mg tablet  (Patient not taking: Reported on 11/1/2022)     • magnesium oxide (MAG-OX) 400 mg tablet TAKE 1 TABLET BY MOUTH TWICE A DAY (Patient not taking: Reported on 11/1/2022) 60 tablet 0   • nicotine polacrilex (NICORETTE) 2 mg gum CHEW 1 EACH (2 MG TOTAL) AS NEEDED FOR SMOKING CESSATION (Patient not taking: Reported on 11/1/2022) 100 each 2   • oxyCODONE-acetaminophen (PERCOCET) 5-325 mg per tablet TAKE 1 TABLET BY MOUTH EVERY 6 HOURS AS NEEDED FOR MODERATE PAIN (PAIN SCORE 4-6)   MAX 4 TABS/DAY (Patient not taking: Reported on 11/1/2022)     • Pediatric Multivitamins-Fl (MULTIVITAMINS/FL PO) Take 1 capsule by mouth daily (Patient not taking: Reported on 8/23/2021)     • Potassium Chloride ER 20 MEQ TBCR TAKE 2 TABLETS BY MOUTH DAILY (Patient not taking: Reported on 11/1/2022) 180 tablet 0   • Prolensa 0 07 % SOLN  (Patient not taking: Reported on 2/16/2023)     • varenicline (CHANTIX MOODY) 0 5 MG X 11 & 1 MG X 42 tablet Take one 0 5 mg tablet by mouth once daily for 3 days, then one 0 5 mg tablet by mouth twice daily for 4 days, then one 1 mg tablet by mouth twice daily  (Patient not taking: Reported on 1/29/2021) 53 tablet 0     No current facility-administered medications on file prior to visit       Family History   Problem Relation Age of Onset   • Cancer Mother    • Brain cancer Mother [de-identified]   • Heart disease Father    • Breast cancer Sister 48   • Breast cancer Sister 45   • Breast cancer Maternal Aunt 61   • Breast cancer Maternal Aunt 60   • Breast cancer Maternal Aunt 60   • Breast cancer Cousin 50   • Breast cancer Cousin 52   • Breast cancer Cousin 52   • Prostate cancer Maternal Uncle 55   • No Known Problems Son    • No Known Problems Maternal Grandmother    • No Known Problems Maternal Grandfather    • No Known Problems Paternal Grandmother    • No Known Problems Paternal Grandfather    • BRCA1 Negative Sister    • BRCA2 Negative Sister    • No Known Problems Sister    • No Known Problems Maternal Aunt    • No Known Problems Maternal Aunt    • No Known Problems Maternal Aunt    • No Known Problems Maternal Aunt      Social History     Socioeconomic History   • Marital status: /Civil Union     Spouse name: Not on file   • Number of children: Not on file   • Years of education: Not on file   • Highest education level: Not on file   Occupational History   • Not on file   Tobacco Use   • Smoking status: Every Day     Packs/day: 0 25     Types: Cigarettes     Start date: 0   • Smokeless tobacco: Never   Vaping Use   • Vaping Use: Never used   Substance and Sexual Activity   • Alcohol use: Yes     Comment: SOCIAL   • Drug use: Not Currently   • Sexual activity: Yes     Partners: Male   Other Topics Concern   • Not on file   Social History Narrative   • Not on file     Social Determinants of Health     Financial Resource Strain: Not on file   Food Insecurity: Not on file   Transportation Needs: Not on file   Physical Activity: Not on file   Stress: Not on file   Social Connections: Not on file   Intimate Partner Violence: Not on file   Housing Stability: Not on file     Vitals:    02/16/23 1248   BP: 146/86   Pulse: 66   SpO2: 99%   Weight: 71 3 kg (157 lb 3 2 oz)     Results for orders placed or performed in visit on 06/15/21   Tissue Exam   Result Value Ref Range    Case Report       Surgical Pathology Report                         Case: [id number]                                   Authorizing Provider:  Ruddy Nur PA-C       Collected:           06/15/2021 1156              Ordering Location:     [facility]  Received:            06/15/2021 1 [facility]                                     OBGY                                                                        Pathologist:           Edy Parnell DO                                                     Specimen:    Endometrium                                                                                Final Diagnosis       A  Uterus, Endometrium, Biopsy:  - Inactive endometrium with features of breakdown  Additional Information       All reported additional testing was performed with appropriately reactive controls  These tests were developed and their performance characteristics determined by [facility] or appropriate performing facility, though some tests may be performed on tissues which have not been validated for performance characteristics (such as staining performed on alcohol exposed cell blocks and decalcified tissues)  Results should be interpreted with caution and in the context of the patients’ clinical condition  These tests may not be cleared or approved by the [place]  Food and Drug Administration, though the FDA has determined that such clearance or approval is not necessary  These tests are used for clinical purposes and they should not be regarded as investigational or for research   This laboratory has been approved by CLIA 88, designated as a high-complexity laboratory and is qualified to perform these tests  Interpretation performed at [facility], [address] [place] [address]      Gross Description          A  The specimen is received in formalin, labeled with the patient's name and hospital number, and is designated "endometrium”  The specimen consists of an aggregate of cloudy, hemorrhagic mucinous material with minute tissue fragments measuring 2 5 x 1 5 x 0 2 cm  The specimen is entirely submitted in an embedding bag in 1 cassette and may not entirely survive processing due to the size and consistency  Note: The estimated total formalin fixation time based upon information provided by the submitting clinician and the standard processing schedule is under 72 hours  Mannie           Weight (last 2 days)     Date/Time Weight    02/16/23 1248 71 3 (157 2)        Body mass index is 24 62 kg/m²  BP      Temp      Pulse     Resp      SpO2        Vitals:    02/16/23 1248   Weight: 71 3 kg (157 lb 3 2 oz)     Vitals:    02/16/23 1248   Weight: 71 3 kg (157 lb 3 2 oz)       /86   Pulse 66   Wt 71 3 kg (157 lb 3 2 oz)   SpO2 99%   BMI 24 62 kg/m²          Physical Exam  Vitals reviewed  Constitutional:       General: She is not in acute distress  Appearance: She is not toxic-appearing  HENT:      Head: Normocephalic and atraumatic  Mouth/Throat:      Mouth: Mucous membranes are moist       Pharynx: Oropharynx is clear  Eyes:      Extraocular Movements: Extraocular movements intact  Conjunctiva/sclera: Conjunctivae normal    Cardiovascular:      Rate and Rhythm: Normal rate and regular rhythm  Pulses: Normal pulses  Heart sounds: Normal heart sounds  Pulmonary:      Effort: Pulmonary effort is normal  No respiratory distress  Musculoskeletal:         General: Swelling and tenderness present  Cervical back: Normal range of motion and neck supple     Skin:     Findings: Lesion present  Comments: 1x2 cm lesion on plantar forefoot, between 3rd and 4th metatarsals, necrotic edges, yellow pus, tender to palpation, increased warmth, entire foot and ankle swollen    Neurological:      General: No focal deficit present  Mental Status: She is alert and oriented to person, place, and time  Psychiatric:         Mood and Affect: Mood normal          Behavior: Behavior normal          Thought Content:  Thought content normal          Judgment: Judgment normal

## 2023-02-17 NOTE — ASSESSMENT & PLAN NOTE
· Plantar wart- onset 2 weeks ago, tried OTC medication, tried to debride it herself with a file, now foot is swollen, tender, warm, lesion has necrotic edges and draining  · Poor wound healing in a current smoker  Wart with likely superimposed bacterial cellulitis    · Wound care performed in office with saline wash and dressing pad  · Will treat with Keflex x 7 days  · Instructed on wound care with Bactroban  · Rt foot xray to r/o osteomyelitis  · Podiatry referral  · Follow up in 2 weeks

## 2023-02-18 ENCOUNTER — APPOINTMENT (OUTPATIENT)
Dept: RADIOLOGY | Age: 63
End: 2023-02-18

## 2023-02-18 DIAGNOSIS — L03.119 CELLULITIS OF PLANTAR ASPECT OF FOOT: ICD-10-CM

## 2023-02-22 ENCOUNTER — TELEPHONE (OUTPATIENT)
Dept: INTERNAL MEDICINE CLINIC | Facility: CLINIC | Age: 63
End: 2023-02-22

## 2023-02-22 NOTE — TELEPHONE ENCOUNTER
----- Message from Julius Singh MD sent at 2/22/2023 11:15 AM EST -----  Can you please call patient and let her know that the foot xray does not show that infection has spread into the bone?  She should continue to take the antibiotics, do wound care and see the podiatrist   Thank you

## 2023-02-26 DIAGNOSIS — I10 ESSENTIAL HYPERTENSION: ICD-10-CM

## 2023-02-26 RX ORDER — ATENOLOL 50 MG/1
TABLET ORAL
Qty: 90 TABLET | Refills: 0 | Status: SHIPPED | OUTPATIENT
Start: 2023-02-26

## 2023-03-01 DIAGNOSIS — E87.6 HYPOKALEMIA: ICD-10-CM

## 2023-03-02 ENCOUNTER — OFFICE VISIT (OUTPATIENT)
Dept: INTERNAL MEDICINE CLINIC | Facility: CLINIC | Age: 63
End: 2023-03-02

## 2023-03-02 VITALS
HEART RATE: 76 BPM | SYSTOLIC BLOOD PRESSURE: 158 MMHG | OXYGEN SATURATION: 97 % | DIASTOLIC BLOOD PRESSURE: 92 MMHG | BODY MASS INDEX: 24.8 KG/M2 | HEIGHT: 67 IN | WEIGHT: 158 LBS

## 2023-03-02 DIAGNOSIS — R19.8 ABNORMAL BOWEL MOVEMENT: ICD-10-CM

## 2023-03-02 DIAGNOSIS — Z72.0 TOBACCO ABUSE: ICD-10-CM

## 2023-03-02 DIAGNOSIS — E87.8 ELECTROLYTE IMBALANCE: ICD-10-CM

## 2023-03-02 DIAGNOSIS — E78.1 HIGH BLOOD TRIGLYCERIDES: ICD-10-CM

## 2023-03-02 DIAGNOSIS — I16.0 HYPERTENSIVE URGENCY: ICD-10-CM

## 2023-03-02 DIAGNOSIS — I10 HYPERTENSION, UNSPECIFIED TYPE: Primary | ICD-10-CM

## 2023-03-02 DIAGNOSIS — Z98.1 S/P CERVICAL SPINAL FUSION: ICD-10-CM

## 2023-03-02 RX ORDER — HYDROCHLOROTHIAZIDE 12.5 MG/1
12.5 CAPSULE, GELATIN COATED ORAL DAILY
COMMUNITY

## 2023-03-02 RX ORDER — POTASSIUM CHLORIDE 1500 MG/1
2 TABLET, FILM COATED, EXTENDED RELEASE ORAL DAILY
Qty: 180 TABLET | Refills: 1 | Status: SHIPPED | OUTPATIENT
Start: 2023-03-02

## 2023-03-02 NOTE — LETTER
March 2, 2023     Patient: Barbara Buchanan  YOB: 1960  Date of Visit: 3/2/2023      To Whom it May Concern:    Olayinka Couch is under my professional care  Adam Kowalski was seen in my office on 3/2/2023  Adam Kowalski may return to work on 03/10/2023  If you have any questions or concerns, please don't hesitate to call  Sincerely,          Laurita Carrasco MD        CC[facility]

## 2023-03-02 NOTE — LETTER
March 2, 2023     Patient: Lila Franco  YOB: 1960  Date of Visit: 3/2/2023      To Whom it May Concern:    Ja Jay is under my professional care  Gabriel Jorgensen was seen in my office on 3/2/2023  Gabriel Jorgensen may return to work on 03/09/2023  If you have any questions or concerns, please don't hesitate to call           Sincerely,          Lori Rogers MD        CC: No Recipients

## 2023-03-02 NOTE — PROGRESS NOTES
Assessment/Plan:    Electrolyte imbalance  Hypokalemia noted in BMP at [facility] ER  Will repeat BMP  Likely secondary to GI losses- has chronic diarrhea  Will also test for Hyperaldosteronism in the setting of elevated BP  Patient is currently on Potassium chloride started by [facility] cardiology    Abnormal bowel movement  H/o chronic diarrhea  Had colonoscopy in the past, unable to give me date  Last one on chart from 2010- normal colon, diverticulosis, Extnl hemorrhoids  Never had further work up  Suspect she likely has an element of chronic pancreatitis in the setting of hypertriglyceridemia  Patient is amicable to see Gastroenterology for further work up  Hypertension  Was on Amlodipine 5 mg and Atenolol  Admitted for chest pain likely secondary to hypertension  Increased amlodipine to 10 mg, started on HCTZ 12 5 mg  Asked to monitor BP    S/P cervical spinal fusion  Chest X ray in ED showed - Interval cervical decompression with posterior hardware partially in field-of-view with fractures of the most inferior screws that are probably at T1  Has appt with NS next Wednesday  C/o dizziness with right arm pain  High blood triglycerides  Patient had continued hightriglycerides  Started on Statin- Lipitor 20 mg  ASCVD risk score- 16 8%  Stopped alcohol  Monitor lipid panel in 3 months  Tobacco abuse  Smoking now 3/day  Encouraged cessation    Hypertensive urgency  Continue to monitor with increased dose of amlodipine and starting HCTZ  Check for hyperaldosteronism  Likely pain contributing factor for hypertension       Diagnoses and all orders for this visit:    Hypertension, unspecified type  -     Aldosterone/Renin Ratio; Future    Abnormal bowel movement  -     Ambulatory Referral to Gastroenterology;  Future    Tobacco abuse    High blood triglycerides    S/P cervical spinal fusion    Hypertensive urgency    Electrolyte imbalance    Other orders  -     hydrochlorothiazide (MICROZIDE) 12 5 mg capsule; Take 12 5 mg by mouth daily  -     atorvastatin (LIPITOR) 20 mg tablet; Take 20 mg by mouth daily              Emotional and Mental Well-being, Sleep, Connectedness Assessment and Intervention:    Sleep/stress assessment performed      Tobacco and Toxic Substance Assessment and Intervention:     Tobacco use screening performed    Alcohol and drug use screening performed    Brief intervention performed for tobacco, alcohol, or drug use      Therapeutic Lifestyle Change Visit:     One-on-one comprehensive counseling, coaching, and health behavior change visit completed      Subjective:      Patient ID: Shannan Gomes is a 59 y o  female  60 y/o female seen in the office post ER visit at [facility]  Patient was seen for intermittent chest pain on and off for a week along with dizziness that started on 2/27  Patient had high BP with 179 SBP and DBP in 100's, negative troponin and D dimer  Patient was likely diagnosed with hypertensive urgency, and asked to see PCP  Patient has seen Cardiology at [facility] since dc  No c/o chest pain since DC  Still has dizziness and pain in the right upper extremity with radiation  Was emotional that the pain, dizziness is interfering her functional qualities especially at the employment, which sometimes require driving large vehicles  Has chronic diarrhea  Stopped drinking alcohol  Still smokes 3 cigarettes/day  The following portions of the patient's history were reviewed and updated as appropriate: allergies, current medications, past family history, past medical history, past social history, past surgical history and problem list     Review of Systems   Constitutional: Positive for activity change  Negative for appetite change, chills, diaphoresis, fatigue, fever and unexpected weight change  Respiratory: Negative  Gastrointestinal: Positive for diarrhea  Musculoskeletal: Positive for arthralgias and back pain     Psychiatric/Behavioral: Positive for sleep disturbance  The patient is nervous/anxious  Objective:      /92   Pulse 76   Ht 5' 7" (1 702 m)   Wt 71 7 kg (158 lb)   SpO2 97%   BMI 24 75 kg/m²            Physical Exam   Physical Exam  Constitutional:       Comments: tearful   HENT:      Mouth/Throat:      Mouth: Mucous membranes are moist       Pharynx: Oropharynx is clear  Eyes:      Conjunctiva/sclera: Conjunctivae normal    Cardiovascular:      Rate and Rhythm: Normal rate  Heart sounds: Murmur heard  Pulmonary:      Effort: Pulmonary effort is normal       Breath sounds: Normal breath sounds  Abdominal:      General: Abdomen is flat  Skin:     General: Skin is warm  Capillary Refill: Capillary refill takes less than 2 seconds  Neurological:      Mental Status: She is alert and oriented to person, place, and time  Psychiatric:         Attention and Perception: Attention and perception normal          Mood and Affect: Mood is anxious  Affect is tearful  Speech: Speech normal          Behavior: Behavior normal          Thought Content:  Thought content normal          Cognition and Memory: Cognition and memory normal

## 2023-03-02 NOTE — PATIENT INSTRUCTIONS
Please go for Renin/Aldosterone ratio blood work  Referral provided for gastroenterology  Check BP two times day

## 2023-03-03 ENCOUNTER — TELEPHONE (OUTPATIENT)
Dept: INTERNAL MEDICINE CLINIC | Facility: CLINIC | Age: 63
End: 2023-03-03

## 2023-03-03 NOTE — TELEPHONE ENCOUNTER
Pt's employer calling in regarding the work note provided to pt, states pt was given a copy of her full duties so they just want clarification whether or not she may return to her FULL duties as of 3/10 - he does not need to know her medical conditions just whether or not she is safe to return to her full duties  Her employer has forwarded a copy of job description that was provided to pt - scanned under Media tab and email copied below:    Per our phone discussion earlier this morning, we at [employer] need a written Doctor’s excuse stating that Romelia Dhaliwal is cleared to return to work and perform 100% of her required safety sensitive job tasks  Vincent’s daily job task’s that her position is required to perform as follows:    • Work at [employer], etc /lift and or carry up to 50 lbs  • Works in adverse weather conditions/around live traffic  • Work in/around toxins/chemical/exhaust fumes/steep grades & slopes  • Wear personnel protective gear/react to hazard warnings and sounds  • Follow all safety policies & procedures  Use personal protective gear  • Travel to/from field sites, training, meetings, etc   • Interpret/apply regulations/laws/guidelines  Assign/review/evaluate work & performance of subordinates and contractors  • Work assigned hours / additional or varied hours at times  • Document/maintain accurate business records  Establish/maintain effective working relationships  • Communicate effectively oral & writing  Use standard office equipment/computer & software programs  The note that Janay Alejandre had provided to us from [employer], states that Janay Alejandre was cleared to work, however, before Janay Alejandre is allowed to return to work, we need a note clearing her to perform 100% of her daily work tasks  A PDF of Vincent’s job description and the note from Dr Dewitt Dear have been attached to this email          CB: [phone]  Fax: [phone]

## 2023-03-05 PROBLEM — E78.1 HYPERTRIGLYCERIDEMIA: Status: ACTIVE | Noted: 2023-03-02

## 2023-03-05 PROBLEM — I16.0 HYPERTENSIVE URGENCY: Status: ACTIVE | Noted: 2023-02-27

## 2023-03-05 PROBLEM — E87.8 ELECTROLYTE IMBALANCE: Status: ACTIVE | Noted: 2023-03-05

## 2023-03-05 RX ORDER — ATORVASTATIN CALCIUM 20 MG/1
20 TABLET, FILM COATED ORAL DAILY
COMMUNITY
Start: 2023-03-02

## 2023-03-05 NOTE — ASSESSMENT & PLAN NOTE
Patient had continued hightriglycerides  Started on Statin- Lipitor 20 mg  ASCVD risk score- 16 8%  Stopped alcohol  Monitor lipid panel in 3 months

## 2023-03-05 NOTE — ASSESSMENT & PLAN NOTE
Was on Amlodipine 5 mg and Atenolol  Admitted for chest pain likely secondary to hypertension    Increased amlodipine to 10 mg, started on HCTZ 12 5 mg  Asked to monitor BP

## 2023-03-05 NOTE — ASSESSMENT & PLAN NOTE
H/o chronic diarrhea  Had colonoscopy in the past, unable to give me date  Last one on chart from 2010- normal colon, diverticulosis, Extnl hemorrhoids  Never had further work up  Suspect she likely has an element of chronic pancreatitis in the setting of hypertriglyceridemia  Patient is amicable to see Gastroenterology for further work up

## 2023-03-05 NOTE — ASSESSMENT & PLAN NOTE
Chest X ray in ED showed - Interval cervical decompression with posterior hardware partially in field-of-view with fractures of the most inferior screws that are probably at T1  Has appt with NS next Wednesday  C/o dizziness with right arm pain

## 2023-03-05 NOTE — ASSESSMENT & PLAN NOTE
Continue to monitor with increased dose of amlodipine and starting HCTZ  Check for hyperaldosteronism  Likely pain contributing factor for hypertension

## 2023-03-05 NOTE — ASSESSMENT & PLAN NOTE
Hypokalemia noted in BMP at [facility] ER  Will repeat BMP  Likely secondary to GI losses- has chronic diarrhea   Will also test for Hyperaldosteronism in the setting of elevated BP  Patient is currently on Potassium chloride started by [facility] cardiology

## 2023-03-08 ENCOUNTER — OFFICE VISIT (OUTPATIENT)
Dept: NEUROSURGERY | Facility: CLINIC | Age: 63
End: 2023-03-08

## 2023-03-08 VITALS
SYSTOLIC BLOOD PRESSURE: 102 MMHG | BODY MASS INDEX: 24.8 KG/M2 | HEART RATE: 74 BPM | WEIGHT: 158 LBS | RESPIRATION RATE: 18 BRPM | DIASTOLIC BLOOD PRESSURE: 74 MMHG | TEMPERATURE: 98.2 F | HEIGHT: 67 IN

## 2023-03-08 DIAGNOSIS — M96.1 CERVICAL POST-LAMINECTOMY SYNDROME: ICD-10-CM

## 2023-03-08 DIAGNOSIS — Z98.1 S/P CERVICAL SPINAL FUSION: Primary | ICD-10-CM

## 2023-03-08 DIAGNOSIS — R51.9 OCCIPITAL HEADACHE: ICD-10-CM

## 2023-03-08 DIAGNOSIS — M79.18 MYOFASCIAL PAIN SYNDROME, CERVICAL: ICD-10-CM

## 2023-03-08 DIAGNOSIS — M54.2 CERVICALGIA: ICD-10-CM

## 2023-03-08 NOTE — PROGRESS NOTES
Assessment/Plan:    S/P cervical spinal fusion  · As addressed in HPI  · As addressed in CXR-Interval cervical decompression with posterior hardware partially in   field-of-view with fractures of the most inferior screws that are probably at   T1  No postsurgical comparison study  · As addressed in multiple cervical spine xrays in 2014   · 6/27/2014 cervical spine xrays One of the pedicle screws of T1 has now broken  Because this can only be seen on the lateral view, I cannot tell whether this is the   · right-sided or left-sided screw  · She reports while attending physical therapy in [place] after surgery  Therapist performed deep cervical massage although contraindicated by Dr Dalia Bianchi, she never returned to therapy  after that session ad informed Dr Dalia Bianchi  Occipital headache  As addressed in HPI     Cervicalgia  As addressed in HPI    Myofascial pain syndrome, cervical  As addressed in HPI    Cervical post-laminectomy syndrome  · Symptoms as addressed in HPI  · Complaints of cervicalgia, myofascial pain in cervical paraspinal area, bilateral shoulder pain and occipital neuralgia  · Myofascial tenderness to palpation in the left uppper scapula area and left cervical; paraspinal area  · She reports intermittent episodes if fingers curling up on left hand    · Today she has a bandage on her left hand , reports she cut hand with a knife and bandage herself   · She denies myelopathic symptoms, no urinary retention , no gait instability   Is right hand dominate   She denies picking up objects off flat surfaces     Imagining   No current in the past years   CXR with cervical spine information refer to cervical fusion and or imagining section        Plan  · Ordered MRI cervical for post cervical fusion with new symptoms   · Ordered Xray cervical to assess construct and dynamic stability of cervical spine  · Reviewed red flag signs -if develop go to the closest emergency room  · If additional questions or concerns call the office   · Referral to pain management cervicalgia left sided, cervical; paraspinal area myofascial tenderness , tightness and pain , occipital headaches  Is status post cervical spine fusion 12/24/2013 by Dr Head Mainland - T1, please initiate multimodal interventional treatment modalities as per protocols  · RTO for fu visit as snpx with a surgeon and I  after imagining completion   ·          Subjective: Presents for referral  from 2022  Patient ID: Layla Rudd is a 59 y o  female She was referred to neurosurgeyr 8/22/22 , cancelled several appointments  She reported to the MA during rooming she is applying fo  disability  HPI   PREVIOUS NEUROSURGERY VISITS:She known to neurosurgery and was last seen  8/1/2018  With comlaint of left sided cervicalgia, numbness and tingling in the 4th and 5th digits of her left hand, headaches , weakness of her left hand, and occasional pain distribution down into eft arm  She presents with similar complaints today  She is status post surgery with Dr Carroll Rudd  12/24/2013 (found in and Xray report ) procedure  C3 - T1 fusion w/ cage DX -- had bilater cervical radiculopathy and cervicalgia pain and stiffness  LOCATION/SYMPTOMS: Left sided cervical pian with distribution into the lLeft upper chest across to sternal area and posterior cervical pain left sided into the left upper scpula area  No pain in arms  Reports intermittent episodes of fingers freeze/curl up on left hand  Has  headaches in back of head all the time/occipital area  Today she has a bandage on her left hand , reports she cut hand with a knife and bandage herself   ONSET: DURATION: onset 2018 symptoms have progressively worsened  She has an office visit w/ EVERARDO PA 8/1/2018 , after assessment and review ordered PT, PM, and imagining MRI cervical   she never completed the imagining, reports insurance would not cover  She reported attending PT and PM but cannot remember details or location   Dr Florina Márquez was consulted for pain, reports she attended  but there is no supportive documentation  Office visit   INCITING EVENT:None   CHARACTER/QUALITY: Decreased ROM in cervical ,  Stabbing , intermittent/constant to varying degrees,  Cracking in neck with movement  tightness I  The uppper back and neck so want to streatch to crack the upper back  ,  Squeezing , dizziness with head movent bend to pick something up or any cervical ROM cause dizziness  SEVERITY OF SYMPTOMS  10/10  AGGRAVATING FACTORS : driving , as passenger in a car,  turning head , bending forward, sleeping supine and not using a pillow  RELIEVING FACTORS - none    MULTIMODAL TREATMENTS:  PT---2018 in [place]  at a [facility], no record , HEP using rubber bands, currently not doing HEP  Darwin Harps PM ---[facility] neck injections 1  , and a second  Injection -no record  Reports the Dr Fawn Amezcua the injections were not helping so stopped  Severity 10/10   Medicinal --no OTC medications used   CAM Heating pad  --use in poster left neck and up on left shoulder , TENS in left upper scapular area  ---    REVIEW OF SYSTEMS  Review of Systems   Cardiovascular: Positive for chest pain  Musculoskeletal: Positive for gait problem and neck pain  surgery; C3-T1 posterior lateral mass screw instrumentation and fusion  C3-C4 and C7-T1 laminectomy decompression  C5-C6 and C6-C7 bilateral facet osteotomies for correction of cervical deformity, loss of cervical lordosis  Local autograft harvest for posterior lateral fusion    This was performed by Dr Mounika Mack 12/24/13  seen Ed 8/1/18    Neck pain to center L Side w L shoulder pain & HA   rates pain 7/10 chest pain w Left side neck pain down to upper back  w left hand cramping w weakness and dropping items- difficult opening items  pt also c/o Dizzness & lightheadness- pt is unbalanced and unsteady    no recent PT/PM    Neurological: Positive for dizziness, weakness, light-headedness and headaches  All other systems reviewed and are negative  Meds/Allergies     Current Outpatient Medications   Medication Sig Dispense Refill   • albuterol (PROVENTIL HFA,VENTOLIN HFA) 90 mcg/act inhaler INHALE 1 PUFF EVERY 4 HOURS AS NEEDED FOR WHEEZING 6 7 g 3   • amLODIPine (NORVASC) 5 mg tablet Take 1 tablet (5 mg total) by mouth daily (Patient taking differently: Take 10 mg by mouth daily) 90 tablet 1   • atenolol (TENORMIN) 50 mg tablet TAKE 1 TABLET BY MOUTH EVERY DAY 90 tablet 0   • atorvastatin (LIPITOR) 20 mg tablet Take 20 mg by mouth daily     • escitalopram (LEXAPRO) 10 mg tablet TAKE 1 TABLET BY MOUTH EVERY DAY 90 tablet 1   • famotidine (PEPCID) 20 mg tablet TAKE 1 TABLET BY MOUTH TWICE A  tablet 1   • hydrochlorothiazide (MICROZIDE) 12 5 mg capsule Take 12 5 mg by mouth daily     • Potassium Chloride ER 20 MEQ TBCR Take 2 tablets (40 mEq total) by mouth daily 180 tablet 1   • fluticasone (FLONASE) 50 mcg/act nasal spray SPRAY 1 SPRAY INTO EACH NOSTRIL EVERY DAY (Patient not taking: Reported on ) 48 mL 2   • folic acid (FOLVITE) 1 mg tablet  (Patient not taking: Reported on 3/8/2023)     • mupirocin (BACTROBAN) 2 % ointment Apply topically 3 (three) times a day (Patient not taking: Reported on 3/8/2023) 22 g 0     No current facility-administered medications for this visit         Allergies   Allergen Reactions   • Methocarbamol Shortness Of Breath and Swelling   • Tramadol Shortness Of Breath and Swelling       PAST HISTORY    Past Medical History:   Diagnosis Date   • Cervical disc disorder    • Stenosis of cervical spine region        Past Surgical History:   Procedure Laterality Date   • BREAST BIOPSY Left 2009    core   • BREAST BIOPSY Right     core   • CERVICAL FUSION  2013    cC3-C4, C4-C5, C5 C6, C6-C7, C7-T1   •  SECTION     • ELBOW SURGERY     • KNEE SURGERY Bilateral 20YRS AGO       Social History     Tobacco Use   • Smoking status: Every Day Packs/day: 0 25     Types: Cigarettes     Start date: 0   • Smokeless tobacco: Never   Vaping Use   • Vaping Use: Never used   Substance Use Topics   • Alcohol use: Yes     Comment: SOCIAL   • Drug use: Not Currently       Family History   Problem Relation Age of Onset   • Cancer Mother    • Brain cancer Mother [de-identified]   • Heart disease Father    • Breast cancer Sister 48   • Breast cancer Sister 45   • Breast cancer Maternal Aunt 61   • Breast cancer Maternal Aunt 60   • Breast cancer Maternal Aunt 60   • Breast cancer Cousin 50   • Breast cancer Cousin 52   • Breast cancer Cousin 52   • Prostate cancer Maternal Uncle 55   • No Known Problems Son    • No Known Problems Maternal Grandmother    • No Known Problems Maternal Grandfather    • No Known Problems Paternal Grandmother    • No Known Problems Paternal Grandfather    • BRCA1 Negative Sister    • BRCA2 Negative Sister    • No Known Problems Sister    • No Known Problems Maternal Aunt    • No Known Problems Maternal Aunt    • No Known Problems Maternal Aunt    • No Known Problems Maternal Aunt        The following portions of the patient's history were reviewed and updated as appropriate: allergies, current medications, past family history, past medical history, past social history, past surgical history and problem list       EXAM    Vitals:Blood pressure 102/74, pulse 74, temperature 98 2 °F (36 8 °C), temperature source Temporal, resp  rate 18, height 5' 7" (1 702 m), weight 71 7 kg (158 lb), not currently breastfeeding  ,Body mass index is 24 75 kg/m²  Physical Exam  Vitals and nursing note reviewed  Constitutional:       General: She is not in acute distress  Appearance: Normal appearance  She is normal weight  She is not ill-appearing, toxic-appearing or diaphoretic     Neck:      Comments: Stiffness cervical area limitation in rotation , flexion , extension, turns upper trunk  Pulmonary:      Effort: Pulmonary effort is normal  No respiratory distress  Musculoskeletal:      Right lower leg: No edema  Left lower leg: No edema  Skin:     General: Skin is warm and dry  Neurological:      General: No focal deficit present  Mental Status: She is alert and oriented to person, place, and time  Sensory: No sensory deficit  Motor: No weakness  Coordination: Coordination normal       Gait: Gait is intact  Gait normal       Deep Tendon Reflexes:      Reflex Scores:       Tricep reflexes are 2+ on the right side and 2+ on the left side  Bicep reflexes are 2+ on the right side and 2+ on the left side  Brachioradialis reflexes are 2+ on the right side and 2+ on the left side  Patellar reflexes are 2+ on the right side and 2+ on the left side  Achilles reflexes are 2+ on the right side and 2+ on the left side  Psychiatric:         Mood and Affect: Mood normal          Behavior: Behavior normal          Neurologic Exam     Mental Status   Oriented to person, place, and time  Motor Exam   Overall muscle tone: normal    Strength   Right biceps: 5/5  Right triceps: 5/5  Right wrist flexion: 5/5  Right wrist extension: 5/5  Right interossei: 5/5  Right iliopsoas: 5/5  Left iliopsoas: 5/5  Right quadriceps: 5/5  Left quadriceps: 5/5  Right hamstrin/5  Left hamstrin/5  Right anterior tibial: 5/5  Left anterior tibial: 5/5  Right gastroc: 5/5  Left gastroc: 5/5    Sensory Exam   Light touch normal      Gait, Coordination, and Reflexes     Gait  Gait: normal    Reflexes   Right brachioradialis: 2+  Left brachioradialis: 2+  Right biceps: 2+  Left biceps: 2+  Right triceps: 2+  Left triceps: 2+  Right patellar: 2+  Left patellar: 2+  Right achilles: 2+  Left achilles: 2+  Right Henao: absent  Left Henao: absent  Right ankle clonus: absent  Left ankle clonus: absent      Imaging Studies    2023 CXR [facility] No acute cardiopulmonary finding     Interval cervical decompression with posterior hardware partially in   field-of-view with fractures of the most inferior screws that are probably at   T1  No postsurgical comparison study            Xray cervical spine  6/27/2014   Patient is status post posterior fusion of C3-T1  One of the pedicle screws of T1 has now broken  Because this can only   be seen on the lateral view, I cannot tell whether this is the   right-sided or left-sided screw  Alignment of the cervical spine remains normal    Moderate degenerative disc disease again seen at C5-C6 and C6-C7  The prevertebral soft tissues are within normal limits  The lung apices are intact  IMPRESSION-   Patient is status post posterior fusion of C3-T1  One of the pedicle   screws of T1 has now broken  Because this can only be seen on the   lateral view, I cannot tell whether this is the right-sided or   left-sided screw  Xray cervical spine 3/24/2014 Stable alignment after posterior fusion from C3-T1  The intervertebral disc spaces are preserved  The prevertebral soft tissues are within normal limits  The lung apices are intact  IMPRESSION- Stable postoperative alignment  Xray cervical spine 2/9/2014  Posterior shannan and screw fusion is identified at C3-T1  Riri Larry Hardware appears intact  Cervical alignment stable  No acute osseous findings  IMPRESSION-   Stable postoperative appearance posterior fusion  I have personally reviewed pertinent reports  and I have personally reviewed pertinent films in PACS    I have spent a total time of 60 minutes on 03/08/23 in caring for this patient including Diagnostic results, Risks and benefits of tx options, Instructions for management, Patient and family education, Importance of tx compliance, Risk factor reductions, Impressions, Counseling / Coordination of care, Documenting in the medical record, Reviewing / ordering tests, medicine, procedures  , Obtaining or reviewing history   and records

## 2023-03-09 NOTE — ASSESSMENT & PLAN NOTE
· Symptoms as addressed in HPI  · Complaints of cervicalgia, myofascial pain in cervical paraspinal area, bilateral shoulder pain and occipital neuralgia  · Myofascial tenderness to palpation in the left uppper scapula area and left cervical; paraspinal area  · She reports intermittent episodes if fingers curling up on left hand    · Today she has a bandage on her left hand , reports she cut hand with a knife and bandage herself   · She denies myelopathic symptoms, no urinary retention , no gait instability   Is right hand dominate   She denies picking up objects off flat surfaces     Imagining   No current in the past years   CXR with cervical spine information refer to cervical fusion and or imagining section  Plan  · Ordered MRI cervical for post cervical fusion with new symptoms   · Ordered Xray cervical to assess construct and dynamic stability of cervical spine  · Reviewed red flag signs -if develop go to the closest emergency room  · If additional questions or concerns call the office   · Referral to pain management cervicalgia left sided, cervical; paraspinal area myofascial tenderness , tightness and pain , occipital headaches  Is status post cervical spine fusion 12/24/2013 by Dr Francia Monique - T1, please initiate multimodal interventional treatment modalities as per protocols    · RTO for fu visit as snpx with a surgeon and I  after imagining completion   ·

## 2023-03-09 NOTE — ASSESSMENT & PLAN NOTE
· As addressed in HPI  · As addressed in CXR-Interval cervical decompression with posterior hardware partially in   field-of-view with fractures of the most inferior screws that are probably at   T1  No postsurgical comparison study  · As addressed in multiple cervical spine xrays in 2014   · 6/27/2014 cervical spine xrays One of the pedicle screws of T1 has now broken  Because this can only be seen on the lateral view, I cannot tell whether this is the   · right-sided or left-sided screw  · She reports while attending physical therapy in [place] after surgery  Therapist performed deep cervical massage although contraindicated by Dr Phillip Leung, she never returned to therapy  after that session ad informed Dr Phillip Leung

## 2023-03-15 ENCOUNTER — TELEPHONE (OUTPATIENT)
Dept: INTERNAL MEDICINE CLINIC | Facility: CLINIC | Age: 63
End: 2023-03-15

## 2023-03-15 NOTE — TELEPHONE ENCOUNTER
The patient's neurosurgeon is requesting a note to excuse her for the next week  The surgeon does not want her driving because of her uncontrollable blood pressure and she is light headed  Please advise  Thank you    Please put the note in the patient's mychart  She needs to present the note to her employer today

## 2023-03-22 NOTE — TELEPHONE ENCOUNTER
Patient is calling back in regarding this  States her job now wants to fire her because there has been no response          Please advise

## 2023-03-23 NOTE — TELEPHONE ENCOUNTER
LM on VM with message and advised patient that we did send a message to the Neurosurgeon's office asking them to advise with no response  I recommended that she call their office to request the note or have them contact us

## 2023-03-28 ENCOUNTER — TELEPHONE (OUTPATIENT)
Dept: NEUROSURGERY | Facility: CLINIC | Age: 63
End: 2023-03-28

## 2023-03-28 ENCOUNTER — OFFICE VISIT (OUTPATIENT)
Dept: PODIATRY | Facility: CLINIC | Age: 63
End: 2023-03-28

## 2023-03-28 ENCOUNTER — HOSPITAL ENCOUNTER (OUTPATIENT)
Dept: MRI IMAGING | Facility: HOSPITAL | Age: 63
Discharge: HOME | End: 2023-03-28

## 2023-03-28 VITALS
WEIGHT: 159.2 LBS | BODY MASS INDEX: 24.99 KG/M2 | DIASTOLIC BLOOD PRESSURE: 76 MMHG | SYSTOLIC BLOOD PRESSURE: 130 MMHG | HEIGHT: 67 IN | HEART RATE: 67 BPM

## 2023-03-28 DIAGNOSIS — Z98.1 S/P CERVICAL SPINAL FUSION: ICD-10-CM

## 2023-03-28 DIAGNOSIS — L03.119 CELLULITIS OF PLANTAR ASPECT OF FOOT: ICD-10-CM

## 2023-03-28 DIAGNOSIS — M79.18 MYOFASCIAL PAIN SYNDROME, CERVICAL: ICD-10-CM

## 2023-03-28 DIAGNOSIS — L81.9 PIGMENTED SKIN LESION SUSPICIOUS FOR MALIGNANT NEOPLASM: Primary | ICD-10-CM

## 2023-03-28 DIAGNOSIS — B07.0 PLANTAR WART OF RIGHT FOOT: ICD-10-CM

## 2023-03-28 DIAGNOSIS — M54.2 CERVICALGIA: ICD-10-CM

## 2023-03-28 DIAGNOSIS — R51.9 OCCIPITAL HEADACHE: ICD-10-CM

## 2023-03-28 RX ADMIN — GADOBUTROL 7 ML: 604.72 INJECTION INTRAVENOUS at 20:08

## 2023-03-28 NOTE — TELEPHONE ENCOUNTER
Called transferred from the nurse line to my     Patient requesting documentation in regards to driving     [facility] for callback from patient

## 2023-03-28 NOTE — TELEPHONE ENCOUNTER
Spoke with patient she stated her primary care Dr Abel Ellis forwarded a message to [facility] to clarify about driving restrictions and Dr Abel Ellis told patient our office is to write letter on patients behalf to remain out of work until after imaging and follow up appt scheduled 4/3/23     Sent message to [facility] for review    Please advise how I should assist patient

## 2023-03-28 NOTE — TELEPHONE ENCOUNTER
"3/28/23- CALLED PT AND OFFERED TO R/S APT  PT STATED, \"I'D LIKE TO CALL YOU BACK TO R/S  \"     Appointment canceled for Elmira Mckenna ([phone])   Visit Type: OFFICE VISIT LONG [facility]   Date        Time      Length    Provider                  Department   4/3/2023    10:15 AM  45 mins   PAVEL Jones         [facility]      Reason for Cancellation: Financial       "

## 2023-03-30 ENCOUNTER — TELEPHONE (OUTPATIENT)
Dept: NEUROSURGERY | Facility: CLINIC | Age: 63
End: 2023-03-30

## 2023-03-30 NOTE — TELEPHONE ENCOUNTER
Called patient ANNA, asking her to call back  We can provide her a note to excuse her from work until 4/4/2023 as she has a scheduled visit on Monday 4/3/2023  Patient was asked to call back with information where note needs to be sent

## 2023-03-30 NOTE — TELEPHONE ENCOUNTER
Called ayla to confirm her appointment on 4/3/23 and to remind her of an xray needed  She states she was suppose to get a doctors note stating she is to be out of work  After investigation she spoke to bettye  See telephone note on 3/28/23     I explained I would look into it and call her back  She was appreciative

## 2023-03-30 NOTE — LETTER
March 30, 2023     Patient: Alvaro Yung  YOB: 1960  Date of Visit: 3/30/2023      To Whom it May Concern:    Shazia Gris is under my professional care  Yolisrigoberto Merritt was seen in my office on 3/8/2023  Yoilsrigoberto Merritt may return to work on 4/4/2023  Pending her evaluation on 4/3/2023  If you have any questions or concerns, please don't hesitate to call           Sincerely,          PAVEL Jones        CC: No Recipients

## 2023-03-31 NOTE — PATIENT INSTRUCTIONS
Proceed for physical therapy as discussed, consult has been placed  Proceed for plain films of the cervical spine with flexion and extension views as discussed  MRI cervical spine as discussed with and without contrast     Consultation with pain management, Dr Mildred Bey      Return to Neurosurgery for further evaluation Dr Lory Patterson, [facility]) [FreeTextEntry2] : WC follow up

## 2023-04-06 ENCOUNTER — OFFICE VISIT (OUTPATIENT)
Dept: PODIATRY | Facility: CLINIC | Age: 63
End: 2023-04-06

## 2023-04-06 DIAGNOSIS — L81.9 PIGMENTED SKIN LESION SUSPICIOUS FOR MALIGNANT NEOPLASM: Primary | ICD-10-CM

## 2023-04-06 RX ORDER — OXYCODONE HYDROCHLORIDE AND ACETAMINOPHEN 5; 325 MG/1; MG/1
1 TABLET ORAL EVERY 4 HOURS PRN
Qty: 20 TABLET | Refills: 0 | Status: SHIPPED | OUTPATIENT
Start: 2023-04-06

## 2023-04-06 RX ORDER — LIDOCAINE HYDROCHLORIDE AND EPINEPHRINE 10; 10 MG/ML; UG/ML
10 INJECTION, SOLUTION INFILTRATION; PERINEURAL ONCE
Status: COMPLETED | OUTPATIENT
Start: 2023-04-06 | End: 2023-04-06

## 2023-04-06 RX ADMIN — LIDOCAINE HYDROCHLORIDE AND EPINEPHRINE 10 ML: 10; 10 INJECTION, SOLUTION INFILTRATION; PERINEURAL at 17:43

## 2023-04-06 NOTE — PROGRESS NOTES
Date Patient Seen: 4/6/23       Subjective:     Chief Complaint:  Pigmented foot lesion     Patient ID: Alvaro Yung is a 59 y o  female  Yolis Mole is here for the cc of a growing lesion on the plantar Right foot that started two months ago  She's here today for biopsy  The following portions of the patient's history were reviewed and updated as appropriate: allergies, current medications, past family history, past medical history, past social history, past surgical history and problem list     Review of Systems   Constitutional: Negative  Respiratory: Negative  Gastrointestinal: Negative  Skin: Positive for color change and wound  Objective: There were no vitals taken for this visit  Physical Exam  Vitals and nursing note reviewed  Constitutional:       General: She is not in acute distress  Appearance: Normal appearance  She is normal weight  She is not ill-appearing or toxic-appearing  HENT:      Head: Normocephalic and atraumatic  Cardiovascular:      Pulses: Normal pulses  Comments: No tenderness nor masses in the popliteal nor inguinal areas  Pulmonary:      Effort: Pulmonary effort is normal    Abdominal:      General: Abdomen is flat  There is no distension  Palpations: Abdomen is soft  Tenderness: There is no abdominal tenderness  There is no guarding  Musculoskeletal:         General: Normal range of motion  Skin:     Capillary Refill: Capillary refill takes less than 2 seconds  Findings: Lesion present  Comments: I note a dark pigmented lesion on the plantar forefoot that measures about 7cm long and 3 5cm wide  There are some small shallow ulcers in the center  There are tiny satellite pigmented lesions in the skin around it and also distal to it tucked into the sulcus behind the lesser toes  Neurological:      Mental Status: She is alert              Diagnoses and all orders for this visit:    Pigmented skin lesion suspicious "for malignant neoplasm  -     oxyCODONE-acetaminophen (Percocet) 5-325 mg per tablet; Take 1 tablet by mouth every 4 (four) hours as needed for moderate pain Max Daily Amount: 6 tablets  -     Tissue Exam; Future         Assessment:  PIgmented foot lesion    Plan:    I spent time to discuss with the patient the surgical procedure(s) as excisional biopsy and post-op course (NWB with crutches, dressing changes, and possible further consultations with specialists and further foot surgery pending biopsy results)  I discussed risks as infection, scar, swelling, chronic pain, poor healing of biopsy site that could require more surgery including flap/graft, incomplete removal of lesion or recurrence of lesion, change in shape of foot, toe, or walking and function, numbness, neuritis/RSD, blood clots in the leg or lung, and even death from anesthesia complications  No guarantees were given before patient signed the consent form  A 1 0cm margin was marked surrounding the lesion and the distal satellite lesions  A #10 scalpel was used to excise the lesion into the deep subcutaneous tissues  A thin layer of fat was left over the metatarsophalangeal joint capsules  Biopsy    Date/Time: 4/6/2023 5:22 PM  Performed by: Chioma Kearney DPM  Authorized by: Chioma Kearney DPM   Universal Protocol:  Consent: Verbal consent obtained  Written consent obtained  Risks and benefits: risks, benefits and alternatives were discussed  Consent given by: patient  Time out: Immediately prior to procedure a \"time out\" was called to verify the correct patient, procedure, equipment, support staff and site/side marked as required  Timeout called at: 4/6/2023 5:22 PM   Patient understanding: patient states understanding of the procedure being performed  Patient consent: the patient's understanding of the procedure matches consent given  Patient identity confirmed: verbally with patient      Procedure Details - Lesion Biopsy:      Body " area:  Lower extremity    Lower extremity location:  R foot    Biopsy method: saucerize biopsy      Biopsy tissue type: skin and subcutaneous    Initial size (mm):  50    Final defect size (mm):  70    Malignancy: malignancy unknown       1% lidocaine with epinephrine injected 10cc field block  Surgifoam applied to open biopsy site with DSD and compression with coban  Patient discharged on crutches and told to remain NWB, keep dressings clean, dry and intact and to elevate  Rx for percocet given for pain relief  Follow-up next week for dressing change and pathology review  Oncology/Surgical Oncology consults will be ordered if positive  We will discuss closure options (SPTSG vs  Secondary intention) if negative

## 2023-04-12 PROBLEM — B07.0 PLANTAR WART OF RIGHT FOOT: Status: RESOLVED | Noted: 2023-02-16 | Resolved: 2023-04-12

## 2023-04-12 PROBLEM — C43.71 MALIGNANT MELANOMA OF RIGHT LOWER EXTREMITY INCLUDING HIP (HCC): Status: ACTIVE | Noted: 2023-03-28

## 2023-04-24 ENCOUNTER — TELEPHONE (OUTPATIENT)
Dept: HEMATOLOGY ONCOLOGY | Facility: CLINIC | Age: 63
End: 2023-04-24

## 2023-04-24 ENCOUNTER — CONSULT (OUTPATIENT)
Dept: HEMATOLOGY ONCOLOGY | Facility: CLINIC | Age: 63
End: 2023-04-24

## 2023-04-24 VITALS
BODY MASS INDEX: 24.64 KG/M2 | HEART RATE: 72 BPM | OXYGEN SATURATION: 98 % | SYSTOLIC BLOOD PRESSURE: 142 MMHG | WEIGHT: 157 LBS | DIASTOLIC BLOOD PRESSURE: 82 MMHG | HEIGHT: 67 IN | RESPIRATION RATE: 16 BRPM | TEMPERATURE: 97.8 F

## 2023-04-24 DIAGNOSIS — Z80.3 FAMILY HISTORY OF BREAST CANCER: ICD-10-CM

## 2023-04-24 DIAGNOSIS — Z80.3 FAMILY HISTORY OF BREAST CANCER IN FIRST DEGREE RELATIVE: ICD-10-CM

## 2023-04-24 DIAGNOSIS — C43.71 MALIGNANT MELANOMA OF RIGHT LOWER EXTREMITY INCLUDING HIP (HCC): Primary | ICD-10-CM

## 2023-04-24 NOTE — LETTER
April 26, 2023     Kermit Castillo  [address] [facility] [address]    Patient: Adelina Bishop   YOB: 1960   Date of Visit: 4/24/2023       Dear Dr June Whyte: Thank you for referring Maria R Barnett to me for evaluation  Below are my notes for this consultation  If you have questions, please do not hesitate to call me  I look forward to following your patient along with you  Sincerely,        Leana Huston MD        CC: MD Radha Solorzano MD Amey Ledger, MD Devoria Herd, DPM Liisa Deiters, MD  4/26/2023 10:50 PM  Sign when Signing Visit  [address]  [phone]  [phone]     Date of Visit: 4/24/2023  Name: Adelina Bishop   YOB: 1960        Subjective    VISIT DIAGNOSIS:  Diagnoses and all orders for this visit:    Malignant melanoma of right lower extremity including hip [facility])  -     Ambulatory Referral to Hematology / Oncology  -     NM pet ct tumor imaging whole body; Future  -     MRI brain w wo contrast; Future  -     Ambulatory Referral to Wound Care; Future  -     CBC and differential; Future  -     Comprehensive metabolic panel; Future  -     LD,Blood; Future  -     Ambulatory Referral to Oncology Genetics; Future    Family history of breast cancer  -     Ambulatory Referral to Oncology Genetics; Future    Family history of breast cancer in first degree relative  -     Ambulatory Referral to Oncology Genetics;  Future        Oncology History   Malignant melanoma of right lower extremity including hip ([facility] [address] )   3/28/2023 Initial Diagnosis    Malignant melanoma of right lower extremity including hip ([facility] [address] )     4/6/2023 Surgery    Wide excision by podiatry:  Skin, foot, right, wide excision:  Melanoma, acral type, ulcerated, Breslow thickness: 4 2 mm  PATHOLOGIC STAGE CLASSIFICATION (pTNM, AJCC 8th Edition)     pT Category  pT4b 4/7/2023 -  Cancer Staged    Staging form: Melanoma of the Skin, AJCC 8th Edition  - Clinical stage from 4/7/2023: Stage III (cT4b, cN1c, cM0) - Signed by Tramaine Rod MD on 4/24/2023 4/7/2023 -  Cancer Staged    Staging form: Melanoma of the Skin, AJCC 8th Edition  - Pathologic stage from 4/7/2023: Stage IIIC (pT4b, pN1c, cM0) - Signed by Tramaine Rod MD on 4/24/2023          Cancer Staging   Malignant melanoma of right lower extremity including hip [facility])  Staging form: Melanoma of the Skin, AJCC 8th Edition  - Clinical stage from 4/7/2023: Stage III (cT4b, cN1c, cM0) - Signed by Tramaine Rod MD on 4/24/2023  - Pathologic stage from 4/7/2023: Stage IIIC (pT4b, pN1c, cM0) - Signed by Tramaine Rod MD on 4/24/2023          HISTORY OF PRESENT ILLNESS: Colten Mcarthur is a 59 y o  female  who is seen in consultation at the request of Dr William Padgett for new diagnosis of melanoma  History is obtained from the patient, review of records, and discussion with Dr William Padgett  Patient states that approximately 2 to 3 months ago she thought she had a plantar wart on the bottom of her right foot  She was walking on it and she could feel it  She tried over-the-counter treatments which did not help  It only got bigger  She got more concerned as it increased in size significantly  She did see her primary care doctor who thought it looked like a callus  But then it continued to get worse  It became more painful and it got to the point where she could not even walk on it  She then saw podiatry who excised the lesion in the office  They were concerned that it was pigmented and looked like it had satellite lesions extending to her toes  Biopsy of the lesion on 4/6/2023 on the right foot wide excision demonstrated melanoma, acral type, ulcerated, Breslow thickness 4 2 mm  There was macroscopic satellites present, Grupo level IV, mitoses 4/mm², microsatellites    No lymphovascular invasion, neurotropism, or regression  TILs nonbrisk  Description of the pathological sample describes multiple melanoma satellite nodules identified predominantly involving the papillary dermis  One of the satellite nodules extends to the margins  All other margins were negative  Patient also describes feeling a right thigh nodule recently that was tiny, maybe a size of a marble but now has grown to a large size size of a grape  It is not painful  She does have a father who had melanoma and who  of a brain mass  She has a younger sister who had melanoma and breast cancer diagnosed in her 45s  She also has an older sister who had breast cancer  She has a mother who  of a brain tumor in her [de-identified]  There is a significant family history of breast cancer on her maternal side  She has 5-6 maternal aunts who have had breast cancer one was in her 76s the rest were in their 46s or 62s  She also has a cousin who has breast cancer who  of her disease as well  Denies family history of ovarian and pancreatic cancer  She describes having normal sun exposure  Denies having normal sunburns  She had 1-2 bad blistering sunburns  She tried a tanning bed once and never again  She has dark brown hair, has brown eyes, and is [place] and descent  He is up-to-date on her age-appropriate cancer screenings with her recent colonoscopy as well as mammograms and Pap smears  She continues to smoke  She smokes about a half a pack per day for about 20 years  She drinks alcohol socially  Denies any drug use  REVIEW OF SYSTEMS:  Review of Systems   Constitutional: Negative for appetite change, fatigue, fever and unexpected weight change  HENT:   Negative for lump/mass  Eyes: Negative for icterus  Respiratory: Negative for cough, shortness of breath and wheezing  Cardiovascular: Negative for leg swelling     Gastrointestinal: Negative for abdominal pain, constipation, diarrhea, nausea and vomiting  Genitourinary: Negative for difficulty urinating and hematuria  Musculoskeletal: Positive for gait problem (cannot walk on right plantar surface of foot due to open wound  )  Negative for arthralgias and myalgias  Skin: Negative for itching and rash  No new, changing, or concerning lesions  Neurological: Positive for gait problem (cannot walk on right plantar surface of foot due to open wound  )  Negative for extremity weakness, headaches, light-headedness and numbness  Hematological: Positive for adenopathy          MEDICATIONS:    Current Outpatient Medications:   •  albuterol (PROVENTIL HFA,VENTOLIN HFA) 90 mcg/act inhaler, INHALE 1 PUFF EVERY 4 HOURS AS NEEDED FOR WHEEZING, Disp: 6 7 g, Rfl: 3  •  amLODIPine (NORVASC) 5 mg tablet, Take 1 tablet (5 mg total) by mouth daily (Patient taking differently: Take 10 mg by mouth daily), Disp: 90 tablet, Rfl: 1  •  atenolol (TENORMIN) 50 mg tablet, TAKE 1 TABLET BY MOUTH EVERY DAY, Disp: 90 tablet, Rfl: 0  •  escitalopram (LEXAPRO) 10 mg tablet, TAKE 1 TABLET BY MOUTH EVERY DAY, Disp: 90 tablet, Rfl: 1  •  famotidine (PEPCID) 20 mg tablet, TAKE 1 TABLET BY MOUTH TWICE A DAY, Disp: 180 tablet, Rfl: 1  •  hydrochlorothiazide (MICROZIDE) 12 5 mg capsule, Take 12 5 mg by mouth daily, Disp: , Rfl:      ALLERGIES:  Allergies   Allergen Reactions   • Methocarbamol Shortness Of Breath and Swelling   • Tramadol Shortness Of Breath and Swelling        ACTIVE PROBLEMS:  Patient Active Problem List   Diagnosis   • Mild intermittent asthma   • Esophageal dysmotility   • Allergic rhinitis with postnasal drip   • Tobacco abuse   • NATE (obstructive sleep apnea)   • Cough   • Vitamin D deficiency   • High blood triglycerides   • Diarrhea   • Asthma   • Need for vaccination   • Unintentional weight loss   • Anemia   • Enlarged liver   • Low folic acid   • High vitamin A level   • High serum vitamin E   • Low serum vitamin B12   • Hypokalemia   • Alcoholism ([facility] )   • Anxiety disorder   • Cervical spinal stenosis   • Diverticular disease of colon   • Erosive esophagitis   • Hemorrhoids   • History of gastroesophageal reflux (GERD)   • Hypertension   • Irritable bowel syndrome   • Need for influenza vaccination   • Postoperative examination   • Pure hypercholesterolemia   • Thoracic and lumbosacral neuritis   • Pre-operative clearance   • Angiomyolipoma   • Chest pain on respiration   • Left flank pain   • Change in stool habits   • Epicondylitis, lateral (tennis elbow)   • Gallbladder disease   • Limb pain   • Impaired fasting glucose   • Microhematuria   • Pelvic pain in female   • Venous insufficiency   • Annual physical exam   • Fatigue   • Foot pain   • S/P cervical spinal fusion   • Fatty liver   • Abnormal bowel movement   • Hypertensive urgency   • Hypertriglyceridemia   • Electrolyte imbalance   • Myofascial pain syndrome, cervical   • Cervicalgia   • Occipital headache   • Cervical post-laminectomy syndrome   • Malignant melanoma of right lower extremity including hip (HCC)          PAST MEDICAL HISTORY:   Past Medical History:   Diagnosis Date   • Cervical disc disorder    • Stenosis of cervical spine region         PAST SURGICAL HISTORY:  Past Surgical History:   Procedure Laterality Date   • BREAST BIOPSY Left 2009    core   • BREAST BIOPSY Right 2009    core   • CERVICAL FUSION  2013    cC3-C4, C4-C5, C5 C6, C6-C7, C7-T1   •  SECTION     • ELBOW SURGERY     • KNEE SURGERY Bilateral 20YRS AGO        SOCIAL HISTORY:  Social History     Socioeconomic History   • Marital status: Single     Spouse name: None   • Number of children: None   • Years of education: None   • Highest education level: None   Occupational History   • None   Tobacco Use   • Smoking status: Every Day     Packs/day: 0 25     Types: Cigarettes     Start date: 0   • Smokeless tobacco: Never   Vaping Use   • Vaping Use: Never used   Substance and Sexual Activity   • "Alcohol use: Yes     Comment: SOCIAL   • Drug use: Not Currently   • Sexual activity: Yes     Partners: Male   Other Topics Concern   • None   Social History Narrative   • None     Social Determinants of Health     Financial Resource Strain: Not on file   Food Insecurity: Not on file   Transportation Needs: Not on file   Physical Activity: Not on file   Stress: Not on file   Social Connections: Not on file   Intimate Partner Violence: Not on file   Housing Stability: Not on file        FAMILY HISTORY:  Family History   Problem Relation Age of Onset   • Cancer Mother    • Brain cancer Mother [de-identified]   • Heart disease Father    • Breast cancer Sister 48   • Breast cancer Sister 45   • Breast cancer Maternal Aunt 61   • Breast cancer Maternal Aunt 60   • Breast cancer Maternal Aunt 61   • Breast cancer Cousin 50   • Breast cancer Cousin 52   • Breast cancer Cousin 49   • Prostate cancer Maternal Uncle 55   • No Known Problems Son    • No Known Problems Maternal Grandmother    • No Known Problems Maternal Grandfather    • No Known Problems Paternal Grandmother    • No Known Problems Paternal Grandfather    • BRCA1 Negative Sister    • BRCA2 Negative Sister    • No Known Problems Sister    • No Known Problems Maternal Aunt    • No Known Problems Maternal Aunt    • No Known Problems Maternal Aunt    • No Known Problems Maternal Aunt            Objective    PHYSICAL EXAMINATION:   Blood pressure 142/82, pulse 72, temperature 97 8 °F (36 6 °C), temperature source Temporal, resp  rate 16, height 5' 7\" (1 702 m), weight 71 2 kg (157 lb), SpO2 98 %, not currently breastfeeding  Pain Score:   2     ECOG Performance Status    Flowsheet Row Most Recent Value   ECOG Performance Status 0 - Fully active, able to carry on all pre-disease performance without restriction             Physical Exam  Constitutional:       General: She is not in acute distress  Appearance: Normal appearance  She is not toxic-appearing     HENT:      " Mouth/Throat:      Mouth: Mucous membranes are moist       Pharynx: Oropharynx is clear  Eyes:      General: No scleral icterus  Cardiovascular:      Rate and Rhythm: Normal rate and regular rhythm  Pulses: Normal pulses  Heart sounds: No murmur heard  No friction rub  No gallop  Pulmonary:      Effort: Pulmonary effort is normal  No respiratory distress  Breath sounds: Normal breath sounds  No wheezing or rales  Abdominal:      General: There is no distension  Palpations: There is no mass  Tenderness: There is no abdominal tenderness  There is no rebound  Musculoskeletal:         General: No swelling or tenderness  Normal range of motion  Right lower leg: No edema  Left lower leg: No edema  Lymphadenopathy:      Head:      Right side of head: No submandibular, preauricular or posterior auricular adenopathy  Left side of head: No submandibular, preauricular or posterior auricular adenopathy  Cervical: No cervical adenopathy  Right cervical: No superficial or posterior cervical adenopathy  Left cervical: No superficial or posterior cervical adenopathy  Upper Body:      Right upper body: No supraclavicular or axillary adenopathy  Left upper body: No supraclavicular or axillary adenopathy  Lower Body: Right inguinal adenopathy (about 1 cm, mobile) present  No left inguinal adenopathy  Skin:     Coloration: Skin is not jaundiced  Findings: No rash  Comments: No concerning skin lesions  Neurological:      General: No focal deficit present  Mental Status: She is alert and oriented to person, place, and time  Psychiatric:         Mood and Affect: Mood normal          Behavior: Behavior normal          Thought Content: Thought content normal          Judgment: Judgment normal          I reviewed lab data in the chart      WBC   Date Value Ref Range Status   09/03/2020 5 32 4 31 - 10 16 Thousand/uL Final   01/12/2019 8 95 4 31 - 10 16 Thousand/uL Final   12/12/2013 6 62 4 31 - 10 16 Thousand/uL Final     Comment:     Result Comment: New Reference Range     Hemoglobin   Date Value Ref Range Status   09/03/2020 11 7 11 5 - 15 4 g/dL Final   01/12/2019 14 8 11 5 - 15 4 g/dL Final   12/12/2013 14 0 11 5 - 15 4 g/dL Final     Comment:     Result Comment: New Reference Range     Platelets   Date Value Ref Range Status   09/03/2020 262 149 - 390 Thousands/uL Final   01/12/2019 268 149 - 390 Thousands/uL Final   12/12/2013 251 149 - 390 Thousand/uL Final     MCV   Date Value Ref Range Status   09/03/2020 105 (H) 82 - 98 fL Final   01/12/2019 100 (H) 82 - 98 fL Final   12/12/2013 96 82 - 98 fL Final     Comment:     Result Comment: New Reference Range      Sodium   Date Value Ref Range Status   12/12/2013 140 135 - 145 mmol/L Final     Potassium   Date Value Ref Range Status   10/23/2020 4 0 3 5 - 5 3 mmol/L Final   09/28/2020 4 1 3 5 - 5 3 mmol/L Final   09/08/2020 3 1 (L) 3 5 - 5 3 mmol/L Final   12/12/2013 3 8 3 6 - 5 0 mmol/L Final     Chloride   Date Value Ref Range Status   09/03/2020 98 (L) 100 - 108 mmol/L Final   01/12/2019 102 100 - 108 mmol/L Final   11/16/2017 102 100 - 108 mmol/L Final   12/12/2013 102 101 - 111 mmol/L Final     CO2   Date Value Ref Range Status   09/03/2020 36 (H) 21 - 32 mmol/L Final   01/12/2019 28 21 - 32 mmol/L Final   11/16/2017 31 21 - 32 mmol/L Final   12/12/2013 29 21 - 31 mmol/L Final     BUN   Date Value Ref Range Status   09/03/2020 7 5 - 25 mg/dL Final   01/12/2019 11 5 - 25 mg/dL Final   11/16/2017 15 5 - 25 mg/dL Final   12/12/2013 19 5 - 27 mg/dL Final     Creatinine   Date Value Ref Range Status   09/03/2020 0 53 (L) 0 60 - 1 30 mg/dL Final     Comment:     Standardized to IDMS reference method   01/12/2019 0 74 0 60 - 1 30 mg/dL Final     Comment:     Standardized to IDMS reference method   11/16/2017 0 80 0 60 - 1 30 mg/dL Final     Comment:     Standardized to IDMS reference method 12/12/2013 0 64 0 60 - 1 30 mg/dL Final     Comment:     Result Comment: Standardized to IDMS reference method     Calcium   Date Value Ref Range Status   09/03/2020 8 6 8 3 - 10 1 mg/dL Final   01/12/2019 8 5 8 3 - 10 1 mg/dL Final   11/16/2017 9 1 8 3 - 10 1 mg/dL Final   12/12/2013 9 1 8 3 - 10 1 mg/dL Final     Albumin   Date Value Ref Range Status   09/03/2020 3 3 (L) 3 5 - 5 0 g/dL Final   01/12/2019 3 6 3 5 - 5 0 g/dL Final   11/16/2017 3 4 (L) 3 5 - 5 0 g/dL Final     Total Bilirubin   Date Value Ref Range Status   09/03/2020 0 81 0 20 - 1 00 mg/dL Final     Comment:     Use of this assay is not recommended for patients undergoing treatment with eltrombopag due to the potential for falsely elevated results  01/12/2019 0 85 0 20 - 1 00 mg/dL Final   11/16/2017 0 30 0 20 - 1 00 mg/dL Final     Alkaline Phosphatase   Date Value Ref Range Status   09/03/2020 90 46 - 116 U/L Final   01/12/2019 53 46 - 116 U/L Final   11/16/2017 63 46 - 116 U/L Final     AST   Date Value Ref Range Status   09/03/2020 57 (H) 5 - 45 U/L Final     Comment:     Specimen collection should occur prior to Sulfasalazine administration due to the potential for falsely depressed results  01/12/2019 20 5 - 45 U/L Final     Comment:       Specimen collection should occur prior to Sulfasalazine administration due to the potential for falsely depressed results  11/16/2017 32 5 - 45 U/L Final     Comment:       Specimen collection should occur prior to Sulfasalazine administration due to the potential for falsely depressed results  ALT   Date Value Ref Range Status   09/03/2020 23 12 - 78 U/L Final     Comment:     Specimen collection should occur prior to Sulfasalazine and/or Sulfapyridine administration due to the potential for falsely depressed results      01/12/2019 23 12 - 78 U/L Final     Comment:       Specimen collection should occur prior to Sulfasalazine and/or Sulfapyridine administration due to the potential for falsely depressed results  11/16/2017 70 12 - 78 U/L Final     Comment:       Specimen collection should occur prior to Sulfasalazine administration due to the potential for falsely depressed results  No results found for: LDH  No results found for: TSH  No results found for: [id number]   No results found for: FREET4      RECENT IMAGING:  Procedure: MRI cervical spine with and without contrast    Result Date: 3/30/2023  Narrative: MRI CERVICAL SPINE WITH AND WITHOUT CONTRAST INDICATION: Z98 1: Arthrodesis status M79 18: Myalgia, other site M54 2: Cervicalgia R51 9: Headache, unspecified  COMPARISON:  MRI cervical spine 1/21/2013 TECHNIQUE:  Multiplanar, multisequence imaging of the cervical spine was performed before and after gadolinium administration     IV Contrast:  7 mL of Gadobutrol injection (SINGLE-DOSE) IMAGE QUALITY:  Diagnostic  FINDINGS: POSTSURGICAL FINDINGS: Interval bilateral decompressive laminectomies at the C4-C7 levels with placement of bilateral transpedicular screws at the at the C3-T1 levels  ALIGNMENT:  There is straightening of the mid to upper cervical lordosis  No compression fracture  No subluxation  No scoliosis  MARROW SIGNAL:  Normal marrow signal is identified within the visualized bony structures  No discrete marrow lesion  CERVICAL AND VISUALIZED UPPER THORACIC CORD:  Normal signal within the visualized cord  PREVERTEBRAL AND PARASPINAL SOFT TISSUES:  Normal  VISUALIZED POSTERIOR FOSSA:  The visualized posterior fossa demonstrates no abnormal signal  CERVICAL DISC SPACES: C2-3: No focal disc herniation, central canal stenosis, or neural foraminal narrowing  C3-4: Stable tiny central disc protrusion without central canal or neural foraminal narrowing  C4-5: Stable broad-based left paracentral disc protrusion  Improved patency of the central canal status post bilateral laminectomies  Evaluation of the neural foramina is limited due to susceptibility artifacts   C5-6: Stable central disc protrusion and bilateral uncovertebral hypertrophy  Improved patency of the central canal status post bilateral laminectomies  Evaluation of the neural foramina is limited due to susceptibility artifacts  C6-7: Stable central disc protrusion and bilateral uncovertebral hypertrophy  Improved patency of the central canal status post bilateral laminectomies  Evaluation of the neural foramina is limited due to susceptibility artifacts  C7-T1: Stable tiny right paracentral disc protrusion  No central canal narrowing  Right subarticular/lateral recess narrowing  No neural foraminal stenosis  UPPER THORACIC DISC SPACES:  Normal  POSTCONTRAST IMAGING:  Normal      Impression: 1  Interval bilateral decompressive laminectomies at the C4-C7 levels with placement of bilateral transpedicular screws at the at the C3-T1 levels  Stable disc protrusions at the C3-4 through the C7-T1 levels  2  The cervical cord appears normal in caliber and signal with no evidence of focal impingement  Workstation performed: [id number]             Assessment/Plan  Ms Jose Roberto Chu is a 59 yr female with newly diagnosed melanoma of her right foot here for disease management discussion  1  Malignant melanoma of right lower extremity including hip ([facility] [address] )  1  Melanoma: I had an extensive discussion with the patient and her stepmother regarding her  diagnosis, prognosis, and recommendations for further management  We reviewed her melanoma history, current findings, pathology and imaging tests  We discussed that she has a least stage IIIc disease by way of having satellite lesions that were incompletely cleared with wide local excision in the office  I do recommend biopsying the right inguinal/thigh lesion as well  We discussed that this could be melanoma versus a reactionary lymph node given the open wound on her foot  She is set to see Dr Galina Pak tomorrow and I am hoping he will be able to biopsy this in the office      I did discuss that she should undergo complete full body imaging to determine extent of disease  I discussed that this would determine next steps in terms of treatment options including systemic treatment for stage IV disease versus neoadjuvant approach to resection versus surgery and then treatment  We discussed that treatment mainstay for melanoma is based on two principles - targeted therapy and immunotherapy  We discussed in great deal the reasoning and rationale behind both treatments  She would require a BRAF V600 mutation to be eligible for targeted therapy with BRAF/MEK inhibitor  We will send her tumor for mutation testing with next generation sequencing  We discussed that ultimately decisions versus treatment and surgery and sequencing would depend on final staging  This will depend on imaging  I have ordered a PET scan and a brain MRI  She will get these scheduled and I will call her with results once they have been completed  I have also ordered her for baseline blood work including CBC with differential, comprehensive metabolic panel, and LDH  We discussed that she will ultimately require ongoing monitoring and surveillance, both for disease recurrence as well as a new primary melanoma as well as other nonmelanoma skin cancers  This includes physical exams and labs, including a comprehensive metabolic panel, CBC with differential and LDH; periodic imaging studies with either CT chest, abdomen and pelvis or PET/CT scans  We discussed the importance of regular cutaneous self examinations and reviewed the features of lesions that could be concerning for skin cancer  I did explain that she  is at risk for developing another primary melanoma as well  We also discussed avoidance of unnecessary sun exposure and use of sun protective clothing and sunscreen  I also recommended routine follow up and skin checks with dermatology      Family Screening: her  first-degree relatives, namely his siblings, parents, and children, have an increased risk of developing a melanoma over the general population given  her  diagnosis of melanoma, and should have annual dermatologic screening         - Ambulatory Referral to Hematology / Oncology  - NM pet ct tumor imaging whole body; Future  - MRI brain w wo contrast; Future  - Ambulatory Referral to Wound Care; Future  - CBC and differential; Future  - Comprehensive metabolic panel; Future  - LD,Blood; Future  - Ambulatory Referral to Oncology Genetics; Future    2  Family history of breast cancer  3  Family history of breast cancer in first degree relative  She has a family history of breastcancer in her family  Given this history and her melanoma, I am concerned that there could be a genetic underpinning to her  disease and recommend that she speak with a genetic counselor regarding germline genetic testing  I have provided information for the genetic counselor and placed a referral         - Ambulatory Referral to Oncology Genetics; Future          Return after imaging and decision on surgery, for Office Visit  Ms Yasmany Couch had all her questions and concerns addressed and she knows to call with any additional issues  Thank you for allowing me to participate in Ms Yepez's care        Emerita Waldron MD, PhD

## 2023-04-25 ENCOUNTER — CONSULT (OUTPATIENT)
Dept: SURGICAL ONCOLOGY | Facility: CLINIC | Age: 63
End: 2023-04-25

## 2023-04-25 ENCOUNTER — TELEPHONE (OUTPATIENT)
Dept: GENETICS | Facility: CLINIC | Age: 63
End: 2023-04-25

## 2023-04-25 VITALS
WEIGHT: 157 LBS | OXYGEN SATURATION: 97 % | DIASTOLIC BLOOD PRESSURE: 80 MMHG | RESPIRATION RATE: 16 BRPM | SYSTOLIC BLOOD PRESSURE: 120 MMHG | HEIGHT: 67 IN | HEART RATE: 77 BPM | BODY MASS INDEX: 24.64 KG/M2 | TEMPERATURE: 98.1 F

## 2023-04-25 DIAGNOSIS — C43.71 MALIGNANT MELANOMA OF RIGHT LOWER EXTREMITY INCLUDING HIP (HCC): Primary | ICD-10-CM

## 2023-04-25 DIAGNOSIS — R19.09 GROIN MASS: ICD-10-CM

## 2023-04-25 NOTE — LETTER
April 25, 2023     Kermit Traylor  [address] [facility] [address]     Patient: Wendy Chong   YOB: 1960   Date of Visit: 4/25/2023       Dear Dr Devon Avelar: Thank you for referring Kimani Ryan to me for evaluation  Below are my notes for this consultation  If you have questions, please do not hesitate to call me  I look forward to following your patient along with you  Sincerely,        Nya Santa MD        CC: TABITHA Acosta MD Duey Lent, MD  4/25/2023  9:54 AM  Sign when Signing Visit               Surgical Oncology Consult       [address] [facility] [address] [address] [phone]    Wendy Chong  1960  [id number]  [address] [facility]  [address]  [facility] [address] [phone]    Diagnoses and all orders for this visit:    Malignant melanoma of right lower extremity including hip [facility])  -     Ambulatory Referral to Surgical Oncology        Chief Complaint   Patient presents with   • Consult       Return in about 3 months (around 7/25/2023) for Office Visit      Oncology History   Malignant melanoma of right lower extremity including hip ([facility] [address] )   3/28/2023 Initial Diagnosis    Malignant melanoma of right lower extremity including hip ([facility] [address] )     4/6/2023 Surgery    Wide excision by podiatry:  Skin, foot, right, wide excision:  Melanoma, acral type, ulcerated, Breslow thickness: 4 2 mm  PATHOLOGIC STAGE CLASSIFICATION (pTNM, AJCC 8th Edition)     pT Category  pT4b         4/7/2023 -  Cancer Staged    Staging form: Melanoma of the Skin, AJCC 8th Edition  - Clinical stage from 4/7/2023: Stage III (cT4b, cN1c, cM0) - Signed by Cheyenne Bautista MD on 4/24/2023 4/7/2023 -  Cancer Staged    Staging form: Melanoma of the Skin, AJCC 8th Edition  - Pathologic stage from 4/7/2023: Stage IIIC (pT4b, pN1c, cM0) - Signed by Mika Galindo MD on 4/24/2023           History of Present Illness: 71-year-old female who noticed a lesion on her right forefoot approximately 3 months ago  She initially thought this was a callus or a wart and treated this with over-the-counter topical agents  She ultimately saw her podiatrist and a, excisional biopsy was performed  This revealed malignant melanoma, 4 2 mm  There was ulceration  4 mitoses per millimeter squared  Invasive melanoma was present at the margin  Satellite nodules were seen  Shortly after the biopsy, she noticed adenopathy in the right inguinal region  She comes in now to discuss further therapy  She denies any new abdominal pain, nausea, vomiting, back pain, bone pain, headaches, or cough  PET/CT is pending  Review of Systems  Complete ROS Surg Onc:   Constitutional: The patient denies new or recent history of general fatigue, no recent weight loss, no change in appetite  Eyes: No complaints of visual problems, no scleral icterus  ENT: no complaints of ear pain, no hoarseness, no difficulty swallowing,  no tinnitus and no new masses in head, oral cavity, or neck  Cardiovascular: No complaints of chest pain, no palpitations, no ankle edema  Respiratory: No complaints of shortness of breath, no cough  Gastrointestinal: No complaints of jaundice, no bloody stools, no pale stools  Genitourinary: No complaints of dysuria, no hematuria, no nocturia, no frequent urination, no urethral discharge  Musculoskeletal: No complaints of weakness, paralysis, joint stiffness or arthralgias  Integumentary: No complaints of rash, no new lesions  Neurological: No complaints of convulsions, no seizures, no dizziness  Hematologic/Lymphatic: No complaints of easy bruising  Endocrine:  No hot or cold intolerance  No polydipsia, polyphagia, or polyuria  Allergy/immunology:  No environmental allergies  No food allergies  Not immunocompromised  Skin:  No pallor or rash  No wound            Patient Active Problem List   Diagnosis   • Mild intermittent asthma   • Esophageal dysmotility   • Allergic rhinitis with postnasal drip   • Tobacco abuse   • NATE (obstructive sleep apnea)   • Cough   • Vitamin D deficiency   • High blood triglycerides   • Diarrhea   • Asthma   • Need for vaccination   • Unintentional weight loss   • Anemia   • Enlarged liver   • Low folic acid   • High vitamin A level   • High serum vitamin E   • Low serum vitamin B12   • Hypokalemia   • Alcoholism (HCC)   • Anxiety disorder   • Cervical spinal stenosis   • Diverticular disease of colon   • Erosive esophagitis   • Hemorrhoids   • History of gastroesophageal reflux (GERD)   • Hypertension   • Irritable bowel syndrome   • Need for influenza vaccination   • Postoperative examination   • Pure hypercholesterolemia   • Thoracic and lumbosacral neuritis   • Pre-operative clearance   • Angiomyolipoma   • Chest pain on respiration   • Left flank pain   • Change in stool habits   • Epicondylitis, lateral (tennis elbow)   • Gallbladder disease   • Limb pain   • Impaired fasting glucose   • Microhematuria   • Pelvic pain in female   • Venous insufficiency   • Annual physical exam   • Fatigue   • Foot pain   • S/P cervical spinal fusion   • Fatty liver   • Abnormal bowel movement   • Hypertensive urgency   • Hypertriglyceridemia   • Electrolyte imbalance   • Myofascial pain syndrome, cervical   • Cervicalgia   • Occipital headache   • Cervical post-laminectomy syndrome   • Malignant melanoma of right lower extremity including hip (HCC)     Past Medical History:   Diagnosis Date   • Cervical disc disorder    • Stenosis of cervical spine region      Past Surgical History:   Procedure Laterality Date   • BREAST BIOPSY Left     core   • BREAST BIOPSY Right     core   • CERVICAL FUSION  2013    cC3-C4, C4-C5, C5 C6, C6-C7, C7-T1   •  SECTION     • ELBOW SURGERY  1991   • KNEE SURGERY Bilateral 20YRS AGO     Family History   Problem Relation Age of Onset   • Cancer Mother    • Brain cancer Mother [de-identified]   • Heart disease Father    • Breast cancer Sister 48   • Breast cancer Sister 45   • Breast cancer Maternal Aunt 61   • Breast cancer Maternal Aunt 60   • Breast cancer Maternal Aunt 60   • Breast cancer Cousin 50   • Breast cancer Cousin 52   • Breast cancer Cousin 52   • Prostate cancer Maternal Uncle 55   • No Known Problems Son    • No Known Problems Maternal Grandmother    • No Known Problems Maternal Grandfather    • No Known Problems Paternal Grandmother    • No Known Problems Paternal Grandfather    • BRCA1 Negative Sister    • BRCA2 Negative Sister    • No Known Problems Sister    • No Known Problems Maternal Aunt    • No Known Problems Maternal Aunt    • No Known Problems Maternal Aunt    • No Known Problems Maternal Aunt      Social History     Socioeconomic History   • Marital status: Single     Spouse name: Not on file   • Number of children: Not on file   • Years of education: Not on file   • Highest education level: Not on file   Occupational History   • Not on file   Tobacco Use   • Smoking status: Every Day     Packs/day: 0 25     Types: Cigarettes     Start date: 0   • Smokeless tobacco: Never   Vaping Use   • Vaping Use: Never used   Substance and Sexual Activity   • Alcohol use: Yes     Comment: SOCIAL   • Drug use: Not Currently   • Sexual activity: Yes     Partners: Male   Other Topics Concern   • Not on file   Social History Narrative   • Not on file     Social Determinants of Health     Financial Resource Strain: Not on file   Food Insecurity: Not on file   Transportation Needs: Not on file   Physical Activity: Not on file   Stress: Not on file   Social Connections: Not on file   Intimate Partner Violence: Not on file   Housing Stability: Not on file       Current Outpatient Medications:   •  albuterol (PROVENTIL HFA,VENTOLIN HFA) 90 mcg/act inhaler, INHALE 1 PUFF EVERY 4 HOURS AS NEEDED FOR WHEEZING, Disp: 6 7 g, Rfl: 3  •  amLODIPine (NORVASC) 5 mg tablet, Take 1 tablet (5 mg total) by mouth daily (Patient taking differently: Take 10 mg by mouth daily), Disp: 90 tablet, Rfl: 1  •  atenolol (TENORMIN) 50 mg tablet, TAKE 1 TABLET BY MOUTH EVERY DAY, Disp: 90 tablet, Rfl: 0  •  escitalopram (LEXAPRO) 10 mg tablet, TAKE 1 TABLET BY MOUTH EVERY DAY, Disp: 90 tablet, Rfl: 1  •  famotidine (PEPCID) 20 mg tablet, TAKE 1 TABLET BY MOUTH TWICE A DAY, Disp: 180 tablet, Rfl: 1  •  hydrochlorothiazide (MICROZIDE) 12 5 mg capsule, Take 12 5 mg by mouth daily, Disp: , Rfl:   •  atorvastatin (LIPITOR) 20 mg tablet, Take 20 mg by mouth daily (Patient not taking: Reported on 4/25/2023), Disp: , Rfl:   •  oxyCODONE-acetaminophen (Percocet) 5-325 mg per tablet, Take 1 tablet by mouth every 4 (four) hours as needed for moderate pain Max Daily Amount: 6 tablets (Patient not taking: Reported on 4/24/2023), Disp: 20 tablet, Rfl: 0  •  Potassium Chloride ER 20 MEQ TBCR, Take 2 tablets (40 mEq total) by mouth daily (Patient not taking: Reported on 4/25/2023), Disp: 180 tablet, Rfl: 1  Allergies   Allergen Reactions   • Methocarbamol Shortness Of Breath and Swelling   • Tramadol Shortness Of Breath and Swelling     Vitals:    04/25/23 0905   BP: 120/80   Pulse: 77   Resp: 16   Temp: 98 1 °F (36 7 °C)   SpO2: 97%       Physical Exam   Constitutional: General appearance: The Patient is well-developed and well-nourished who appears the stated age in no acute distress  Patient is pleasant and talkative  HEENT:  Normocephalic  Sclerae are anicteric  Mucous membranes are moist  Neck is supple without adenopathy  No JVD  Chest: The lungs are clear to auscultation  Cardiac: Heart is regular rate  Abdomen: Abdomen is soft, non-tender, non-distended and without masses  Extremities: There is no clubbing or cyanosis  There is no edema  Symmetric    Neuro: Grossly nonfocal  Gait is normal      Lymphatic: No evidence of cervical adenopathy bilaterally  No evidence of axillary adenopathy bilaterally  There is right inguinal adenopathy  Skin: Warm, anicteric  There is a healing biopsy site on the right forefoot  Psych:  Patient is pleasant and talkative  Breasts:      Pathology:  Final Diagnosis   A  Skin, foot, right, wide excision:  Melanoma, acral type, ulcerated, Breslow thickness: 4 2 mm  See synoptic report and note         Note:  Multiple melanoma satellite nodules are seen, predominantly involving the papillary dermis  One of the satellite nodules extends to the 12 o'clock end margin  All other peripheral margins and deep margin are free       The pathologic stage (pTNM, AJCC 8th edition) is pT4b pN1c (at least)     Immunohistochemical stains for SOX10 and Melan-A will be reported in an addendum       The findings were discussed with Dr Deuce Mendez  04/12/23  Per discussion with Sherri Guzmán, satellite nodule was seen macroscopically (clinically detected) near the 12 o'clock margin        Electronically signed by Tammy Girmes MD on 4/12/2023 at  9:45 PM   Comments: This is an appended report  These results have been appended to a previously preliminary verified report  Additional Information    All reported additional testing was performed with appropriately reactive controls   These tests were developed and their performance characteristics determined by [facility] or appropriate performing facility, though some tests may be performed on tissues which have not been validated for performance characteristics (such as staining performed on alcohol exposed cell blocks and decalcified tissues)   Results should be interpreted with caution and in the context of the patients’ clinical condition  These tests may not be cleared or approved by the [place]   Food and Drug Administration, though the FDA has determined that such clearance or approval is not necessary  These tests are used for clinical purposes and they should not be regarded as investigational or for research  This laboratory has been approved by [place] [id number], designated as a high-complexity laboratory and is qualified to perform these tests      Synoptic Checklist   MELANOMA OF THE SKIN: Excision, Re-Excision  8th Edition - Protocol posted: 11/10/2021  MELANOMA OF THE SKIN: EXCISION, RE-EXCISION  - All Specimens  SPECIMEN   Procedure  Excision    Specimen Laterality  Right    TUMOR   Tumor Site  Skin of lower limb and hip: Right foot          Histologic Type  Acral melanoma    Maximum Tumor (Breslow) Thickness (Millimeters)  4 2 mm   Macroscopic Satellite Nodule(s)  Present    Ulceration  Present    Anatomic (Grupo) Level  IV (Melanoma invades reticular dermis)    Mitotic Rate  4 mitoses per mm2   Microsatellite(s)  Present    Lymphovascular Invasion  Not identified    Neurotropism  Not identified    Tumor-Infiltrating Lymphocytes  Present, nonbrisk    Tumor Regression  Not identified    MARGINS     Margin Status for Invasive Melanoma  Invasive melanoma present at margin    Margin(s) Involved by Invasive Melanoma  Peripheral: One of the satellite nodules extends to the 12 o'clock end margin      Margin Status for Melanoma in situ  All margins negative for melanoma in situ    REGIONAL LYMPH NODES     Regional Lymph Node Status  Not applicable (no regional lymph nodes submitted or found)    PATHOLOGIC STAGE CLASSIFICATION (pTNM, AJCC 8th Edition)     pT Category  pT4b    pN Category  pN1c             Labs:      Imaging  MRI cervical spine with and without contrast    Result Date: 3/30/2023  Narrative: MRI CERVICAL SPINE WITH AND WITHOUT CONTRAST INDICATION: Z98 1: Arthrodesis status M79 18: Myalgia, other site M54 2: Cervicalgia R51 9: Headache, unspecified   COMPARISON:  MRI cervical spine 1/21/2013 TECHNIQUE:  Multiplanar, multisequence imaging of the cervical spine was performed before and after gadolinium administration     IV Contrast:  7 mL of Gadobutrol injection (SINGLE-DOSE) IMAGE QUALITY:  Diagnostic  FINDINGS: POSTSURGICAL FINDINGS: Interval bilateral decompressive laminectomies at the C4-C7 levels with placement of bilateral transpedicular screws at the at the C3-T1 levels  ALIGNMENT:  There is straightening of the mid to upper cervical lordosis  No compression fracture  No subluxation  No scoliosis  MARROW SIGNAL:  Normal marrow signal is identified within the visualized bony structures  No discrete marrow lesion  CERVICAL AND VISUALIZED UPPER THORACIC CORD:  Normal signal within the visualized cord  PREVERTEBRAL AND PARASPINAL SOFT TISSUES:  Normal  VISUALIZED POSTERIOR FOSSA:  The visualized posterior fossa demonstrates no abnormal signal  CERVICAL DISC SPACES: C2-3: No focal disc herniation, central canal stenosis, or neural foraminal narrowing  C3-4: Stable tiny central disc protrusion without central canal or neural foraminal narrowing  C4-5: Stable broad-based left paracentral disc protrusion  Improved patency of the central canal status post bilateral laminectomies  Evaluation of the neural foramina is limited due to susceptibility artifacts  C5-6: Stable central disc protrusion and bilateral uncovertebral hypertrophy  Improved patency of the central canal status post bilateral laminectomies  Evaluation of the neural foramina is limited due to susceptibility artifacts  C6-7: Stable central disc protrusion and bilateral uncovertebral hypertrophy  Improved patency of the central canal status post bilateral laminectomies  Evaluation of the neural foramina is limited due to susceptibility artifacts  C7-T1: Stable tiny right paracentral disc protrusion  No central canal narrowing  Right subarticular/lateral recess narrowing  No neural foraminal stenosis  UPPER THORACIC DISC SPACES:  Normal  POSTCONTRAST IMAGING:  Normal      Impression: 1   Interval bilateral decompressive laminectomies at the C4-C7 levels with placement of bilateral transpedicular screws at the at the C3-T1 levels  Stable disc protrusions at the C3-4 through the C7-T1 levels  2  The cervical cord appears normal in caliber and signal with no evidence of focal impingement  Workstation performed: [id number]     I reviewed the above laboratory and imaging data  Procedure: Sterile conditions and local anesthesia, fine-needle aspiration biopsy was performed without difficulty  She tolerated this well  Discussion/Summary: 21-year-old female with a clinical [id number] melanoma  She does have satellite lesions and is clinically stage III  I have recommended performing a fine-needle aspiration biopsy of the right inguinal lymph node  The risks were explained including bleeding and infection  Informed consent was obtained  She tolerated this well  We will await the results of her PET/CT  If the PET/CT shows distant metastatic disease, I would recommend immunotherapy  Surgery to the foot could then be performed for palliative reasons  If she has stage III disease by the PET, I would recommend neoadjuvant immunotherapy for 3 months followed by surgical intervention at that completing adjuvant immunotherapy based on the SWOG 1801 study  Surgical intervention to the foot would likely be a transmetatarsal amputation for her best functional outcome assuming there is no significant local response to immunotherapy  She should also have a fiducial placed in the inguinal node prior to immunotherapy if she is stage III  We will await the results of the PET/CT  I have reached out to radiology to see if this can be expedited  I will tentatively see her again in 3 months  She is agreeable to this  All her questions were answered

## 2023-04-25 NOTE — TELEPHONE ENCOUNTER
I called Owen Nava to schedule a new patient appointment with the Cancer Risk and Genetics Program       Outcome:   I left a voice message encouraging the patient to call the genetics team at [phone] to schedule this appointment  Follow-up: We will make one more attempt to reach the patient

## 2023-04-25 NOTE — PROGRESS NOTES
Surgical Oncology Consult       [address] [facility]  [address] [facility] [address] [phone]    Delilah Joseph  1960  [id number]  [address] [facility]  [address] [facility] [address] [phone]    Diagnoses and all orders for this visit:    Malignant melanoma of right lower extremity including hip [facility])  -     Ambulatory Referral to Surgical Oncology        Chief Complaint   Patient presents with   • Consult       Return in about 3 months (around 7/25/2023) for Office Visit  Oncology History   Malignant melanoma of right lower extremity including hip ([facility] [address] )   3/28/2023 Initial Diagnosis    Malignant melanoma of right lower extremity including hip ([facility] [address] )     4/6/2023 Surgery    Wide excision by podiatry:  Skin, foot, right, wide excision:  Melanoma, acral type, ulcerated, Breslow thickness: 4 2 mm  PATHOLOGIC STAGE CLASSIFICATION (pTNM, AJCC 8th Edition)     pT Category  pT4b         4/7/2023 -  Cancer Staged    Staging form: Melanoma of the Skin, AJCC 8th Edition  - Clinical stage from 4/7/2023: Stage III (cT4b, cN1c, cM0) - Signed by Maynor Martell MD on 4/24/2023 4/7/2023 -  Cancer Staged    Staging form: Melanoma of the Skin, AJCC 8th Edition  - Pathologic stage from 4/7/2023: Stage IIIC (pT4b, pN1c, cM0) - Signed by Maynor Martell MD on 4/24/2023           History of Present Illness: 80-year-old female who noticed a lesion on her right forefoot approximately 3 months ago  She initially thought this was a callus or a wart and treated this with over-the-counter topical agents  She ultimately saw her podiatrist and a, excisional biopsy was performed  This revealed malignant melanoma, 4 2 mm  There was ulceration  4 mitoses per millimeter squared  Invasive melanoma was present at the margin  Satellite nodules were seen    Shortly after the biopsy, she noticed adenopathy in the right inguinal region  She comes in now to discuss further therapy  She denies any new abdominal pain, nausea, vomiting, back pain, bone pain, headaches, or cough  PET/CT is pending  Review of Systems  Complete ROS Surg Onc:   Constitutional: The patient denies new or recent history of general fatigue, no recent weight loss, no change in appetite  Eyes: No complaints of visual problems, no scleral icterus  ENT: no complaints of ear pain, no hoarseness, no difficulty swallowing,  no tinnitus and no new masses in head, oral cavity, or neck  Cardiovascular: No complaints of chest pain, no palpitations, no ankle edema  Respiratory: No complaints of shortness of breath, no cough  Gastrointestinal: No complaints of jaundice, no bloody stools, no pale stools  Genitourinary: No complaints of dysuria, no hematuria, no nocturia, no frequent urination, no urethral discharge  Musculoskeletal: No complaints of weakness, paralysis, joint stiffness or arthralgias  Integumentary: No complaints of rash, no new lesions  Neurological: No complaints of convulsions, no seizures, no dizziness  Hematologic/Lymphatic: No complaints of easy bruising  Endocrine:  No hot or cold intolerance  No polydipsia, polyphagia, or polyuria  Allergy/immunology:  No environmental allergies  No food allergies  Not immunocompromised  Skin:  No pallor or rash  No wound            Patient Active Problem List   Diagnosis   • Mild intermittent asthma   • Esophageal dysmotility   • Allergic rhinitis with postnasal drip   • Tobacco abuse   • NATE (obstructive sleep apnea)   • Cough   • Vitamin D deficiency   • High blood triglycerides   • Diarrhea   • Asthma   • Need for vaccination   • Unintentional weight loss   • Anemia   • Enlarged liver   • Low folic acid   • High vitamin A level   • High serum vitamin E   • Low serum vitamin B12   • Hypokalemia   • Alcoholism (HCC)   • Anxiety disorder   • Cervical spinal stenosis   • Diverticular disease of colon   • Erosive esophagitis   • Hemorrhoids   • History of gastroesophageal reflux (GERD)   • Hypertension   • Irritable bowel syndrome   • Need for influenza vaccination   • Postoperative examination   • Pure hypercholesterolemia   • Thoracic and lumbosacral neuritis   • Pre-operative clearance   • Angiomyolipoma   • Chest pain on respiration   • Left flank pain   • Change in stool habits   • Epicondylitis, lateral (tennis elbow)   • Gallbladder disease   • Limb pain   • Impaired fasting glucose   • Microhematuria   • Pelvic pain in female   • Venous insufficiency   • Annual physical exam   • Fatigue   • Foot pain   • S/P cervical spinal fusion   • Fatty liver   • Abnormal bowel movement   • Hypertensive urgency   • Hypertriglyceridemia   • Electrolyte imbalance   • Myofascial pain syndrome, cervical   • Cervicalgia   • Occipital headache   • Cervical post-laminectomy syndrome   • Malignant melanoma of right lower extremity including hip (HCC)     Past Medical History:   Diagnosis Date   • Cervical disc disorder    • Stenosis of cervical spine region      Past Surgical History:   Procedure Laterality Date   • BREAST BIOPSY Left 2009    core   • BREAST BIOPSY Right 2009    core   • CERVICAL FUSION  2013    cC3-C4, C4-C5, C5 C6, C6-C7, C7-T1   •  SECTION     • ELBOW SURGERY     • KNEE SURGERY Bilateral 20YRS AGO     Family History   Problem Relation Age of Onset   • Cancer Mother    • Brain cancer Mother [de-identified]   • Heart disease Father    • Breast cancer Sister 48   • Breast cancer Sister 45   • Breast cancer Maternal Aunt 60   • Breast cancer Maternal Aunt 60   • Breast cancer Maternal Aunt 60   • Breast cancer Cousin 50   • Breast cancer Cousin 52   • Breast cancer Cousin 52   • Prostate cancer Maternal Uncle 55   • No Known Problems Son    • No Known Problems Maternal Grandmother    • No Known Problems Maternal Grandfather    • No Known Problems Paternal Grandmother    • No Known Problems Paternal Grandfather    • BRCA1 Negative Sister    • BRCA2 Negative Sister    • No Known Problems Sister    • No Known Problems Maternal Aunt    • No Known Problems Maternal Aunt    • No Known Problems Maternal Aunt    • No Known Problems Maternal Aunt      Social History     Socioeconomic History   • Marital status: Single     Spouse name: Not on file   • Number of children: Not on file   • Years of education: Not on file   • Highest education level: Not on file   Occupational History   • Not on file   Tobacco Use   • Smoking status: Every Day     Packs/day: 0 25     Types: Cigarettes     Start date: 0   • Smokeless tobacco: Never   Vaping Use   • Vaping Use: Never used   Substance and Sexual Activity   • Alcohol use: Yes     Comment: SOCIAL   • Drug use: Not Currently   • Sexual activity: Yes     Partners: Male   Other Topics Concern   • Not on file   Social History Narrative   • Not on file     Social Determinants of Health     Financial Resource Strain: Not on file   Food Insecurity: Not on file   Transportation Needs: Not on file   Physical Activity: Not on file   Stress: Not on file   Social Connections: Not on file   Intimate Partner Violence: Not on file   Housing Stability: Not on file       Current Outpatient Medications:   •  albuterol (PROVENTIL HFA,VENTOLIN HFA) 90 mcg/act inhaler, INHALE 1 PUFF EVERY 4 HOURS AS NEEDED FOR WHEEZING, Disp: 6 7 g, Rfl: 3  •  amLODIPine (NORVASC) 5 mg tablet, Take 1 tablet (5 mg total) by mouth daily (Patient taking differently: Take 10 mg by mouth daily), Disp: 90 tablet, Rfl: 1  •  atenolol (TENORMIN) 50 mg tablet, TAKE 1 TABLET BY MOUTH EVERY DAY, Disp: 90 tablet, Rfl: 0  •  escitalopram (LEXAPRO) 10 mg tablet, TAKE 1 TABLET BY MOUTH EVERY DAY, Disp: 90 tablet, Rfl: 1  •  famotidine (PEPCID) 20 mg tablet, TAKE 1 TABLET BY MOUTH TWICE A DAY, Disp: 180 tablet, Rfl: 1  •  hydrochlorothiazide (MICROZIDE) 12 5 mg capsule, Take 12 5 mg by mouth daily, Disp: , Rfl:   •  atorvastatin (LIPITOR) 20 mg tablet, Take 20 mg by mouth daily (Patient not taking: Reported on 4/25/2023), Disp: , Rfl:   •  oxyCODONE-acetaminophen (Percocet) 5-325 mg per tablet, Take 1 tablet by mouth every 4 (four) hours as needed for moderate pain Max Daily Amount: 6 tablets (Patient not taking: Reported on 4/24/2023), Disp: 20 tablet, Rfl: 0  •  Potassium Chloride ER 20 MEQ TBCR, Take 2 tablets (40 mEq total) by mouth daily (Patient not taking: Reported on 4/25/2023), Disp: 180 tablet, Rfl: 1  Allergies   Allergen Reactions   • Methocarbamol Shortness Of Breath and Swelling   • Tramadol Shortness Of Breath and Swelling     Vitals:    04/25/23 0905   BP: 120/80   Pulse: 77   Resp: 16   Temp: 98 1 °F (36 7 °C)   SpO2: 97%       Physical Exam   Constitutional: General appearance: The Patient is well-developed and well-nourished who appears the stated age in no acute distress  Patient is pleasant and talkative  HEENT:  Normocephalic  Sclerae are anicteric  Mucous membranes are moist  Neck is supple without adenopathy  No JVD  Chest: The lungs are clear to auscultation  Cardiac: Heart is regular rate  Abdomen: Abdomen is soft, non-tender, non-distended and without masses  Extremities: There is no clubbing or cyanosis  There is no edema  Symmetric  Neuro: Grossly nonfocal  Gait is normal      Lymphatic: No evidence of cervical adenopathy bilaterally  No evidence of axillary adenopathy bilaterally  There is right inguinal adenopathy  Skin: Warm, anicteric  There is a healing biopsy site on the right forefoot  Psych:  Patient is pleasant and talkative  Breasts:      Pathology:  Final Diagnosis   A  Skin, foot, right, wide excision:  Melanoma, acral type, ulcerated, Breslow thickness: 4 2 mm  See synoptic report and note         Note:  Multiple melanoma satellite nodules are seen, predominantly involving the papillary dermis   One of the satellite nodules extends to the 12 o'clock end margin  All other peripheral margins and deep margin are free       The pathologic stage (pTNM, AJCC 8th edition) is pT4b pN1c (at least)     Immunohistochemical stains for SOX10 and Melan-A will be reported in an addendum       The findings were discussed with Dr Emani Pickard  04/12/23  Per discussion with Jose Guzmán, satellite nodule was seen macroscopically (clinically detected) near the 12 o'clock margin        Electronically signed by Yoan Zavala MD on 4/12/2023 at  9:45 PM   Comments: This is an appended report  These results have been appended to a previously preliminary verified report  Additional Information    All reported additional testing was performed with appropriately reactive controls   These tests were developed and their performance characteristics determined by [facility] or appropriate performing facility, though some tests may be performed on tissues which have not been validated for performance characteristics (such as staining performed on alcohol exposed cell blocks and decalcified tissues)   Results should be interpreted with caution and in the context of the patients’ clinical condition  These tests may not be cleared or approved by the [place]  Food and Drug Administration, though the FDA has determined that such clearance or approval is not necessary  These tests are used for clinical purposes and they should not be regarded as investigational or for research  This laboratory has been approved by CLIA 88, designated as a high-complexity laboratory and is qualified to perform these tests        Synoptic Checklist   MELANOMA OF THE SKIN: Excision, Re-Excision  8th Edition - Protocol posted: 11/10/2021  MELANOMA OF THE SKIN: EXCISION, RE-EXCISION  - All Specimens  SPECIMEN   Procedure  Excision    Specimen Laterality  Right    TUMOR   Tumor Site  Skin of lower limb and hip: Right foot          Histologic Type  Acral melanoma    Maximum Tumor (Breslow) Thickness (Millimeters)  4 2 mm   Macroscopic Satellite Nodule(s)  Present    Ulceration  Present    Anatomic (Grupo) Level  IV (Melanoma invades reticular dermis)    Mitotic Rate  4 mitoses per mm2   Microsatellite(s)  Present    Lymphovascular Invasion  Not identified    Neurotropism  Not identified    Tumor-Infiltrating Lymphocytes  Present, nonbrisk    Tumor Regression  Not identified    MARGINS     Margin Status for Invasive Melanoma  Invasive melanoma present at margin    Margin(s) Involved by Invasive Melanoma  Peripheral: One of the satellite nodules extends to the 12 o'clock end margin      Margin Status for Melanoma in situ  All margins negative for melanoma in situ    REGIONAL LYMPH NODES     Regional Lymph Node Status  Not applicable (no regional lymph nodes submitted or found)    PATHOLOGIC STAGE CLASSIFICATION (pTNM, AJCC 8th Edition)     pT Category  pT4b    pN Category  pN1c             Labs:      Imaging  MRI cervical spine with and without contrast    Result Date: 3/30/2023  Narrative: MRI CERVICAL SPINE WITH AND WITHOUT CONTRAST INDICATION: Z98 1: Arthrodesis status M79 18: Myalgia, other site M54 2: Cervicalgia R51 9: Headache, unspecified  COMPARISON:  MRI cervical spine 1/21/2013 TECHNIQUE:  Multiplanar, multisequence imaging of the cervical spine was performed before and after gadolinium administration     IV Contrast:  7 mL of Gadobutrol injection (SINGLE-DOSE) IMAGE QUALITY:  Diagnostic  FINDINGS: POSTSURGICAL FINDINGS: Interval bilateral decompressive laminectomies at the C4-C7 levels with placement of bilateral transpedicular screws at the at the C3-T1 levels  ALIGNMENT:  There is straightening of the mid to upper cervical lordosis  No compression fracture  No subluxation  No scoliosis  MARROW SIGNAL:  Normal marrow signal is identified within the visualized bony structures  No discrete marrow lesion   CERVICAL AND VISUALIZED UPPER THORACIC CORD:  Normal signal within the visualized cord  PREVERTEBRAL AND PARASPINAL SOFT TISSUES:  Normal  VISUALIZED POSTERIOR FOSSA:  The visualized posterior fossa demonstrates no abnormal signal  CERVICAL DISC SPACES: C2-3: No focal disc herniation, central canal stenosis, or neural foraminal narrowing  C3-4: Stable tiny central disc protrusion without central canal or neural foraminal narrowing  C4-5: Stable broad-based left paracentral disc protrusion  Improved patency of the central canal status post bilateral laminectomies  Evaluation of the neural foramina is limited due to susceptibility artifacts  C5-6: Stable central disc protrusion and bilateral uncovertebral hypertrophy  Improved patency of the central canal status post bilateral laminectomies  Evaluation of the neural foramina is limited due to susceptibility artifacts  C6-7: Stable central disc protrusion and bilateral uncovertebral hypertrophy  Improved patency of the central canal status post bilateral laminectomies  Evaluation of the neural foramina is limited due to susceptibility artifacts  C7-T1: Stable tiny right paracentral disc protrusion  No central canal narrowing  Right subarticular/lateral recess narrowing  No neural foraminal stenosis  UPPER THORACIC DISC SPACES:  Normal  POSTCONTRAST IMAGING:  Normal      Impression: 1  Interval bilateral decompressive laminectomies at the C4-C7 levels with placement of bilateral transpedicular screws at the at the C3-T1 levels  Stable disc protrusions at the C3-4 through the C7-T1 levels  2  The cervical cord appears normal in caliber and signal with no evidence of focal impingement  Workstation performed: [id number]     I reviewed the above laboratory and imaging data  Procedure: Sterile conditions and local anesthesia, fine-needle aspiration biopsy was performed without difficulty  She tolerated this well  Discussion/Summary: 60-year-old female with a clinical [id number] melanoma  She does have satellite lesions and is clinically stage III  I have recommended performing a fine-needle aspiration biopsy of the right inguinal lymph node  The risks were explained including bleeding and infection  Informed consent was obtained  She tolerated this well  We will await the results of her PET/CT  If the PET/CT shows distant metastatic disease, I would recommend immunotherapy  Surgery to the foot could then be performed for palliative reasons  If she has stage III disease by the PET, I would recommend neoadjuvant immunotherapy for 3 months followed by surgical intervention at that completing adjuvant immunotherapy based on the SWOG 1801 study  Surgical intervention to the foot would likely be a transmetatarsal amputation for her best functional outcome assuming there is no significant local response to immunotherapy  She should also have a fiducial placed in the inguinal node prior to immunotherapy if she is stage III  We will await the results of the PET/CT  I have reached out to radiology to see if this can be expedited  I will tentatively see her again in 3 months  She is agreeable to this  All her questions were answered

## 2023-04-25 NOTE — LETTER
April 25, 2023     Kermit Fleming  [address] [facility] 40 [address]    Patient: Lele Munoz   YOB: 1960   Date of Visit: 4/25/2023       Dear Dr Scott Hodges: Thank you for referring Hannah Angulo to me for evaluation  Below are my notes for this consultation  If you have questions, please do not hesitate to call me  I look forward to following your patient along with you  Sincerely,        Belia Brandon MD        CC: TABITHA Davis MD Ferman Salisbury, MD  4/25/2023  9:53 AM  Incomplete               Surgical Oncology Consult       [address] [facility] CANCER [facility] [address] [address] [phone]    Lele Munoz  1960  [id number]  [address] [facility]  [address]  [address] [facility]  [phone]    Diagnoses and all orders for this visit:    Malignant melanoma of right lower extremity including hip [facility])  -     Ambulatory Referral to Surgical Oncology        Chief Complaint   Patient presents with   • Consult       Return in about 3 months (around 7/25/2023) for Office Visit      Oncology History   Malignant melanoma of right lower extremity including hip ([address] )   3/28/2023 Initial Diagnosis    Malignant melanoma of right lower extremity including hip ([facility] [address] )     4/6/2023 Surgery    Wide excision by podiatry:  Skin, foot, right, wide excision:  Melanoma, acral type, ulcerated, Breslow thickness: 4 2 mm  PATHOLOGIC STAGE CLASSIFICATION (pTNM, AJCC 8th Edition)     pT Category  pT4b         4/7/2023 -  Cancer Staged    Staging form: Melanoma of the Skin, AJCC 8th Edition  - Clinical stage from 4/7/2023: Stage III (cT4b, cN1c, cM0) - Signed by Aundrea Alejandre MD on 4/24/2023 4/7/2023 -  Cancer Staged    Staging form: Melanoma of the Skin, AJCC 8th Edition  - Pathologic stage from 4/7/2023: Stage IIIC (pT4b, pN1c, cM0) - Signed by Vania Nobles MD on 4/24/2023           History of Present Illness: 25-year-old female who noticed a lesion on her right forefoot approximately 3 months ago  She initially thought this was a callus or a wart and treated this with over-the-counter topical agents  She ultimately saw her podiatrist and a, excisional biopsy was performed  This revealed malignant melanoma, 4 2 mm  There was ulceration  4 mitoses per millimeter squared  Invasive melanoma was present at the margin  Satellite nodules were seen  Shortly after the biopsy, she noticed adenopathy in the right inguinal region  She comes in now to discuss further therapy  She denies any new abdominal pain, nausea, vomiting, back pain, bone pain, headaches, or cough  PET/CT is pending  Review of Systems  Complete ROS Surg Onc:   Constitutional: The patient denies new or recent history of general fatigue, no recent weight loss, no change in appetite  Eyes: No complaints of visual problems, no scleral icterus  ENT: no complaints of ear pain, no hoarseness, no difficulty swallowing,  no tinnitus and no new masses in head, oral cavity, or neck  Cardiovascular: No complaints of chest pain, no palpitations, no ankle edema  Respiratory: No complaints of shortness of breath, no cough  Gastrointestinal: No complaints of jaundice, no bloody stools, no pale stools  Genitourinary: No complaints of dysuria, no hematuria, no nocturia, no frequent urination, no urethral discharge  Musculoskeletal: No complaints of weakness, paralysis, joint stiffness or arthralgias  Integumentary: No complaints of rash, no new lesions  Neurological: No complaints of convulsions, no seizures, no dizziness  Hematologic/Lymphatic: No complaints of easy bruising  Endocrine:  No hot or cold intolerance  No polydipsia, polyphagia, or polyuria  Allergy/immunology:  No environmental allergies  No food allergies    Not immunocompromised  Skin:  No pallor or rash  No wound            Patient Active Problem List   Diagnosis   • Mild intermittent asthma   • Esophageal dysmotility   • Allergic rhinitis with postnasal drip   • Tobacco abuse   • NATE (obstructive sleep apnea)   • Cough   • Vitamin D deficiency   • High blood triglycerides   • Diarrhea   • Asthma   • Need for vaccination   • Unintentional weight loss   • Anemia   • Enlarged liver   • Low folic acid   • High vitamin A level   • High serum vitamin E   • Low serum vitamin B12   • Hypokalemia   • Alcoholism (HCC)   • Anxiety disorder   • Cervical spinal stenosis   • Diverticular disease of colon   • Erosive esophagitis   • Hemorrhoids   • History of gastroesophageal reflux (GERD)   • Hypertension   • Irritable bowel syndrome   • Need for influenza vaccination   • Postoperative examination   • Pure hypercholesterolemia   • Thoracic and lumbosacral neuritis   • Pre-operative clearance   • Angiomyolipoma   • Chest pain on respiration   • Left flank pain   • Change in stool habits   • Epicondylitis, lateral (tennis elbow)   • Gallbladder disease   • Limb pain   • Impaired fasting glucose   • Microhematuria   • Pelvic pain in female   • Venous insufficiency   • Annual physical exam   • Fatigue   • Foot pain   • S/P cervical spinal fusion   • Fatty liver   • Abnormal bowel movement   • Hypertensive urgency   • Hypertriglyceridemia   • Electrolyte imbalance   • Myofascial pain syndrome, cervical   • Cervicalgia   • Occipital headache   • Cervical post-laminectomy syndrome   • Malignant melanoma of right lower extremity including hip (HCC)     Past Medical History:   Diagnosis Date   • Cervical disc disorder    • Stenosis of cervical spine region      Past Surgical History:   Procedure Laterality Date   • BREAST BIOPSY Left     core   • BREAST BIOPSY Right     core   • CERVICAL FUSION  2013    cC3-C4, C4-C5, C5 C6, C6-C7, C7-T1   •  SECTION     • ELBOW SURGERY 1991   • KNEE SURGERY Bilateral 20YRS AGO     Family History   Problem Relation Age of Onset   • Cancer Mother    • Brain cancer Mother [de-identified]   • Heart disease Father    • Breast cancer Sister 48   • Breast cancer Sister 45   • Breast cancer Maternal Aunt 61   • Breast cancer Maternal Aunt 60   • Breast cancer Maternal Aunt 60   • Breast cancer Cousin 50   • Breast cancer Cousin 52   • Breast cancer Cousin 52   • Prostate cancer Maternal Uncle 55   • No Known Problems Son    • No Known Problems Maternal Grandmother    • No Known Problems Maternal Grandfather    • No Known Problems Paternal Grandmother    • No Known Problems Paternal Grandfather    • BRCA1 Negative Sister    • BRCA2 Negative Sister    • No Known Problems Sister    • No Known Problems Maternal Aunt    • No Known Problems Maternal Aunt    • No Known Problems Maternal Aunt    • No Known Problems Maternal Aunt      Social History     Socioeconomic History   • Marital status: Single     Spouse name: Not on file   • Number of children: Not on file   • Years of education: Not on file   • Highest education level: Not on file   Occupational History   • Not on file   Tobacco Use   • Smoking status: Every Day     Packs/day: 0 25     Types: Cigarettes     Start date: 0   • Smokeless tobacco: Never   Vaping Use   • Vaping Use: Never used   Substance and Sexual Activity   • Alcohol use: Yes     Comment: SOCIAL   • Drug use: Not Currently   • Sexual activity: Yes     Partners: Male   Other Topics Concern   • Not on file   Social History Narrative   • Not on file     Social Determinants of Health     Financial Resource Strain: Not on file   Food Insecurity: Not on file   Transportation Needs: Not on file   Physical Activity: Not on file   Stress: Not on file   Social Connections: Not on file   Intimate Partner Violence: Not on file   Housing Stability: Not on file       Current Outpatient Medications:   •  albuterol (PROVENTIL HFA,VENTOLIN HFA) 90 mcg/act inhaler, INHALE 1 PUFF EVERY 4 HOURS AS NEEDED FOR WHEEZING, Disp: 6 7 g, Rfl: 3  •  amLODIPine (NORVASC) 5 mg tablet, Take 1 tablet (5 mg total) by mouth daily (Patient taking differently: Take 10 mg by mouth daily), Disp: 90 tablet, Rfl: 1  •  atenolol (TENORMIN) 50 mg tablet, TAKE 1 TABLET BY MOUTH EVERY DAY, Disp: 90 tablet, Rfl: 0  •  escitalopram (LEXAPRO) 10 mg tablet, TAKE 1 TABLET BY MOUTH EVERY DAY, Disp: 90 tablet, Rfl: 1  •  famotidine (PEPCID) 20 mg tablet, TAKE 1 TABLET BY MOUTH TWICE A DAY, Disp: 180 tablet, Rfl: 1  •  hydrochlorothiazide (MICROZIDE) 12 5 mg capsule, Take 12 5 mg by mouth daily, Disp: , Rfl:   •  atorvastatin (LIPITOR) 20 mg tablet, Take 20 mg by mouth daily (Patient not taking: Reported on 4/25/2023), Disp: , Rfl:   •  oxyCODONE-acetaminophen (Percocet) 5-325 mg per tablet, Take 1 tablet by mouth every 4 (four) hours as needed for moderate pain Max Daily Amount: 6 tablets (Patient not taking: Reported on 4/24/2023), Disp: 20 tablet, Rfl: 0  •  Potassium Chloride ER 20 MEQ TBCR, Take 2 tablets (40 mEq total) by mouth daily (Patient not taking: Reported on 4/25/2023), Disp: 180 tablet, Rfl: 1  Allergies   Allergen Reactions   • Methocarbamol Shortness Of Breath and Swelling   • Tramadol Shortness Of Breath and Swelling     Vitals:    04/25/23 0905   BP: 120/80   Pulse: 77   Resp: 16   Temp: 98 1 °F (36 7 °C)   SpO2: 97%       Physical Exam   Constitutional: General appearance: The Patient is well-developed and well-nourished who appears the stated age in no acute distress  Patient is pleasant and talkative  HEENT:  Normocephalic  Sclerae are anicteric  Mucous membranes are moist  Neck is supple without adenopathy  No JVD  Chest: The lungs are clear to auscultation  Cardiac: Heart is regular rate  Abdomen: Abdomen is soft, non-tender, non-distended and without masses  Extremities: There is no clubbing or cyanosis  There is no edema  Symmetric    Neuro: Grossly nonfocal  Gait is normal      Lymphatic: No evidence of cervical adenopathy bilaterally  No evidence of axillary adenopathy bilaterally  There is right inguinal adenopathy  Skin: Warm, anicteric  There is a healing biopsy site on the right forefoot  Psych:  Patient is pleasant and talkative  Breasts:      Pathology:  Final Diagnosis   A  Skin, foot, right, wide excision:  Melanoma, acral type, ulcerated, Breslow thickness: 4 2 mm  See synoptic report and note         Note:  Multiple melanoma satellite nodules are seen, predominantly involving the papillary dermis  One of the satellite nodules extends to the 12 o'clock end margin  All other peripheral margins and deep margin are free       The pathologic stage (pTNM, AJCC 8th edition) is pT4b pN1c (at least)     Immunohistochemical stains for SOX10 and Melan-A will be reported in an addendum       The findings were discussed with Dr Jose A Del Rio  04/12/23  Per discussion with Ermias Guzmán, satellite nodule was seen macroscopically (clinically detected) near the 12 o'clock margin        Electronically signed by Cade Goddard MD on 4/12/2023 at  9:45 PM   Comments: This is an appended report  These results have been appended to a previously preliminary verified report  Additional Information    All reported additional testing was performed with appropriately reactive controls   These tests were developed and their performance characteristics determined by [facility] or appropriate performing facility, though some tests may be performed on tissues which have not been validated for performance characteristics (such as staining performed on alcohol exposed cell blocks and decalcified tissues)   Results should be interpreted with caution and in the context of the patients’ clinical condition  These tests may not be cleared or approved by the [place]   Food and Drug Administration, though the FDA has determined that such clearance or approval is not necessary  These tests are used for clinical purposes and they should not be regarded as investigational or for research  This laboratory has been approved by [place] [id number], designated as a high-complexity laboratory and is qualified to perform these tests      Synoptic Checklist   MELANOMA OF THE SKIN: Excision, Re-Excision  8th Edition - Protocol posted: 11/10/2021  MELANOMA OF THE SKIN: EXCISION, RE-EXCISION  - All Specimens  SPECIMEN   Procedure  Excision    Specimen Laterality  Right    TUMOR   Tumor Site  Skin of lower limb and hip: Right foot          Histologic Type  Acral melanoma    Maximum Tumor (Breslow) Thickness (Millimeters)  4 2 mm   Macroscopic Satellite Nodule(s)  Present    Ulceration  Present    Anatomic (Grupo) Level  IV (Melanoma invades reticular dermis)    Mitotic Rate  4 mitoses per mm2   Microsatellite(s)  Present    Lymphovascular Invasion  Not identified    Neurotropism  Not identified    Tumor-Infiltrating Lymphocytes  Present, nonbrisk    Tumor Regression  Not identified    MARGINS     Margin Status for Invasive Melanoma  Invasive melanoma present at margin    Margin(s) Involved by Invasive Melanoma  Peripheral: One of the satellite nodules extends to the 12 o'clock end margin      Margin Status for Melanoma in situ  All margins negative for melanoma in situ    REGIONAL LYMPH NODES     Regional Lymph Node Status  Not applicable (no regional lymph nodes submitted or found)    PATHOLOGIC STAGE CLASSIFICATION (pTNM, AJCC 8th Edition)     pT Category  pT4b    pN Category  pN1c             Labs:      Imaging  MRI cervical spine with and without contrast    Result Date: 3/30/2023  Narrative: MRI CERVICAL SPINE WITH AND WITHOUT CONTRAST INDICATION: Z98 1: Arthrodesis status M79 18: Myalgia, other site M54 2: Cervicalgia R51 9: Headache, unspecified   COMPARISON:  MRI cervical spine 1/21/2013 TECHNIQUE:  Multiplanar, multisequence imaging of the cervical spine was performed before and after gadolinium administration     IV Contrast:  7 mL of Gadobutrol injection (SINGLE-DOSE) IMAGE QUALITY:  Diagnostic  FINDINGS: POSTSURGICAL FINDINGS: Interval bilateral decompressive laminectomies at the C4-C7 levels with placement of bilateral transpedicular screws at the at the C3-T1 levels  ALIGNMENT:  There is straightening of the mid to upper cervical lordosis  No compression fracture  No subluxation  No scoliosis  MARROW SIGNAL:  Normal marrow signal is identified within the visualized bony structures  No discrete marrow lesion  CERVICAL AND VISUALIZED UPPER THORACIC CORD:  Normal signal within the visualized cord  PREVERTEBRAL AND PARASPINAL SOFT TISSUES:  Normal  VISUALIZED POSTERIOR FOSSA:  The visualized posterior fossa demonstrates no abnormal signal  CERVICAL DISC SPACES: C2-3: No focal disc herniation, central canal stenosis, or neural foraminal narrowing  C3-4: Stable tiny central disc protrusion without central canal or neural foraminal narrowing  C4-5: Stable broad-based left paracentral disc protrusion  Improved patency of the central canal status post bilateral laminectomies  Evaluation of the neural foramina is limited due to susceptibility artifacts  C5-6: Stable central disc protrusion and bilateral uncovertebral hypertrophy  Improved patency of the central canal status post bilateral laminectomies  Evaluation of the neural foramina is limited due to susceptibility artifacts  C6-7: Stable central disc protrusion and bilateral uncovertebral hypertrophy  Improved patency of the central canal status post bilateral laminectomies  Evaluation of the neural foramina is limited due to susceptibility artifacts  C7-T1: Stable tiny right paracentral disc protrusion  No central canal narrowing  Right subarticular/lateral recess narrowing  No neural foraminal stenosis  UPPER THORACIC DISC SPACES:  Normal  POSTCONTRAST IMAGING:  Normal      Impression: 1   Interval bilateral decompressive laminectomies at the C4-C7 levels with placement of bilateral transpedicular screws at the at the C3-T1 levels  Stable disc protrusions at the C3-4 through the C7-T1 levels  2  The cervical cord appears normal in caliber and signal with no evidence of focal impingement  Workstation performed: [id number]     I reviewed the above laboratory and imaging data  Procedure: Sterile conditions and local anesthesia, fine-needle aspiration biopsy was performed without difficulty  She tolerated this well  Discussion/Summary: 71-year-old female with a clinical [id number] melanoma  She does have satellite lesions and is clinically stage III  I have recommended performing a fine-needle aspiration biopsy of the right inguinal lymph node  The risks were explained including bleeding and infection  Informed consent was obtained  She tolerated this well  We will await the results of her PET/CT  If the PET/CT shows distant metastatic disease, I would recommend immunotherapy  Surgery to the foot could then be performed for palliative reasons  If she has stage III disease by the PET, I would recommend neoadjuvant immunotherapy for 3 months followed by surgical intervention at that completing adjuvant immunotherapy based on the SWOG 1801 study  Surgical intervention to the foot would likely be a transmetatarsal amputation for her best functional outcome assuming there is no significant local response to immunotherapy  She should also have a fiducial placed in the inguinal node prior to immunotherapy if she is stage III  We will await the results of the PET/CT  I have reached out to radiology to see if this can be expedited  I will tentatively see her again in 3 months  She is agreeable to this  All her questions were answered            Ronda Wesley MD  4/25/2023  9:41 AM  Incomplete               Surgical Oncology Consult       [address] [facility] [place] [address] [address] [facility] [address]  [phone]    Anil [place]  1960  [id number]  [address] [facility] [place] [id number] [facility]  [facility] [id number] [phone]    Diagnoses and all orders for this visit:    Malignant melanoma of right lower extremity including hip [facility])  -     Ambulatory Referral to Surgical Oncology        Chief Complaint   Patient presents with   • Consult       No follow-ups on file  Oncology History   Malignant melanoma of right lower extremity including hip ([facility] [address] )   3/28/2023 Initial Diagnosis    Malignant melanoma of right lower extremity including hip ([facility] [address] )     4/6/2023 Surgery    Wide excision by podiatry:  Skin, foot, right, wide excision:  Melanoma, acral type, ulcerated, Breslow thickness: 4 2 mm  PATHOLOGIC STAGE CLASSIFICATION (pTNM, AJCC 8th Edition)     pT Category  pT4b         4/7/2023 -  Cancer Staged    Staging form: Melanoma of the Skin, AJCC 8th Edition  - Clinical stage from 4/7/2023: Stage III (cT4b, cN1c, cM0) - Signed by Deep Tinajero MD on 4/24/2023 4/7/2023 -  Cancer Staged    Staging form: Melanoma of the Skin, AJCC 8th Edition  - Pathologic stage from 4/7/2023: Stage IIIC (pT4b, pN1c, cM0) - Signed by Deep Tinajero MD on 4/24/2023           History of Present Illness: ***    Review of Systems  Complete ROS Surg Onc:   Constitutional: The patient denies new or recent history of general fatigue, no recent weight loss, no change in appetite  Eyes: No complaints of visual problems, no scleral icterus  ENT: no complaints of ear pain, no hoarseness, no difficulty swallowing,  no tinnitus and no new masses in head, oral cavity, or neck  Cardiovascular: No complaints of chest pain, no palpitations, no ankle edema  Respiratory: No complaints of shortness of breath, no cough  Gastrointestinal: No complaints of jaundice, no bloody stools, no pale stools     Genitourinary: No complaints of dysuria, no hematuria, no nocturia, no frequent urination, no urethral discharge  Musculoskeletal: No complaints of weakness, paralysis, joint stiffness or arthralgias  Integumentary: No complaints of rash, no new lesions  Neurological: No complaints of convulsions, no seizures, no dizziness  Hematologic/Lymphatic: No complaints of easy bruising  Endocrine:  No hot or cold intolerance  No polydipsia, polyphagia, or polyuria  Allergy/immunology:  No environmental allergies  No food allergies  Not immunocompromised  Skin:  No pallor or rash  No wound            Patient Active Problem List   Diagnosis   • Mild intermittent asthma   • Esophageal dysmotility   • Allergic rhinitis with postnasal drip   • Tobacco abuse   • NATE (obstructive sleep apnea)   • Cough   • Vitamin D deficiency   • High blood triglycerides   • Diarrhea   • Asthma   • Need for vaccination   • Unintentional weight loss   • Anemia   • Enlarged liver   • Low folic acid   • High vitamin A level   • High serum vitamin E   • Low serum vitamin B12   • Hypokalemia   • Alcoholism (HCC)   • Anxiety disorder   • Cervical spinal stenosis   • Diverticular disease of colon   • Erosive esophagitis   • Hemorrhoids   • History of gastroesophageal reflux (GERD)   • Hypertension   • Irritable bowel syndrome   • Need for influenza vaccination   • Postoperative examination   • Pure hypercholesterolemia   • Thoracic and lumbosacral neuritis   • Pre-operative clearance   • Angiomyolipoma   • Chest pain on respiration   • Left flank pain   • Change in stool habits   • Epicondylitis, lateral (tennis elbow)   • Gallbladder disease   • Limb pain   • Impaired fasting glucose   • Microhematuria   • Pelvic pain in female   • Venous insufficiency   • Annual physical exam   • Fatigue   • Foot pain   • S/P cervical spinal fusion   • Fatty liver   • Abnormal bowel movement   • Hypertensive urgency   • Hypertriglyceridemia   • Electrolyte imbalance   • Myofascial pain syndrome, cervical   • Cervicalgia   • Occipital headache   • Cervical post-laminectomy syndrome   • Malignant melanoma of right lower extremity including hip [facility])     Past Medical History:   Diagnosis Date   • Cervical disc disorder    • Stenosis of cervical spine region      Past Surgical History:   Procedure Laterality Date   • BREAST BIOPSY Left 2009    core   • BREAST BIOPSY Right 2009    core   • CERVICAL FUSION  2013    cC3-C4, C4-C5, C5 C6, C6-C7, C7-T1   •  SECTION     • ELBOW SURGERY     • KNEE SURGERY Bilateral 20YRS AGO     Family History   Problem Relation Age of Onset   • Cancer Mother    • Brain cancer Mother [de-identified]   • Heart disease Father    • Breast cancer Sister 48   • Breast cancer Sister 45   • Breast cancer Maternal Aunt 60   • Breast cancer Maternal Aunt 60   • Breast cancer Maternal Aunt 60   • Breast cancer Cousin 50   • Breast cancer Cousin 52   • Breast cancer Cousin 52   • Prostate cancer Maternal Uncle 55   • No Known Problems Son    • No Known Problems Maternal Grandmother    • No Known Problems Maternal Grandfather    • No Known Problems Paternal Grandmother    • No Known Problems Paternal Grandfather    • BRCA1 Negative Sister    • BRCA2 Negative Sister    • No Known Problems Sister    • No Known Problems Maternal Aunt    • No Known Problems Maternal Aunt    • No Known Problems Maternal Aunt    • No Known Problems Maternal Aunt      Social History     Socioeconomic History   • Marital status: Single     Spouse name: Not on file   • Number of children: Not on file   • Years of education: Not on file   • Highest education level: Not on file   Occupational History   • Not on file   Tobacco Use   • Smoking status: Every Day     Packs/day: 0 25     Types: Cigarettes     Start date: 0   • Smokeless tobacco: Never   Vaping Use   • Vaping Use: Never used   Substance and Sexual Activity   • Alcohol use: Yes     Comment: SOCIAL   • Drug use: Not Currently   • Sexual activity: Yes     Partners: Male   Other Topics Concern   • Not on file   Social History Narrative   • Not on file     Social Determinants of Health     Financial Resource Strain: Not on file   Food Insecurity: Not on file   Transportation Needs: Not on file   Physical Activity: Not on file   Stress: Not on file   Social Connections: Not on file   Intimate Partner Violence: Not on file   Housing Stability: Not on file       Current Outpatient Medications:   •  albuterol (PROVENTIL HFA,VENTOLIN HFA) 90 mcg/act inhaler, INHALE 1 PUFF EVERY 4 HOURS AS NEEDED FOR WHEEZING, Disp: 6 7 g, Rfl: 3  •  amLODIPine (NORVASC) 5 mg tablet, Take 1 tablet (5 mg total) by mouth daily (Patient taking differently: Take 10 mg by mouth daily), Disp: 90 tablet, Rfl: 1  •  atenolol (TENORMIN) 50 mg tablet, TAKE 1 TABLET BY MOUTH EVERY DAY, Disp: 90 tablet, Rfl: 0  •  escitalopram (LEXAPRO) 10 mg tablet, TAKE 1 TABLET BY MOUTH EVERY DAY, Disp: 90 tablet, Rfl: 1  •  famotidine (PEPCID) 20 mg tablet, TAKE 1 TABLET BY MOUTH TWICE A DAY, Disp: 180 tablet, Rfl: 1  •  hydrochlorothiazide (MICROZIDE) 12 5 mg capsule, Take 12 5 mg by mouth daily, Disp: , Rfl:   •  atorvastatin (LIPITOR) 20 mg tablet, Take 20 mg by mouth daily (Patient not taking: Reported on 4/25/2023), Disp: , Rfl:   •  oxyCODONE-acetaminophen (Percocet) 5-325 mg per tablet, Take 1 tablet by mouth every 4 (four) hours as needed for moderate pain Max Daily Amount: 6 tablets (Patient not taking: Reported on 4/24/2023), Disp: 20 tablet, Rfl: 0  •  Potassium Chloride ER 20 MEQ TBCR, Take 2 tablets (40 mEq total) by mouth daily (Patient not taking: Reported on 4/25/2023), Disp: 180 tablet, Rfl: 1  Allergies   Allergen Reactions   • Methocarbamol Shortness Of Breath and Swelling   • Tramadol Shortness Of Breath and Swelling     Vitals:    04/25/23 0905   BP: 120/80   Pulse: 77   Resp: 16   Temp: 98 1 °F (36 7 °C)   SpO2: 97%       Physical Exam   Constitutional: General appearance:  The Patient is well-developed and well-nourished who appears the stated age in no acute distress  Patient is pleasant and talkative  HEENT:  Normocephalic  Sclerae are anicteric  Mucous membranes are moist  Neck is supple without adenopathy  No JVD  Chest: The lungs are clear to auscultation  Cardiac: Heart is regular rate  Abdomen: Abdomen is soft, non-tender, non-distended and without masses  Extremities: There is no clubbing or cyanosis  There is no edema  Symmetric  Neuro: Grossly nonfocal  Gait is normal      Lymphatic: No evidence of cervical adenopathy bilaterally  No evidence of axillary adenopathy bilaterally  No evidence of inguinal adenopathy bilaterally  Skin: Warm, anicteric  Psych:  Patient is pleasant and talkative  Breasts:      Pathology:  Final Diagnosis   A  Skin, foot, right, wide excision:  Melanoma, acral type, ulcerated, Breslow thickness: 4 2 mm  See synoptic report and note         Note:  Multiple melanoma satellite nodules are seen, predominantly involving the papillary dermis  One of the satellite nodules extends to the 12 o'clock end margin  All other peripheral margins and deep margin are free       The pathologic stage (pTNM, AJCC 8th edition) is pT4b pN1c (at least)     Immunohistochemical stains for SOX10 and Melan-A will be reported in an addendum       The findings were discussed with Dr Gray Clark  04/12/23  Per discussion with Jose Guzmán, satellite nodule was seen macroscopically (clinically detected) near the 12 o'clock margin        Electronically signed by Yoan Zavala MD on 4/12/2023 at  9:45 PM   Comments: This is an appended report  These results have been appended to a previously preliminary verified report        Additional Information    All reported additional testing was performed with appropriately reactive controls   These tests were developed and their performance characteristics determined by [facility] or appropriate performing facility, though some tests may be performed on tissues which have not been validated for performance characteristics (such as staining performed on alcohol exposed cell blocks and decalcified tissues)   Results should be interpreted with caution and in the context of the patients’ clinical condition  These tests may not be cleared or approved by the [place]  Food and Drug Administration, though the FDA has determined that such clearance or approval is not necessary  These tests are used for clinical purposes and they should not be regarded as investigational or for research  This laboratory has been approved by [facility], designated as a high-complexity laboratory and is qualified to perform these tests        Synoptic Checklist   MELANOMA OF THE SKIN: Excision, Re-Excision  8th Edition - Protocol posted: 11/10/2021  MELANOMA OF THE SKIN: EXCISION, RE-EXCISION  - All Specimens  SPECIMEN   Procedure  Excision    Specimen Laterality  Right    TUMOR   Tumor Site  Skin of lower limb and hip: Right foot          Histologic Type  Acral melanoma    Maximum Tumor (Breslow) Thickness (Millimeters)  4 2 mm   Macroscopic Satellite Nodule(s)  Present    Ulceration  Present    Anatomic (Grupo) Level  IV (Melanoma invades reticular dermis)    Mitotic Rate  4 mitoses per mm2   Microsatellite(s)  Present    Lymphovascular Invasion  Not identified    Neurotropism  Not identified    Tumor-Infiltrating Lymphocytes  Present, nonbrisk    Tumor Regression  Not identified    MARGINS     Margin Status for Invasive Melanoma  Invasive melanoma present at margin    Margin(s) Involved by Invasive Melanoma  Peripheral: One of the satellite nodules extends to the 12 o'clock end margin      Margin Status for Melanoma in situ  All margins negative for melanoma in situ    REGIONAL LYMPH NODES     Regional Lymph Node Status  Not applicable (no regional lymph nodes submitted or found)    PATHOLOGIC STAGE CLASSIFICATION (pTNM, AJCC 8th Edition)     pT Category  pT4b    pN Category  pN1c             Labs:      Imaging  MRI cervical spine with and without contrast    Result Date: 3/30/2023  Narrative: MRI CERVICAL SPINE WITH AND WITHOUT CONTRAST INDICATION: Z98 1: Arthrodesis status M79 18: Myalgia, other site M54 2: Cervicalgia R51 9: Headache, unspecified  COMPARISON:  MRI cervical spine 1/21/2013 TECHNIQUE:  Multiplanar, multisequence imaging of the cervical spine was performed before and after gadolinium administration     IV Contrast:  7 mL of Gadobutrol injection (SINGLE-DOSE) IMAGE QUALITY:  Diagnostic  FINDINGS: POSTSURGICAL FINDINGS: Interval bilateral decompressive laminectomies at the C4-C7 levels with placement of bilateral transpedicular screws at the at the C3-T1 levels  ALIGNMENT:  There is straightening of the mid to upper cervical lordosis  No compression fracture  No subluxation  No scoliosis  MARROW SIGNAL:  Normal marrow signal is identified within the visualized bony structures  No discrete marrow lesion  CERVICAL AND VISUALIZED UPPER THORACIC CORD:  Normal signal within the visualized cord  PREVERTEBRAL AND PARASPINAL SOFT TISSUES:  Normal  VISUALIZED POSTERIOR FOSSA:  The visualized posterior fossa demonstrates no abnormal signal  CERVICAL DISC SPACES: C2-3: No focal disc herniation, central canal stenosis, or neural foraminal narrowing  C3-4: Stable tiny central disc protrusion without central canal or neural foraminal narrowing  C4-5: Stable broad-based left paracentral disc protrusion  Improved patency of the central canal status post bilateral laminectomies  Evaluation of the neural foramina is limited due to susceptibility artifacts  C5-6: Stable central disc protrusion and bilateral uncovertebral hypertrophy  Improved patency of the central canal status post bilateral laminectomies  Evaluation of the neural foramina is limited due to susceptibility artifacts   C6-7: Stable central disc protrusion and bilateral uncovertebral hypertrophy  Improved patency of the central canal status post bilateral laminectomies  Evaluation of the neural foramina is limited due to susceptibility artifacts  C7-T1: Stable tiny right paracentral disc protrusion  No central canal narrowing  Right subarticular/lateral recess narrowing  No neural foraminal stenosis  UPPER THORACIC DISC SPACES:  Normal  POSTCONTRAST IMAGING:  Normal      Impression: 1  Interval bilateral decompressive laminectomies at the C4-C7 levels with placement of bilateral transpedicular screws at the at the C3-T1 levels  Stable disc protrusions at the C3-4 through the C7-T1 levels  2  The cervical cord appears normal in caliber and signal with no evidence of focal impingement  Workstation performed: [id number]     I reviewed the above laboratory and imaging data      Discussion/Summary: ***

## 2023-04-27 ENCOUNTER — HOSPITAL ENCOUNTER (OUTPATIENT)
Dept: RADIOLOGY | Age: 63
Discharge: HOME | End: 2023-04-27

## 2023-04-27 ENCOUNTER — DOCUMENTATION (OUTPATIENT)
Dept: HEMATOLOGY ONCOLOGY | Facility: CLINIC | Age: 63
End: 2023-04-27

## 2023-04-27 ENCOUNTER — OFFICE VISIT (OUTPATIENT)
Dept: INTERNAL MEDICINE CLINIC | Facility: CLINIC | Age: 63
End: 2023-04-27

## 2023-04-27 VITALS
SYSTOLIC BLOOD PRESSURE: 118 MMHG | WEIGHT: 157.4 LBS | OXYGEN SATURATION: 98 % | HEIGHT: 67 IN | DIASTOLIC BLOOD PRESSURE: 78 MMHG | HEART RATE: 79 BPM | BODY MASS INDEX: 24.71 KG/M2

## 2023-04-27 DIAGNOSIS — M48.02 CERVICAL SPINAL STENOSIS: ICD-10-CM

## 2023-04-27 DIAGNOSIS — Z12.31 ENCOUNTER FOR SCREENING MAMMOGRAM FOR BREAST CANCER: ICD-10-CM

## 2023-04-27 DIAGNOSIS — Z23 ENCOUNTER FOR IMMUNIZATION: ICD-10-CM

## 2023-04-27 DIAGNOSIS — Z98.1 S/P CERVICAL SPINAL FUSION: ICD-10-CM

## 2023-04-27 DIAGNOSIS — M54.2 CERVICALGIA: ICD-10-CM

## 2023-04-27 DIAGNOSIS — C43.9 METASTATIC MELANOMA (HCC): ICD-10-CM

## 2023-04-27 DIAGNOSIS — C43.71 MALIGNANT MELANOMA OF RIGHT LOWER EXTREMITY INCLUDING HIP (HCC): ICD-10-CM

## 2023-04-27 DIAGNOSIS — C43.71 MALIGNANT MELANOMA OF RIGHT LOWER EXTREMITY INCLUDING HIP (HCC): Primary | ICD-10-CM

## 2023-04-27 LAB — GLUCOSE SERPL-MCNC: 121 MG/DL (ref 65–140)

## 2023-04-27 NOTE — ASSESSMENT & PLAN NOTE
Here for disability application  PET scan showed possible metastasis to inguinal lymphnodes  Per last oncology note, will begin targeted therapy with BRAF/MEK inhibitors and immunotherapy  Podiatry would also perform partial right foot amputation  Due to partial foot amputation, she will be unable to perform employment duties     She is qualified for disability benefits from medical standpoint

## 2023-04-27 NOTE — PROGRESS NOTES
In-basket message received from Dr Ml Gonzalez to add patient to [facility] on 5/5/2023   Chart reviewed and prep completed

## 2023-04-27 NOTE — PROGRESS NOTES
Assessment/Plan:    Metastatic melanoma ([facility] [address] )  Here for disability application  PET scan showed possible metastasis to inguinal lymphnodes  Per last oncology note, will begin targeted therapy with BRAF/MEK inhibitors and immunotherapy  Podiatry would also perform partial right foot amputation  Due to partial foot amputation, she will be unable to perform employment duties  She is qualified for disability benefits from medical standpoint    Cervicalgia  S/p cervical spinal fusion  However continue to have cervical radiculopathy such as upper extremity weakness, that rendered unable to lift more than 50 lb   Reviewed Cervical MRI showed bilateral decompressive laminectomies at the C4-C7 levels with placement of bilateral transpedicular screws at the at the C3-T1 levels  Stable disc protrusions at the C3-4 through the C7-T1 levels  She will continue to follow neurosurgery    S/P cervical spinal fusion  MRI of the cervical spine showed bilateral decompressive laminectomies at the C4-C7 levels with placement of bilateral transpedicular screws at the at the C3-T1 levels  Stable disc protrusions at the C3-4 through the C7-T1 levels  Continue to follow with NS    Cervical spinal stenosis  · Continued to have cervical radiculopathy with neuropathy and upper extremity weakness as evidenced by cervical MRI  · Debilitating symptoms persistent after spinal fusion  · Continue follow up with neurosurgery  ·          Problem List Items Addressed This Visit        Other    Cervical spinal stenosis     · Continued to have cervical radiculopathy with neuropathy and upper extremity weakness as evidenced by cervical MRI  · Debilitating symptoms persistent after spinal fusion  · Continue follow up with neurosurgery  ·          S/P cervical spinal fusion     MRI of the cervical spine showed bilateral decompressive laminectomies at the C4-C7 levels with placement of bilateral transpedicular screws at the at the C3-T1 levels  Patients daughter calling in.  States she received a letter from the insurance company, stating it would be more cost effective for the patient if she took levomere instead of the lantus.  Daughter wants to know if this is an option.   Patient is a resident of [facility] and uses [facility].   Please call daughter Huong at [phone]   Stable disc protrusions at the C3-4 through the C7-T1 levels  Continue to follow with NS         Cervicalgia     S/p cervical spinal fusion  However continue to have cervical radiculopathy such as upper extremity weakness, that rendered unable to lift more than 50 lb   Reviewed Cervical MRI showed bilateral decompressive laminectomies at the C4-C7 levels with placement of bilateral transpedicular screws at the at the C3-T1 levels  Stable disc protrusions at the C3-4 through the C7-T1 levels  She will continue to follow neurosurgery         Malignant melanoma of right lower extremity including hip ([facility] [address] ) - Primary    Metastatic melanoma ([facility] [address] )     Here for disability application  PET scan showed possible metastasis to inguinal lymphnodes  Per last oncology note, will begin targeted therapy with BRAF/MEK inhibitors and immunotherapy  Podiatry would also perform partial right foot amputation  Due to partial foot amputation, she will be unable to perform employment duties  She is qualified for disability benefits from medical standpoint        Other Visit Diagnoses     Encounter for immunization        Encounter for screening mammogram for breast cancer                Subjective:      Patient ID: Wendy Chong is a 59 y o  female  She is here for disability form because she is going to have mid foot amputation as well as chemotherapy and surgery for metastatic melanoma  She is tearful about her metastatic melanoma today  She denied chest pain or SOB  However does complain of severe cervical radiculopathy which involves numbness tinging and weakness in her hands that rendered her unable to work  The following portions of the patient's history were reviewed and updated as appropriate: allergies, current medications, past family history, past medical history, past social history, past surgical history and problem list     Review of Systems   Constitutional: Negative for chills and fever     HENT: Negative for ear pain and sore throat  Eyes: Negative for pain and visual disturbance  Respiratory: Negative for cough and shortness of breath  Cardiovascular: Negative for chest pain and palpitations  Gastrointestinal: Negative for abdominal pain and vomiting  Genitourinary: Negative for dysuria and hematuria  Musculoskeletal: Negative for arthralgias and back pain  Skin: Negative for color change and rash  Neurological: Negative for seizures and syncope  Psychiatric/Behavioral: The patient is nervous/anxious  All other systems reviewed and are negative  Objective:    Return in about 2 months (around 6/27/2023) for Next scheduled follow up  Procedure: MRI cervical spine with and without contrast    Result Date: 3/30/2023  Narrative: MRI CERVICAL SPINE WITH AND WITHOUT CONTRAST INDICATION: Z98 1: Arthrodesis status M79 18: Myalgia, other site M54 2: Cervicalgia R51 9: Headache, unspecified  COMPARISON:  MRI cervical spine 1/21/2013 TECHNIQUE:  Multiplanar, multisequence imaging of the cervical spine was performed before and after gadolinium administration     IV Contrast:  7 mL of Gadobutrol injection (SINGLE-DOSE) IMAGE QUALITY:  Diagnostic  FINDINGS: POSTSURGICAL FINDINGS: Interval bilateral decompressive laminectomies at the C4-C7 levels with placement of bilateral transpedicular screws at the at the C3-T1 levels  ALIGNMENT:  There is straightening of the mid to upper cervical lordosis  No compression fracture  No subluxation  No scoliosis  MARROW SIGNAL:  Normal marrow signal is identified within the visualized bony structures  No discrete marrow lesion  CERVICAL AND VISUALIZED UPPER THORACIC CORD:  Normal signal within the visualized cord   PREVERTEBRAL AND PARASPINAL SOFT TISSUES:  Normal  VISUALIZED POSTERIOR FOSSA:  The visualized posterior fossa demonstrates no abnormal signal  CERVICAL DISC SPACES: C2-3: No focal disc herniation, central canal stenosis, or neural foraminal narrowing  C3-4: Stable tiny central disc protrusion without central canal or neural foraminal narrowing  C4-5: Stable broad-based left paracentral disc protrusion  Improved patency of the central canal status post bilateral laminectomies  Evaluation of the neural foramina is limited due to susceptibility artifacts  C5-6: Stable central disc protrusion and bilateral uncovertebral hypertrophy  Improved patency of the central canal status post bilateral laminectomies  Evaluation of the neural foramina is limited due to susceptibility artifacts  C6-7: Stable central disc protrusion and bilateral uncovertebral hypertrophy  Improved patency of the central canal status post bilateral laminectomies  Evaluation of the neural foramina is limited due to susceptibility artifacts  C7-T1: Stable tiny right paracentral disc protrusion  No central canal narrowing  Right subarticular/lateral recess narrowing  No neural foraminal stenosis  UPPER THORACIC DISC SPACES:  Normal  POSTCONTRAST IMAGING:  Normal      Impression: 1  Interval bilateral decompressive laminectomies at the C4-C7 levels with placement of bilateral transpedicular screws at the at the C3-T1 levels  Stable disc protrusions at the C3-4 through the C7-T1 levels  2  The cervical cord appears normal in caliber and signal with no evidence of focal impingement  Workstation performed: [id number]     Procedure: NM pet ct tumor imaging whole body    Result Date: 4/27/2023  Narrative: WHOLE-BODY PET/CT SCAN INDICATION: C43 71: Malignant melanoma of right lower limb, including hip   , initial staging     MODIFIER: PI COMPARISON: CT 11/7/2022 and priors CELL TYPE: Melanoma, right plantar foot biopsy 4/6/2023 TECHNIQUE:   8 0 mCi F-18-FD administered IV  Multiplanar attenuation corrected and non attenuation corrected PET images were acquired 60 minutes post injection  Contiguous, low dose, axial CT sections were obtained from the skull vertex through the feet  Intravenous contrast material was not utilized  This examination, like all CT scans performed in the [facility], was performed utilizing techniques to minimize radiation dose exposure, including the use of iterative reconstruction  and automated exposure control  Fasting serum glucose: 121 mg/dl FINDINGS: VISUALIZED BRAIN:   No acute abnormalities are seen  HEAD/NECK:   There is a physiologic distribution of FDG  No FDG avid cervical adenopathy is seen  CT images: Unremarkable CHEST:   No FDG avid soft tissue lesions are seen  CT images: Coronary artery calcifications  ABDOMEN:   Asymmetrically enlarged right liver with subtle heterogeneous density and greater FDG activity compared to the left lobe  The homogeneous FDG activity has an SUV of 4 6  For comparison, left liver SUV measures 2 2  Findings appear new from the prior CT from 1/4/2018  Etiologies such as low FDG avid infiltrative tumor is not excluded  CT images: Small hiatal hernia  Stable small left renal angiomyolipoma  PELVIS: There is hypermetabolic right inguinal and right pelvic external iliac adenopathy compatible with metastases  Example right inguinal frances mass image 2/276 measures 3 7 x 2 4 cm, SUV 17  Right external iliac node image 250 measures 3 3 x 2 3 cm, SUV 18  Additional right external iliac node image 2/233 measures 2 3 x 1 6 cm, SUV 5 6  No hypermetabolic left pelvic adenopathy  CT images: Otherwise stable  OSSEOUS STRUCTURES/EXTREMITIES: Increased FDG activity along the plantar right foot, SUV 5 8, corresponds with the history of right foot plantar melanoma  No FDG avid osseous lesions are seen  CT images: Spine degenerative change  Impression: 1  Increased FDG activity along the plantar right foot corresponds with the history of right foot plantar melanoma  2   Hypermetabolic right pelvic adenopathy compatible with metastases   3  Asymmetrically enlarged right liver with greater FDG intensity compared to the left liver  Recommend contrast MRI liver evaluation  4  No worrisome hypermetabolic lesions in the neck or chest  Workstation performed: [id number]         Allergies   Allergen Reactions   • Methocarbamol Shortness Of Breath and Swelling   • Tramadol Shortness Of Breath and Swelling       Past Medical History:   Diagnosis Date   • Cervical disc disorder    • Melanoma ([facility] [address] )    • Stenosis of cervical spine region      Past Surgical History:   Procedure Laterality Date   • BREAST BIOPSY Left 2009    core   • BREAST BIOPSY Right 2009    core   • CERVICAL FUSION  2013    cC3-C4, C4-C5, C5 C6, C6-C7, C7-T1   •  SECTION     • ELBOW SURGERY     • KNEE SURGERY Bilateral 20YRS AGO     Current Outpatient Medications on File Prior to Visit   Medication Sig Dispense Refill   • albuterol (PROVENTIL HFA,VENTOLIN HFA) 90 mcg/act inhaler INHALE 1 PUFF EVERY 4 HOURS AS NEEDED FOR WHEEZING 6 7 g 3   • amLODIPine (NORVASC) 5 mg tablet Take 1 tablet (5 mg total) by mouth daily 90 tablet 1   • atenolol (TENORMIN) 50 mg tablet TAKE 1 TABLET BY MOUTH EVERY DAY 90 tablet 0   • escitalopram (LEXAPRO) 10 mg tablet TAKE 1 TABLET BY MOUTH EVERY DAY 90 tablet 1   • famotidine (PEPCID) 20 mg tablet TAKE 1 TABLET BY MOUTH TWICE A  tablet 1   • hydrochlorothiazide (MICROZIDE) 12 5 mg capsule Take 12 5 mg by mouth daily       No current facility-administered medications on file prior to visit       Family History   Problem Relation Age of Onset   • Cancer Mother    • Brain cancer Mother [de-identified]   • Heart disease Father    • Breast cancer Sister 48   • Breast cancer Sister 45   • Breast cancer Maternal Aunt 61   • Breast cancer Maternal Aunt 60   • Breast cancer Maternal Aunt 60   • Breast cancer Cousin 50   • Breast cancer Cousin 52   • Breast cancer Cousin 52   • Prostate cancer Maternal Uncle 55   • No Known Problems Son    • No Known Problems Maternal Grandmother    • No Known Problems Maternal Grandfather    • No Known Problems "Paternal Grandmother    • No Known Problems Paternal Grandfather    • BRCA1 Negative Sister    • BRCA2 Negative Sister    • No Known Problems Sister    • No Known Problems Maternal Aunt    • No Known Problems Maternal Aunt    • No Known Problems Maternal Aunt    • No Known Problems Maternal Aunt      Social History     Socioeconomic History   • Marital status: Single     Spouse name: Not on file   • Number of children: Not on file   • Years of education: Not on file   • Highest education level: Not on file   Occupational History   • Not on file   Tobacco Use   • Smoking status: Every Day     Packs/day: 0 02     Types: Cigarettes     Start date: 0   • Smokeless tobacco: Never   • Tobacco comments:     4 a day   Vaping Use   • Vaping Use: Never used   Substance and Sexual Activity   • Alcohol use: Yes     Comment: SOCIAL   • Drug use: Not Currently   • Sexual activity: Yes     Partners: Male   Other Topics Concern   • Not on file   Social History Narrative   • Not on file     Social Determinants of Health     Financial Resource Strain: Not on file   Food Insecurity: Not on file   Transportation Needs: Not on file   Physical Activity: Not on file   Stress: Not on file   Social Connections: Not on file   Intimate Partner Violence: Not on file   Housing Stability: Not on file     Vitals:    04/27/23 1422   BP: 118/78   Pulse: 79   SpO2: 98%   Weight: 71 4 kg (157 lb 6 4 oz)   Height: 5' 7\" (1 702 m)     Results for orders placed or performed during the hospital encounter of 04/27/23   Fingerstick Glucose (POCT)   Result Value Ref Range    POC Glucose 121 65 - 140 mg/dl     Weight (last 2 days)     Date/Time Weight    04/27/23 1422 71 4 (157 4)        Body mass index is 24 65 kg/m²    BP      Temp      Pulse     Resp      SpO2        Vitals:    04/27/23 1422   Weight: 71 4 kg (157 lb 6 4 oz)     Vitals:    04/27/23 1422   Weight: 71 4 kg (157 lb 6 4 oz)       /78   Pulse 79   Ht 5' 7\" (1 702 m)   Wt 71 4 kg " (157 lb 6 4 oz)   SpO2 98%   BMI 24 65 kg/m²          Physical Exam  Vitals and nursing note reviewed  Constitutional:       Appearance: Normal appearance  HENT:      Head: Normocephalic and atraumatic  Eyes:      Conjunctiva/sclera: Conjunctivae normal       Pupils: Pupils are equal, round, and reactive to light  Cardiovascular:      Rate and Rhythm: Normal rate  Pulses: Normal pulses  Heart sounds: Normal heart sounds  No murmur heard  Pulmonary:      Effort: Pulmonary effort is normal       Breath sounds: Normal breath sounds  No wheezing or rales  Abdominal:      General: Abdomen is flat  Musculoskeletal:         General: No swelling or tenderness  Cervical back: Normal range of motion  Right lower leg: No edema  Left lower leg: No edema  Skin:     General: Skin is warm and dry  Findings: No erythema  Neurological:      Mental Status: She is alert and oriented to person, place, and time  Psychiatric:         Behavior: Behavior normal                Tobacco and Toxic Substance Assessment and Intervention:     Tobacco use screening performed    Alcohol and drug use screening performed      I have spent a total time of 45 minutes on 04/27/23 in caring for this patient including Diagnostic results, Risks and benefits of tx options, Instructions for management, Patient and family education, Counseling / Coordination of care, Documenting in the medical record and Reviewing / ordering tests, medicine, procedures

## 2023-04-27 NOTE — PROGRESS NOTES
[facility] [address] [place] [address]  [phone]     Date of Visit: 2023  Name: Delilah Joseph   YOB: 1960        Subjective    VISIT DIAGNOSIS:  Diagnoses and all orders for this visit:    Malignant melanoma of right lower extremity including hip [facility])  -     Ambulatory Referral to Hematology / Oncology  -     NM pet ct tumor imaging whole body; Future  -     MRI brain w wo contrast; Future  -     Ambulatory Referral to Wound Care; Future  -     CBC and differential; Future  -     Comprehensive metabolic panel; Future  -     LD,Blood; Future  -     Ambulatory Referral to Oncology Genetics; Future    Family history of breast cancer  -     Ambulatory Referral to Oncology Genetics; Future    Family history of breast cancer in first degree relative  -     Ambulatory Referral to Oncology Genetics;  Future        Oncology History   Malignant melanoma of right lower extremity including hip ([facility] [address] )   3/28/2023 Initial Diagnosis    Malignant melanoma of right lower extremity including hip ([facility] [address] )     2023 Surgery    Wide excision by podiatry:  Skin, foot, right, wide excision:  Melanoma, acral type, ulcerated, Breslow thickness: 4 2 mm  PATHOLOGIC STAGE CLASSIFICATION (pTNM, AJCC 8th Edition)     pT Category  pT4b         2023 -  Cancer Staged    Staging form: Melanoma of the Skin, AJCC 8th Edition  - Clinical stage from 2023: Stage III (cT4b, cN1c, cM0) - Signed by Maynor Martell MD on 2023 -  Cancer Staged    Staging form: Melanoma of the Skin, AJCC 8th Edition  - Pathologic stage from 2023: Stage IIIC (pT4b, pN1c, cM0) - Signed by Maynor Martell MD on 2023          Cancer Staging   Malignant melanoma of right lower extremity including hip [facility])  Staging form: Melanoma of the Skin, AJCC 8th Edition  - Clinical stage from 2023: Stage III (cT4b, cN1c, cM0) - Signed by Maynor Martell MD on 2023  - Pathologic stage from 2023: Stage IIIC (pT4b, pN1c, cM0) - Signed by Deep Tinajero MD on 2023          HISTORY OF PRESENT ILLNESS: Anil Cabrera is a 59 y o  female  who is seen in consultation at the request of Dr Kristina Li for new diagnosis of melanoma  History is obtained from the patient, review of records, and discussion with Dr Kristina Li  Patient states that approximately 2 to 3 months ago she thought she had a plantar wart on the bottom of her right foot  She was walking on it and she could feel it  She tried over-the-counter treatments which did not help  It only got bigger  She got more concerned as it increased in size significantly  She did see her primary care doctor who thought it looked like a callus  But then it continued to get worse  It became more painful and it got to the point where she could not even walk on it  She then saw podiatry who excised the lesion in the office  They were concerned that it was pigmented and looked like it had satellite lesions extending to her toes  Biopsy of the lesion on 2023 on the right foot wide excision demonstrated melanoma, acral type, ulcerated, Breslow thickness 4 2 mm  There was macroscopic satellites present, Grupo level IV, mitoses 4/mm², microsatellites  No lymphovascular invasion, neurotropism, or regression  TILs nonbrisk  Description of the pathological sample describes multiple melanoma satellite nodules identified predominantly involving the papillary dermis  One of the satellite nodules extends to the margins  All other margins were negative  Patient also describes feeling a right thigh nodule recently that was tiny, maybe a size of a marble but now has grown to a large size size of a grape  It is not painful  She does have a father who had melanoma and who  of a brain mass  She has a younger sister who had melanoma and breast cancer diagnosed in her 45s     She also has an older sister who had breast cancer  She has a mother who  of a brain tumor in her [de-identified]  There is a significant family history of breast cancer on her maternal side  She has 5-6 maternal aunts who have had breast cancer one was in her 76s the rest were in their 46s or 62s  She also has a cousin who has breast cancer who  of her disease as well  Denies family history of ovarian and pancreatic cancer  She describes having normal sun exposure  Denies having normal sunburns  She had 1-2 bad blistering sunburns  She tried a tanning bed once and never again  She has dark brown hair, has brown eyes, and is [place] and descent  He is up-to-date on her age-appropriate cancer screenings with her recent colonoscopy as well as mammograms and Pap smears  She continues to smoke  She smokes about a half a pack per day for about 20 years  She drinks alcohol socially  Denies any drug use  REVIEW OF SYSTEMS:  Review of Systems   Constitutional: Negative for appetite change, fatigue, fever and unexpected weight change  HENT:   Negative for lump/mass  Eyes: Negative for icterus  Respiratory: Negative for cough, shortness of breath and wheezing  Cardiovascular: Negative for leg swelling  Gastrointestinal: Negative for abdominal pain, constipation, diarrhea, nausea and vomiting  Genitourinary: Negative for difficulty urinating and hematuria  Musculoskeletal: Positive for gait problem (cannot walk on right plantar surface of foot due to open wound  )  Negative for arthralgias and myalgias  Skin: Negative for itching and rash  No new, changing, or concerning lesions  Neurological: Positive for gait problem (cannot walk on right plantar surface of foot due to open wound  )  Negative for extremity weakness, headaches, light-headedness and numbness  Hematological: Positive for adenopathy          MEDICATIONS:    Current Outpatient Medications:   •  albuterol (PROVENTIL HFA,VENTOLIN HFA) 90 mcg/act inhaler, INHALE 1 PUFF EVERY 4 HOURS AS NEEDED FOR WHEEZING, Disp: 6 7 g, Rfl: 3  •  amLODIPine (NORVASC) 5 mg tablet, Take 1 tablet (5 mg total) by mouth daily (Patient taking differently: Take 10 mg by mouth daily), Disp: 90 tablet, Rfl: 1  •  atenolol (TENORMIN) 50 mg tablet, TAKE 1 TABLET BY MOUTH EVERY DAY, Disp: 90 tablet, Rfl: 0  •  escitalopram (LEXAPRO) 10 mg tablet, TAKE 1 TABLET BY MOUTH EVERY DAY, Disp: 90 tablet, Rfl: 1  •  famotidine (PEPCID) 20 mg tablet, TAKE 1 TABLET BY MOUTH TWICE A DAY, Disp: 180 tablet, Rfl: 1  •  hydrochlorothiazide (MICROZIDE) 12 5 mg capsule, Take 12 5 mg by mouth daily, Disp: , Rfl:      ALLERGIES:  Allergies   Allergen Reactions   • Methocarbamol Shortness Of Breath and Swelling   • Tramadol Shortness Of Breath and Swelling        ACTIVE PROBLEMS:  Patient Active Problem List   Diagnosis   • Mild intermittent asthma   • Esophageal dysmotility   • Allergic rhinitis with postnasal drip   • Tobacco abuse   • NATE (obstructive sleep apnea)   • Cough   • Vitamin D deficiency   • High blood triglycerides   • Diarrhea   • Asthma   • Need for vaccination   • Unintentional weight loss   • Anemia   • Enlarged liver   • Low folic acid   • High vitamin A level   • High serum vitamin E   • Low serum vitamin B12   • Hypokalemia   • Alcoholism (HCC)   • Anxiety disorder   • Cervical spinal stenosis   • Diverticular disease of colon   • Erosive esophagitis   • Hemorrhoids   • History of gastroesophageal reflux (GERD)   • Hypertension   • Irritable bowel syndrome   • Need for influenza vaccination   • Postoperative examination   • Pure hypercholesterolemia   • Thoracic and lumbosacral neuritis   • Pre-operative clearance   • Angiomyolipoma   • Chest pain on respiration   • Left flank pain   • Change in stool habits   • Epicondylitis, lateral (tennis elbow)   • Gallbladder disease   • Limb pain   • Impaired fasting glucose   • Microhematuria   • Pelvic pain in female   • Venous insufficiency   • Annual physical exam   • Fatigue   • Foot pain   • S/P cervical spinal fusion   • Fatty liver   • Abnormal bowel movement   • Hypertensive urgency   • Hypertriglyceridemia   • Electrolyte imbalance   • Myofascial pain syndrome, cervical   • Cervicalgia   • Occipital headache   • Cervical post-laminectomy syndrome   • Malignant melanoma of right lower extremity including hip (HCC)          PAST MEDICAL HISTORY:   Past Medical History:   Diagnosis Date   • Cervical disc disorder    • Stenosis of cervical spine region         PAST SURGICAL HISTORY:  Past Surgical History:   Procedure Laterality Date   • BREAST BIOPSY Left 2009    core   • BREAST BIOPSY Right 2009    core   • CERVICAL FUSION  2013    cC3-C4, C4-C5, C5 C6, C6-C7, C7-T1   •  SECTION     • ELBOW SURGERY     • KNEE SURGERY Bilateral 20YRS AGO        SOCIAL HISTORY:  Social History     Socioeconomic History   • Marital status: Single     Spouse name: None   • Number of children: None   • Years of education: None   • Highest education level: None   Occupational History   • None   Tobacco Use   • Smoking status: Every Day     Packs/day: 0 25     Types: Cigarettes     Start date: 0   • Smokeless tobacco: Never   Vaping Use   • Vaping Use: Never used   Substance and Sexual Activity   • Alcohol use: Yes     Comment: SOCIAL   • Drug use: Not Currently   • Sexual activity: Yes     Partners: Male   Other Topics Concern   • None   Social History Narrative   • None     Social Determinants of Health     Financial Resource Strain: Not on file   Food Insecurity: Not on file   Transportation Needs: Not on file   Physical Activity: Not on file   Stress: Not on file   Social Connections: Not on file   Intimate Partner Violence: Not on file   Housing Stability: Not on file        FAMILY HISTORY:  Family History   Problem Relation Age of Onset   • Cancer Mother    • Brain cancer Mother [de-identified]   • Heart disease Father    • "Breast cancer Sister 48   • Breast cancer Sister 45   • Breast cancer Maternal Aunt 60   • Breast cancer Maternal Aunt 60   • Breast cancer Maternal Aunt 60   • Breast cancer Cousin 50   • Breast cancer Cousin 52   • Breast cancer Cousin 52   • Prostate cancer Maternal Uncle 54   • No Known Problems Son    • No Known Problems Maternal Grandmother    • No Known Problems Maternal Grandfather    • No Known Problems Paternal Grandmother    • No Known Problems Paternal Grandfather    • BRCA1 Negative Sister    • BRCA2 Negative Sister    • No Known Problems Sister    • No Known Problems Maternal Aunt    • No Known Problems Maternal Aunt    • No Known Problems Maternal Aunt    • No Known Problems Maternal Aunt            Objective    PHYSICAL EXAMINATION:   Blood pressure 142/82, pulse 72, temperature 97 8 °F (36 6 °C), temperature source Temporal, resp  rate 16, height 5' 7\" (1 702 m), weight 71 2 kg (157 lb), SpO2 98 %, not currently breastfeeding  Pain Score:   2     ECOG Performance Status    Flowsheet Row Most Recent Value   ECOG Performance Status 0 - Fully active, able to carry on all pre-disease performance without restriction             Physical Exam  Constitutional:       General: She is not in acute distress  Appearance: Normal appearance  She is not toxic-appearing  HENT:      Mouth/Throat:      Mouth: Mucous membranes are moist       Pharynx: Oropharynx is clear  Eyes:      General: No scleral icterus  Cardiovascular:      Rate and Rhythm: Normal rate and regular rhythm  Pulses: Normal pulses  Heart sounds: No murmur heard  No friction rub  No gallop  Pulmonary:      Effort: Pulmonary effort is normal  No respiratory distress  Breath sounds: Normal breath sounds  No wheezing or rales  Abdominal:      General: There is no distension  Palpations: There is no mass  Tenderness: There is no abdominal tenderness  There is no rebound     Musculoskeletal:         General: " No swelling or tenderness  Normal range of motion  Right lower leg: No edema  Left lower leg: No edema  Lymphadenopathy:      Head:      Right side of head: No submandibular, preauricular or posterior auricular adenopathy  Left side of head: No submandibular, preauricular or posterior auricular adenopathy  Cervical: No cervical adenopathy  Right cervical: No superficial or posterior cervical adenopathy  Left cervical: No superficial or posterior cervical adenopathy  Upper Body:      Right upper body: No supraclavicular or axillary adenopathy  Left upper body: No supraclavicular or axillary adenopathy  Lower Body: Right inguinal adenopathy (about 1 cm, mobile) present  No left inguinal adenopathy  Skin:     Coloration: Skin is not jaundiced  Findings: No rash  Comments: No concerning skin lesions  Neurological:      General: No focal deficit present  Mental Status: She is alert and oriented to person, place, and time  Psychiatric:         Mood and Affect: Mood normal          Behavior: Behavior normal          Thought Content: Thought content normal          Judgment: Judgment normal          I reviewed lab data in the chart      WBC   Date Value Ref Range Status   09/03/2020 5 32 4 31 - 10 16 Thousand/uL Final   01/12/2019 8 95 4 31 - 10 16 Thousand/uL Final   12/12/2013 6 62 4 31 - 10 16 Thousand/uL Final     Comment:     Result Comment: New Reference Range     Hemoglobin   Date Value Ref Range Status   09/03/2020 11 7 11 5 - 15 4 g/dL Final   01/12/2019 14 8 11 5 - 15 4 g/dL Final   12/12/2013 14 0 11 5 - 15 4 g/dL Final     Comment:     Result Comment: New Reference Range     Platelets   Date Value Ref Range Status   09/03/2020 262 149 - 390 Thousands/uL Final   01/12/2019 268 149 - 390 Thousands/uL Final   12/12/2013 251 149 - 390 Thousand/uL Final     MCV   Date Value Ref Range Status   09/03/2020 105 (H) 82 - 98 fL Final   01/12/2019 100 (H) 82 - 98 fL Final   12/12/2013 96 82 - 98 fL Final     Comment:     Result Comment: New Reference Range      Sodium   Date Value Ref Range Status   12/12/2013 140 135 - 145 mmol/L Final     Potassium   Date Value Ref Range Status   10/23/2020 4 0 3 5 - 5 3 mmol/L Final   09/28/2020 4 1 3 5 - 5 3 mmol/L Final   09/08/2020 3 1 (L) 3 5 - 5 3 mmol/L Final   12/12/2013 3 8 3 6 - 5 0 mmol/L Final     Chloride   Date Value Ref Range Status   09/03/2020 98 (L) 100 - 108 mmol/L Final   01/12/2019 102 100 - 108 mmol/L Final   11/16/2017 102 100 - 108 mmol/L Final   12/12/2013 102 101 - 111 mmol/L Final     CO2   Date Value Ref Range Status   09/03/2020 36 (H) 21 - 32 mmol/L Final   01/12/2019 28 21 - 32 mmol/L Final   11/16/2017 31 21 - 32 mmol/L Final   12/12/2013 29 21 - 31 mmol/L Final     BUN   Date Value Ref Range Status   09/03/2020 7 5 - 25 mg/dL Final   01/12/2019 11 5 - 25 mg/dL Final   11/16/2017 15 5 - 25 mg/dL Final   12/12/2013 19 5 - 27 mg/dL Final     Creatinine   Date Value Ref Range Status   09/03/2020 0 53 (L) 0 60 - 1 30 mg/dL Final     Comment:     Standardized to IDMS reference method   01/12/2019 0 74 0 60 - 1 30 mg/dL Final     Comment:     Standardized to IDMS reference method   11/16/2017 0 80 0 60 - 1 30 mg/dL Final     Comment:     Standardized to IDMS reference method   12/12/2013 0 64 0 60 - 1 30 mg/dL Final     Comment:     Result Comment: Standardized to IDMS reference method     Calcium   Date Value Ref Range Status   09/03/2020 8 6 8 3 - 10 1 mg/dL Final   01/12/2019 8 5 8 3 - 10 1 mg/dL Final   11/16/2017 9 1 8 3 - 10 1 mg/dL Final   12/12/2013 9 1 8 3 - 10 1 mg/dL Final     Albumin   Date Value Ref Range Status   09/03/2020 3 3 (L) 3 5 - 5 0 g/dL Final   01/12/2019 3 6 3 5 - 5 0 g/dL Final   11/16/2017 3 4 (L) 3 5 - 5 0 g/dL Final     Total Bilirubin   Date Value Ref Range Status   09/03/2020 0 81 0 20 - 1 00 mg/dL Final     Comment:     Use of this assay is not recommended for patients undergoing treatment with eltrombopag due to the potential for falsely elevated results  01/12/2019 0 85 0 20 - 1 00 mg/dL Final   11/16/2017 0 30 0 20 - 1 00 mg/dL Final     Alkaline Phosphatase   Date Value Ref Range Status   09/03/2020 90 46 - 116 U/L Final   01/12/2019 53 46 - 116 U/L Final   11/16/2017 63 46 - 116 U/L Final     AST   Date Value Ref Range Status   09/03/2020 57 (H) 5 - 45 U/L Final     Comment:     Specimen collection should occur prior to Sulfasalazine administration due to the potential for falsely depressed results  01/12/2019 20 5 - 45 U/L Final     Comment:       Specimen collection should occur prior to Sulfasalazine administration due to the potential for falsely depressed results  11/16/2017 32 5 - 45 U/L Final     Comment:       Specimen collection should occur prior to Sulfasalazine administration due to the potential for falsely depressed results  ALT   Date Value Ref Range Status   09/03/2020 23 12 - 78 U/L Final     Comment:     Specimen collection should occur prior to Sulfasalazine and/or Sulfapyridine administration due to the potential for falsely depressed results  01/12/2019 23 12 - 78 U/L Final     Comment:       Specimen collection should occur prior to Sulfasalazine and/or Sulfapyridine administration due to the potential for falsely depressed results  11/16/2017 70 12 - 78 U/L Final     Comment:       Specimen collection should occur prior to Sulfasalazine administration due to the potential for falsely depressed results  No results found for: LDH  No results found for: TSH  No results found for: [id number]   No results found for: FREET4      RECENT IMAGING:  Procedure: MRI cervical spine with and without contrast    Result Date: 3/30/2023  Narrative: MRI CERVICAL SPINE WITH AND WITHOUT CONTRAST INDICATION: Z98 1: Arthrodesis status M79 18: Myalgia, other site M54 2: Cervicalgia R51 9: Headache, unspecified   COMPARISON:  MRI cervical spine 1/21/2013 TECHNIQUE:  Multiplanar, multisequence imaging of the cervical spine was performed before and after gadolinium administration     IV Contrast:  7 mL of Gadobutrol injection (SINGLE-DOSE) IMAGE QUALITY:  Diagnostic  FINDINGS: POSTSURGICAL FINDINGS: Interval bilateral decompressive laminectomies at the C4-C7 levels with placement of bilateral transpedicular screws at the at the C3-T1 levels  ALIGNMENT:  There is straightening of the mid to upper cervical lordosis  No compression fracture  No subluxation  No scoliosis  MARROW SIGNAL:  Normal marrow signal is identified within the visualized bony structures  No discrete marrow lesion  CERVICAL AND VISUALIZED UPPER THORACIC CORD:  Normal signal within the visualized cord  PREVERTEBRAL AND PARASPINAL SOFT TISSUES:  Normal  VISUALIZED POSTERIOR FOSSA:  The visualized posterior fossa demonstrates no abnormal signal  CERVICAL DISC SPACES: C2-3: No focal disc herniation, central canal stenosis, or neural foraminal narrowing  C3-4: Stable tiny central disc protrusion without central canal or neural foraminal narrowing  C4-5: Stable broad-based left paracentral disc protrusion  Improved patency of the central canal status post bilateral laminectomies  Evaluation of the neural foramina is limited due to susceptibility artifacts  C5-6: Stable central disc protrusion and bilateral uncovertebral hypertrophy  Improved patency of the central canal status post bilateral laminectomies  Evaluation of the neural foramina is limited due to susceptibility artifacts  C6-7: Stable central disc protrusion and bilateral uncovertebral hypertrophy  Improved patency of the central canal status post bilateral laminectomies  Evaluation of the neural foramina is limited due to susceptibility artifacts  C7-T1: Stable tiny right paracentral disc protrusion  No central canal narrowing  Right subarticular/lateral recess narrowing  No neural foraminal stenosis   UPPER THORACIC DISC SPACES: Normal  POSTCONTRAST IMAGING:  Normal      Impression: 1  Interval bilateral decompressive laminectomies at the C4-C7 levels with placement of bilateral transpedicular screws at the at the C3-T1 levels  Stable disc protrusions at the C3-4 through the C7-T1 levels  2  The cervical cord appears normal in caliber and signal with no evidence of focal impingement  Workstation performed: [id number]             Assessment/Plan  Ms Juli Art is a 59 yr female with newly diagnosed melanoma of her right foot here for disease management discussion  1  Malignant melanoma of right lower extremity including hip ([facility] [address] )  1  Melanoma: I had an extensive discussion with the patient and her stepmother regarding her  diagnosis, prognosis, and recommendations for further management  We reviewed her melanoma history, current findings, pathology and imaging tests  We discussed that she has a least stage IIIc disease by way of having satellite lesions that were incompletely cleared with wide local excision in the office  I do recommend biopsying the right inguinal/thigh lesion as well  We discussed that this could be melanoma versus a reactionary lymph node given the open wound on her foot  She is set to see Dr Laura Soler tomorrow and I am hoping he will be able to biopsy this in the office  I did discuss that she should undergo complete full body imaging to determine extent of disease  I discussed that this would determine next steps in terms of treatment options including systemic treatment for stage IV disease versus neoadjuvant approach to resection versus surgery and then treatment  We discussed that treatment mainstay for melanoma is based on two principles - targeted therapy and immunotherapy  We discussed in great deal the reasoning and rationale behind both treatments  She would require a BRAF V600 mutation to be eligible for targeted therapy with BRAF/MEK inhibitor    We will send her tumor for mutation testing with next generation sequencing  We discussed that ultimately decisions versus treatment and surgery and sequencing would depend on final staging  This will depend on imaging  I have ordered a PET scan and a brain MRI  She will get these scheduled and I will call her with results once they have been completed  I have also ordered her for baseline blood work including CBC with differential, comprehensive metabolic panel, and LDH  We discussed that she will ultimately require ongoing monitoring and surveillance, both for disease recurrence as well as a new primary melanoma as well as other nonmelanoma skin cancers  This includes physical exams and labs, including a comprehensive metabolic panel, CBC with differential and LDH; periodic imaging studies with either CT chest, abdomen and pelvis or PET/CT scans  We discussed the importance of regular cutaneous self examinations and reviewed the features of lesions that could be concerning for skin cancer  I did explain that she  is at risk for developing another primary melanoma as well  We also discussed avoidance of unnecessary sun exposure and use of sun protective clothing and sunscreen  I also recommended routine follow up and skin checks with dermatology  Family Screening: her  first-degree relatives, namely his siblings, parents, and children, have an increased risk of developing a melanoma over the general population given  her  diagnosis of melanoma, and should have annual dermatologic screening         - Ambulatory Referral to Hematology / Oncology  - NM pet ct tumor imaging whole body; Future  - MRI brain w wo contrast; Future  - Ambulatory Referral to Wound Care; Future  - CBC and differential; Future  - Comprehensive metabolic panel; Future  - LD,Blood; Future  - Ambulatory Referral to Oncology Genetics; Future    2  Family history of breast cancer  3   Family history of breast cancer in first degree relative  She has a family history of breastcancer in her family  Given this history and her melanoma, I am concerned that there could be a genetic underpinning to her  disease and recommend that she speak with a genetic counselor regarding germline genetic testing  I have provided information for the genetic counselor and placed a referral         - Ambulatory Referral to Oncology Genetics; Future          Return after imaging and decision on surgery, for Office Visit  Ms Serina Fisher had all her questions and concerns addressed and she knows to call with any additional issues  Thank you for allowing me to participate in Ms Yepez's care        Sage Dickson MD, PhD

## 2023-04-27 NOTE — ASSESSMENT & PLAN NOTE
S/p cervical spinal fusion  However continue to have cervical radiculopathy such as upper extremity weakness, that rendered unable to lift more than 50 lb   Reviewed Cervical MRI showed bilateral decompressive laminectomies at the C4-C7 levels with placement of bilateral transpedicular screws at the at the C3-T1 levels  Stable disc protrusions at the C3-4 through the C7-T1 levels    She will continue to follow neurosurgery

## 2023-04-27 NOTE — ASSESSMENT & PLAN NOTE
MRI of the cervical spine showed bilateral decompressive laminectomies at the C4-C7 levels with placement of bilateral transpedicular screws at the at the C3-T1 levels  Stable disc protrusions at the C3-4 through the C7-T1 levels    Continue to follow with NS

## 2023-04-27 NOTE — ASSESSMENT & PLAN NOTE
· Continued to have cervical radiculopathy with neuropathy and upper extremity weakness as evidenced by cervical MRI  · Debilitating symptoms persistent after spinal fusion  · Continue follow up with neurosurgery  ·

## 2023-04-28 ENCOUNTER — LAB REQUISITION (OUTPATIENT)
Dept: LAB | Facility: HOSPITAL | Age: 63
End: 2023-04-28

## 2023-04-28 ENCOUNTER — TELEPHONE (OUTPATIENT)
Dept: HEMATOLOGY ONCOLOGY | Facility: CLINIC | Age: 63
End: 2023-04-28

## 2023-04-28 ENCOUNTER — TELEPHONE (OUTPATIENT)
Dept: SURGICAL ONCOLOGY | Facility: CLINIC | Age: 63
End: 2023-04-28

## 2023-04-28 DIAGNOSIS — C43.71 MALIGNANT MELANOMA OF RIGHT LOWER EXTREMITY INCLUDING HIP (HCC): Primary | ICD-10-CM

## 2023-04-28 DIAGNOSIS — C43.9 METASTATIC MELANOMA (HCC): ICD-10-CM

## 2023-04-28 DIAGNOSIS — C43.71 MALIGNANT MELANOMA OF RIGHT LOWER LIMB, INCLUDING HIP (HCC): ICD-10-CM

## 2023-04-28 NOTE — TELEPHONE ENCOUNTER
Please schedule Keytruda for [id number]  Monday, Wednesday, or Thursdays  Dr Laura Whitten needs to see her same days so nothing first thing in the AM  I will schedule follow ups around your availability  Thank you!

## 2023-04-28 NOTE — TELEPHONE ENCOUNTER
I spoke with the patient  I gave her the date and time of the MRI  I provided central scheduling phone number if she needed to change the appointment

## 2023-04-28 NOTE — TELEPHONE ENCOUNTER
Patient returned my call  Told her there was expected activity in the foot and the groin  There was also activity in the liver  Plan for a liver MRI, that is ordered, and patient to start immunotherapy ASAP

## 2023-04-28 NOTE — TELEPHONE ENCOUNTER
"Received voicemail: \"Hi, this is Lamonte Mayers  Birth date is [id number], phone number is [phone]  And I'm returning Doctor Harvey's call  She told me to call the nurses line and about my test results for my pet Kash  She wants to go over the next steps so I'll just wait to hear from you  Thank you  \"    Will forward to Dr Mena to return patient's call         "

## 2023-04-28 NOTE — TELEPHONE ENCOUNTER
She can't do first thing in the AM (8am) Because Dr Esvin Fan needs to see her same day starting with cycle 2  Thanks!

## 2023-04-28 NOTE — TELEPHONE ENCOUNTER
Spoke with Ms Yepez regarding neck steps  She is agreeable to treatment with Keytruda  She verbally signed consent today for pembrolizumab and blood  We had discussed immunotherapy in the office at her visit and she understands and is in agreement with receiving treatment with pembrolizumab  I explained we will place these orders and get her infusion appointment scheduled  She will receive further instructions regarding blood work needed prior to her first treatment  She was very appreciative for all the work that all of us have been doing on the team to help her with her cancer - including all the nurses, Dr Rj Pro and Dr Benja Benitez, and myself      Paz Yusuf MD, PhD

## 2023-05-01 ENCOUNTER — TELEPHONE (OUTPATIENT)
Dept: GENETICS | Facility: CLINIC | Age: 63
End: 2023-05-01

## 2023-05-01 ENCOUNTER — TELEPHONE (OUTPATIENT)
Dept: HEMATOLOGY ONCOLOGY | Facility: CLINIC | Age: 63
End: 2023-05-01

## 2023-05-01 ENCOUNTER — APPOINTMENT (OUTPATIENT)
Dept: LAB | Age: 63
End: 2023-05-01

## 2023-05-01 ENCOUNTER — TELEPHONE (OUTPATIENT)
Dept: SURGICAL ONCOLOGY | Facility: CLINIC | Age: 63
End: 2023-05-01

## 2023-05-01 DIAGNOSIS — C43.71 MALIGNANT MELANOMA OF RIGHT LOWER EXTREMITY INCLUDING HIP (HCC): ICD-10-CM

## 2023-05-01 LAB
ALBUMIN SERPL BCP-MCNC: 3.5 G/DL (ref 3.5–5)
ALP SERPL-CCNC: 91 U/L (ref 46–116)
ALT SERPL W P-5'-P-CCNC: 15 U/L (ref 12–78)
ANION GAP SERPL CALCULATED.3IONS-SCNC: 5 MMOL/L (ref 4–13)
AST SERPL W P-5'-P-CCNC: 19 U/L (ref 5–45)
BASOPHILS # BLD AUTO: 0.03 THOUSANDS/ΜL (ref 0–0.1)
BASOPHILS NFR BLD AUTO: 0 % (ref 0–1)
BILIRUB SERPL-MCNC: 1.12 MG/DL (ref 0.2–1)
BUN SERPL-MCNC: 12 MG/DL (ref 5–25)
CALCIUM SERPL-MCNC: 9.1 MG/DL (ref 8.3–10.1)
CHLORIDE SERPL-SCNC: 103 MMOL/L (ref 96–108)
CO2 SERPL-SCNC: 29 MMOL/L (ref 21–32)
CREAT SERPL-MCNC: 0.67 MG/DL (ref 0.6–1.3)
EOSINOPHIL # BLD AUTO: 0.1 THOUSAND/ΜL (ref 0–0.61)
EOSINOPHIL NFR BLD AUTO: 1 % (ref 0–6)
ERYTHROCYTE [DISTWIDTH] IN BLOOD BY AUTOMATED COUNT: 13.3 % (ref 11.6–15.1)
GFR SERPL CREATININE-BSD FRML MDRD: 94 ML/MIN/1.73SQ M
GLUCOSE P FAST SERPL-MCNC: 100 MG/DL (ref 65–99)
HCT VFR BLD AUTO: 41.2 % (ref 34.8–46.1)
HGB BLD-MCNC: 14.4 G/DL (ref 11.5–15.4)
IMM GRANULOCYTES # BLD AUTO: 0.02 THOUSAND/UL (ref 0–0.2)
IMM GRANULOCYTES NFR BLD AUTO: 0 % (ref 0–2)
LDH SERPL-CCNC: 171 U/L (ref 81–234)
LYMPHOCYTES # BLD AUTO: 1.88 THOUSANDS/ΜL (ref 0.6–4.47)
LYMPHOCYTES NFR BLD AUTO: 25 % (ref 14–44)
MCH RBC QN AUTO: 36.4 PG (ref 26.8–34.3)
MCHC RBC AUTO-ENTMCNC: 35 G/DL (ref 31.4–37.4)
MCV RBC AUTO: 104 FL (ref 82–98)
MONOCYTES # BLD AUTO: 0.59 THOUSAND/ΜL (ref 0.17–1.22)
MONOCYTES NFR BLD AUTO: 8 % (ref 4–12)
NEUTROPHILS # BLD AUTO: 4.77 THOUSANDS/ΜL (ref 1.85–7.62)
NEUTS SEG NFR BLD AUTO: 66 % (ref 43–75)
NRBC BLD AUTO-RTO: 0 /100 WBCS
PLATELET # BLD AUTO: 261 THOUSANDS/UL (ref 149–390)
PMV BLD AUTO: 9.6 FL (ref 8.9–12.7)
POTASSIUM SERPL-SCNC: 3 MMOL/L (ref 3.5–5.3)
PROT SERPL-MCNC: 6.5 G/DL (ref 6.4–8.4)
RBC # BLD AUTO: 3.96 MILLION/UL (ref 3.81–5.12)
SODIUM SERPL-SCNC: 137 MMOL/L (ref 135–147)
WBC # BLD AUTO: 7.39 THOUSAND/UL (ref 4.31–10.16)

## 2023-05-01 NOTE — TELEPHONE ENCOUNTER
Second Attempt:     I called Joel Urena to schedule a new patient appointment with the [facility]       Outcome:   I left a voice message encouraging the patient to call the genetics team at [phone] to schedule this appointment  Follow-up:   At this time the referral will be closed and we will wait to hear back from the patient regarding scheduling this appointment

## 2023-05-01 NOTE — TELEPHONE ENCOUNTER
Discussed pathology with the patient  Await liver MRI  Recommended proceeding with immunotherapy  All of her questions were answered

## 2023-05-02 ENCOUNTER — TELEPHONE (OUTPATIENT)
Dept: INFUSION CENTER | Facility: CLINIC | Age: 63
End: 2023-05-02

## 2023-05-02 NOTE — TELEPHONE ENCOUNTER
New Patient Call:    Left Message for patient with detailed information regarding upcoming appointment: Phone number for unit given: Awaiting return call

## 2023-05-03 ENCOUNTER — APPOINTMENT (OUTPATIENT)
Dept: LAB | Age: 63
End: 2023-05-03

## 2023-05-03 ENCOUNTER — TELEPHONE (OUTPATIENT)
Dept: HEMATOLOGY ONCOLOGY | Facility: CLINIC | Age: 63
End: 2023-05-03

## 2023-05-03 DIAGNOSIS — C43.71 MALIGNANT MELANOMA OF RIGHT LOWER EXTREMITY INCLUDING HIP (HCC): ICD-10-CM

## 2023-05-03 LAB
T3FREE SERPL-MCNC: 2.44 PG/ML (ref 2.3–4.2)
T4 FREE SERPL-MCNC: 0.84 NG/DL (ref 0.76–1.46)
TSH SERPL DL<=0.05 MIU/L-ACNC: 3.14 UIU/ML (ref 0.45–4.5)

## 2023-05-03 NOTE — TELEPHONE ENCOUNTER
Received message from finance regarding Keytruda infusion scheduled for tomorrow, authorization still in process and infusion needs to be deferred  Will send to infusion schedulers to push appointment date to next week

## 2023-05-03 NOTE — TELEPHONE ENCOUNTER
Patient Call    Who are you speaking with? Patient    If it is not the patient, are they listed on an active communication consent form? N/A   What is the reason for this call? The patient is calling in to inform Dr Scott Armas that she went for her tests today  Does this require a call back? No   If a call back is required, please list best call back number N/A   If a call back is required, advise that a message will be forwarded to their care team and someone will return their call as soon as possible  Did you relay this information to the patient?  N/A

## 2023-05-03 NOTE — TELEPHONE ENCOUNTER
Spoke with patient, she would prefer to stay at [facility] even if it is a week or two later, but if not she said [facility] is her second choice  She is also alling her insurance company to see what the holdup is on her authorization

## 2023-05-03 NOTE — TELEPHONE ENCOUNTER
Called patient to get the rest of her labs that are needed for treatment  Patient verbalized understanding and reports she is going to get them this morning  Dr Rachel Blue reviewed already obtained labs and labs look good, relayed message to patient  No other issues or concerns at this moment

## 2023-05-04 ENCOUNTER — HOSPITAL ENCOUNTER (OUTPATIENT)
Dept: INFUSION CENTER | Facility: CLINIC | Age: 63
Discharge: HOME | End: 2023-05-04

## 2023-05-04 ENCOUNTER — TELEPHONE (OUTPATIENT)
Dept: PODIATRY | Facility: CLINIC | Age: 63
End: 2023-05-04

## 2023-05-04 VITALS
HEIGHT: 67 IN | DIASTOLIC BLOOD PRESSURE: 84 MMHG | BODY MASS INDEX: 24.64 KG/M2 | HEART RATE: 67 BPM | SYSTOLIC BLOOD PRESSURE: 128 MMHG | WEIGHT: 157 LBS | OXYGEN SATURATION: 96 % | TEMPERATURE: 97.8 F | RESPIRATION RATE: 18 BRPM

## 2023-05-04 DIAGNOSIS — C43.71 MALIGNANT MELANOMA OF RIGHT LOWER EXTREMITY INCLUDING HIP (HCC): Primary | ICD-10-CM

## 2023-05-04 RX ORDER — SODIUM CHLORIDE 9 MG/ML
20 INJECTION, SOLUTION INTRAVENOUS ONCE
Status: COMPLETED | OUTPATIENT
Start: 2023-05-04 | End: 2023-05-04

## 2023-05-04 RX ADMIN — SODIUM CHLORIDE 20 ML/HR: 0.9 INJECTION, SOLUTION INTRAVENOUS at 08:58

## 2023-05-04 RX ADMIN — SODIUM CHLORIDE 200 MG: 9 INJECTION, SOLUTION INTRAVENOUS at 08:58

## 2023-05-04 NOTE — PROGRESS NOTES
Pt tolerated infusion well no adverse reactions noted  Reviewed pts next appt and getting lab work  Pt has copy of appts at home

## 2023-05-04 NOTE — PROGRESS NOTES
Pt here [facility] infusion  Pts first today  Reviewed unit and answered all questions  Labs 5/1/23 labs reviewed and within parameter for treatment today  Pt resting comfortable in chair

## 2023-05-04 NOTE — TELEPHONE ENCOUNTER
"Received voicemail from patient:    [facility], this is Arther Filter  You had called me saying my insurance hadn't approved it yet  After two hours on the phone with him, they said they think it was just approved at 4:20  So I'm going to show up for my appointment  I don't know if you cancelled it already  I'm going to show up because she said it was approved at 4:20  And in other words, she's like, it's not my problem that they leave at 4:00 o'clock and you didn't get this, which doesn't help me  So I'm just going to show up and go from there and see what happens  You want to call me back, [phone]  Thank you  \"    Infusion - [facility] - Can we make sure finance got the approval?    Thank you!   "

## 2023-05-04 NOTE — TELEPHONE ENCOUNTER
Confirmed with finance that infusion has been approved and patient can proceed today  Attempted to call patient, no answer  Left detailed message letting her know infusion is still on for today  Direct call back number provided for any questions

## 2023-05-10 ENCOUNTER — OFFICE VISIT (OUTPATIENT)
Dept: PODIATRY | Facility: CLINIC | Age: 63
End: 2023-05-10

## 2023-05-10 VITALS
DIASTOLIC BLOOD PRESSURE: 80 MMHG | HEIGHT: 67 IN | WEIGHT: 157.8 LBS | BODY MASS INDEX: 24.77 KG/M2 | SYSTOLIC BLOOD PRESSURE: 122 MMHG

## 2023-05-10 DIAGNOSIS — T14.8XXA SURGICAL WOUND PRESENT: Primary | ICD-10-CM

## 2023-05-10 DIAGNOSIS — C43.71 MALIGNANT MELANOMA OF RIGHT LOWER EXTREMITY INCLUDING HIP (HCC): ICD-10-CM

## 2023-05-10 RX ORDER — GENTAMICIN SULFATE 1 MG/G
CREAM TOPICAL 3 TIMES DAILY
Qty: 30 G | Refills: 2 | Status: SHIPPED | OUTPATIENT
Start: 2023-05-10

## 2023-05-10 NOTE — PROGRESS NOTES
"This patient was seen on 5/10/23  My role is Foot , Ankle, and Wound Specialist    SUBJECTIVE    Chief Complaint:  Malignant melanoma s/p excisional biopsy     Patient ID: Thalia Joshi is a 59 y o  female  Martell Trejo is here s/p excisional biosy of melanoma  She has planned upcoming immunotherapy  She's been changing her dressings daily  She understands an upcoming TMA will be required  The following portions of the patient's history were reviewed and updated as appropriate: allergies, current medications, past family history, past medical history, past social history, past surgical history and problem list     Review of Systems   Constitutional: Positive for activity change  Negative for appetite change, chills, diaphoresis, fatigue, fever and unexpected weight change  Respiratory: Negative  Cardiovascular: Negative  Musculoskeletal: Positive for gait problem  Skin: Positive for wound  OBJECTIVE      /80   Ht 5' 7\" (1 702 m)   Wt 71 6 kg (157 lb 12 8 oz)   BMI 24 71 kg/m²     Foot/Ankle Musculoskeletal Exam    General    Neurological: alert       Physical Exam  Vitals and nursing note reviewed  Constitutional:       General: She is not in acute distress  Appearance: Normal appearance  She is normal weight  She is not ill-appearing, toxic-appearing or diaphoretic  HENT:      Head: Normocephalic and atraumatic  Cardiovascular:      Pulses: Normal pulses  Pulmonary:      Effort: Pulmonary effort is normal    Musculoskeletal:         General: Tenderness present  Skin:     Capillary Refill: Capillary refill takes less than 2 seconds  Comments: The surgical wound is healing nicely  I note granulation tissue throughout with peripheral new epithelial growth circumferentially  No visual evidence of hyperpigmented tissues  I do note a mild green-tint to the drainage in the dressing  Neurological:      General: No focal deficit present        Mental Status: She is alert " and oriented to person, place, and time  ASSESSMENT     Diagnoses and all orders for this visit:    Surgical wound present    Malignant melanoma of right lower extremity including hip ([facility] [address] )         Problem List Items Addressed This Visit        Other    Malignant melanoma of right lower extremity including hip ([facility] )    Surgical wound present - Primary           PLAN  I am going to prescribe topical gentamycin cream for the presumed colonization with pseudomonas  She will change this daily  I dispensed a wedge shoe to help offload the ulcer site  I spoke with Dr Franci Avilez today to coordinate the timing on the TMA  His plan includes her completing 3 cycles of immunotherapy after which we do the TMA while he does the SLN biopsy together  I had her sign a consent today for the TMA so it's on file for when we start planning  Follow-up two weeks

## 2023-05-11 ENCOUNTER — APPOINTMENT (OUTPATIENT)
Dept: RADIOLOGY | Age: 63
End: 2023-05-11

## 2023-05-11 ENCOUNTER — HOSPITAL ENCOUNTER (OUTPATIENT)
Dept: RADIOLOGY | Age: 63
Discharge: HOME | End: 2023-05-11

## 2023-05-11 DIAGNOSIS — C43.71 MALIGNANT MELANOMA OF RIGHT LOWER EXTREMITY INCLUDING HIP (HCC): ICD-10-CM

## 2023-05-11 RX ADMIN — GADOBUTROL 7 ML: 604.72 INJECTION INTRAVENOUS at 12:17

## 2023-05-17 ENCOUNTER — TELEPHONE (OUTPATIENT)
Dept: HEMATOLOGY ONCOLOGY | Facility: CLINIC | Age: 63
End: 2023-05-17

## 2023-05-17 ENCOUNTER — DOCUMENTATION (OUTPATIENT)
Dept: RADIATION ONCOLOGY | Facility: HOSPITAL | Age: 63
End: 2023-05-17

## 2023-05-17 DIAGNOSIS — C43.71 MALIGNANT MELANOMA OF RIGHT LOWER EXTREMITY INCLUDING HIP (HCC): Primary | ICD-10-CM

## 2023-05-17 NOTE — PROGRESS NOTES
NEURO ONCOLOGY CASE REVIEW     DATE:  5/17/23  PRESENTING DOCTOR: Dr Aliza Carvalho    DIAGNOSIS:   Metastatic melanoma      Anusha Gomez is a 59 y o  female who was presented at Neuro Oncology Case Review today  Recently diagnosed melanoma of the right foot  Staging workup ordered  Patient presented today to review recent MRI brain findings  PHYSICIAN RECOMMENDED PLAN:   - Radiation oncology referral  - Consider continuing immunotherapy and repeat MRI brain in 2months    5/22/23 Hematology Oncology follow up, Dr Regulo Kamara    Imaging Reviewed:   5/11/23 MRI brain:   IMPRESSION:   Findings suspicious for tiny hemorrhagic brain metastasis in right frontal lobe, left superior temporal lobe, right caudate, right medial occipital lobes, and right posterior thalamocapsular junction with tiny nonhemorrhagic metastasis in right cerebellum  Mild perilesional vasogenic edema in right frontal lobe    No acute intracranial abnormality    Mild chronic microangiopathy  Team agreed to plan  NCCN guidelines were readily available for review at this discussion  The final treatment plan will be left at the discretion of the patient and the treating physician  DISCLAIMERS:  TO THE TREATING PHYSICIAN:  This conference is a meeting of clinicians from various specialty areas who evaluate and discuss patients for whom a multidisciplinary treatment approach is being considered  Please note that the above opinion was a consensus of the conference attendees and is intended only to assist in quality care of the discussed patient  The responsibility for follow up on the input given during the conference, along with any final decisions regarding plan of care, is that of the patient and the patient's provider  Accordingly, appointments have only been recommended based on this information; and have NOT been scheduled unless otherwise noted        TO THE PATIENT:  This summary is a brief record of major aspects of your cancer treatment  You may choose to can share a your copy with any of your doctors or nurses  However, this is not a detailed or comprehensive record of your care

## 2023-05-17 NOTE — TELEPHONE ENCOUNTER
Called to discuss results from neuro-oncology tumor board this morning  Left voicemail to call office back  Also discussed that we will place referral for radiation oncology for discussion  Left phone numbers for her to call back      Marshall Arreaga MD, PhD

## 2023-05-21 ENCOUNTER — APPOINTMENT (OUTPATIENT)
Dept: LAB | Age: 63
End: 2023-05-21

## 2023-05-22 ENCOUNTER — OFFICE VISIT (OUTPATIENT)
Dept: HEMATOLOGY ONCOLOGY | Facility: CLINIC | Age: 63
End: 2023-05-22

## 2023-05-22 VITALS
SYSTOLIC BLOOD PRESSURE: 128 MMHG | BODY MASS INDEX: 25.43 KG/M2 | DIASTOLIC BLOOD PRESSURE: 80 MMHG | OXYGEN SATURATION: 98 % | HEIGHT: 67 IN | TEMPERATURE: 97.6 F | WEIGHT: 162 LBS | HEART RATE: 81 BPM | RESPIRATION RATE: 16 BRPM

## 2023-05-22 DIAGNOSIS — C43.71 MALIGNANT MELANOMA OF RIGHT LOWER EXTREMITY INCLUDING HIP (HCC): Primary | ICD-10-CM

## 2023-05-22 DIAGNOSIS — Z79.899 HIGH RISK MEDICATION USE: ICD-10-CM

## 2023-05-22 DIAGNOSIS — C43.9 METASTATIC MELANOMA (HCC): ICD-10-CM

## 2023-05-22 NOTE — LETTER
May 25, 2023     Jax Pinto MD  [address] [facility]  [address]nd [address] [facility]    Patient: Eliecer Ngo   YOB: 1960   Date of Visit: 5/22/2023       Dear Dr Anna Holstein:    Thank you for referring Cherry Holland to me for evaluation  Below are my notes for this consultation  If you have questions, please do not hesitate to call me  I look forward to following your patient along with you  Sincerely,        Antwan Koroma MD        CC: Tamika Heaton, TABITHA Bledsoe, MD Antwan Nath MD  5/25/2023  5:26 PM  Sign when Signing Visit  [address] [place] [address] [facility]  [phone]     Date of Visit: 5/22/2023  Name: Eliecer Ngo   YOB: 1960        Subjective    VISIT DIAGNOSIS:  Diagnoses and all orders for this visit:    Malignant melanoma of right lower extremity including hip ([facility] [address] )    Metastatic melanoma ([facility] [address] )    High risk medication use        Oncology History   Malignant melanoma of right lower extremity including hip ([facility] [address] )   3/28/2023 Initial Diagnosis    Malignant melanoma of right lower extremity including hip ([facility] [address] )     4/6/2023 Surgery    Wide excision by podiatry:  Skin, foot, right, wide excision:  Melanoma, acral type, ulcerated, Breslow thickness: 4 2 mm  PATHOLOGIC STAGE CLASSIFICATION (pTNM, AJCC 8th Edition)     pT Category  pT4b          NGS testing  NRAS Exon 3 p  Q61R  PDL1 positive 1+ 1%  TMB low 3 Mut/Mb    ALK Fusion Unknown Significance RETREG1-ALK Exon 6     4/7/2023 -  Cancer Staged    Staging form: Melanoma of the Skin, AJCC 8th Edition  - Clinical stage from 4/7/2023: Stage III (cT4b, cN1c, cM0) - Signed by Antwan Koroma MD on 4/24/2023 4/7/2023 -  Cancer Staged    Staging form: Melanoma of the Skin, AJCC 8th Edition  - Pathologic stage from 4/7/2023: Stage IIIC (pT4b, pN1c, cM0) - Signed by Antwan Koroma MD on 4/24/2023 5/4/2023 -  Chemotherapy    alteplase (CATHFLO), 2 mg, Intracatheter, Every 1 Minute as needed, 2 of 6 cycles  pembrolizumab (KEYTRUDA) IVPB, 200 mg, Intravenous, Once, 2 of 6 cycles  Administration: 200 mg (5/4/2023), 200 mg (5/25/2023)     5/11/2023 -  Cancer Staged    Staging form: Melanoma of the Skin, AJCC 8th Edition  - Pathologic stage from 5/11/2023: Stage IV (pT4b, pN1c, pM1d(0)) - Signed by Avinash Dickinson MD on 5/25/2023          Cancer Staging   Malignant melanoma of right lower extremity including hip [facility])  Staging form: Melanoma of the Skin, AJCC 8th Edition  - Clinical stage from 4/7/2023: Stage III (cT4b, cN1c, cM0) - Signed by Avinash Dickinson MD on 4/24/2023  - Pathologic stage from 4/7/2023: Stage IIIC (pT4b, pN1c, cM0) - Signed by Avinash Dickinson MD on 4/24/2023  - Pathologic stage from 5/11/2023: Stage IV (pT4b, pN1c, pM1d(0)) - Signed by Avinash Dickinson MD on 5/25/2023     Treatment Details   Treatment goal Curative   Plan Name OP Pembrolizumab Q 21 Days   Status Active   Start Date 5/4/2023   End Date 8/17/2023 (Planned)   Provider Avinash Dickinson MD   Chemotherapy alteplase (CATHFLO), 2 mg, Intracatheter, Every 1 Minute as needed, 2 of 6 cycles    pembrolizumab (KEYTRUDA) IVPB, 200 mg, Intravenous, Once, 2 of 6 cycles  Administration: 200 mg (5/4/2023), 200 mg (5/25/2023)          HISTORY OF PRESENT ILLNESS: Luis Alberto Winn is a 59 y o  female  who is on neoadjuvant treatment pembrolizumab here for continued monitoring, follow-up, and surveillance while on treatment  She did start treatment on pembrolizumab and is tolerating treatment without any issues at this time  Denies any immune mediated side effects  Denies any headaches, double vision, rash, chest pain, shortness of breath, diarrhea  No fatigue or muscle weakness  She did undergo imaging completing staging work-up with a recent brain MRI    I independently reviewed her imaging and discussed with neuro oncology team   I had talked her about her results on the phone last week and again today  Unfortunately imaging from 5/11/2023 demonstrates small subcentimeter metastatic disease  She has an appointment with radiation oncology next week  She is not having any side effects of the small brain metastases  She knows to monitor for any symptoms and to call  Doing well and minimal pressure on the heel of her foot  Continues with wound care  REVIEW OF SYSTEMS:  Review of Systems   Constitutional: Negative for appetite change, fatigue, fever and unexpected weight change  HENT:   Negative for lump/mass  Eyes: Negative for icterus  Respiratory: Negative for cough, shortness of breath and wheezing  Cardiovascular: Negative for leg swelling  Gastrointestinal: Negative for abdominal pain, constipation, diarrhea, nausea and vomiting  Genitourinary: Negative for difficulty urinating and hematuria  Musculoskeletal: Positive for gait problem (Inability to put pressure on her right foot  )  Negative for arthralgias and myalgias  Skin: Negative for itching and rash  No new, changing, or concerning lesions  Neurological: Positive for gait problem (Inability to put pressure on her right foot  )  Negative for extremity weakness, headaches, light-headedness and numbness  Hematological: Negative for adenopathy          MEDICATIONS:    Current Outpatient Medications:   •  albuterol (PROVENTIL HFA,VENTOLIN HFA) 90 mcg/act inhaler, INHALE 1 PUFF EVERY 4 HOURS AS NEEDED FOR WHEEZING, Disp: 6 7 g, Rfl: 3  •  amLODIPine (NORVASC) 5 mg tablet, Take 1 tablet (5 mg total) by mouth daily, Disp: 90 tablet, Rfl: 1  •  atenolol (TENORMIN) 50 mg tablet, TAKE 1 TABLET BY MOUTH EVERY DAY, Disp: 90 tablet, Rfl: 0  •  escitalopram (LEXAPRO) 10 mg tablet, TAKE 1 TABLET BY MOUTH EVERY DAY, Disp: 90 tablet, Rfl: 1  •  famotidine (PEPCID) 20 mg tablet, TAKE 1 TABLET BY MOUTH TWICE A DAY, Disp: 180 tablet, Rfl: 1  • gentamicin (GARAMYCIN) 0 1 % cream, Apply topically 3 (three) times a day, Disp: 30 g, Rfl: 2  •  hydrochlorothiazide (MICROZIDE) 12 5 mg capsule, Take 12 5 mg by mouth daily, Disp: , Rfl:   No current facility-administered medications for this visit      Facility-Administered Medications Ordered in Other Visits:   •  alteplase (CATHFLO) injection 2 mg, 2 mg, Intracatheter, Q1MIN PRN, Miguel Roman MD     ALLERGIES:  Allergies   Allergen Reactions   • Methocarbamol Shortness Of Breath and Swelling   • Tramadol Shortness Of Breath and Swelling        ACTIVE PROBLEMS:  Patient Active Problem List   Diagnosis   • Mild intermittent asthma   • Esophageal dysmotility   • Allergic rhinitis with postnasal drip   • Tobacco abuse   • NATE (obstructive sleep apnea)   • Cough   • Vitamin D deficiency   • High blood triglycerides   • Diarrhea   • Asthma   • Need for vaccination   • Unintentional weight loss   • Anemia   • Enlarged liver   • Low folic acid   • High vitamin A level   • High serum vitamin E   • Low serum vitamin B12   • Hypokalemia   • Alcoholism (HCC)   • Anxiety disorder   • Cervical spinal stenosis   • Diverticular disease of colon   • Erosive esophagitis   • Hemorrhoids   • History of gastroesophageal reflux (GERD)   • Hypertension   • Irritable bowel syndrome   • Need for influenza vaccination   • Postoperative examination   • Pure hypercholesterolemia   • Thoracic and lumbosacral neuritis   • Pre-operative clearance   • Angiomyolipoma   • Chest pain on respiration   • Left flank pain   • Change in stool habits   • Epicondylitis, lateral (tennis elbow)   • Gallbladder disease   • Limb pain   • Impaired fasting glucose   • Microhematuria   • Pelvic pain in female   • Venous insufficiency   • Annual physical exam   • Fatigue   • Foot pain   • S/P cervical spinal fusion   • Fatty liver   • Abnormal bowel movement   • Hypertensive urgency   • Hypertriglyceridemia   • Electrolyte imbalance   • Myofascial pain syndrome, cervical   • Cervicalgia   • Occipital headache   • Cervical post-laminectomy syndrome   • Malignant melanoma of right lower extremity including hip (HCC)   • Metastatic melanoma (HCC)   • Surgical wound present   • High risk medication use          PAST MEDICAL HISTORY:   Past Medical History:   Diagnosis Date   • Cervical disc disorder    • Melanoma ([facility] [address] )    • Stenosis of cervical spine region         PAST SURGICAL HISTORY:  Past Surgical History:   Procedure Laterality Date   • BREAST BIOPSY Left 2009    core   • BREAST BIOPSY Right 2009    core   • CERVICAL FUSION  2013    cC3-C4, C4-C5, C5 C6, C6-C7, C7-T1   •  SECTION     • ELBOW SURGERY     • KNEE SURGERY Bilateral 20YRS AGO        SOCIAL HISTORY:  Social History     Socioeconomic History   • Marital status: Single     Spouse name: None   • Number of children: None   • Years of education: None   • Highest education level: None   Occupational History   • None   Tobacco Use   • Smoking status: Every Day     Packs/day: 0 02     Types: Cigarettes     Start date: 0   • Smokeless tobacco: Never   • Tobacco comments:     4 a day   Vaping Use   • Vaping Use: Never used   Substance and Sexual Activity   • Alcohol use: Yes     Comment: SOCIAL   • Drug use: Not Currently   • Sexual activity: Yes     Partners: Male   Other Topics Concern   • None   Social History Narrative   • None     Social Determinants of Health     Financial Resource Strain: Not on file   Food Insecurity: Not on file   Transportation Needs: Not on file   Physical Activity: Not on file   Stress: Not on file   Social Connections: Not on file   Intimate Partner Violence: Not on file   Housing Stability: Not on file        FAMILY HISTORY:  Family History   Problem Relation Age of Onset   • Cancer Mother    • Brain cancer Mother [de-identified]   • Heart disease Father    • Breast cancer Sister 48   • Breast cancer Sister 45   • Breast cancer Maternal Aunt 60   • Breast cancer Maternal "Aunt 60   • Breast cancer Maternal Aunt 60   • Breast cancer Cousin 50   • Breast cancer Cousin 52   • Breast cancer Cousin 52   • Prostate cancer Maternal Uncle 55   • No Known Problems Son    • No Known Problems Maternal Grandmother    • No Known Problems Maternal Grandfather    • No Known Problems Paternal Grandmother    • No Known Problems Paternal Grandfather    • BRCA1 Negative Sister    • BRCA2 Negative Sister    • No Known Problems Sister    • No Known Problems Maternal Aunt    • No Known Problems Maternal Aunt    • No Known Problems Maternal Aunt    • No Known Problems Maternal Aunt            Objective    PHYSICAL EXAMINATION:   Blood pressure 128/80, pulse 81, temperature 97 6 °F (36 4 °C), temperature source Temporal, resp  rate 16, height 5' 7\" (1 702 m), weight 73 5 kg (162 lb), SpO2 98 %, not currently breastfeeding  Pain Score: 0-No pain     ECOG Performance Status      Flowsheet Row Most Recent Value   ECOG Performance Status 0 - Fully active, able to carry on all pre-disease performance without restriction               Physical Exam  Constitutional:       General: She is not in acute distress  Appearance: Normal appearance  She is not toxic-appearing  HENT:      Mouth/Throat:      Mouth: Mucous membranes are moist       Pharynx: Oropharynx is clear  Eyes:      General: No scleral icterus  Conjunctiva/sclera: Conjunctivae normal    Cardiovascular:      Rate and Rhythm: Normal rate and regular rhythm  Pulses: Normal pulses  Heart sounds: No murmur heard  No friction rub  No gallop  Pulmonary:      Effort: Pulmonary effort is normal  No respiratory distress  Breath sounds: Normal breath sounds  No wheezing or rales  Abdominal:      General: There is no distension  Palpations: There is no mass  Tenderness: There is no abdominal tenderness  There is no rebound  Musculoskeletal:         General: No swelling or tenderness        Cervical back: Normal " range of motion  No rigidity  Right lower leg: No edema  Left lower leg: No edema  Lymphadenopathy:      Head:      Right side of head: No submandibular, preauricular or posterior auricular adenopathy  Left side of head: No submandibular, preauricular or posterior auricular adenopathy  Cervical: No cervical adenopathy  Right cervical: No superficial or posterior cervical adenopathy  Left cervical: No superficial or posterior cervical adenopathy  Upper Body:      Right upper body: No supraclavicular or axillary adenopathy  Left upper body: No supraclavicular or axillary adenopathy  Lower Body: Right inguinal adenopathy (two small oblong nodules, less distinct than previous exam) present  No left inguinal adenopathy  Skin:     Coloration: Skin is not jaundiced  Findings: No rash  Comments: Healing right foot wound, bandaged  No concerning skin lesions  Neurological:      General: No focal deficit present  Mental Status: She is alert and oriented to person, place, and time  Motor: No weakness  Gait: Gait abnormal (inability to put weight on right foot)  Psychiatric:         Mood and Affect: Mood normal          Behavior: Behavior normal          Thought Content: Thought content normal          Judgment: Judgment normal          I reviewed lab data in the chart      Hemoglobin   Date Value Ref Range Status   05/21/2023 12 7 11 5 - 15 4 g/dL Final   05/01/2023 14 4 11 5 - 15 4 g/dL Final   09/03/2020 11 7 11 5 - 15 4 g/dL Final   12/12/2013 14 0 11 5 - 15 4 g/dL Final     Comment:     Result Comment: New Reference Range     MCV   Date Value Ref Range Status   05/21/2023 107 (H) 82 - 98 fL Final   05/01/2023 104 (H) 82 - 98 fL Final   09/03/2020 105 (H) 82 - 98 fL Final   12/12/2013 96 82 - 98 fL Final     Comment:     Result Comment: New Reference Range     Platelets   Date Value Ref Range Status   05/21/2023 228 149 - 390 Thousands/uL Final 05/01/2023 261 149 - 390 Thousands/uL Final   09/03/2020 262 149 - 390 Thousands/uL Final   12/12/2013 251 149 - 390 Thousand/uL Final     WBC   Date Value Ref Range Status   05/21/2023 7 51 4 31 - 10 16 Thousand/uL Final   05/01/2023 7 39 4 31 - 10 16 Thousand/uL Final   09/03/2020 5 32 4 31 - 10 16 Thousand/uL Final   12/12/2013 6 62 4 31 - 10 16 Thousand/uL Final     Comment:     Result Comment: New Reference Range      Albumin   Date Value Ref Range Status   05/21/2023 3 2 (L) 3 5 - 5 0 g/dL Final   05/01/2023 3 5 3 5 - 5 0 g/dL Final   09/03/2020 3 3 (L) 3 5 - 5 0 g/dL Final     Alkaline Phosphatase   Date Value Ref Range Status   05/21/2023 119 (H) 46 - 116 U/L Final   05/01/2023 91 46 - 116 U/L Final   09/03/2020 90 46 - 116 U/L Final     ALT   Date Value Ref Range Status   05/21/2023 20 12 - 78 U/L Final     Comment:     Specimen collection should occur prior to Sulfasalazine and/or Sulfapyridine administration due to the potential for falsely depressed results  05/01/2023 15 12 - 78 U/L Final     Comment:     Specimen collection should occur prior to Sulfasalazine and/or Sulfapyridine administration due to the potential for falsely depressed results  09/03/2020 23 12 - 78 U/L Final     Comment:     Specimen collection should occur prior to Sulfasalazine and/or Sulfapyridine administration due to the potential for falsely depressed results  AST   Date Value Ref Range Status   05/21/2023 29 5 - 45 U/L Final     Comment:     Specimen collection should occur prior to Sulfasalazine administration due to the potential for falsely depressed results  05/01/2023 19 5 - 45 U/L Final     Comment:     Specimen collection should occur prior to Sulfasalazine administration due to the potential for falsely depressed results  09/03/2020 57 (H) 5 - 45 U/L Final     Comment:     Specimen collection should occur prior to Sulfasalazine administration due to the potential for falsely depressed results  BUN   Date Value Ref Range Status   05/21/2023 7 5 - 25 mg/dL Final   05/01/2023 12 5 - 25 mg/dL Final   09/03/2020 7 5 - 25 mg/dL Final   12/12/2013 19 5 - 27 mg/dL Final     Calcium   Date Value Ref Range Status   05/21/2023 8 7 8 3 - 10 1 mg/dL Final   05/01/2023 9 1 8 3 - 10 1 mg/dL Final   09/03/2020 8 6 8 3 - 10 1 mg/dL Final   12/12/2013 9 1 8 3 - 10 1 mg/dL Final     Chloride   Date Value Ref Range Status   05/21/2023 102 96 - 108 mmol/L Final   05/01/2023 103 96 - 108 mmol/L Final   09/03/2020 98 (L) 100 - 108 mmol/L Final   12/12/2013 102 101 - 111 mmol/L Final     CO2   Date Value Ref Range Status   05/21/2023 32 21 - 32 mmol/L Final   05/01/2023 29 21 - 32 mmol/L Final   09/03/2020 36 (H) 21 - 32 mmol/L Final   12/12/2013 29 21 - 31 mmol/L Final     Creatinine   Date Value Ref Range Status   05/21/2023 0 60 0 60 - 1 30 mg/dL Final     Comment:     Standardized to IDMS reference method   05/01/2023 0 67 0 60 - 1 30 mg/dL Final     Comment:     Standardized to IDMS reference method   09/03/2020 0 53 (L) 0 60 - 1 30 mg/dL Final     Comment:     Standardized to IDMS reference method   12/12/2013 0 64 0 60 - 1 30 mg/dL Final     Comment:     Result Comment: Standardized to IDMS reference method     Potassium   Date Value Ref Range Status   05/21/2023 3 3 (L) 3 5 - 5 3 mmol/L Final   05/01/2023 3 0 (L) 3 5 - 5 3 mmol/L Final   10/23/2020 4 0 3 5 - 5 3 mmol/L Final   12/12/2013 3 8 3 6 - 5 0 mmol/L Final     Sodium   Date Value Ref Range Status   12/12/2013 140 135 - 145 mmol/L Final     Total Bilirubin   Date Value Ref Range Status   05/21/2023 1 01 (H) 0 20 - 1 00 mg/dL Final     Comment:     Use of this assay is not recommended for patients undergoing treatment with eltrombopag due to the potential for falsely elevated results     05/01/2023 1 12 (H) 0 20 - 1 00 mg/dL Final     Comment:     Use of this assay is not recommended for patients undergoing treatment with eltrombopag due to the potential for "falsely elevated results  09/03/2020 0 81 0 20 - 1 00 mg/dL Final     Comment:     Use of this assay is not recommended for patients undergoing treatment with eltrombopag due to the potential for falsely elevated results  LD   Date Value Ref Range Status   05/21/2023 179 81 - 234 U/L Final   05/01/2023 171 81 - 234 U/L Final     No results found for: \"TSH\"  No results found for: \"J3PHOJX\"   Free T4   Date Value Ref Range Status   05/21/2023 0 75 0 61 - 1 12 ng/dL Final     Comment:     Specimens with biotin concentrations > 10 ng/mL can lead to significant (> 10%) positive bias in result  05/03/2023 0 84 0 76 - 1 46 ng/dL Final     Comment:     Specimen collection should occur prior to Sulfasalazine administration due to the potential for falsely elevated results  RECENT IMAGING:  Procedure: MRI brain w wo contrast    Result Date: 5/16/2023  Narrative: MRI BRAIN WITH AND WITHOUT CONTRAST INDICATION: C43 71: Malignant melanoma of right lower limb, including hip    melanoma staging COMPARISON: Nuclear medicine PET/CT 4/27/2023  MRI cervical spine with and without contrast 3/28/2023  MRI brain an MRA head without contrast 11/7/2013  TECHNIQUE: Multiplanar, multisequence imaging of the brain was performed before and after gadolinium administration  Imaging performed on 3 0T MRI IV Contrast:  7 mL of Gadobutrol injection (SINGLE-DOSE) IMAGE QUALITY:   Diagnostic  FINDINGS: BRAIN PARENCHYMA: 0 3 cm right frontal lobe enhancing hemorrhagic lesion with mild perilesional vasogenic edema (10:238), suspicious for hemorrhagic brain metastasis  Tiny enhancing hemorrhagic lesions in left superior temporal, right caudate, right medial occipital lobes, and right right posterior thalamocapsular junction (10:161, 158, 139, 132), suspicious for hemorrhagic brain metastasis  Tiny enhancing lesion in right cerebellum (10:81), suspicious for nonhemorrhagic brain metastasis  No mass effect or midline shift   Chronic " microhemorrhage in right temporal lobe  Diffusion imaging is unremarkable  No acute infarction  Tiny right frontal and left parietal developmental venous anomalies  Small scattered hyperintensities on T2/FLAIR imaging are noted in the periventricular and subcortical white matter demonstrating an appearance that is statistically most likely to represent mild microangiopathic change  VENTRICLES:  Normal for the patient's age  SELLA AND PITUITARY GLAND:  Normal  ORBITS: Sequela of bilateral cataract surgery  PARANASAL SINUSES:  Normal  VASCULATURE:  Evaluation of the major intracranial vasculature demonstrates appropriate flow voids  CALVARIUM AND SKULL BASE:  Normal  MASTOIDS: Trace left mastoid effusion  EXTRACRANIAL SOFT TISSUES:  Normal      Impression: Findings suspicious for tiny hemorrhagic brain metastasis in right frontal lobe, left superior temporal lobe, right caudate, right medial occipital lobes, and right posterior thalamocapsular junction with tiny nonhemorrhagic metastasis in right cerebellum  Mild perilesional vasogenic edema in right frontal lobe  No acute intracranial abnormality  Mild chronic microangiopathy  The study was marked in [facility] for immediate notification  Workstation performed: [id number]     Procedure: NM pet ct tumor imaging whole body    Result Date: 4/27/2023  Narrative: WHOLE-BODY PET/CT SCAN INDICATION: C43 71: Malignant melanoma of right lower limb, including hip   , initial staging     MODIFIER: PI COMPARISON: CT 11/7/2022 and priors CELL TYPE: Melanoma, right plantar foot biopsy 4/6/2023 TECHNIQUE:   8 0 mCi F-18-FD administered IV  Multiplanar attenuation corrected and non attenuation corrected PET images were acquired 60 minutes post injection  Contiguous, low dose, axial CT sections were obtained from the skull vertex through the feet  Intravenous contrast material was not utilized    This examination, like all CT scans performed in the [facility], was performed utilizing techniques to minimize radiation dose exposure, including the use of iterative reconstruction  and automated exposure control  Fasting serum glucose: 121 mg/dl FINDINGS: VISUALIZED BRAIN:   No acute abnormalities are seen  HEAD/NECK:   There is a physiologic distribution of FDG  No FDG avid cervical adenopathy is seen  CT images: Unremarkable CHEST:   No FDG avid soft tissue lesions are seen  CT images: Coronary artery calcifications  ABDOMEN:   Asymmetrically enlarged right liver with subtle heterogeneous density and greater FDG activity compared to the left lobe  The homogeneous FDG activity has an SUV of 4 6  For comparison, left liver SUV measures 2 2  Findings appear new from the prior CT from 1/4/2018  Etiologies such as low FDG avid infiltrative tumor is not excluded  CT images: Small hiatal hernia  Stable small left renal angiomyolipoma  PELVIS: There is hypermetabolic right inguinal and right pelvic external iliac adenopathy compatible with metastases  Example right inguinal frances mass image 2/276 measures 3 7 x 2 4 cm, SUV 17  Right external iliac node image 250 measures 3 3 x 2 3 cm, SUV 18  Additional right external iliac node image 2/233 measures 2 3 x 1 6 cm, SUV 5 6  No hypermetabolic left pelvic adenopathy  CT images: Otherwise stable  OSSEOUS STRUCTURES/EXTREMITIES: Increased FDG activity along the plantar right foot, SUV 5 8, corresponds with the history of right foot plantar melanoma  No FDG avid osseous lesions are seen  CT images: Spine degenerative change  Impression: 1  Increased FDG activity along the plantar right foot corresponds with the history of right foot plantar melanoma  2   Hypermetabolic right pelvic adenopathy compatible with metastases  3  Asymmetrically enlarged right liver with greater FDG intensity compared to the left liver  Recommend contrast MRI liver evaluation   4  No worrisome hypermetabolic lesions in the neck or chest  Workstation performed: [id number]             Assessment/Plan  Ms Husam Foss is a 59 yr female with metastatic melanoma with newly discovered brain metastases on brain MRI on treatment with neoadjuvant pembrolizumab here for continued follow-up, monitoring, and surveillance while on treatment  1  Malignant melanoma of right lower extremity including hip ([facility] [address] )  2  Metastatic melanoma ([facility] [address] )  She is on neoadjuvant pembrolizumab in anticipation of additional surgery on her foot and right inguinal/groin lymphadenopathy  Recent brain MRI demonstrates brain metastases  I still think it is important to consider surgery on her right foot in order to maximize her mobility and functionality, as currently she has a nonhealing wound with known satellite lesions at the base of her toes  She is tolerating treatment with immunotherapy  Labs reviewed and okay to continue treatment  I will discuss her case further with the team including Dr Joey Islas and Dr Demetra Camacho  She has an appointment with radiation oncology next week for discussion regarding her recently identified brain metastases  She is asymptomatic at this time but knows to monitor and watch for any symptoms and to call right away  She will continue treatment with pembrolizumab at this time we can decide whether or not we should switch her over to ipilimumab and nivolumab  She knows to monitor for immune mediated side effects and to call with any issues or concerns  She is standing orders for blood work prior to each treatment  3  High risk medication use  She is on treatment with immunotherapy and we will continue to monitor for side effects and lab abnormalities  We will monitor labs with each treatment to ensure safety of continuing on treatment  She knows to watch for signs and symptoms for immune mediated side effects  Denies any immune mediated side effects at this time          Return in about 3 weeks (around 6/12/2023) for Office Visit, Infusion - See Treatment Plan, labs       Tammy Bee MD, PhD

## 2023-05-23 ENCOUNTER — TELEPHONE (OUTPATIENT)
Dept: SURGICAL ONCOLOGY | Facility: CLINIC | Age: 63
End: 2023-05-23

## 2023-05-23 NOTE — TELEPHONE ENCOUNTER
Placed call to the patient and it went to   I introduced myself and left my call back information  I am looking for a possible [employer] card for the patient's Alberto Hendrickson with [employer]

## 2023-05-25 ENCOUNTER — HOSPITAL ENCOUNTER (OUTPATIENT)
Dept: INFUSION CENTER | Facility: CLINIC | Age: 63
Discharge: HOME | End: 2023-05-25

## 2023-05-25 VITALS
OXYGEN SATURATION: 96 % | DIASTOLIC BLOOD PRESSURE: 87 MMHG | RESPIRATION RATE: 16 BRPM | TEMPERATURE: 97.3 F | HEART RATE: 67 BPM | SYSTOLIC BLOOD PRESSURE: 135 MMHG

## 2023-05-25 DIAGNOSIS — C43.71 MALIGNANT MELANOMA OF RIGHT LOWER EXTREMITY INCLUDING HIP (HCC): Primary | ICD-10-CM

## 2023-05-25 PROBLEM — Z79.899 HIGH RISK MEDICATION USE: Status: ACTIVE | Noted: 2023-05-25

## 2023-05-25 LAB — SCAN RESULT: NORMAL

## 2023-05-25 RX ORDER — SODIUM CHLORIDE 9 MG/ML
20 INJECTION, SOLUTION INTRAVENOUS ONCE
Status: COMPLETED | OUTPATIENT
Start: 2023-05-25 | End: 2023-05-25

## 2023-05-25 RX ADMIN — SODIUM CHLORIDE 200 MG: 9 INJECTION, SOLUTION INTRAVENOUS at 08:50

## 2023-05-25 RX ADMIN — SODIUM CHLORIDE 20 ML/HR: 0.9 INJECTION, SOLUTION INTRAVENOUS at 08:05

## 2023-05-25 NOTE — PROGRESS NOTES
Patient to [facility] 145: Offers no complaints at present time: Lab work ( 05/21/23 ) reviewed:  Within parameters to treat: Right FA PIV initiated without incident

## 2023-05-25 NOTE — PROGRESS NOTES
[facility] HEMATOLOGY ONCOLOGY SPECIALISTS [address] [facility] [id number] [phone]     Date of Visit: 5/22/2023  Name: Edison Chaudhary   YOB: 1960        Subjective    VISIT DIAGNOSIS:  Diagnoses and all orders for this visit:    Malignant melanoma of right lower extremity including hip ([facility] [address] )    Metastatic melanoma ([facility] )    High risk medication use        Oncology History   Malignant melanoma of right lower extremity including hip ([facility] )   3/28/2023 Initial Diagnosis    Malignant melanoma of right lower extremity including hip ([facility] )     4/6/2023 Surgery    Wide excision by podiatry:  Skin, foot, right, wide excision:  Melanoma, acral type, ulcerated, Breslow thickness: 4 2 mm  PATHOLOGIC STAGE CLASSIFICATION (pTNM, AJCC 8th Edition)     pT Category  pT4b          NGS testing  NRAS Exon 3 p  Q61R  PDL1 positive 1+ 1%  TMB low 3 Mut/Mb    ALK Fusion Unknown Significance RETREG1-ALK Exon 6     4/7/2023 -  Cancer Staged    Staging form: Melanoma of the Skin, AJCC 8th Edition  - Clinical stage from 4/7/2023: Stage III (cT4b, cN1c, cM0) - Signed by Kelley Coleman MD on 4/24/2023 4/7/2023 -  Cancer Staged    Staging form: Melanoma of the Skin, AJCC 8th Edition  - Pathologic stage from 4/7/2023: Stage IIIC (pT4b, pN1c, cM0) - Signed by Kelley Coleman MD on 4/24/2023 5/4/2023 -  Chemotherapy    alteplase (CATHFLO), 2 mg, Intracatheter, Every 1 Minute as needed, 2 of 6 cycles  pembrolizumab (KEYTRUDA) IVPB, 200 mg, Intravenous, Once, 2 of 6 cycles  Administration: 200 mg (5/4/2023), 200 mg (5/25/2023)     5/11/2023 -  Cancer Staged    Staging form: Melanoma of the Skin, AJCC 8th Edition  - Pathologic stage from 5/11/2023: Stage IV (pT4b, pN1c, pM1d(0)) - Signed by Kelley Coleman MD on 5/25/2023          Cancer Staging   Malignant melanoma of right lower extremity including hip ([facility] [address] )  Staging form: Melanoma of the Skin, AJCC 8th Edition  - Clinical stage from 4/7/2023: Stage III (cT4b, cN1c, cM0) - Signed by James Post MD on 4/24/2023  - Pathologic stage from 4/7/2023: Stage IIIC (pT4b, pN1c, cM0) - Signed by James Post MD on 4/24/2023  - Pathologic stage from 5/11/2023: Stage IV (pT4b, pN1c, pM1d(0)) - Signed by James Post MD on 5/25/2023     Treatment Details   Treatment goal Curative   Plan Name OP Pembrolizumab Q 21 Days   Status Active   Start Date 5/4/2023   End Date 8/17/2023 (Planned)   Provider James Post MD   Chemotherapy alteplase (CATHFLO), 2 mg, Intracatheter, Every 1 Minute as needed, 2 of 6 cycles    pembrolizumab (KEYTRUDA) IVPB, 200 mg, Intravenous, Once, 2 of 6 cycles  Administration: 200 mg (5/4/2023), 200 mg (5/25/2023)          HISTORY OF PRESENT ILLNESS: Nell Alvarado is a 59 y o  female  who is on neoadjuvant treatment pembrolizumab here for continued monitoring, follow-up, and surveillance while on treatment  She did start treatment on pembrolizumab and is tolerating treatment without any issues at this time  Denies any immune mediated side effects  Denies any headaches, double vision, rash, chest pain, shortness of breath, diarrhea  No fatigue or muscle weakness  She did undergo imaging completing staging work-up with a recent brain MRI  I independently reviewed her imaging and discussed with neuro oncology team   I had talked her about her results on the phone last week and again today  Unfortunately imaging from 5/11/2023 demonstrates small subcentimeter metastatic disease  She has an appointment with radiation oncology next week  She is not having any side effects of the small brain metastases  She knows to monitor for any symptoms and to call  Doing well and minimal pressure on the heel of her foot  Continues with wound care  REVIEW OF SYSTEMS:  Review of Systems   Constitutional: Negative for appetite change, fatigue, fever and unexpected weight change  HENT:   Negative for lump/mass  Eyes: Negative for icterus  Respiratory: Negative for cough, shortness of breath and wheezing  Cardiovascular: Negative for leg swelling  Gastrointestinal: Negative for abdominal pain, constipation, diarrhea, nausea and vomiting  Genitourinary: Negative for difficulty urinating and hematuria  Musculoskeletal: Positive for gait problem (Inability to put pressure on her right foot  )  Negative for arthralgias and myalgias  Skin: Negative for itching and rash  No new, changing, or concerning lesions  Neurological: Positive for gait problem (Inability to put pressure on her right foot  )  Negative for extremity weakness, headaches, light-headedness and numbness  Hematological: Negative for adenopathy  MEDICATIONS:    Current Outpatient Medications:   •  albuterol (PROVENTIL HFA,VENTOLIN HFA) 90 mcg/act inhaler, INHALE 1 PUFF EVERY 4 HOURS AS NEEDED FOR WHEEZING, Disp: 6 7 g, Rfl: 3  •  amLODIPine (NORVASC) 5 mg tablet, Take 1 tablet (5 mg total) by mouth daily, Disp: 90 tablet, Rfl: 1  •  atenolol (TENORMIN) 50 mg tablet, TAKE 1 TABLET BY MOUTH EVERY DAY, Disp: 90 tablet, Rfl: 0  •  escitalopram (LEXAPRO) 10 mg tablet, TAKE 1 TABLET BY MOUTH EVERY DAY, Disp: 90 tablet, Rfl: 1  •  famotidine (PEPCID) 20 mg tablet, TAKE 1 TABLET BY MOUTH TWICE A DAY, Disp: 180 tablet, Rfl: 1  •  gentamicin (GARAMYCIN) 0 1 % cream, Apply topically 3 (three) times a day, Disp: 30 g, Rfl: 2  •  hydrochlorothiazide (MICROZIDE) 12 5 mg capsule, Take 12 5 mg by mouth daily, Disp: , Rfl:   No current facility-administered medications for this visit      Facility-Administered Medications Ordered in Other Visits:   •  alteplase (CATHFLO) injection 2 mg, 2 mg, Intracatheter, Q1MIN PRN, Radha Navarrete MD     ALLERGIES:  Allergies   Allergen Reactions   • Methocarbamol Shortness Of Breath and Swelling   • Tramadol Shortness Of Breath and Swelling        ACTIVE PROBLEMS:  Patient Active Problem List   Diagnosis   • Mild intermittent asthma   • Esophageal dysmotility   • Allergic rhinitis with postnasal drip   • Tobacco abuse   • NATE (obstructive sleep apnea)   • Cough   • Vitamin D deficiency   • High blood triglycerides   • Diarrhea   • Asthma   • Need for vaccination   • Unintentional weight loss   • Anemia   • Enlarged liver   • Low folic acid   • High vitamin A level   • High serum vitamin E   • Low serum vitamin B12   • Hypokalemia   • Alcoholism (HCC)   • Anxiety disorder   • Cervical spinal stenosis   • Diverticular disease of colon   • Erosive esophagitis   • Hemorrhoids   • History of gastroesophageal reflux (GERD)   • Hypertension   • Irritable bowel syndrome   • Need for influenza vaccination   • Postoperative examination   • Pure hypercholesterolemia   • Thoracic and lumbosacral neuritis   • Pre-operative clearance   • Angiomyolipoma   • Chest pain on respiration   • Left flank pain   • Change in stool habits   • Epicondylitis, lateral (tennis elbow)   • Gallbladder disease   • Limb pain   • Impaired fasting glucose   • Microhematuria   • Pelvic pain in female   • Venous insufficiency   • Annual physical exam   • Fatigue   • Foot pain   • S/P cervical spinal fusion   • Fatty liver   • Abnormal bowel movement   • Hypertensive urgency   • Hypertriglyceridemia   • Electrolyte imbalance   • Myofascial pain syndrome, cervical   • Cervicalgia   • Occipital headache   • Cervical post-laminectomy syndrome   • Malignant melanoma of right lower extremity including hip (HCC)   • Metastatic melanoma (HCC)   • Surgical wound present   • High risk medication use          PAST MEDICAL HISTORY:   Past Medical History:   Diagnosis Date   • Cervical disc disorder    • Melanoma ([address] )    • Stenosis of cervical spine region         PAST SURGICAL HISTORY:  Past Surgical History:   Procedure Laterality Date   • BREAST BIOPSY Left 2009    core   • BREAST BIOPSY Right 2009    core   • CERVICAL FUSION  12/2013    cC3-C4, C4-C5, C5 C6, C6-C7, C7-T1   •  SECTION     • ELBOW SURGERY     • KNEE SURGERY Bilateral 20YRS AGO        SOCIAL HISTORY:  Social History     Socioeconomic History   • Marital status: Single     Spouse name: None   • Number of children: None   • Years of education: None   • Highest education level: None   Occupational History   • None   Tobacco Use   • Smoking status: Every Day     Packs/day: 0 02     Types: Cigarettes     Start date: 0   • Smokeless tobacco: Never   • Tobacco comments:     4 a day   Vaping Use   • Vaping Use: Never used   Substance and Sexual Activity   • Alcohol use: Yes     Comment: SOCIAL   • Drug use: Not Currently   • Sexual activity: Yes     Partners: Male   Other Topics Concern   • None   Social History Narrative   • None     Social Determinants of Health     Financial Resource Strain: Not on file   Food Insecurity: Not on file   Transportation Needs: Not on file   Physical Activity: Not on file   Stress: Not on file   Social Connections: Not on file   Intimate Partner Violence: Not on file   Housing Stability: Not on file        FAMILY HISTORY:  Family History   Problem Relation Age of Onset   • Cancer Mother    • Brain cancer Mother [de-identified]   • Heart disease Father    • Breast cancer Sister 48   • Breast cancer Sister 45   • Breast cancer Maternal Aunt 61   • Breast cancer Maternal Aunt 60   • Breast cancer Maternal Aunt 61   • Breast cancer Cousin 50   • Breast cancer Cousin 52   • Breast cancer Cousin 49   • Prostate cancer Maternal Uncle 55   • No Known Problems Son    • No Known Problems Maternal Grandmother    • No Known Problems Maternal Grandfather    • No Known Problems Paternal Grandmother    • No Known Problems Paternal Grandfather    • BRCA1 Negative Sister    • BRCA2 Negative Sister    • No Known Problems Sister    • No Known Problems Maternal Aunt    • No Known Problems Maternal Aunt    • No Known Problems Maternal Aunt    • No Known Problems Maternal Aunt "  Objective    PHYSICAL EXAMINATION:   Blood pressure 128/80, pulse 81, temperature 97 6 °F (36 4 °C), temperature source Temporal, resp  rate 16, height 5' 7\" (1 702 m), weight 73 5 kg (162 lb), SpO2 98 %, not currently breastfeeding  Pain Score: 0-No pain     ECOG Performance Status    Flowsheet Row Most Recent Value   ECOG Performance Status 0 - Fully active, able to carry on all pre-disease performance without restriction             Physical Exam  Constitutional:       General: She is not in acute distress  Appearance: Normal appearance  She is not toxic-appearing  HENT:      Mouth/Throat:      Mouth: Mucous membranes are moist       Pharynx: Oropharynx is clear  Eyes:      General: No scleral icterus  Conjunctiva/sclera: Conjunctivae normal    Cardiovascular:      Rate and Rhythm: Normal rate and regular rhythm  Pulses: Normal pulses  Heart sounds: No murmur heard  No friction rub  No gallop  Pulmonary:      Effort: Pulmonary effort is normal  No respiratory distress  Breath sounds: Normal breath sounds  No wheezing or rales  Abdominal:      General: There is no distension  Palpations: There is no mass  Tenderness: There is no abdominal tenderness  There is no rebound  Musculoskeletal:         General: No swelling or tenderness  Cervical back: Normal range of motion  No rigidity  Right lower leg: No edema  Left lower leg: No edema  Lymphadenopathy:      Head:      Right side of head: No submandibular, preauricular or posterior auricular adenopathy  Left side of head: No submandibular, preauricular or posterior auricular adenopathy  Cervical: No cervical adenopathy  Right cervical: No superficial or posterior cervical adenopathy  Left cervical: No superficial or posterior cervical adenopathy  Upper Body:      Right upper body: No supraclavicular or axillary adenopathy        Left upper body: No supraclavicular or axillary " adenopathy  Lower Body: Right inguinal adenopathy (two small oblong nodules, less distinct than previous exam) present  No left inguinal adenopathy  Skin:     Coloration: Skin is not jaundiced  Findings: No rash  Comments: Healing right foot wound, bandaged  No concerning skin lesions  Neurological:      General: No focal deficit present  Mental Status: She is alert and oriented to person, place, and time  Motor: No weakness  Gait: Gait abnormal (inability to put weight on right foot)  Psychiatric:         Mood and Affect: Mood normal          Behavior: Behavior normal          Thought Content: Thought content normal          Judgment: Judgment normal          I reviewed lab data in the chart      Hemoglobin   Date Value Ref Range Status   05/21/2023 12 7 11 5 - 15 4 g/dL Final   05/01/2023 14 4 11 5 - 15 4 g/dL Final   09/03/2020 11 7 11 5 - 15 4 g/dL Final   12/12/2013 14 0 11 5 - 15 4 g/dL Final     Comment:     Result Comment: New Reference Range     MCV   Date Value Ref Range Status   05/21/2023 107 (H) 82 - 98 fL Final   05/01/2023 104 (H) 82 - 98 fL Final   09/03/2020 105 (H) 82 - 98 fL Final   12/12/2013 96 82 - 98 fL Final     Comment:     Result Comment: New Reference Range     Platelets   Date Value Ref Range Status   05/21/2023 228 149 - 390 Thousands/uL Final   05/01/2023 261 149 - 390 Thousands/uL Final   09/03/2020 262 149 - 390 Thousands/uL Final   12/12/2013 251 149 - 390 Thousand/uL Final     WBC   Date Value Ref Range Status   05/21/2023 7 51 4 31 - 10 16 Thousand/uL Final   05/01/2023 7 39 4 31 - 10 16 Thousand/uL Final   09/03/2020 5 32 4 31 - 10 16 Thousand/uL Final   12/12/2013 6 62 4 31 - 10 16 Thousand/uL Final     Comment:     Result Comment: New Reference Range      Albumin   Date Value Ref Range Status   05/21/2023 3 2 (L) 3 5 - 5 0 g/dL Final   05/01/2023 3 5 3 5 - 5 0 g/dL Final   09/03/2020 3 3 (L) 3 5 - 5 0 g/dL Final     Alkaline Phosphatase   Date Value Ref Range Status   05/21/2023 119 (H) 46 - 116 U/L Final   05/01/2023 91 46 - 116 U/L Final   09/03/2020 90 46 - 116 U/L Final     ALT   Date Value Ref Range Status   05/21/2023 20 12 - 78 U/L Final     Comment:     Specimen collection should occur prior to Sulfasalazine and/or Sulfapyridine administration due to the potential for falsely depressed results  05/01/2023 15 12 - 78 U/L Final     Comment:     Specimen collection should occur prior to Sulfasalazine and/or Sulfapyridine administration due to the potential for falsely depressed results  09/03/2020 23 12 - 78 U/L Final     Comment:     Specimen collection should occur prior to Sulfasalazine and/or Sulfapyridine administration due to the potential for falsely depressed results  AST   Date Value Ref Range Status   05/21/2023 29 5 - 45 U/L Final     Comment:     Specimen collection should occur prior to Sulfasalazine administration due to the potential for falsely depressed results  05/01/2023 19 5 - 45 U/L Final     Comment:     Specimen collection should occur prior to Sulfasalazine administration due to the potential for falsely depressed results  09/03/2020 57 (H) 5 - 45 U/L Final     Comment:     Specimen collection should occur prior to Sulfasalazine administration due to the potential for falsely depressed results        BUN   Date Value Ref Range Status   05/21/2023 7 5 - 25 mg/dL Final   05/01/2023 12 5 - 25 mg/dL Final   09/03/2020 7 5 - 25 mg/dL Final   12/12/2013 19 5 - 27 mg/dL Final     Calcium   Date Value Ref Range Status   05/21/2023 8 7 8 3 - 10 1 mg/dL Final   05/01/2023 9 1 8 3 - 10 1 mg/dL Final   09/03/2020 8 6 8 3 - 10 1 mg/dL Final   12/12/2013 9 1 8 3 - 10 1 mg/dL Final     Chloride   Date Value Ref Range Status   05/21/2023 102 96 - 108 mmol/L Final   05/01/2023 103 96 - 108 mmol/L Final   09/03/2020 98 (L) 100 - 108 mmol/L Final   12/12/2013 102 101 - 111 mmol/L Final     CO2   Date Value Ref Range Status "  05/21/2023 32 21 - 32 mmol/L Final   05/01/2023 29 21 - 32 mmol/L Final   09/03/2020 36 (H) 21 - 32 mmol/L Final   12/12/2013 29 21 - 31 mmol/L Final     Creatinine   Date Value Ref Range Status   05/21/2023 0 60 0 60 - 1 30 mg/dL Final     Comment:     Standardized to IDMS reference method   05/01/2023 0 67 0 60 - 1 30 mg/dL Final     Comment:     Standardized to IDMS reference method   09/03/2020 0 53 (L) 0 60 - 1 30 mg/dL Final     Comment:     Standardized to IDMS reference method   12/12/2013 0 64 0 60 - 1 30 mg/dL Final     Comment:     Result Comment: Standardized to IDMS reference method     Potassium   Date Value Ref Range Status   05/21/2023 3 3 (L) 3 5 - 5 3 mmol/L Final   05/01/2023 3 0 (L) 3 5 - 5 3 mmol/L Final   10/23/2020 4 0 3 5 - 5 3 mmol/L Final   12/12/2013 3 8 3 6 - 5 0 mmol/L Final     Sodium   Date Value Ref Range Status   12/12/2013 140 135 - 145 mmol/L Final     Total Bilirubin   Date Value Ref Range Status   05/21/2023 1 01 (H) 0 20 - 1 00 mg/dL Final     Comment:     Use of this assay is not recommended for patients undergoing treatment with eltrombopag due to the potential for falsely elevated results  05/01/2023 1 12 (H) 0 20 - 1 00 mg/dL Final     Comment:     Use of this assay is not recommended for patients undergoing treatment with eltrombopag due to the potential for falsely elevated results  09/03/2020 0 81 0 20 - 1 00 mg/dL Final     Comment:     Use of this assay is not recommended for patients undergoing treatment with eltrombopag due to the potential for falsely elevated results  LD   Date Value Ref Range Status   05/21/2023 179 81 - 234 U/L Final   05/01/2023 171 81 - 234 U/L Final     No results found for: \"TSH\"  No results found for: \"A1JOUTJ\"   Free T4   Date Value Ref Range Status   05/21/2023 0 75 0 61 - 1 12 ng/dL Final     Comment:     Specimens with biotin concentrations > 10 ng/mL can lead to significant (> 10%) positive bias in result     05/03/2023 0 84 " 0 76 - 1 46 ng/dL Final     Comment:     Specimen collection should occur prior to Sulfasalazine administration due to the potential for falsely elevated results  RECENT IMAGING:  Procedure: MRI brain w wo contrast    Result Date: 5/16/2023  Narrative: MRI BRAIN WITH AND WITHOUT CONTRAST INDICATION: C43 71: Malignant melanoma of right lower limb, including hip    melanoma staging COMPARISON: Nuclear medicine PET/CT 4/27/2023  MRI cervical spine with and without contrast 3/28/2023  MRI brain an MRA head without contrast 11/7/2013  TECHNIQUE: Multiplanar, multisequence imaging of the brain was performed before and after gadolinium administration  Imaging performed on 3 0T MRI IV Contrast:  7 mL of Gadobutrol injection (SINGLE-DOSE) IMAGE QUALITY:   Diagnostic  FINDINGS: BRAIN PARENCHYMA: 0 3 cm right frontal lobe enhancing hemorrhagic lesion with mild perilesional vasogenic edema (10:238), suspicious for hemorrhagic brain metastasis  Tiny enhancing hemorrhagic lesions in left superior temporal, right caudate, right medial occipital lobes, and right right posterior thalamocapsular junction (10:161, 158, 139, 132), suspicious for hemorrhagic brain metastasis  Tiny enhancing lesion in right cerebellum (10:81), suspicious for nonhemorrhagic brain metastasis  No mass effect or midline shift  Chronic microhemorrhage in right temporal lobe  Diffusion imaging is unremarkable  No acute infarction  Tiny right frontal and left parietal developmental venous anomalies  Small scattered hyperintensities on T2/FLAIR imaging are noted in the periventricular and subcortical white matter demonstrating an appearance that is statistically most likely to represent mild microangiopathic change  VENTRICLES:  Normal for the patient's age  SELLA AND PITUITARY GLAND:  Normal  ORBITS: Sequela of bilateral cataract surgery   PARANASAL SINUSES:  Normal  VASCULATURE:  Evaluation of the major intracranial vasculature demonstrates appropriate flow voids  CALVARIUM AND SKULL BASE:  Normal  MASTOIDS: Trace left mastoid effusion  EXTRACRANIAL SOFT TISSUES:  Normal      Impression: Findings suspicious for tiny hemorrhagic brain metastasis in right frontal lobe, left superior temporal lobe, right caudate, right medial occipital lobes, and right posterior thalamocapsular junction with tiny nonhemorrhagic metastasis in right cerebellum  Mild perilesional vasogenic edema in right frontal lobe  No acute intracranial abnormality  Mild chronic microangiopathy  The study was marked in [facility] for immediate notification  Workstation performed: [id number]     Procedure: NM pet ct tumor imaging whole body    Result Date: 4/27/2023  Narrative: WHOLE-BODY PET/CT SCAN INDICATION: C43 71: Malignant melanoma of right lower limb, including hip   , initial staging     MODIFIER: PI COMPARISON: CT 11/7/2022 and priors CELL TYPE: Melanoma, right plantar foot biopsy 4/6/2023 TECHNIQUE:   8 0 mCi F-18-FD administered IV  Multiplanar attenuation corrected and non attenuation corrected PET images were acquired 60 minutes post injection  Contiguous, low dose, axial CT sections were obtained from the skull vertex through the feet  Intravenous contrast material was not utilized  This examination, like all CT scans performed in the [facility], was performed utilizing techniques to minimize radiation dose exposure, including the use of iterative reconstruction  and automated exposure control  Fasting serum glucose: 121 mg/dl FINDINGS: VISUALIZED BRAIN:   No acute abnormalities are seen  HEAD/NECK:   There is a physiologic distribution of FDG  No FDG avid cervical adenopathy is seen  CT images: Unremarkable CHEST:   No FDG avid soft tissue lesions are seen  CT images: Coronary artery calcifications  ABDOMEN:   Asymmetrically enlarged right liver with subtle heterogeneous density and greater FDG activity compared to the left lobe   The homogeneous FDG activity has an SUV of 4 6  For comparison, left liver SUV measures 2 2  Findings appear new from the prior CT from 1/4/2018  Etiologies such as low FDG avid infiltrative tumor is not excluded  CT images: Small hiatal hernia  Stable small left renal angiomyolipoma  PELVIS: There is hypermetabolic right inguinal and right pelvic external iliac adenopathy compatible with metastases  Example right inguinal frances mass image 2/276 measures 3 7 x 2 4 cm, SUV 17  Right external iliac node image 250 measures 3 3 x 2 3 cm, SUV 18  Additional right external iliac node image 2/233 measures 2 3 x 1 6 cm, SUV 5 6  No hypermetabolic left pelvic adenopathy  CT images: Otherwise stable  OSSEOUS STRUCTURES/EXTREMITIES: Increased FDG activity along the plantar right foot, SUV 5 8, corresponds with the history of right foot plantar melanoma  No FDG avid osseous lesions are seen  CT images: Spine degenerative change  Impression: 1  Increased FDG activity along the plantar right foot corresponds with the history of right foot plantar melanoma  2   Hypermetabolic right pelvic adenopathy compatible with metastases  3  Asymmetrically enlarged right liver with greater FDG intensity compared to the left liver  Recommend contrast MRI liver evaluation  4  No worrisome hypermetabolic lesions in the neck or chest  Workstation performed: [id number]             Assessment/Plan  Ms Ulysses Smalls is a 59 yr female with metastatic melanoma with newly discovered brain metastases on brain MRI on treatment with neoadjuvant pembrolizumab here for continued follow-up, monitoring, and surveillance while on treatment  1  Malignant melanoma of right lower extremity including hip ([address] )  2  Metastatic melanoma ([address] )  She is on neoadjuvant pembrolizumab in anticipation of additional surgery on her foot and right inguinal/groin lymphadenopathy  Recent brain MRI demonstrates brain metastases    I still think it is important to consider surgery on her right foot in order to maximize her mobility and functionality, as currently she has a nonhealing wound with known satellite lesions at the base of her toes  She is tolerating treatment with immunotherapy  Labs reviewed and okay to continue treatment  I will discuss her case further with the team including Dr Cyn Ruiz and Dr Rubio Course  She has an appointment with radiation oncology next week for discussion regarding her recently identified brain metastases  She is asymptomatic at this time but knows to monitor and watch for any symptoms and to call right away  She will continue treatment with pembrolizumab at this time we can decide whether or not we should switch her over to ipilimumab and nivolumab  She knows to monitor for immune mediated side effects and to call with any issues or concerns  She is standing orders for blood work prior to each treatment  3  High risk medication use  She is on treatment with immunotherapy and we will continue to monitor for side effects and lab abnormalities  We will monitor labs with each treatment to ensure safety of continuing on treatment  She knows to watch for signs and symptoms for immune mediated side effects  Denies any immune mediated side effects at this time  Return in about 3 weeks (around 6/12/2023) for Office Visit, Infusion - See Treatment Plan, labs       Marylou Anegl MD, PhD

## 2023-05-28 ENCOUNTER — APPOINTMENT (EMERGENCY)
Dept: RADIOLOGY | Facility: HOSPITAL | Age: 63
DRG: 193 | End: 2023-05-28
Payer: COMMERCIAL

## 2023-05-28 ENCOUNTER — TELEPHONE (OUTPATIENT)
Dept: HEMATOLOGY ONCOLOGY | Facility: CLINIC | Age: 63
End: 2023-05-28

## 2023-05-28 ENCOUNTER — TELEPHONE (OUTPATIENT)
Dept: OTHER | Facility: OTHER | Age: 63
End: 2023-05-28

## 2023-05-28 ENCOUNTER — HOSPITAL ENCOUNTER (INPATIENT)
Facility: HOSPITAL | Age: 63
LOS: 3 days | Discharge: HOME | DRG: 193 | End: 2023-06-01
Attending: EMERGENCY MEDICINE | Admitting: INTERNAL MEDICINE
Payer: COMMERCIAL

## 2023-05-28 DIAGNOSIS — I10 ESSENTIAL HYPERTENSION: ICD-10-CM

## 2023-05-28 DIAGNOSIS — J21.9 ACUTE BRONCHIOLITIS: ICD-10-CM

## 2023-05-28 DIAGNOSIS — C79.31 MELANOMA METASTATIC TO BRAIN (HCC): ICD-10-CM

## 2023-05-28 DIAGNOSIS — J18.9 PNEUMONIA: ICD-10-CM

## 2023-05-28 DIAGNOSIS — A31.9 MYCOBACTERIA, ATYPICAL: ICD-10-CM

## 2023-05-28 DIAGNOSIS — C79.31 MALIGNANT MELANOMA METASTATIC TO BRAIN (HCC): ICD-10-CM

## 2023-05-28 DIAGNOSIS — R65.10 SIRS (SYSTEMIC INFLAMMATORY RESPONSE SYNDROME) (HCC): ICD-10-CM

## 2023-05-28 DIAGNOSIS — H53.462 LEFT HOMONYMOUS HEMIANOPSIA: Primary | ICD-10-CM

## 2023-05-28 DIAGNOSIS — J21.9 BRONCHIOLITIS: ICD-10-CM

## 2023-05-28 LAB
ALBUMIN SERPL BCP-MCNC: 3.7 G/DL (ref 3.5–5)
ALP SERPL-CCNC: 189 U/L (ref 34–104)
ALT SERPL W P-5'-P-CCNC: 18 U/L (ref 7–52)
ANION GAP SERPL CALCULATED.3IONS-SCNC: 11 MMOL/L (ref 4–13)
AST SERPL W P-5'-P-CCNC: 34 U/L (ref 13–39)
BASOPHILS # BLD AUTO: 0.03 THOUSANDS/ÂΜL (ref 0–0.1)
BASOPHILS NFR BLD AUTO: 0 % (ref 0–1)
BILIRUB SERPL-MCNC: 1.03 MG/DL (ref 0.2–1)
BUN SERPL-MCNC: 9 MG/DL (ref 5–25)
CALCIUM SERPL-MCNC: 8.9 MG/DL (ref 8.4–10.2)
CARDIAC TROPONIN I PNL SERPL HS: 5 NG/L
CHLORIDE SERPL-SCNC: 97 MMOL/L (ref 96–108)
CO2 SERPL-SCNC: 27 MMOL/L (ref 21–32)
CREAT SERPL-MCNC: 0.58 MG/DL (ref 0.6–1.3)
EOSINOPHIL # BLD AUTO: 0.03 THOUSAND/ÂΜL (ref 0–0.61)
EOSINOPHIL NFR BLD AUTO: 0 % (ref 0–6)
ERYTHROCYTE [DISTWIDTH] IN BLOOD BY AUTOMATED COUNT: 12 % (ref 11.6–15.1)
GFR SERPL CREATININE-BSD FRML MDRD: 99 ML/MIN/1.73SQ M
GLUCOSE SERPL-MCNC: 115 MG/DL (ref 65–140)
HCT VFR BLD AUTO: 36.3 % (ref 34.8–46.1)
HGB BLD-MCNC: 12.5 G/DL (ref 11.5–15.4)
IMM GRANULOCYTES # BLD AUTO: 0.04 THOUSAND/UL (ref 0–0.2)
IMM GRANULOCYTES NFR BLD AUTO: 0 % (ref 0–2)
LYMPHOCYTES # BLD AUTO: 0.99 THOUSANDS/ÂΜL (ref 0.6–4.47)
LYMPHOCYTES NFR BLD AUTO: 9 % (ref 14–44)
MCH RBC QN AUTO: 35.2 PG (ref 26.8–34.3)
MCHC RBC AUTO-ENTMCNC: 34.4 G/DL (ref 31.4–37.4)
MCV RBC AUTO: 102 FL (ref 82–98)
MONOCYTES # BLD AUTO: 1.03 THOUSAND/ÂΜL (ref 0.17–1.22)
MONOCYTES NFR BLD AUTO: 9 % (ref 4–12)
NEUTROPHILS # BLD AUTO: 8.95 THOUSANDS/ÂΜL (ref 1.85–7.62)
NEUTS SEG NFR BLD AUTO: 82 % (ref 43–75)
NRBC BLD AUTO-RTO: 0 /100 WBCS
PLATELET # BLD AUTO: 206 THOUSANDS/UL (ref 149–390)
PMV BLD AUTO: 8.7 FL (ref 8.9–12.7)
POTASSIUM SERPL-SCNC: 3.2 MMOL/L (ref 3.5–5.3)
PROT SERPL-MCNC: 6.5 G/DL (ref 6.4–8.4)
RBC # BLD AUTO: 3.55 MILLION/UL (ref 3.81–5.12)
SODIUM SERPL-SCNC: 135 MMOL/L (ref 135–147)
WBC # BLD AUTO: 11.07 THOUSAND/UL (ref 4.31–10.16)

## 2023-05-28 PROCEDURE — 84443 ASSAY THYROID STIM HORMONE: CPT

## 2023-05-28 PROCEDURE — 84145 PROCALCITONIN (PCT): CPT

## 2023-05-28 PROCEDURE — 85610 PROTHROMBIN TIME: CPT

## 2023-05-28 PROCEDURE — 93005 ELECTROCARDIOGRAM TRACING: CPT

## 2023-05-28 PROCEDURE — 71045 X-RAY EXAM CHEST 1 VIEW: CPT

## 2023-05-28 PROCEDURE — 80053 COMPREHEN METABOLIC PANEL: CPT | Performed by: EMERGENCY MEDICINE

## 2023-05-28 PROCEDURE — 85025 COMPLETE CBC W/AUTO DIFF WBC: CPT | Performed by: EMERGENCY MEDICINE

## 2023-05-28 PROCEDURE — 84484 ASSAY OF TROPONIN QUANT: CPT | Performed by: EMERGENCY MEDICINE

## 2023-05-28 PROCEDURE — 36415 COLL VENOUS BLD VENIPUNCTURE: CPT

## 2023-05-28 PROCEDURE — 85730 THROMBOPLASTIN TIME PARTIAL: CPT

## 2023-05-28 PROCEDURE — 99285 EMERGENCY DEPT VISIT HI MDM: CPT

## 2023-05-28 RX ORDER — ATENOLOL 50 MG/1
TABLET ORAL
Qty: 90 TABLET | Refills: 0 | Status: SHIPPED | OUTPATIENT
Start: 2023-05-28

## 2023-05-28 RX ORDER — ESCITALOPRAM OXALATE 10 MG/1
TABLET ORAL
Qty: 90 TABLET | Refills: 1 | Status: SHIPPED | OUTPATIENT
Start: 2023-05-28

## 2023-05-28 NOTE — TELEPHONE ENCOUNTER
Patient called saying that the immuno treatment that she is getting is messing with her eye's and she is looking for medical advice on what to do

## 2023-05-28 NOTE — TELEPHONE ENCOUNTER
Pt called with complaints with bilateral side visual fields deficits and blurred vision since Friday  No eye pain  No other neurological deficit  She is on pembrolizumab  She received 2 doses so far  MRI in early 5/2023 showed tiny hemorrhagic brain lesions  Suspicious for mets  It is not clear if her symptoms are immune- mediated due to pembrolizumab or related to brain / orbit metastasis  Recommended to go to ED for quick MRI brain  She understood

## 2023-05-29 ENCOUNTER — APPOINTMENT (EMERGENCY)
Dept: MRI IMAGING | Facility: HOSPITAL | Age: 63
DRG: 193 | End: 2023-05-29
Payer: COMMERCIAL

## 2023-05-29 ENCOUNTER — APPOINTMENT (EMERGENCY)
Dept: CT IMAGING | Facility: HOSPITAL | Age: 63
DRG: 193 | End: 2023-05-29
Payer: COMMERCIAL

## 2023-05-29 PROBLEM — H53.8 BLURRED VISION: Status: ACTIVE | Noted: 2023-01-01

## 2023-05-29 PROBLEM — R65.10 SIRS (SYSTEMIC INFLAMMATORY RESPONSE SYNDROME) (HCC): Status: ACTIVE | Noted: 2023-01-01

## 2023-05-29 PROBLEM — H53.462 LEFT HOMONYMOUS HEMIANOPSIA: Status: ACTIVE | Noted: 2023-05-29

## 2023-05-29 PROBLEM — I63.9 CEREBROVASCULAR ACCIDENT (CVA) (HCC): Status: ACTIVE | Noted: 2023-05-29

## 2023-05-29 LAB
2HR DELTA HS TROPONIN: 1 NG/L
ALBUMIN SERPL BCP-MCNC: 3.4 G/DL (ref 3.5–5)
ALP SERPL-CCNC: 159 U/L (ref 34–104)
ALT SERPL W P-5'-P-CCNC: 15 U/L (ref 7–52)
ANION GAP SERPL CALCULATED.3IONS-SCNC: 9 MMOL/L (ref 4–13)
APTT PPP: 35 SECONDS (ref 23–37)
AST SERPL W P-5'-P-CCNC: 22 U/L (ref 13–39)
BACTERIA UR QL AUTO: NORMAL /HPF
BASOPHILS # BLD AUTO: 0.03 THOUSANDS/ÂΜL (ref 0–0.1)
BASOPHILS NFR BLD AUTO: 0 % (ref 0–1)
BILIRUB SERPL-MCNC: 1.23 MG/DL (ref 0.2–1)
BILIRUB UR QL STRIP: NEGATIVE
BUN SERPL-MCNC: 8 MG/DL (ref 5–25)
CALCIUM ALBUM COR SERPL-MCNC: 8.8 MG/DL (ref 8.3–10.1)
CALCIUM SERPL-MCNC: 8.3 MG/DL (ref 8.4–10.2)
CARDIAC TROPONIN I PNL SERPL HS: 6 NG/L
CHLORIDE SERPL-SCNC: 101 MMOL/L (ref 96–108)
CHOLEST SERPL-MCNC: 123 MG/DL
CLARITY UR: CLEAR
CO2 SERPL-SCNC: 28 MMOL/L (ref 21–32)
COLOR UR: YELLOW
CREAT SERPL-MCNC: 0.51 MG/DL (ref 0.6–1.3)
EOSINOPHIL # BLD AUTO: 0.08 THOUSAND/ÂΜL (ref 0–0.61)
EOSINOPHIL NFR BLD AUTO: 1 % (ref 0–6)
ERYTHROCYTE [DISTWIDTH] IN BLOOD BY AUTOMATED COUNT: 12.2 % (ref 11.6–15.1)
EST. AVERAGE GLUCOSE BLD GHB EST-MCNC: 88 MG/DL
FLUAV RNA RESP QL NAA+PROBE: NEGATIVE
FLUBV RNA RESP QL NAA+PROBE: NEGATIVE
GFR SERPL CREATININE-BSD FRML MDRD: 103 ML/MIN/1.73SQ M
GLUCOSE SERPL-MCNC: 110 MG/DL (ref 65–140)
GLUCOSE UR STRIP-MCNC: NEGATIVE MG/DL
HBA1C MFR BLD: 4.7 %
HCT VFR BLD AUTO: 33.8 % (ref 34.8–46.1)
HDLC SERPL-MCNC: 30 MG/DL
HGB BLD-MCNC: 11.4 G/DL (ref 11.5–15.4)
HGB UR QL STRIP.AUTO: ABNORMAL
IMM GRANULOCYTES # BLD AUTO: 0.03 THOUSAND/UL (ref 0–0.2)
IMM GRANULOCYTES NFR BLD AUTO: 0 % (ref 0–2)
INR PPP: 1.25 (ref 0.84–1.19)
KETONES UR STRIP-MCNC: NEGATIVE MG/DL
L PNEUMO1 AG UR QL IA.RAPID: NEGATIVE
LACTATE SERPL-SCNC: 1.1 MMOL/L (ref 0.5–2)
LDLC SERPL CALC-MCNC: 63 MG/DL (ref 0–100)
LEUKOCYTE ESTERASE UR QL STRIP: NEGATIVE
LYMPHOCYTES # BLD AUTO: 0.96 THOUSANDS/ÂΜL (ref 0.6–4.47)
LYMPHOCYTES NFR BLD AUTO: 13 % (ref 14–44)
MAGNESIUM SERPL-MCNC: 1.3 MG/DL (ref 1.9–2.7)
MCH RBC QN AUTO: 35.3 PG (ref 26.8–34.3)
MCHC RBC AUTO-ENTMCNC: 33.7 G/DL (ref 31.4–37.4)
MCV RBC AUTO: 105 FL (ref 82–98)
MONOCYTES # BLD AUTO: 0.91 THOUSAND/ÂΜL (ref 0.17–1.22)
MONOCYTES NFR BLD AUTO: 12 % (ref 4–12)
NEUTROPHILS # BLD AUTO: 5.68 THOUSANDS/ÂΜL (ref 1.85–7.62)
NEUTS SEG NFR BLD AUTO: 74 % (ref 43–75)
NITRITE UR QL STRIP: NEGATIVE
NON-SQ EPI CELLS URNS QL MICRO: NORMAL /HPF
NONHDLC SERPL-MCNC: 93 MG/DL
NRBC BLD AUTO-RTO: 0 /100 WBCS
PH UR STRIP.AUTO: 6 [PH]
PLATELET # BLD AUTO: 197 THOUSANDS/UL (ref 149–390)
PMV BLD AUTO: 9 FL (ref 8.9–12.7)
POTASSIUM SERPL-SCNC: 3.5 MMOL/L (ref 3.5–5.3)
PROCALCITONIN SERPL-MCNC: 0.36 NG/ML
PROCALCITONIN SERPL-MCNC: 0.41 NG/ML
PROT SERPL-MCNC: 6 G/DL (ref 6.4–8.4)
PROT UR STRIP-MCNC: ABNORMAL MG/DL
PROTHROMBIN TIME: 15.9 SECONDS (ref 11.6–14.5)
RBC # BLD AUTO: 3.23 MILLION/UL (ref 3.81–5.12)
RBC #/AREA URNS AUTO: NORMAL /HPF
RSV RNA RESP QL NAA+PROBE: NEGATIVE
S PNEUM AG UR QL: NEGATIVE
SARS-COV-2 RNA RESP QL NAA+PROBE: NEGATIVE
SODIUM SERPL-SCNC: 138 MMOL/L (ref 135–147)
SP GR UR STRIP.AUTO: >=1.05 (ref 1–1.03)
TRIGL SERPL-MCNC: 149 MG/DL
TSH SERPL DL<=0.05 MIU/L-ACNC: 3.08 UIU/ML (ref 0.45–4.5)
UROBILINOGEN UR STRIP-ACNC: <2 MG/DL
WBC # BLD AUTO: 7.69 THOUSAND/UL (ref 4.31–10.16)
WBC #/AREA URNS AUTO: NORMAL /HPF

## 2023-05-29 PROCEDURE — 87449 NOS EACH ORGANISM AG IA: CPT

## 2023-05-29 PROCEDURE — 70553 MRI BRAIN STEM W/O & W/DYE: CPT

## 2023-05-29 PROCEDURE — 83735 ASSAY OF MAGNESIUM: CPT

## 2023-05-29 PROCEDURE — 84145 PROCALCITONIN (PCT): CPT

## 2023-05-29 PROCEDURE — 80053 COMPREHEN METABOLIC PANEL: CPT

## 2023-05-29 PROCEDURE — A9585 GADOBUTROL INJECTION: HCPCS

## 2023-05-29 PROCEDURE — 71275 CT ANGIOGRAPHY CHEST: CPT

## 2023-05-29 PROCEDURE — 87040 BLOOD CULTURE FOR BACTERIA: CPT

## 2023-05-29 PROCEDURE — 99223 1ST HOSP IP/OBS HIGH 75: CPT | Performed by: INTERNAL MEDICINE

## 2023-05-29 PROCEDURE — 0241U HB NFCT DS VIR RESP RNA 4 TRGT: CPT

## 2023-05-29 PROCEDURE — 81001 URINALYSIS AUTO W/SCOPE: CPT

## 2023-05-29 PROCEDURE — 94664 DEMO&/EVAL PT USE INHALER: CPT

## 2023-05-29 PROCEDURE — 99223 1ST HOSP IP/OBS HIGH 75: CPT | Performed by: PSYCHIATRY & NEUROLOGY

## 2023-05-29 PROCEDURE — G1004 CDSM NDSC: HCPCS

## 2023-05-29 PROCEDURE — 94760 N-INVAS EAR/PLS OXIMETRY 1: CPT

## 2023-05-29 PROCEDURE — 87116 MYCOBACTERIA CULTURE: CPT | Performed by: NURSE PRACTITIONER

## 2023-05-29 PROCEDURE — 84484 ASSAY OF TROPONIN QUANT: CPT | Performed by: EMERGENCY MEDICINE

## 2023-05-29 PROCEDURE — 80061 LIPID PANEL: CPT | Performed by: PHYSICIAN ASSISTANT

## 2023-05-29 PROCEDURE — 96365 THER/PROPH/DIAG IV INF INIT: CPT

## 2023-05-29 PROCEDURE — 87206 SMEAR FLUORESCENT/ACID STAI: CPT | Performed by: NURSE PRACTITIONER

## 2023-05-29 PROCEDURE — 36415 COLL VENOUS BLD VENIPUNCTURE: CPT

## 2023-05-29 PROCEDURE — 85025 COMPLETE CBC W/AUTO DIFF WBC: CPT

## 2023-05-29 PROCEDURE — 94640 AIRWAY INHALATION TREATMENT: CPT

## 2023-05-29 PROCEDURE — 96366 THER/PROPH/DIAG IV INF ADDON: CPT

## 2023-05-29 PROCEDURE — 83036 HEMOGLOBIN GLYCOSYLATED A1C: CPT | Performed by: PHYSICIAN ASSISTANT

## 2023-05-29 PROCEDURE — 83605 ASSAY OF LACTIC ACID: CPT

## 2023-05-29 RX ORDER — ALBUTEROL SULFATE 90 UG/1
1 AEROSOL, METERED RESPIRATORY (INHALATION) EVERY 6 HOURS PRN
Status: DISCONTINUED | OUTPATIENT
Start: 2023-05-29 | End: 2023-05-31

## 2023-05-29 RX ORDER — ENOXAPARIN SODIUM 100 MG/ML
40 INJECTION SUBCUTANEOUS DAILY
Status: DISCONTINUED | OUTPATIENT
Start: 2023-05-29 | End: 2023-06-01 | Stop reason: HOSPADM

## 2023-05-29 RX ORDER — AMLODIPINE BESYLATE 5 MG/1
5 TABLET ORAL DAILY
Status: DISCONTINUED | OUTPATIENT
Start: 2023-05-29 | End: 2023-06-01 | Stop reason: HOSPADM

## 2023-05-29 RX ORDER — ATENOLOL 50 MG/1
50 TABLET ORAL DAILY
Status: DISCONTINUED | OUTPATIENT
Start: 2023-05-29 | End: 2023-06-01 | Stop reason: HOSPADM

## 2023-05-29 RX ORDER — FAMOTIDINE 20 MG/1
20 TABLET, FILM COATED ORAL 2 TIMES DAILY
Status: DISCONTINUED | OUTPATIENT
Start: 2023-05-29 | End: 2023-06-01 | Stop reason: HOSPADM

## 2023-05-29 RX ORDER — ESCITALOPRAM OXALATE 10 MG/1
10 TABLET ORAL DAILY
Status: DISCONTINUED | OUTPATIENT
Start: 2023-05-29 | End: 2023-06-01 | Stop reason: HOSPADM

## 2023-05-29 RX ORDER — POTASSIUM CHLORIDE 20 MEQ/1
40 TABLET, EXTENDED RELEASE ORAL ONCE
Status: COMPLETED | OUTPATIENT
Start: 2023-05-29 | End: 2023-05-29

## 2023-05-29 RX ORDER — HYDROCHLOROTHIAZIDE 12.5 MG/1
12.5 TABLET ORAL DAILY
Status: DISCONTINUED | OUTPATIENT
Start: 2023-05-29 | End: 2023-06-01 | Stop reason: HOSPADM

## 2023-05-29 RX ORDER — AZITHROMYCIN 250 MG/1
500 TABLET, FILM COATED ORAL EVERY 24 HOURS
Status: DISCONTINUED | OUTPATIENT
Start: 2023-05-30 | End: 2023-05-29

## 2023-05-29 RX ORDER — LEVALBUTEROL INHALATION SOLUTION 1.25 MG/3ML
1.25 SOLUTION RESPIRATORY (INHALATION)
Status: DISCONTINUED | OUTPATIENT
Start: 2023-05-29 | End: 2023-05-31

## 2023-05-29 RX ORDER — ACETAMINOPHEN 325 MG/1
650 TABLET ORAL EVERY 6 HOURS PRN
Status: DISCONTINUED | OUTPATIENT
Start: 2023-05-29 | End: 2023-06-01 | Stop reason: HOSPADM

## 2023-05-29 RX ORDER — GUAIFENESIN 600 MG/1
600 TABLET, EXTENDED RELEASE ORAL 2 TIMES DAILY
Status: DISCONTINUED | OUTPATIENT
Start: 2023-05-29 | End: 2023-06-01 | Stop reason: HOSPADM

## 2023-05-29 RX ORDER — POTASSIUM CHLORIDE 14.9 MG/ML
20 INJECTION INTRAVENOUS ONCE
Status: COMPLETED | OUTPATIENT
Start: 2023-05-29 | End: 2023-05-29

## 2023-05-29 RX ORDER — BENZONATATE 100 MG/1
200 CAPSULE ORAL 3 TIMES DAILY PRN
Status: DISCONTINUED | OUTPATIENT
Start: 2023-05-29 | End: 2023-06-01 | Stop reason: HOSPADM

## 2023-05-29 RX ORDER — MAGNESIUM SULFATE HEPTAHYDRATE 40 MG/ML
4 INJECTION, SOLUTION INTRAVENOUS ONCE
Status: COMPLETED | OUTPATIENT
Start: 2023-05-29 | End: 2023-05-29

## 2023-05-29 RX ADMIN — GUAIFENESIN 600 MG: 600 TABLET ORAL at 17:09

## 2023-05-29 RX ADMIN — POTASSIUM CHLORIDE 40 MEQ: 1500 TABLET, EXTENDED RELEASE ORAL at 01:01

## 2023-05-29 RX ADMIN — FAMOTIDINE 20 MG: 20 TABLET ORAL at 17:09

## 2023-05-29 RX ADMIN — BENZONATATE 200 MG: 100 CAPSULE ORAL at 20:44

## 2023-05-29 RX ADMIN — AZITHROMYCIN 500 MG: 500 INJECTION, POWDER, LYOPHILIZED, FOR SOLUTION INTRAVENOUS at 05:32

## 2023-05-29 RX ADMIN — GUAIFENESIN 600 MG: 600 TABLET ORAL at 08:05

## 2023-05-29 RX ADMIN — ENOXAPARIN SODIUM 40 MG: 40 INJECTION SUBCUTANEOUS at 08:05

## 2023-05-29 RX ADMIN — FAMOTIDINE 20 MG: 20 TABLET ORAL at 08:05

## 2023-05-29 RX ADMIN — LEVALBUTEROL HYDROCHLORIDE 1.25 MG: 1.25 SOLUTION RESPIRATORY (INHALATION) at 20:04

## 2023-05-29 RX ADMIN — MAGNESIUM SULFATE HEPTAHYDRATE 4 G: 40 INJECTION, SOLUTION INTRAVENOUS at 17:11

## 2023-05-29 RX ADMIN — IOHEXOL 60 ML: 350 INJECTION, SOLUTION INTRAVENOUS at 00:54

## 2023-05-29 RX ADMIN — GADOBUTROL 7 ML: 604.72 INJECTION INTRAVENOUS at 02:23

## 2023-05-29 RX ADMIN — ALBUTEROL SULFATE 1 PUFF: 90 AEROSOL, METERED RESPIRATORY (INHALATION) at 12:32

## 2023-05-29 RX ADMIN — ACETAMINOPHEN 650 MG: 325 TABLET ORAL at 06:49

## 2023-05-29 RX ADMIN — ESCITALOPRAM OXALATE 10 MG: 10 TABLET ORAL at 08:05

## 2023-05-29 RX ADMIN — ACETAMINOPHEN 650 MG: 325 TABLET ORAL at 20:44

## 2023-05-29 RX ADMIN — CEFTRIAXONE SODIUM 1000 MG: 10 INJECTION, POWDER, FOR SOLUTION INTRAVENOUS at 04:13

## 2023-05-29 RX ADMIN — POTASSIUM CHLORIDE 20 MEQ: 14.9 INJECTION, SOLUTION INTRAVENOUS at 01:03

## 2023-05-29 NOTE — ASSESSMENT & PLAN NOTE
Continue PTA Atenolol 50mg daily, HCTZ 12 5mg daily and Amlodipine 5mg daily  Patient notes that her Amlodipine was recently increased, but she has continued taking 5mg daily  BP acceptable on arrival to the ED so will continue this regimen for now  Continue monitoring BP

## 2023-05-29 NOTE — CONSULTS
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) on Keytruda with hemorrhagic brain metastasis on most recent MRI from 5/11/2023, cervical spinal stenosis, HTN, hypertriglyceridemia, and former tobacco use who presented to [facility] on 5/29/2023 due to new visual field blurriness in bilateral left eye fields that began on 5/26 (the day after receiving her second Keytruda treatment)  · Upon arrival to the ED, /77 with temp 100 1F (of note, PTA patient had reported temp of 100 8F)  · CTA ED chest PE study revealed diffuse tree-in-bud nodularity which may represent an infectious bronchiolitis such as an atypical mycobacterial infection  · MRI brain revealed punctate subacute infarct in the left corona radiata and interval worsening of intracranial hemorrhagic metastatic lesions associated with mild perilesional vasogenic edema without mass effect  · Labs revealed elevated alk phos, procalcitonin 0 36, WBC 11 07, potassium 3 2, and INR 1 25   UA, COVID/flu/RSV, lactic acid, troponin, and TSH unremarkable  On exam, no visual field loss, however patient has subjective blurred vision in bilateral left eye fields and has difficulty seeing pictures/words on the left side of a page  Etiology for visual disturbance likely due to hemorrhagic brain metastatic lesion in the right occipital lobe  However, could also be secondary to [facility] side effect  The punctate stroke would not explain her visual disturbances  The stroke is likely secondary to hypercoagulable state given known malignancy      Plan:  - Recommend Heme-Onc consult and Rad Onc consult (was supposed to see Rad Onc on 5/31)   · Several new hemorrhagic brain metastasis since recent MRI brain on 5/11/2023  - Recent 2D echo stress test from 2/28/2023 revealed EF 60 to 65% with normal systolic function  - Blood cultures in process; if positive recommend JOSSE to check for endocarditis  - Lipid panel and Hemoglobin A1C pending  - Unfortunately patient is not a candidate for antiplatelet or anticoagulant therapy due to hemorrhagic brain mets and increased risk of bleeding  - Will hold off on statin therapy at this time  - May consider decadron, but will defer to Heme-Onc/Rad-Once  - Patient states that she has not driven since the visual disturbance began   - PT/OT  - Continue to monitor and notify neurology with any changes  - Medical management and supportive care per primary team  Correction of any metabolic or infectious disturbances    Cerebrovascular accident (CVA) ([facility] [address] )  Assessment & Plan  - MRI brain revealed punctate subacute infarct in the left corona radiata and interval worsening of intracranial hemorrhagic metastatic lesions associated with mild perilesional vasogenic edema without mass effect   - Stroke etiology likely due to hypercoagulable state from known melanoma  - Unfortunately, given multiple hemorrhagic metastatic lesions, patient is not a candidate for aspirin or anticoagulation therapy    - We will defer statin therapy at this time  - See remainder of assessment/plan above    Metastatic melanoma [facility])  Assessment & Plan  - Diagnosed with melanoma on the right foot in March 2023  - Started on Keytruda 3 weeks ago, with second dose on 5/25  - She had MRI brain on 5/11/2023 which was suspicious for hemorrhagic brain metastasis in the right frontal lobe, left superior temporal lobe, right caudate, right medial occipital lobes, and right posterior thalamocapsular junction with tiny nonhemorrhagic metastasis in right cerebellum   - Repeat MRI brain w/wo contrast on 5/29 above with several new hemorrhagic brain metastatic lesions  - Heme-Onc and Rad-Onc consulted    Hypertension  Assessment & Plan  - Normotensive goal        Tatyana Mcduffie will need follow up in in 6 weeks with neurovascular attending  She will not require outpatient neurological testing  History of Present Illness     Reason for Consult / Principal Problem: visual field deficits  Hx and PE limited by: N/A  HPI: Tatyana Mcduffie is a 59 y o  left handed female with stage III acral melanoma (diagnosed in March 2023) on Keytruda with concern for hemorrhagic brain metastasis on most recent MRI from 5/11/2023, cervical spinal stenosis, HTN, hypertriglyceridemia, and former tobacco use who presented to [facility] on 5/29/2023 due to new visual field blurriness in bilateral left eye fields that began on 5/26 (the day after receiving her second Keytruda treatment)  Patient was diagnosed with melanoma in March 2023 and has been on [address] for the past 3 weeks  Her second dose was this past Thursday, 5/25  The day after her Keytruda dose, she had blurriness in bilateral left visual fields  She developed a right frontal headache  She spoke to her Heme-Onc doctor who recommend coming to the ED for further workup and MRI brain w/wo contrast   She has also had a dry cough and chest tightness  Patient was recently diagnosed with acral melanoma of the right foot, staging workup in process  Recent MRI brain w/wo contrast on 5/11/2023 was suspicion for any hemorrhagic brain metastasis in the right frontal lobe, left superior temporal lobe, right caudate, right medial occipital lobes, and right posterior thalamocapsular junction with tiny nonhemorrhagic metastasis in right cerebellum  Upon arrival to the ED, /77 with temp 100 1F (of note, PTA patient had reported temp of 100 8F)  CTA ED chest PE study revealed diffuse tree-in-bud nodularity which may represent an infectious bronchiolitis such as an atypical mycobacterial infection    MRI brain w/wo contrast was completed; and final report pending, however on personal review, there are several hemorrhagic brain metastases, several of which are new since recent MRI brain from   Labs revealed elevated alk phos, procalcitonin 0 36, WBC 11 07, potassium 3 2, and INR 1 25   UA, COVID/flu/RSV, lactic acid, troponin, and TSH unremarkable  On exam today, patient continues to have visual field deficits in bilateral left eye fields  Described as blurriness; denies vision loss  She has had a headache across the right eyebrow which started around the same time on  and has been persistent  She is also having a cough  Denies any speech difficulty, numbness, tingling, arm/leg weakness  Reviewed imaging with the patient  Inpatient consult to Neurology  Consult performed by: Sergey Barba PA-C  Consult ordered by: Kane Owen MD          Review of Systems   Eyes: Positive for visual disturbance  Respiratory: Positive for cough  Neurological: Positive for headaches  All other systems reviewed and are negative        Historical Information   Past Medical History:   Diagnosis Date   • Cervical disc disorder    • Melanoma ([address] )    • Stenosis of cervical spine region      Past Surgical History:   Procedure Laterality Date   • BREAST BIOPSY Left     core   • BREAST BIOPSY Right     core   • CERVICAL FUSION  2013    cC3-C4, C4-C5, C5 C6, C6-C7, C7-T1   •  SECTION     • ELBOW SURGERY     • KNEE SURGERY Bilateral 20YRS AGO     Social History   Social History     Substance and Sexual Activity   Alcohol Use Yes    Comment: SOCIAL     Social History     Substance and Sexual Activity   Drug Use Not Currently     E-Cigarette/Vaping   • E-Cigarette Use Never User      E-Cigarette/Vaping Substances   • Nicotine No    • THC No    • CBD No    • Flavoring No    • Other No    • Unknown No      Social History     Tobacco Use   Smoking Status Former   • Packs/day: 0 02   • Types: Cigarettes   • Start date: 0   • Quit date: 3/1/2023   • Years since quittin 2   Smokeless Tobacco Never   Tobacco Comments    4 a day     Family History:   Family History   Problem Relation Age of Onset   • Cancer Mother    • Brain cancer Mother [de-identified]   • Heart disease Father    • Breast cancer Sister 48   • Breast cancer Sister 45   • Breast cancer Maternal Aunt 60   • Breast cancer Maternal Aunt 60   • Breast cancer Maternal Aunt 60   • Breast cancer Cousin 50   • Breast cancer Cousin 52   • Breast cancer Cousin 52   • Prostate cancer Maternal Uncle 54   • No Known Problems Son    • No Known Problems Maternal Grandmother    • No Known Problems Maternal Grandfather    • No Known Problems Paternal Grandmother    • No Known Problems Paternal Grandfather    • BRCA1 Negative Sister    • BRCA2 Negative Sister    • No Known Problems Sister    • No Known Problems Maternal Aunt    • No Known Problems Maternal Aunt    • No Known Problems Maternal Aunt    • No Known Problems Maternal Aunt        Review of previous medical records was completed  Reviewed prior notes, labs, recent MRI brain w/wo contrast and MRI brain w/wo contrast during this admission      Meds/Allergies   all current active meds have been reviewed, current meds:   Current Facility-Administered Medications   Medication Dose Route Frequency   • acetaminophen (TYLENOL) tablet 650 mg  650 mg Oral Q6H PRN   • albuterol (PROVENTIL HFA,VENTOLIN HFA) inhaler 1 puff  1 puff Inhalation Q6H PRN   • amLODIPine (NORVASC) tablet 5 mg  5 mg Oral Daily   • atenolol (TENORMIN) tablet 50 mg  50 mg Oral Daily   • [START ON 2023] azithromycin (ZITHROMAX) tablet 500 mg  500 mg Oral Q24H   • [START ON 2023] ceftriaxone (ROCEPHIN) 1 g/50 mL in dextrose IVPB  1,000 mg Intravenous Q24H   • enoxaparin (LOVENOX) subcutaneous injection 40 mg  40 mg Subcutaneous Daily   • escitalopram (LEXAPRO) tablet 10 mg  10 mg Oral Daily   • famotidine (PEPCID) tablet 20 mg  20 mg Oral BID   • guaiFENesin (MUCINEX) 12 hr tablet 600 mg  600 mg Oral BID   • hydrochlorothiazide (HYDRODIURIL) tablet 12 5 mg  12 5 mg Oral Daily    and PTA meds:   Prior to Admission Medications   Prescriptions Last Dose Informant Patient Reported? Taking? albuterol (PROVENTIL HFA,VENTOLIN HFA) 90 mcg/act inhaler 5/29/2023 Self No Yes   Sig: INHALE 1 PUFF EVERY 4 HOURS AS NEEDED FOR WHEEZING   amLODIPine (NORVASC) 5 mg tablet 5/29/2023 Self No Yes   Sig: Take 1 tablet (5 mg total) by mouth daily   atenolol (TENORMIN) 50 mg tablet 5/29/2023  No Yes   Sig: TAKE 1 TABLET BY MOUTH EVERY DAY   escitalopram (LEXAPRO) 10 mg tablet   No No   Sig: TAKE 1 TABLET BY MOUTH EVERY DAY   famotidine (PEPCID) 20 mg tablet  Self No No   Sig: TAKE 1 TABLET BY MOUTH TWICE A DAY   gentamicin (GARAMYCIN) 0 1 % cream   No No   Sig: Apply topically 3 (three) times a day   hydrochlorothiazide (MICROZIDE) 12 5 mg capsule 5/29/2023 Self Yes Yes   Sig: Take 12 5 mg by mouth daily      Facility-Administered Medications: None       Allergies   Allergen Reactions   • Methocarbamol Shortness Of Breath and Swelling   • Tramadol Shortness Of Breath and Swelling       Objective   Vitals:Blood pressure 136/89, pulse 85, temperature 98 7 °F (37 1 °C), resp  rate 18, weight 70 8 kg (156 lb), SpO2 96 %, not currently breastfeeding  ,Body mass index is 24 43 kg/m²  No intake or output data in the 24 hours ending 05/29/23 0702    Invasive Devices: Invasive Devices     Peripheral Intravenous Line  Duration           Peripheral IV 05/28/23 Left Antecubital <1 day                Physical Exam  Vitals and nursing note reviewed  Constitutional:       Appearance: Normal appearance  Comments: Patient lying comfortably in bed in no acute distress   HENT:      Head: Normocephalic and atraumatic  Mouth/Throat:      Mouth: Mucous membranes are moist       Pharynx: Oropharynx is clear  Eyes:      Extraocular Movements: Extraocular movements intact        Conjunctiva/sclera: Conjunctivae normal       Pupils: Pupils are equal, "round, and reactive to light  Comments: No visual field loss, however does have subjective blurred vision in bilateral left visual fields  Has difficulty seeing objects/words on the far left of the NIH cards due to \"blurriness\"   Pulmonary:      Effort: Pulmonary effort is normal    Musculoskeletal:         General: Normal range of motion  Skin:     General: Skin is warm and dry  Neurological:      Mental Status: She is alert and oriented to person, place, and time  Deep Tendon Reflexes:      Reflex Scores:       Bicep reflexes are 2+ on the right side and 2+ on the left side  Brachioradialis reflexes are 2+ on the right side and 2+ on the left side  Patellar reflexes are 2+ on the right side and 2+ on the left side  Comments: See full neuro exam below       Neurologic Exam     Mental Status   Oriented to person, place, and time  Patient awake and alert  Oriented to person, place, month, and year  No dysarthria or aphasia  Able to follow simple midline and appendicular commands  Patient has difficulty reading words on the left side of the page  Able to describe the scene on NIH cards, however has difficulty seeing the girl at the far left of the page  Cranial Nerves     CN III, IV, VI   Pupils are equal, round, and reactive to light  Pupils 3 mm, round, reactive to light bilaterally  EOMs intact without nystagmus  No visual field deficits, however, has subjective blurred vision in bilateral left visual fields  Facial sensation to light touch intact throughout  Facial expressions full and symmetric  Tongue midline  Soft palate raises symmetrically  Hearing grossly intact  Motor Exam   Muscle bulk: normal  Overall muscle tone: normal  Right arm pronator drift: absent  Left arm pronator drift: absent  5/5 strength in bilateral upper and lower extremities       Sensory Exam   Sensation to light touch, temperature, and vibration intact throughout except slightly " "diminished      Gait, Coordination, and Reflexes     Reflexes   Right brachioradialis: 2+  Left brachioradialis: 2+  Right biceps: 2+  Left biceps: 2+  Right patellar: 2+  Left patellar: 2+  No ataxia with finger to nose bilaterally  No resting or action tremor  No ankle clonus bilaterally       Lab Results:   I have personally reviewed pertinent reports  , CBC:   Results from last 7 days   Lab Units 05/28/23  2252   HEMATOCRIT % 36 3   HEMOGLOBIN g/dL 12 5   MCV fL 102*   PLATELETS Thousands/uL 206   RBC Million/uL 3 55*   WBC Thousand/uL 11 07*   , BMP/CMP:   Results from last 7 days   Lab Units 05/28/23  2252   ALK PHOS U/L 189*   ALT U/L 18   AST U/L 34   BUN mg/dL 9   CALCIUM mg/dL 8 9   CHLORIDE mmol/L 97   CO2 mmol/L 27   CREATININE mg/dL 0 58*   EGFR ml/min/1 73sq m 99   POTASSIUM mmol/L 3 2*   SODIUM mmol/L 135   , Vitamin B12:   , HgBA1C:   , TSH:   Results from last 7 days   Lab Units 05/28/23  2253   TSH 3RD GENERATON uIU/mL 3 077   , Coagulation:   Results from last 7 days   Lab Units 05/28/23 2253   INR  1 25*   , Lipid Profile:   , Ammonia:   , Urinalysis:   Results from last 7 days   Lab Units 05/29/23  0412   BILIRUBIN UA  Negative   BLOOD UA  Small*   CLARITY UA  Clear   COLOR UA  Yellow   GLUCOSE UA mg/dl Negative   KETONES UA mg/dl Negative   LEUKOCYTES UA  Negative   NITRITE UA  Negative   PH UA  6 0   SPEC GRAV UA  >=1 050*   , Drug Screen:   , Medication Drug Levels:       Invalid input(s): \"CARBAMAZEPINE\", \"LACOSAMIDE\", \"OXCARBAZEPINE\"  Imaging Studies: I have personally reviewed pertinent reports  and I have personally reviewed pertinent films in PACS  EKG, Pathology, and Other Studies: I have personally reviewed pertinent reports     and I have personally reviewed pertinent films in PACS  VTE Prophylaxis: Sequential compression device (Venodyne)  and Enoxaparin (Lovenox)    Code Status: Level 1 - Full Code  Advance Directive and Living Will:      Power of :    POLST:      Please " refer to attending's attestation for billing time

## 2023-05-29 NOTE — ASSESSMENT & PLAN NOTE
59 y o  left handed female with stage III acral melanoma (diagnosed in March 2023) on Keytruda with hemorrhagic brain metastasis on most recent MRI from 5/11/2023, cervical spinal stenosis, HTN, hypertriglyceridemia, and former tobacco use who presented to [facility] on 5/29/2023 due to new visual field blurriness in bilateral left eye fields that began on 5/26 (the day after receiving her second Keytruda treatment)  · Upon arrival to the ED, /77 with temp 100 1F (of note, PTA patient had reported temp of 100 8F)  · CTA ED chest PE study revealed diffuse tree-in-bud nodularity which may represent an infectious bronchiolitis such as an atypical mycobacterial infection  · MRI brain revealed punctate subacute infarct in the left corona radiata and interval worsening of intracranial hemorrhagic metastatic lesions associated with mild perilesional vasogenic edema without mass effect  · Labs revealed elevated alk phos, procalcitonin 0 36, WBC 11 07, potassium 3 2, and INR 1 25   UA, COVID/flu/RSV, lactic acid, troponin, and TSH unremarkable  On exam, no visual field loss, however patient has subjective blurred vision in bilateral left eye fields and has difficulty seeing pictures/words on the left side of a page  Etiology for visual disturbance likely due to hemorrhagic brain metastatic lesion in the right occipital lobe  However, could also be secondary to [facility] side effect  The punctate stroke would not explain her visual disturbances  The stroke is likely secondary to hypercoagulable state given known malignancy      Plan:  - Recommend Heme-Onc consult and Rad Onc consult (was supposed to see Rad Onc on 5/31)   · Several new hemorrhagic brain metastasis since recent MRI brain on 5/11/2023  - Recent 2D echo stress test from 2/28/2023 revealed EF 60 to 65% with normal systolic function  - Blood cultures in process; if positive recommend JOSSE to check for endocarditis  - Lipid panel and Hemoglobin A1C pending  - Unfortunately patient is not a candidate for antiplatelet or anticoagulant therapy due to hemorrhagic brain mets and increased risk of bleeding  - Will hold off on statin therapy at this time  - May consider decadron, but will defer to Heme-Onc/Rad-Once  - Patient states that she has not driven since the visual disturbance began   - PT/OT  - Continue to monitor and notify neurology with any changes     - Medical management and supportive care per primary team  Correction of any metabolic or infectious disturbances

## 2023-05-29 NOTE — CONSULTS
Oncology Consult Note  Khang Nurse 59 y o  female MRN: [id number]  Unit/Bed#: S -20 Encounter: [id number]      Presenting Complaint: Metastatic melanoma with brain metastasis  New onset of left homonymous hemianopia  History of Presenting Illness: A 71-year-old female with cutaneous melanoma, located in the right foot  Depth of invasion was 4 2 mm which was ulcerated  Lymph node was clinically positive  This was diagnosed in 2023  Brain MRI was positive for multiple tiny hemorrhagic metastasis  She has been under the care of of Dr Ricardo Malik  She has received 2 doses of pembrolizumab, so far  Since Friday, she developed left homonymous hemianopia  She did not have any headache or any other focal neurological deficit  However, she noticed low-grade temperature up to 100 8  Therefore, she was hospitalized  Her visual symptom has not changed  She has no ambulatory dysfunction  Denied any pain  her fever is down  She has normal performance status  MRI of the brain showed increasing hemorrhagic metastasis as well as punctate focus of mild increased diffusion signal in the left corona radiata without obvious low signal on ADC map and no enhancement could indicate  tiny subacute infarct  Review of Systems - As stated in the HPI otherwise the fourteen point review of systems was negative      Past Medical History:   Diagnosis Date   • Cervical disc disorder    • Melanoma ([facility] [address] )    • Stenosis of cervical spine region        Social History     Socioeconomic History   • Marital status: Single     Spouse name: None   • Number of children: None   • Years of education: None   • Highest education level: None   Occupational History   • None   Tobacco Use   • Smoking status: Former     Packs/day: 0 02     Types: Cigarettes     Start date: 0     Quit date: 3/1/2023     Years since quittin 2   • Smokeless tobacco: Never   • Tobacco comments:     4 a day   Vaping Use   • Vaping Use: Never used   Substance and Sexual Activity   • Alcohol use: Yes     Comment: SOCIAL   • Drug use: Not Currently   • Sexual activity: Yes     Partners: Male   Other Topics Concern   • None   Social History Narrative   • None     Social Determinants of Health     Financial Resource Strain: Not on file   Food Insecurity: Not on file   Transportation Needs: Not on file   Physical Activity: Not on file   Stress: Not on file   Social Connections: Not on file   Intimate Partner Violence: Not on file   Housing Stability: Not on file       Family History   Problem Relation Age of Onset   • Cancer Mother    • Brain cancer Mother [de-identified]   • Heart disease Father    • Breast cancer Sister 48   • Breast cancer Sister 45   • Breast cancer Maternal Aunt 60   • Breast cancer Maternal Aunt 60   • Breast cancer Maternal Aunt 60   • Breast cancer Cousin 50   • Breast cancer Cousin 52   • Breast cancer Cousin 49   • Prostate cancer Maternal Uncle 55   • No Known Problems Son    • No Known Problems Maternal Grandmother    • No Known Problems Maternal Grandfather    • No Known Problems Paternal Grandmother    • No Known Problems Paternal Grandfather    • BRCA1 Negative Sister    • BRCA2 Negative Sister    • No Known Problems Sister    • No Known Problems Maternal Aunt    • No Known Problems Maternal Aunt    • No Known Problems Maternal Aunt    • No Known Problems Maternal Aunt        Allergies   Allergen Reactions   • Methocarbamol Shortness Of Breath and Swelling   • Tramadol Shortness Of Breath and Swelling         Current Facility-Administered Medications:   •  acetaminophen (TYLENOL) tablet 650 mg, 650 mg, Oral, Q6H PRN, Kimmy Hodges MD, 650 mg at 05/29/23 0649  •  albuterol (PROVENTIL HFA,VENTOLIN HFA) inhaler 1 puff, 1 puff, Inhalation, Q6H PRN, Kimmy Hodges MD  •  amLODIPine (NORVASC) tablet 5 mg, 5 mg, Oral, Daily, Kimmy Hodgse MD  •  atenolol (TENORMIN) tablet 50 mg, 50 mg, Oral, Daily, Kimmy Hodges MD  •  [START ON 5/30/2023] azithromycin (ZITHROMAX) tablet 500 mg, 500 mg, Oral, Q24H, Miranda Roman MD  •  [START ON 5/30/2023] ceftriaxone (ROCEPHIN) 1 g/50 mL in dextrose IVPB, 1,000 mg, Intravenous, Q24H, Miranda Roman MD  •  enoxaparin (LOVENOX) subcutaneous injection 40 mg, 40 mg, Subcutaneous, Daily, Miranda Roman MD, 40 mg at 05/29/23 0805  •  escitalopram (LEXAPRO) tablet 10 mg, 10 mg, Oral, Daily, Miranda Roman MD, 10 mg at 05/29/23 0805  •  famotidine (PEPCID) tablet 20 mg, 20 mg, Oral, BID, Miranda Roman MD, 20 mg at 05/29/23 0805  •  guaiFENesin (MUCINEX) 12 hr tablet 600 mg, 600 mg, Oral, BID, Miranda Roman MD, 600 mg at 05/29/23 0805  •  hydrochlorothiazide (HYDRODIURIL) tablet 12 5 mg, 12 5 mg, Oral, Daily, Miranda Roman MD      /77   Pulse 78   Temp 98 6 °F (37 °C)   Resp 18   Wt 70 8 kg (156 lb)   SpO2 94%   BMI 24 43 kg/m²     General Appearance:    Alert, oriented        Eyes:    PERRL   Ears:    Normal external ear canals, both ears   Nose:   Nares normal, septum midline   Throat:   Mucosa moist  Pharynx without injection  Neck:   Supple       Lungs:     Clear to auscultation bilaterally   Chest Wall:    No tenderness or deformity    Heart:    Regular rate and rhythm       Abdomen:     Soft, non-tender, bowel sounds +, no organomegaly           Extremities:   Extremities no cyanosis or edema       Skin:   no rash or icterus      Lymph nodes:   Cervical, supraclavicular, and axillary nodes normal   Neurologic:   CNII-XII intact, normal strength, sensation and reflexes     Throughout               Recent Results (from the past 48 hour(s))   CBC and differential    Collection Time: 05/28/23 10:52 PM   Result Value Ref Range    WBC 11 07 (H) 4 31 - 10 16 Thousand/uL    RBC 3 55 (L) 3 81 - 5 12 Million/uL    Hemoglobin 12 5 11 5 - 15 4 g/dL    Hematocrit 36 3 34 8 - 46 1 %     (H) 82 - 98 fL    MCH 35 2 (H) 26 8 - 34 3 pg    MCHC 34 4 31 4 - 37 4 g/dL    RDW 12 0 11 6 "- 15 1 %    MPV 8 7 (L) 8 9 - 12 7 fL    Platelets 513 070 - 711 Thousands/uL    nRBC 0 /100 WBCs    Neutrophils Relative 82 (H) 43 - 75 %    Immat GRANS % 0 0 - 2 %    Lymphocytes Relative 9 (L) 14 - 44 %    Monocytes Relative 9 4 - 12 %    Eosinophils Relative 0 0 - 6 %    Basophils Relative 0 0 - 1 %    Neutrophils Absolute 8 95 (H) 1 85 - 7 62 Thousands/µL    Immature Grans Absolute 0 04 0 00 - 0 20 Thousand/uL    Lymphocytes Absolute 0 99 0 60 - 4 47 Thousands/µL    Monocytes Absolute 1 03 0 17 - 1 22 Thousand/µL    Eosinophils Absolute 0 03 0 00 - 0 61 Thousand/µL    Basophils Absolute 0 03 0 00 - 0 10 Thousands/µL   Comprehensive metabolic panel    Collection Time: 05/28/23 10:52 PM   Result Value Ref Range    Sodium 135 135 - 147 mmol/L    Potassium 3 2 (L) 3 5 - 5 3 mmol/L    Chloride 97 96 - 108 mmol/L    CO2 27 21 - 32 mmol/L    ANION GAP 11 4 - 13 mmol/L    BUN 9 5 - 25 mg/dL    Creatinine 0 58 (L) 0 60 - 1 30 mg/dL    Glucose 115 65 - 140 mg/dL    Calcium 8 9 8 4 - 10 2 mg/dL    AST 34 13 - 39 U/L    ALT 18 7 - 52 U/L    Alkaline Phosphatase 189 (H) 34 - 104 U/L    Total Protein 6 5 6 4 - 8 4 g/dL    Albumin 3 7 3 5 - 5 0 g/dL    Total Bilirubin 1 03 (H) 0 20 - 1 00 mg/dL    eGFR 99 ml/min/1 73sq m   HS Troponin 0hr (reflex protocol)    Collection Time: 05/28/23 10:52 PM   Result Value Ref Range    hs TnI 0hr 5 \"Refer to ACS Flowchart\"- see link ng/L   Procalcitonin    Collection Time: 05/28/23 10:52 PM   Result Value Ref Range    Procalcitonin 0 36 (H) <=0 25 ng/ml   Protime-INR    Collection Time: 05/28/23 10:53 PM   Result Value Ref Range    Protime 15 9 (H) 11 6 - 14 5 seconds    INR 1 25 (H) 0 84 - 1 19   APTT    Collection Time: 05/28/23 10:53 PM   Result Value Ref Range    PTT 35 23 - 37 seconds   TSH, 3rd generation with Free T4 reflex    Collection Time: 05/28/23 10:53 PM   Result Value Ref Range    TSH 3RD GENERATON 3 077 0 450 - 4 500 uIU/mL   FLU/RSV/COVID - if FLU/RSV clinically relevant    " "Collection Time: 05/29/23 12:36 AM    Specimen: Nose; Nares   Result Value Ref Range    SARS-CoV-2 Negative Negative    INFLUENZA A PCR Negative Negative    INFLUENZA B PCR Negative Negative    RSV PCR Negative Negative   Blood culture #1    Collection Time: 05/29/23 12:36 AM    Specimen: Arm, Right; Blood   Result Value Ref Range    Blood Culture Received in Microbiology Lab  Culture in Progress  Blood culture #2    Collection Time: 05/29/23 12:36 AM    Specimen: Arm, Left; Blood   Result Value Ref Range    Blood Culture Received in Microbiology Lab  Culture in Progress      Lactic acid, plasma (w/reflex if result > 2 0)    Collection Time: 05/29/23 12:36 AM   Result Value Ref Range    LACTIC ACID 1 1 0 5 - 2 0 mmol/L   HS Troponin I 2hr    Collection Time: 05/29/23 12:43 AM   Result Value Ref Range    hs TnI 2hr 6 \"Refer to ACS Flowchart\"- see link ng/L    Delta 2hr hsTnI 1 <20 ng/L   UA w Reflex to Microscopic w Reflex to Culture    Collection Time: 05/29/23  4:12 AM    Specimen: Urine, Clean Catch   Result Value Ref Range    Color, UA Yellow     Clarity, UA Clear     Specific Gravity, UA >=1 050 (H) 1 003 - 1 030    pH, UA 6 0 4 5, 5 0, 5 5, 6 0, 6 5, 7 0, 7 5, 8 0    Leukocytes, UA Negative Negative    Nitrite, UA Negative Negative    Protein, UA Trace (A) Negative mg/dl    Glucose, UA Negative Negative mg/dl    Ketones, UA Negative Negative mg/dl    Urobilinogen, UA <2 0 <2 0 mg/dl mg/dl    Bilirubin, UA Negative Negative    Occult Blood, UA Small (A) Negative   Urine Microscopic    Collection Time: 05/29/23  4:12 AM   Result Value Ref Range    RBC, UA 1-2 None Seen, 1-2 /hpf    WBC, UA 1-2 None Seen, 1-2 /hpf    Epithelial Cells Occasional None Seen, Occasional /hpf    Bacteria, UA Occasional None Seen, Occasional /hpf   Comprehensive metabolic panel    Collection Time: 05/29/23  7:01 AM   Result Value Ref Range    Sodium 138 135 - 147 mmol/L    Potassium 3 5 3 5 - 5 3 mmol/L    Chloride 101 96 - 108 mmol/L " CO2 28 21 - 32 mmol/L    ANION GAP 9 4 - 13 mmol/L    BUN 8 5 - 25 mg/dL    Creatinine 0 51 (L) 0 60 - 1 30 mg/dL    Glucose 110 65 - 140 mg/dL    Calcium 8 3 (L) 8 4 - 10 2 mg/dL    Corrected Calcium 8 8 8 3 - 10 1 mg/dL    AST 22 13 - 39 U/L    ALT 15 7 - 52 U/L    Alkaline Phosphatase 159 (H) 34 - 104 U/L    Total Protein 6 0 (L) 6 4 - 8 4 g/dL    Albumin 3 4 (L) 3 5 - 5 0 g/dL    Total Bilirubin 1 23 (H) 0 20 - 1 00 mg/dL    eGFR 103 ml/min/1 73sq m   Magnesium    Collection Time: 05/29/23  7:01 AM   Result Value Ref Range    Magnesium 1 3 (L) 1 9 - 2 7 mg/dL   CBC and differential    Collection Time: 05/29/23  7:01 AM   Result Value Ref Range    WBC 7 69 4 31 - 10 16 Thousand/uL    RBC 3 23 (L) 3 81 - 5 12 Million/uL    Hemoglobin 11 4 (L) 11 5 - 15 4 g/dL    Hematocrit 33 8 (L) 34 8 - 46 1 %     (H) 82 - 98 fL    MCH 35 3 (H) 26 8 - 34 3 pg    MCHC 33 7 31 4 - 37 4 g/dL    RDW 12 2 11 6 - 15 1 %    MPV 9 0 8 9 - 12 7 fL    Platelets 477 684 - 374 Thousands/uL    nRBC 0 /100 WBCs    Neutrophils Relative 74 43 - 75 %    Immat GRANS % 0 0 - 2 %    Lymphocytes Relative 13 (L) 14 - 44 %    Monocytes Relative 12 4 - 12 %    Eosinophils Relative 1 0 - 6 %    Basophils Relative 0 0 - 1 %    Neutrophils Absolute 5 68 1 85 - 7 62 Thousands/µL    Immature Grans Absolute 0 03 0 00 - 0 20 Thousand/uL    Lymphocytes Absolute 0 96 0 60 - 4 47 Thousands/µL    Monocytes Absolute 0 91 0 17 - 1 22 Thousand/µL    Eosinophils Absolute 0 08 0 00 - 0 61 Thousand/µL    Basophils Absolute 0 03 0 00 - 0 10 Thousands/µL   Procalcitonin    Collection Time: 05/29/23  7:01 AM   Result Value Ref Range    Procalcitonin 0 41 (H) <=0 25 ng/ml         MRI brain w wo contrast    Result Date: 5/29/2023  Narrative: MRI BRAIN WITH AND WITHOUT CONTRAST INDICATION: blurred vision  COMPARISON: Brain MRI 5/11/2023 TECHNIQUE: Multiplanar, multisequence imaging of the brain was performed before and after gadolinium administration   IV Contrast: 7 mL of Gadobutrol injection (SINGLE-DOSE) IMAGE QUALITY:   Diagnostic  FINDINGS: BRAIN PARENCHYMA: Redemonstrated numerous enhancing lesions with intralesional hemorrhage involving bilateral cerebral and right cerebellar hemispheres  The largest representative is centered in the superior right hippocampal  body measuring 1 x 0 9 cm (series 11 image 62), previously measured 0 2 cm  There is also 0 5 cm similar appearing lesion along the ependymal surface of anterior horn of right lateral ventricle (series 11 image 79), previously measured 0 2 cm  There is 0 8 cm hemorrhagic lesion in the posterior left temporal lobe without enhancement (series 5 image 11), not conspicuous in prior exam  There is mild perilesional edema without mass effect  There is punctuate focus of increased diffusion signal in the left corona radiata without obvious low signal in ADC map and no associated enhancement (series 3 image 20)  Nonspecific  foci of T2/FLAIR hyperintensities involving periventricular and subcortical white matter, most compatible with mild microangiopathic change  VENTRICLES:  Normal for the patient's age  SELLA AND PITUITARY GLAND:  Normal  ORBITS: Prior bilateral lens replacement  PARANASAL SINUSES:  Normal  VASCULATURE:  Evaluation of the major intracranial vasculature demonstrates appropriate flow voids  CALVARIUM AND SKULL BASE:  Normal  EXTRACRANIAL SOFT TISSUES:  Normal      Impression: 1  Punctate focus of mild increased diffusion signal in the left corona radiata without obvious low signal on ADC map and no enhancement could indicate  tiny subacute infarct in right clinical setting  2   Interval worsening of intracranial hemorrhagic metastatic lesions  Associated mild perilesional vasogenic edema without mass effect   Workstation performed: [id number]     CTA ED chest PE Study    Result Date: 5/29/2023  Narrative: CTA - CHEST WITH IV CONTRAST - PULMONARY ANGIOGRAM INDICATION:   hx of metastatic melanoma with new and worsening sob  COMPARISON: None  TECHNIQUE: CTA examination of the chest was performed using angiographic technique according to a protocol specifically tailored to evaluate for pulmonary embolism  Multiplanar 2D reformatted images were created from the source data  In addition, coronal 3D MIP postprocessing was performed on the acquisition scanner  Radiation dose length product (DLP) for this visit:  277 mGy-cm   This examination, like all CT scans performed in the [facility], was performed utilizing techniques to minimize radiation dose exposure, including the use of iterative reconstruction and automated exposure control  IV Contrast:  60 mL of iohexol (OMNIPAQUE) FINDINGS: PULMONARY ARTERIAL TREE:  No pulmonary embolus is seen  LUNGS: Diffuse tree-in-bud nodularity  There is no tracheal or endobronchial lesion  PLEURA: Trace right pleural effusion  Aleyda Lopez HEART/GREAT VESSELS:  Unremarkable for patient's age  No thoracic aortic aneurysm  MEDIASTINUM AND ARMINDA: Mediastinal and bilateral hilar adenopathy  For instance there is an 11 mm subcarinal lymph node    CHEST WALL AND LOWER NECK:   Unremarkable  VISUALIZED STRUCTURES IN THE UPPER ABDOMEN:  Unremarkable  OSSEOUS STRUCTURES:  No acute fracture or destructive osseous lesion  Impression: Diffuse tree-in-bud nodularity which may represent an infectious bronchiolitis such as with an atypical mycobacterial infection  Workstation performed: [id number]     MRI brain w wo contrast    Result Date: 5/16/2023  Narrative: MRI BRAIN WITH AND WITHOUT CONTRAST INDICATION: C43 71: Malignant melanoma of right lower limb, including hip    melanoma staging COMPARISON: Nuclear medicine PET/CT 4/27/2023  MRI cervical spine with and without contrast 3/28/2023  MRI brain an MRA head without contrast 11/7/2013  TECHNIQUE: Multiplanar, multisequence imaging of the brain was performed before and after gadolinium administration   Imaging performed on 3 0T MRI IV Contrast:  7 mL of Gadobutrol injection (SINGLE-DOSE) IMAGE QUALITY:   Diagnostic  FINDINGS: BRAIN PARENCHYMA: 0 3 cm right frontal lobe enhancing hemorrhagic lesion with mild perilesional vasogenic edema (10:238), suspicious for hemorrhagic brain metastasis  Tiny enhancing hemorrhagic lesions in left superior temporal, right caudate, right medial occipital lobes, and right right posterior thalamocapsular junction (10:161, 158, 139, 132), suspicious for hemorrhagic brain metastasis  Tiny enhancing lesion in right cerebellum (10:81), suspicious for nonhemorrhagic brain metastasis  No mass effect or midline shift  Chronic microhemorrhage in right temporal lobe  Diffusion imaging is unremarkable  No acute infarction  Tiny right frontal and left parietal developmental venous anomalies  Small scattered hyperintensities on T2/FLAIR imaging are noted in the periventricular and subcortical white matter demonstrating an appearance that is statistically most likely to represent mild microangiopathic change  VENTRICLES:  Normal for the patient's age  SELLA AND PITUITARY GLAND:  Normal  ORBITS: Sequela of bilateral cataract surgery  PARANASAL SINUSES:  Normal  VASCULATURE:  Evaluation of the major intracranial vasculature demonstrates appropriate flow voids  CALVARIUM AND SKULL BASE:  Normal  MASTOIDS: Trace left mastoid effusion  EXTRACRANIAL SOFT TISSUES:  Normal      Impression: Findings suspicious for tiny hemorrhagic brain metastasis in right frontal lobe, left superior temporal lobe, right caudate, right medial occipital lobes, and right posterior thalamocapsular junction with tiny nonhemorrhagic metastasis in right cerebellum  Mild perilesional vasogenic edema in right frontal lobe  No acute intracranial abnormality  Mild chronic microangiopathy  The study was marked in [facility] [place] [facility] for immediate notification  Workstation performed: [id number]       Assessment: Metastatic melanoma    Progressive hemorrhagic multiple brain metastasis  Left homonymous hemianopia  Plan: A 59-year-old female with history as described above  He has new vision symptoms with left homonymous hemianopia  Exact etiology of this is not totally clear even with the MRI finding  Differential diagnosis include possible stroke including infectious embolic stroke, progression of brain metastasis as well as immune mediated side effect from pembrolizumab  She clearly has progression of hemorrhagic brain metastasis  I recommend radiation oncology consultation

## 2023-05-29 NOTE — ASSESSMENT & PLAN NOTE
"POA: Patient reports inability to see the left side of her image  She notes that it appears patchy  On my evaluation there were no overt visual field deficits, however patient stated that everything seems \"gray\" on the left  I suspect this may be in the setting of possible disease progression, however even more likely this may be due to Pembrolizumab side effect, particularly given sudden onset after most recent infusion  Plan:   Consult neurology - recommendations appreciated  Consider heme-onc consult  Continue monitoring     "

## 2023-05-29 NOTE — H&P
"[facility]  H&P  Name: Ritika Ordaz 59 y o  female I MRN: [id number]  Unit/Bed#: S -01 I Date of Admission: 5/28/2023   Date of Service: 5/29/2023 I Hospital Day: 0      Assessment/Plan   * Left homonymous hemianopsia  Assessment & Plan  POA: Patient reports inability to see the left side of her image  She notes that it appears patchy  On my evaluation there were no overt visual field deficits, however patient stated that everything seems \"gray\" on the left  I suspect this may be in the setting of possible disease progression, however even more likely this may be due to Pembrolizumab side effect, particularly given sudden onset after most recent infusion  Plan:   Consult neurology - recommendations appreciated  Consider heme-onc consult  Continue monitoring  SIRS (systemic inflammatory response syndrome) (HCC)  Assessment & Plan  Met SIRS criteria on admission with heart rate of 100 and respiratory rate of 20  Presented with low-grade fever of 100 1, however endorses fever at home of 100 8  Reports cough acutely worsened after her second Pembrolizumab treatment  Given tree in bud nodularity noted on imaging, mildly elevated procalcitonin, low grade fever and elevated WBC, will initiate treatment for possible pneumonia  Suspect patient also has worsening pneumonitis in the setting of Pembrolizumab use  Plan: Will start Rocephin and Azithromycin  Strep pneumo and legionella antibodies ordered  Trend procalcitonin - if negative x 2 consider discontinuing Abx  Monitor VS and WBC  Hypokalemia  Assessment & Plan  Recent Labs     05/28/23  2252   K 3 2*     POA: K 2 9, treated with 20mEq KCl IV and 40 mEq PO of KCl  Suspect this is likely in the setting of poor PO intake  Per chart review patient has a long history of chronic diarrhea with hypokalemia, however she notes that this has resolved since she was started on keytruda  Plan:  Cont   " Monitoring K  Replace as needed  Will check Mg with morning labs  Metastatic melanoma [facility])  Assessment & Plan  Diagnosed with Stage III acral melanoma in march 2023  Follows up with Dr Monika Muñoz, recently started on Pembrolizumab  Completed 2nd cycle of Pembrolizumab on 5/25 after which SoB and cough worsened significantly and she endorsed left sided hemianopsia the day following infusion  Suspect symptoms at presentation are likely in the setting of medication side effect  Patient reports that she had appointment coming up with radiation oncology to discuss management of intracranial lesions found on imaging recently concerning for metastatic disease  Consider heme-onc consult  Hypertension  Assessment & Plan  Continue PTA Atenolol 50mg daily, HCTZ 12 5mg daily and Amlodipine 5mg daily  Patient notes that her Amlodipine was recently increased, but she has continued taking 5mg daily  BP acceptable on arrival to the ED so will continue this regimen for now  Continue monitoring BP  Tobacco abuse  Assessment & Plan  Patient reports quitting once diagnosed with melanoma  Will hold off on ordering NRT  Mild intermittent asthma  Assessment & Plan  PTA on Albuterol rescue inhaler  Continue PTA Albuterol 1 puff q6h PRN        VTE Pharmacologic Prophylaxis: VTE Score: 5 High Risk (Score >/= 5) - Pharmacological DVT Prophylaxis Ordered: enoxaparin (Lovenox)  Sequential Compression Devices Ordered  Code Status: Level 1 - Full Code   Discussion with family: Patient declined call to   Anticipated Length of Stay: Patient will be admitted on an observation basis with an anticipated length of stay of less than 2 midnights secondary to vision changes      Chief Complaint: vision change    History of Present Illness:  Marcus Mesa is a 59 y o  female with a PMH of hypertension, tobacco use, asthma, metastatic melanoma diagnosed in March 2023 on [facility] who presents with new vision changes  Patient reports inability to see on the left side of her visual field that this started the morning after her second [address] treatment, which was on 5/25  She states that she woke up with left-sided hemianopsia  She also reports several weeks of nonproductive cough which she was told is a common side effect of her current treatment  She states that after her most recent [address] treatment she also started developing progressively worsening shortness of breath  The day of ED presentation she endorses a temperature of 100 8 at home  Denies congestion, rhinorrhea, sick contacts or recent travel  She does have a history of asthma for which she has a rescue inhaler  She also notes increasing postnasal drip which she attributes to allergies  In the ED, patient met SIRS criteria with tachycardia and increased respiratory rate, has a low-grade fever on arrival   Labs demonstrated mildly elevated WBC as well as elevated procalcitonin  She is also noted to have hypokalemia  MRI brain performed in the ED is concerning for disease progression and a CTA of the chest demonstrates diffuse tree-in-bud nodularity  Given the aforementioned findings, patient will be admitted to the [facility] service for further management  Review of Systems:  Review of Systems   Constitutional: Positive for fatigue and fever  Negative for activity change, appetite change and unexpected weight change  HENT: Negative for congestion, postnasal drip, rhinorrhea, sinus pressure, sore throat and trouble swallowing  Eyes: Negative for photophobia and visual disturbance  Respiratory: Positive for cough and shortness of breath  Negative for chest tightness and wheezing  Cardiovascular: Negative for chest pain, palpitations and leg swelling  Gastrointestinal: Negative for abdominal distention, abdominal pain, constipation, diarrhea, nausea and vomiting  Endocrine: Positive for polyuria  Negative for polydipsia  Genitourinary: Positive for enuresis and frequency  Negative for difficulty urinating, dysuria and hematuria  Musculoskeletal: Negative for arthralgias, back pain and myalgias  Skin: Negative for color change, pallor and rash  Neurological: Negative for dizziness, facial asymmetry, weakness, light-headedness, numbness and headaches  Hematological: Positive for adenopathy  Psychiatric/Behavioral: Negative for agitation, behavioral problems, confusion and dysphoric mood  All other systems reviewed and are negative  Past Medical and Surgical History:   Past Medical History:   Diagnosis Date   • Cervical disc disorder    • Melanoma ([address] )    • Stenosis of cervical spine region        Past Surgical History:   Procedure Laterality Date   • BREAST BIOPSY Left 2009    core   • BREAST BIOPSY Right 2009    core   • CERVICAL FUSION  2013    cC3-C4, C4-C5, C5 C6, C6-C7, C7-T1   •  SECTION     • ELBOW SURGERY     • KNEE SURGERY Bilateral 20YRS AGO       Meds/Allergies:  Prior to Admission medications    Medication Sig Start Date End Date Taking?  Authorizing Provider   albuterol (PROVENTIL HFA,VENTOLIN HFA) 90 mcg/act inhaler INHALE 1 PUFF EVERY 4 HOURS AS NEEDED FOR WHEEZING 23   Katelyn Wylie DO   amLODIPine (NORVASC) 5 mg tablet Take 1 tablet (5 mg total) by mouth daily 22   Katelyn Wylie DO   atenolol (TENORMIN) 50 mg tablet TAKE 1 TABLET BY MOUTH EVERY DAY 23   Katelyn Wylie DO   escitalopram (LEXAPRO) 10 mg tablet TAKE 1 TABLET BY MOUTH EVERY DAY 23   Katelyn Wylie DO   famotidine (PEPCID) 20 mg tablet TAKE 1 TABLET BY MOUTH TWICE A DAY 23   Katelyn Wylie DO   gentamicin (GARAMYCIN) 0 1 % cream Apply topically 3 (three) times a day 5/10/23   Ailyn Zendejas DPM   hydrochlorothiazide (MICROZIDE) 12 5 mg capsule Take 12 5 mg by mouth daily    Historical Provider, MD     I have reviewed home medications with patient personally  Allergies: Allergies   Allergen Reactions   • Methocarbamol Shortness Of Breath and Swelling   • Tramadol Shortness Of Breath and Swelling       Social History:  Marital Status: Single   Patient Pre-hospital Living Situation: Home  Patient Pre-hospital Level of Mobility: walks  Patient Pre-hospital Diet Restrictions: None  Substance Use History:   Social History     Substance and Sexual Activity   Alcohol Use Yes    Comment: SOCIAL     Social History     Tobacco Use   Smoking Status Every Day   • Packs/day: 0 02   • Types: Cigarettes   • Start date: 0   Smokeless Tobacco Never   Tobacco Comments    4 a day     Social History     Substance and Sexual Activity   Drug Use Not Currently       Family History:  Family History   Problem Relation Age of Onset   • Cancer Mother    • Brain cancer Mother [de-identified]   • Heart disease Father    • Breast cancer Sister 48   • Breast cancer Sister 45   • Breast cancer Maternal Aunt 61   • Breast cancer Maternal Aunt 60   • Breast cancer Maternal Aunt 61   • Breast cancer Cousin 50   • Breast cancer Cousin 52   • Breast cancer Cousin 52   • Prostate cancer Maternal Uncle 54   • No Known Problems Son    • No Known Problems Maternal Grandmother    • No Known Problems Maternal Grandfather    • No Known Problems Paternal Grandmother    • No Known Problems Paternal Grandfather    • BRCA1 Negative Sister    • BRCA2 Negative Sister    • No Known Problems Sister    • No Known Problems Maternal Aunt    • No Known Problems Maternal Aunt    • No Known Problems Maternal Aunt    • No Known Problems Maternal Aunt        Physical Exam:     Vitals:   Blood Pressure: 124/74 (05/29/23 0432)  Pulse: 68 (05/29/23 0603)  Temperature: 100 1 °F (37 8 °C) (05/28/23 2235)  Temp Source: Oral (05/28/23 2235)  Respirations: 16 (05/29/23 0603)  Weight - Scale: 70 8 kg (156 lb) (05/29/23 0039)  SpO2: 97 % (05/29/23 0603)    Physical Exam  Vitals and nursing note reviewed     Constitutional: General: She is not in acute distress  Appearance: Normal appearance  She is not ill-appearing, toxic-appearing or diaphoretic  HENT:      Head: Normocephalic and atraumatic  Nose: Nose normal       Mouth/Throat:      Mouth: Mucous membranes are moist       Pharynx: Oropharynx is clear  Eyes:      General: No visual field deficit or scleral icterus  Right eye: No discharge  Left eye: No discharge  Extraocular Movements: Extraocular movements intact  Conjunctiva/sclera: Conjunctivae normal    Cardiovascular:      Rate and Rhythm: Normal rate and regular rhythm  Pulses: Normal pulses  Heart sounds: Normal heart sounds  No murmur heard  Pulmonary:      Effort: Pulmonary effort is normal  No respiratory distress  Breath sounds: Rhonchi (bilateral) present  No wheezing or rales  Abdominal:      General: Abdomen is flat  Bowel sounds are normal  There is no distension  Palpations: Abdomen is soft  Tenderness: There is no abdominal tenderness  There is no guarding or rebound  Musculoskeletal:      Cervical back: Normal range of motion and neck supple  Right lower leg: No edema  Left lower leg: No edema  Lymphadenopathy:      Lower Body: Right inguinal adenopathy present  Skin:     General: Skin is warm and dry  Coloration: Skin is not jaundiced or pale  Neurological:      Mental Status: She is alert and oriented to person, place, and time  Mental status is at baseline  GCS: GCS eye subscore is 4  GCS verbal subscore is 5  GCS motor subscore is 6  Cranial Nerves: No dysarthria or facial asymmetry  Gait: Gait is intact  Psychiatric:         Mood and Affect: Mood normal          Behavior: Behavior normal          Thought Content:  Thought content normal          Judgment: Judgment normal           Additional Data:     Lab Results:  Results from last 7 days   Lab Units 05/28/23  2252   EOS PCT % 0   HEMATOCRIT % 36 3 HEMOGLOBIN g/dL 12 5   LYMPHS PCT % 9*   MONOS PCT % 9   NEUTROS PCT % 82*   PLATELETS Thousands/uL 206   WBC Thousand/uL 11 07*     Results from last 7 days   Lab Units 05/28/23  2252   ANION GAP mmol/L 11   ALBUMIN g/dL 3 7   ALK PHOS U/L 189*   ALT U/L 18   AST U/L 34   BUN mg/dL 9   CALCIUM mg/dL 8 9   CHLORIDE mmol/L 97   CO2 mmol/L 27   CREATININE mg/dL 0 58*   GLUCOSE RANDOM mg/dL 115   POTASSIUM mmol/L 3 2*   SODIUM mmol/L 135   TOTAL BILIRUBIN mg/dL 1 03*     Results from last 7 days   Lab Units 05/28/23  2253   INR  1 25*             Results from last 7 days   Lab Units 05/29/23  0036 05/28/23  2252   LACTIC ACID mmol/L 1 1  --    PROCALCITONIN ng/ml  --  0 36*       Lines/Drains:  Invasive Devices     Peripheral Intravenous Line  Duration           Peripheral IV 05/28/23 Left Antecubital <1 day                    Imaging: Reviewed radiology reports from this admission including: chest xray, MRI brain and CTA chest PE  MRI brain w wo contrast   ED Interpretation by Adriana Gordon MD (05/29 3803)   [facility] PRELIMINARY REPORT:    COMPARISON:  MRI BRAIN W WO CONTRAST 5/11/2023 11:38 AM  FINDINGS:  Exam is degraded by motion artifact  Brain: No evidence of diffusion restriction to suspect acute infarct  Again demonstrated are bilateral  scattered small foci intrinsic T1 hyperintense signal with mild enhancement, susceptibility with  surrounding T2 hyperintense halo, measuring less than 1 cm, mildly increased, for example mesial  right occipital lobe or right cerebral peduncle, and increased in number, for example along the right  cerebellar hemisphere, from comparison exam and most consistent with metastatic foci  No  hemorrhage  No significant white matter disease  No edema  Cerebral ventricles: Normal  No ventriculomegaly  Bones/joints: Unremarkable  Paranasal sinuses: Normal as visualized  No acute sinusitis  Mastoid air cells: Left effusion    Orbital cavities: Lenses of the globes have been replaced  Soft tissues: Unremarkable  IMPRESSION:  No acute findings  Constelation    of findings are most compatible with metastatic disease progression  Thank you for allowing us to participate in the care of your patient  Dictated and Authenticated by: Zhen Sylvester MD  05/29/2023 3:22 AM June Desai Time (Dmit[address] Root [id number])      CTA ED chest PE Study   Final Result by Hedy Grier MD (05/29 4003)      Diffuse tree-in-bud nodularity which may represent an infectious bronchiolitis such as with an atypical mycobacterial infection  Workstation performed: [id number]         XR chest 1 view portable    (Results Pending)       EKG and Other Studies Reviewed on Admission:   · EKG: NSR  HR 89     ** Please Note: This note has been constructed using a voice recognition system   **

## 2023-05-29 NOTE — ASSESSMENT & PLAN NOTE
Diagnosed with Stage III acral melanoma in march 2023  Follows up with Dr Bay Friedman, recently started on Pembrolizumab  Completed 2nd cycle of Pembrolizumab on 5/25 after which SoB and cough worsened significantly and she endorsed left sided hemianopsia the day following infusion  Suspect symptoms at presentation are likely in the setting of medication side effect  Patient reports that she had appointment coming up with radiation oncology to discuss management of intracranial lesions found on imaging recently concerning for metastatic disease  Consider heme-onc consult

## 2023-05-29 NOTE — CONSULTS
"Consultation - Pulmonary Medicine   Lindsay Farris 59 y o  female MRN: [id number]  Unit/Bed#: S -01 Encounter: [id number]      Assessment/Plan:    Abnormal CT chest with diffuse tree-in-bud nodular opacities and elevated procalcitonin in an immunocompromised patient   Will continue with ceftriaxone given elevated procalcitonin and sputum production  Will complete at least a 5-day course  She is able to expectorate some mucus  Will send sputum for bacterial culture, as well as AFB x3  I did discuss the abnormal imaging and noted concern for MAC  We will discontinue azithromycin  Repeat imaging in 6 to 8 weeks  Blurred/double vision   Management per primary team and neurology  Mild persistent asthma without acute exacerbation   Will add Xopenex 3 times daily while inpatient  Home regimen is albuterol as needed  Will add ICS/LABA with albuterol as needed at discharge  Would benefit from outpatient pulmonary follow-up and complete PFTs given tobacco abuse history  Metastatic melanoma   Management per oncology and also follows with podiatry for her foot wound  Nicotine dependence in remission   Quit in March 2023  Continued complete cessation  Outpatient follow-up pending course  Discussed with primary team     History of Present Illness   Physician Requesting Consult: Alcon Medel DO  Reason for Consult / Principal Problem: SIRS, bronchiolitis, MAC  Hx and PE limited by: None  Chief Complaint: \"I was having eye pain  \"  HPI: Lindsay Farris is a 59 y o   female who presented to [facility] with complaints of eye pain and blurred/double vision  Cirilo Ely was diagnosed with melanoma in the beginning of April after having a wound on her foot that she thought was a plantar wart  It worsened and biopsy on April 6 showed melanoma  She underwent PET scan and was found to have PET avid pelvic lymph nodes on the right side    Biopsy of the lymph node confirmed " metastatic melanoma on 2023  She had an MRI of the brain on May 11 confirming metastatic lesions to the brain  She was started on Keytruda and had 2 infusions  Over a few days prior to the admission, she was having blurry/double vision and was having pain and ultimately went to the ER for evaluation  She also had an associated fever  She did note a cough with increased sputum production over the last few weeks  She also has chronic dyspnea in the setting of asthma and uses an albuterol inhaler at least a few times every day  She is not on maintenance therapy  She quit smoking in March  Aside from the above, no other complaints  She has not had any known sick contacts  No diarrhea or sore throat  Inpatient consult to Pulmonology  Consult performed by: PAVEL Kohler  Consult ordered by: Dale Ibrahim MD        Review of Systems   All other systems reviewed and are negative  A full 12-point review of systems was completed and is negative except for those outlined in the HPI      Historical Information   Past Medical History:   Diagnosis Date   • Cervical disc disorder    • Melanoma ([facility] [address] )    • Stenosis of cervical spine region      Past Surgical History:   Procedure Laterality Date   • BREAST BIOPSY Left     core   • BREAST BIOPSY Right     core   • CERVICAL FUSION  2013    cC3-C4, C4-C5, C5 C6, C6-C7, C7-T1   •  SECTION     • ELBOW SURGERY     • KNEE SURGERY Bilateral 20YRS AGO     Social History   Social History     Substance and Sexual Activity   Alcohol Use Yes    Comment: SOCIAL     Social History     Substance and Sexual Activity   Drug Use Not Currently     Social History     Tobacco Use   Smoking Status Former   • Packs/day: 0 02   • Types: Cigarettes   • Start date: 0   • Quit date: 3/1/2023   • Years since quittin 2   Smokeless Tobacco Never   Tobacco Comments    4 a day     E-Cigarette/Vaping   • E-Cigarette Use Never User E-Cigarette/Vaping Substances   • Nicotine No    • THC No    • CBD No    • Flavoring No    • Other No    • Unknown No      Occupational History: Recently retired  Was working with [employer]      Family History:   Family History   Problem Relation Age of Onset   • Cancer Mother    • Brain cancer Mother [de-identified]   • Heart disease Father    • Breast cancer Sister 48   • Breast cancer Sister 45   • Breast cancer Maternal Aunt 61   • Breast cancer Maternal Aunt 60   • Breast cancer Maternal Aunt 60   • Breast cancer Cousin 50   • Breast cancer Cousin 52   • Breast cancer Cousin 52   • Prostate cancer Maternal Uncle 55   • No Known Problems Son    • No Known Problems Maternal Grandmother    • No Known Problems Maternal Grandfather    • No Known Problems Paternal Grandmother    • No Known Problems Paternal Grandfather    • BRCA1 Negative Sister    • BRCA2 Negative Sister    • No Known Problems Sister    • No Known Problems Maternal Aunt    • No Known Problems Maternal Aunt    • No Known Problems Maternal Aunt    • No Known Problems Maternal Aunt        Meds/Allergies   all current active meds have been reviewed, pertinent pulmonary meds have been reviewed, current meds:   Current Facility-Administered Medications   Medication Dose Route Frequency   • acetaminophen (TYLENOL) tablet 650 mg  650 mg Oral Q6H PRN   • albuterol (PROVENTIL HFA,VENTOLIN HFA) inhaler 1 puff  1 puff Inhalation Q6H PRN   • amLODIPine (NORVASC) tablet 5 mg  5 mg Oral Daily   • atenolol (TENORMIN) tablet 50 mg  50 mg Oral Daily   • [START ON 5/30/2023] ceftriaxone (ROCEPHIN) 1 g/50 mL in dextrose IVPB  1,000 mg Intravenous Q24H   • enoxaparin (LOVENOX) subcutaneous injection 40 mg  40 mg Subcutaneous Daily   • escitalopram (LEXAPRO) tablet 10 mg  10 mg Oral Daily   • famotidine (PEPCID) tablet 20 mg  20 mg Oral BID   • guaiFENesin (MUCINEX) 12 hr tablet 600 mg  600 mg Oral BID   • hydrochlorothiazide (HYDRODIURIL) tablet 12 5 mg  12 5 mg Oral Daily   • levalbuterol (XOPENEX) inhalation solution 1 25 mg  1 25 mg Nebulization TID    and PTA meds:   Prior to Admission Medications   Prescriptions Last Dose Informant Patient Reported? Taking? albuterol (PROVENTIL HFA,VENTOLIN HFA) 90 mcg/act inhaler 5/29/2023 Self No Yes   Sig: INHALE 1 PUFF EVERY 4 HOURS AS NEEDED FOR WHEEZING   amLODIPine (NORVASC) 5 mg tablet 5/29/2023 Self No Yes   Sig: Take 1 tablet (5 mg total) by mouth daily   atenolol (TENORMIN) 50 mg tablet 5/29/2023  No Yes   Sig: TAKE 1 TABLET BY MOUTH EVERY DAY   escitalopram (LEXAPRO) 10 mg tablet   No No   Sig: TAKE 1 TABLET BY MOUTH EVERY DAY   famotidine (PEPCID) 20 mg tablet  Self No No   Sig: TAKE 1 TABLET BY MOUTH TWICE A DAY   gentamicin (GARAMYCIN) 0 1 % cream   No No   Sig: Apply topically 3 (three) times a day   hydrochlorothiazide (MICROZIDE) 12 5 mg capsule 5/29/2023 Self Yes Yes   Sig: Take 12 5 mg by mouth daily      Facility-Administered Medications: None       Allergies   Allergen Reactions   • Methocarbamol Shortness Of Breath and Swelling   • Tramadol Shortness Of Breath and Swelling       Objective   Vitals: Blood pressure 107/77, pulse 78, temperature 98 6 °F (37 °C), resp  rate 18, weight 70 8 kg (156 lb), SpO2 94 %, not currently breastfeeding  RA,Body mass index is 24 43 kg/m²  Intake/Output Summary (Last 24 hours) at 5/29/2023 1422  Last data filed at 5/29/2023 0940  Gross per 24 hour   Intake --   Output 100 ml   Net -100 ml     Invasive Devices     Peripheral Intravenous Line  Duration           Peripheral IV 05/28/23 Left Antecubital <1 day                Physical Exam  Vitals reviewed  Constitutional:       General: She is not in acute distress  Appearance: She is well-developed  She is not toxic-appearing or diaphoretic  HENT:      Head: Normocephalic and atraumatic  Eyes:      General: No scleral icterus  Neck:      Trachea: No tracheal deviation     Cardiovascular:      Rate and Rhythm: Normal rate and regular rhythm  Heart sounds: S1 normal and S2 normal  No murmur heard  No friction rub  No gallop  Pulmonary:      Effort: Pulmonary effort is normal  No tachypnea, accessory muscle usage or respiratory distress  Breath sounds: Normal breath sounds  No stridor  No decreased breath sounds, wheezing, rhonchi or rales  Chest:      Chest wall: No tenderness  Abdominal:      General: Bowel sounds are normal  There is no distension  Palpations: Abdomen is soft  Tenderness: There is no abdominal tenderness  Musculoskeletal:      Cervical back: Neck supple  Right lower leg: No edema  Left lower leg: No edema  Skin:     General: Skin is warm and dry  Findings: No rash  Comments: Right foot dressing intact  Neurological:      Mental Status: She is alert and oriented to person, place, and time  GCS: GCS eye subscore is 4  GCS verbal subscore is 5  GCS motor subscore is 6  Psychiatric:         Speech: Speech normal          Behavior: Behavior normal  Behavior is cooperative  Lab Results:   CBC:   Lab Results   Component Value Date    HCT 33 8 (L) 05/29/2023    HGB 11 4 (L) 05/29/2023    MCH 35 3 (H) 05/29/2023    MCHC 33 7 05/29/2023     (H) 05/29/2023    MPV 9 0 05/29/2023    NRBC 0 05/29/2023     05/29/2023    RBC 3 23 (L) 05/29/2023    RDW 12 2 05/29/2023    WBC 7 69 05/29/2023   , CMP:   Lab Results   Component Value Date    ALKPHOS 159 (H) 05/29/2023    ALT 15 05/29/2023    AST 22 05/29/2023    BUN 8 05/29/2023    CALCIUM 8 3 (L) 05/29/2023     05/29/2023    CO2 28 05/29/2023    CREATININE 0 51 (L) 05/29/2023    EGFR 103 05/29/2023    K 3 5 05/29/2023    SODIUM 138 05/29/2023     Procalcitonin: 0 41, 0 36    Flu/COVID/RSV PCR: Negative    Culture Data: Blood cutlures x 2 pending    Urinary antigens: Strep and legionella negative    Imaging Studies: I have personally reviewed pertinent reports     and I have personally reviewed pertinent "films in PACS   CTA chest shows diffuse tree-in-bud nodularity  No PE     EKG, Pathology, and Other Studies: I have personally reviewed pertinent reports  Stress echo done February 28, 2023 showed EF 60% with normal RV size and function  Negative for ischemic changes  Pulmonary Results (PFTs, PSG): None    VTE Prophylaxis: Sequential compression device (Venodyne)  and Enoxaparin (Lovenox)    Code Status: Level 1 - Full Code    None    Portions of the record may have been created with voice recognition software  Occasional wrong word or \"sound a like\" substitutions may have occurred due to the inherent limitations of voice recognition software  Read the chart carefully and recognize, using context, where substitutions have occurred    "

## 2023-05-29 NOTE — ED PROVIDER NOTES
History  Chief Complaint   Patient presents with   • Blurred Vision     Patient presents for blurred vision  Is on Kaytruda for Melanoma  Patient began experiencing trouble with vision starting yesterday as well as SOB  HPI 59-year-old female with history of metastatic melanoma presents to the emergency department with new onset bilateral left-sided vision loss x4 days  States that she just started [place] and had her second treatment 4 days ago  She found on Google that vision disturbances can be a side effect and so she is wondering if it is this medication  Nonpainful vision loss  No floaters or flashes of light  She does endorse some photophobia  She has felt more fatigued and worn down the last couple of weeks  She has a headache that started today, denies any fall or head trauma  She had a fever to 100 8 at home prior to arrival, here she is 100 1  She has not taken any Tylenol or Motrin  She has had a dry cough for couple of days, its been difficult for her to take a deep breath or climb stairs  She has no prior history of DVT or PE  No hemoptysis  She does endorse some chest tightness  Her albuterol is not helping  Prior to Admission Medications   Prescriptions Last Dose Informant Patient Reported? Taking?    albuterol (PROVENTIL HFA,VENTOLIN HFA) 90 mcg/act inhaler 5/29/2023 Self No Yes   Sig: INHALE 1 PUFF EVERY 4 HOURS AS NEEDED FOR WHEEZING   amLODIPine (NORVASC) 5 mg tablet 5/29/2023 Self No Yes   Sig: Take 1 tablet (5 mg total) by mouth daily   atenolol (TENORMIN) 50 mg tablet 5/29/2023  No Yes   Sig: TAKE 1 TABLET BY MOUTH EVERY DAY   escitalopram (LEXAPRO) 10 mg tablet   No No   Sig: TAKE 1 TABLET BY MOUTH EVERY DAY   famotidine (PEPCID) 20 mg tablet  Self No No   Sig: TAKE 1 TABLET BY MOUTH TWICE A DAY   gentamicin (GARAMYCIN) 0 1 % cream   No No   Sig: Apply topically 3 (three) times a day   hydrochlorothiazide (MICROZIDE) 12 5 mg capsule 5/29/2023 Self Yes Yes   Sig: Take 12 5 mg by mouth daily      Facility-Administered Medications: None       Past Medical History:   Diagnosis Date   • Cervical disc disorder    • Melanoma ([facility] [address] )    • Stenosis of cervical spine region        Past Surgical History:   Procedure Laterality Date   • BREAST BIOPSY Left 2009    core   • BREAST BIOPSY Right 2009    core   • CERVICAL FUSION  2013    cC3-C4, C4-C5, C5 C6, C6-C7, C7-T1   •  SECTION     • ELBOW SURGERY     • KNEE SURGERY Bilateral 20YRS AGO       Family History   Problem Relation Age of Onset   • Cancer Mother    • Brain cancer Mother [de-identified]   • Heart disease Father    • Breast cancer Sister 48   • Breast cancer Sister 45   • Breast cancer Maternal Aunt 61   • Breast cancer Maternal Aunt 60   • Breast cancer Maternal Aunt 60   • Breast cancer Cousin 50   • Breast cancer Cousin 52   • Breast cancer Cousin 52   • Prostate cancer Maternal Uncle 55   • No Known Problems Son    • No Known Problems Maternal Grandmother    • No Known Problems Maternal Grandfather    • No Known Problems Paternal Grandmother    • No Known Problems Paternal Grandfather    • BRCA1 Negative Sister    • BRCA2 Negative Sister    • No Known Problems Sister    • No Known Problems Maternal Aunt    • No Known Problems Maternal Aunt    • No Known Problems Maternal Aunt    • No Known Problems Maternal Aunt      I have reviewed and agree with the history as documented      E-Cigarette/Vaping   • E-Cigarette Use Never User      E-Cigarette/Vaping Substances   • Nicotine No    • THC No    • CBD No    • Flavoring No    • Other No    • Unknown No      Social History     Tobacco Use   • Smoking status: Former     Packs/day: 0 02     Types: Cigarettes     Start date: 0     Quit date: 3/1/2023     Years since quittin 2   • Smokeless tobacco: Never   • Tobacco comments:     4 a day   Vaping Use   • Vaping Use: Never used   Substance Use Topics   • Alcohol use: Yes     Comment: SOCIAL   • Drug use: Not Currently Review of Systems   Constitutional: Positive for fatigue and fever  Negative for chills  HENT: Negative for ear pain and sore throat  Eyes: Positive for photophobia and visual disturbance  Negative for pain  Respiratory: Positive for cough, chest tightness and shortness of breath  Cardiovascular: Negative for chest pain and leg swelling  Gastrointestinal: Negative for abdominal pain, blood in stool, constipation, diarrhea, nausea and vomiting  Genitourinary: Negative for dysuria and hematuria  Musculoskeletal: Negative for neck pain and neck stiffness  Neurological: Negative for dizziness, syncope, weakness and light-headedness  All other systems reviewed and are negative  Physical Exam  ED Triage Vitals   Temperature Pulse Respirations Blood Pressure SpO2   05/28/23 2235 05/28/23 2235 05/28/23 2235 05/28/23 2235 05/28/23 2235   100 1 °F (37 8 °C) 100 20 147/77 96 %      Temp Source Heart Rate Source Patient Position - Orthostatic VS BP Location FiO2 (%)   05/28/23 2235 05/28/23 2235 05/28/23 2235 05/28/23 2235 --   Oral Monitor Sitting Right arm       Pain Score       05/29/23 0633       No Pain             Orthostatic Vital Signs  Vitals:    05/29/23 0432 05/29/23 0603 05/29/23 0635 05/29/23 0733   BP: 124/74  136/89 107/77   Pulse: 70 68 85 78   Patient Position - Orthostatic VS: Lying          Physical Exam  Vitals and nursing note reviewed  Constitutional:       General: She is not in acute distress  Appearance: She is well-developed  She is not ill-appearing, toxic-appearing or diaphoretic  HENT:      Head: Normocephalic and atraumatic  Nose: Nose normal    Eyes:      General: Lids are normal  Visual field deficit present  Intraocular pressure: Right eye pressure is 10 mmHg  Left eye pressure is 9 mmHg  Extraocular Movements: Extraocular movements intact        Conjunctiva/sclera: Conjunctivae normal       Comments: Bilateral left sided hemianopsia   Visual acuity:   LEFT 20/30  RIGHT 2/100  BOTH 20/50   Cardiovascular:      Rate and Rhythm: Normal rate and regular rhythm  Heart sounds: Normal heart sounds  No murmur heard  Pulmonary:      Effort: Pulmonary effort is normal  No respiratory distress  Breath sounds: Rales (bilaterally at the bases) present  No wheezing or rhonchi  Abdominal:      General: There is no distension  Palpations: Abdomen is soft  Tenderness: There is no abdominal tenderness  There is no guarding or rebound  Musculoskeletal:         General: No swelling  Cervical back: Normal range of motion and neck supple  Right lower leg: No edema  Left lower leg: No edema  Skin:     General: Skin is warm and dry  Capillary Refill: Capillary refill takes less than 2 seconds  Neurological:      Mental Status: She is alert and oriented to person, place, and time  GCS: GCS eye subscore is 4  GCS verbal subscore is 5  GCS motor subscore is 6  Cranial Nerves: No dysarthria or facial asymmetry  Sensory: Sensation is intact  Motor: Motor function is intact  No weakness  Coordination: Coordination is intact   Coordination normal  Finger-Nose-Finger Test normal    Psychiatric:         Mood and Affect: Mood normal          Behavior: Behavior normal          ED Medications  Medications   amLODIPine (NORVASC) tablet 5 mg (5 mg Oral Not Given 5/29/23 0800)   hydrochlorothiazide (HYDRODIURIL) tablet 12 5 mg (12 5 mg Oral Not Given 5/29/23 0801)   albuterol (PROVENTIL HFA,VENTOLIN HFA) inhaler 1 puff (has no administration in time range)   atenolol (TENORMIN) tablet 50 mg (50 mg Oral Not Given 5/29/23 0801)   escitalopram (LEXAPRO) tablet 10 mg (10 mg Oral Given 5/29/23 0805)   famotidine (PEPCID) tablet 20 mg (20 mg Oral Given 5/29/23 0805)   acetaminophen (TYLENOL) tablet 650 mg (650 mg Oral Given 5/29/23 0649)   enoxaparin (LOVENOX) subcutaneous injection 40 mg (40 mg Subcutaneous Given 5/29/23 0805)   guaiFENesin (MUCINEX) 12 hr tablet 600 mg (600 mg Oral Given 5/29/23 0805)   ceftriaxone (ROCEPHIN) 1 g/50 mL in dextrose IVPB (has no administration in time range)   azithromycin (ZITHROMAX) tablet 500 mg (has no administration in time range)   potassium chloride 20 mEq IVPB (premix) (0 mEq Intravenous Stopped 5/29/23 0415)   potassium chloride (K-DUR,KLOR-CON) CR tablet 40 mEq (40 mEq Oral Given 5/29/23 0101)   iohexol (OMNIPAQUE) 350 MG/ML injection (SINGLE-DOSE) 85 mL (60 mL Intravenous Given 5/29/23 0054)   Gadobutrol injection (SINGLE-DOSE) SOLN 7 mL (7 mL Intravenous Given 5/29/23 0223)   ceftriaxone (ROCEPHIN) 1 g/50 mL in dextrose IVPB (0 mg Intravenous Stopped 5/29/23 0500)   azithromycin (ZITHROMAX) 500 mg in sodium chloride 0 9% 250mL IVPB 500 mg (0 mg Intravenous Stopped 5/29/23 0650)       Diagnostic Studies  Results Reviewed     Procedure Component Value Units Date/Time    Blood culture #2 [[id number]] Collected: 05/29/23 0036    Lab Status: Preliminary result Specimen: Blood from Arm, Left Updated: 05/29/23 0801     Blood Culture Received in Microbiology Lab  Culture in Progress  Blood culture #1 [[id number]] Collected: 05/29/23 0036    Lab Status: Preliminary result Specimen: Blood from Arm, Right Updated: 05/29/23 0801     Blood Culture Received in Microbiology Lab  Culture in Progress      Urine Microscopic [[id number]]  (Normal) Collected: 05/29/23 0412    Lab Status: Final result Specimen: Urine, Clean Catch Updated: 05/29/23 0535     RBC, UA 1-2 /hpf      WBC, UA 1-2 /hpf      Epithelial Cells Occasional /hpf      Bacteria, UA Occasional /hpf     UA w Reflex to Microscopic w Reflex to Culture [[id number]]  (Abnormal) Collected: 05/29/23 0412    Lab Status: Final result Specimen: Urine, Clean Catch Updated: 05/29/23 0444     Color, UA Yellow     Clarity, UA Clear     Specific Gravity, UA >=1 050     pH, UA 6 0     Leukocytes, UA Negative     Nitrite, UA Negative     Protein, UA Trace mg/dl Glucose, UA Negative mg/dl      Ketones, UA Negative mg/dl      Urobilinogen, UA <2 0 mg/dl      Bilirubin, UA Negative     Occult Blood, UA Small    TSH, 3rd generation with Free T4 reflex [[id number]]  (Normal) Collected: 05/28/23 2253    Lab Status: Final result Specimen: Blood from Arm, Left Updated: 05/29/23 0150     TSH 3RD GENERATON 3 077 uIU/mL     FLU/RSV/COVID - if FLU/RSV clinically relevant [[id number]]  (Normal) Collected: 05/29/23 0036    Lab Status: Final result Specimen: Nares from Nose Updated: 05/29/23 0136     SARS-CoV-2 Negative     INFLUENZA A PCR Negative     INFLUENZA B PCR Negative     RSV PCR Negative    Narrative:      FOR PEDIATRIC PATIENTS - copy/paste COVID Guidelines URL to browser: [email or web address]  [employer]    SARS-CoV-2 assay is a Nucleic Acid Amplification assay intended for the  qualitative detection of nucleic acid from SARS-CoV-2 in nasopharyngeal  swabs  Results are for the presumptive identification of SARS-CoV-2 RNA  Positive results are indicative of infection with SARS-CoV-2, the virus  causing COVID-19, but do not rule out bacterial infection or co-infection  with other viruses  Laboratories within the [place] and its  territories are required to report all positive results to the appropriate  public health authorities  Negative results do not preclude SARS-CoV-2  infection and should not be used as the sole basis for treatment or other  patient management decisions  Negative results must be combined with  clinical observations, patient history, and epidemiological information  This test has not been FDA cleared or approved  This test has been authorized by FDA under an Emergency Use Authorization  (EUA)   This test is only authorized for the duration of time the  declaration that circumstances exist justifying the authorization of the  emergency use of an in vitro diagnostic tests for detection of SARS-CoV-2  virus and/or diagnosis of COVID-19 infection under section 564(b)(1) of  the Act, 21 U  S C  930ISC-4(K)(3), unless the authorization is terminated  or revoked sooner  The test has been validated but independent review by FDA  and CLIA is pending  Test performed using [employer] Gene[employer]pert: This RT-PCR assay targets N2,  a region unique to SARS-CoV-2  A conserved region in the E-gene was chosen  for pan-Sarbecovirus detection which includes SARS-CoV-2  According to CMS-2020-01-R, this platform meets the definition of high-throughput technology  Protime-INR [[id number]]  (Abnormal) Collected: 05/28/23 2253    Lab Status: Final result Specimen: Blood from Arm, Left Updated: 05/29/23 0134     Protime 15 9 seconds      INR 1 25    APTT [[id number]]  (Normal) Collected: 05/28/23 2253    Lab Status: Final result Specimen: Blood from Arm, Left Updated: 05/29/23 0134     PTT 35 seconds     Procalcitonin [[id number]]  (Abnormal) Collected: 05/28/23 2252    Lab Status: Final result Specimen: Blood from Arm, Left Updated: 05/29/23 0118     Procalcitonin 0 36 ng/ml     HS Troponin I 2hr [[id number]]  (Normal) Collected: 05/29/23 0043    Lab Status: Final result Specimen: Blood from Arm, Left Updated: 05/29/23 0112     hs TnI 2hr 6 ng/L      Delta 2hr hsTnI 1 ng/L     Lactic acid, plasma (w/reflex if result > 2 0) [[id number]]  (Normal) Collected: 05/29/23 0036    Lab Status: Final result Specimen: Blood from Arm, Left Updated: 05/29/23 0105     LACTIC ACID 1 1 mmol/L     Narrative:      Result may be elevated if tourniquet was used during collection  [place] draw [[id number]] Collected: 05/28/23 2253    Lab Status: In process Specimen: Blood from Arm, Left Updated: 05/29/23 0001    Narrative: The following orders were created for panel order [place] draw    Procedure                               Abnormality         Status                     ---------                               -----------         ------ Light Blue Top on WQAN[[id number]]                           Final result               Gold top on IXYC[[id number]]                                 Final result               Lavender Top 7ml on NZRS[[id number]]                         In process                   Please view results for these tests on the individual orders      HS Troponin 0hr (reflex protocol) [[id number]]  (Normal) Collected: 05/28/23 2252    Lab Status: Final result Specimen: Blood from Arm, Left Updated: 05/28/23 2328     hs TnI 0hr 5 ng/L     Comprehensive metabolic panel [[id number]]  (Abnormal) Collected: 05/28/23 2252    Lab Status: Final result Specimen: Blood from Arm, Left Updated: 05/28/23 2321     Sodium 135 mmol/L      Potassium 3 2 mmol/L      Chloride 97 mmol/L      CO2 27 mmol/L      ANION GAP 11 mmol/L      BUN 9 mg/dL      Creatinine 0 58 mg/dL      Glucose 115 mg/dL      Calcium 8 9 mg/dL      AST 34 U/L      ALT 18 U/L      Alkaline Phosphatase 189 U/L      Total Protein 6 5 g/dL      Albumin 3 7 g/dL      Total Bilirubin 1 03 mg/dL      eGFR 99 ml/min/1 73sq m     Narrative:      [facility] guidelines for Chronic Kidney Disease (CKD):   •  Stage 1 with normal or high GFR (GFR > 90 mL/min/1 73 square meters)  •  Stage 2 Mild CKD (GFR = 60-89 mL/min/1 73 square meters)  •  Stage 3A Moderate CKD (GFR = 45-59 mL/min/1 73 square meters)  •  Stage 3B Moderate CKD (GFR = 30-44 mL/min/1 73 square meters)  •  Stage 4 Severe CKD (GFR = 15-29 mL/min/1 73 square meters)  •  Stage 5 End Stage CKD (GFR <15 mL/min/1 73 square meters)  Note: GFR calculation is accurate only with a steady state creatinine    CBC and differential [[id number]]  (Abnormal) Collected: 05/28/23 2252    Lab Status: Final result Specimen: Blood from Arm, Left Updated: 05/28/23 2302     WBC 11 07 Thousand/uL      RBC 3 55 Million/uL      Hemoglobin 12 5 g/dL      Hematocrit 36 3 %       fL      MCH 35 2 pg      MCHC 34 4 g/dL RDW 12 0 %      MPV 8 7 fL      Platelets 049 Thousands/uL      nRBC 0 /100 WBCs      Neutrophils Relative 82 %      Immat GRANS % 0 %      Lymphocytes Relative 9 %      Monocytes Relative 9 %      Eosinophils Relative 0 %      Basophils Relative 0 %      Neutrophils Absolute 8 95 Thousands/µL      Immature Grans Absolute 0 04 Thousand/uL      Lymphocytes Absolute 0 99 Thousands/µL      Monocytes Absolute 1 03 Thousand/µL      Eosinophils Absolute 0 03 Thousand/µL      Basophils Absolute 0 03 Thousands/µL                  MRI brain w wo contrast   ED Interpretation by Iram Thomas MD (05/29 4163)   [facility] PRELIMINARY REPORT:    COMPARISON:  MRI BRAIN W WO CONTRAST 5/11/2023 11:38 AM  FINDINGS:  Exam is degraded by motion artifact  Brain: No evidence of diffusion restriction to suspect acute infarct  Again demonstrated are bilateral  scattered small foci intrinsic T1 hyperintense signal with mild enhancement, susceptibility with  surrounding T2 hyperintense halo, measuring less than 1 cm, mildly increased, for example mesial  right occipital lobe or right cerebral peduncle, and increased in number, for example along the right  cerebellar hemisphere, from comparison exam and most consistent with metastatic foci  No  hemorrhage  No significant white matter disease  No edema  Cerebral ventricles: Normal  No ventriculomegaly  Bones/joints: Unremarkable  Paranasal sinuses: Normal as visualized  No acute sinusitis  Mastoid air cells: Left effusion  Orbital cavities: Lenses of the globes have been replaced  Soft tissues: Unremarkable  IMPRESSION:  No acute findings  Constelation    of findings are most compatible with metastatic disease progression  Thank you for allowing us to participate in the care of your patient  Dictated and Authenticated by: Sage Kinsey MD  05/29/2023 3:22 AM Christian Meigs Time (Dmitriy Root [id number])      Final Result by Vivi Cortez MD (05/29 1892)      1    Punctate focus of mild increased diffusion signal in the left corona radiata without obvious low signal on ADC map and no enhancement could indicate  tiny subacute infarct in right clinical setting  2   Interval worsening of intracranial hemorrhagic metastatic lesions  Associated mild perilesional vasogenic edema without mass effect  Workstation performed: [id number]         CTA ED chest PE Study   Final Result by Ani Sears MD (05/29 7799)      Diffuse tree-in-bud nodularity which may represent an infectious bronchiolitis such as with an atypical mycobacterial infection  Workstation performed: [id number]         XR chest 1 view portable   ED Interpretation by Rosa Isela Miramontes MD (05/29 1015)   Increase haziness bilaterally, no evidence of pneumothorax, pleural effusion, focal consolidation  Interpreted by me  Procedures  ECG 12 Lead Documentation Only    Date/Time: 5/29/2023 1:52 AM    Performed by: Rosa Isela Miramontes MD  Authorized by:  Rosa Isela Miramontes MD    Indications / Diagnosis:  Sob  ECG reviewed by me, the ED Provider: yes    Patient location:  ED  Previous ECG:     Previous ECG:  Compared to current  Interpretation:     Interpretation: normal    Rate:     ECG rate:  89    ECG rate assessment: normal    Rhythm:     Rhythm: sinus rhythm    Ectopy:     Ectopy: none    QRS:     QRS axis:  Normal    QRS intervals:  Normal  Conduction:     Conduction: normal    ST segments:     ST segments:  Non-specific  T waves:     T waves: flattening      Flattening:  V4, V5, V6, V1, V2, V3, aVL, aVF, aVR, III, II and I  Comments:      qtc 430          ED Course  ED Course as of 05/29/23 1030   Mon May 29, 2023   0054 WBC(!): 11 07   0055 Potassium(!): 3 2  Will supplement   0151 Procalcitonin(!): 0 36   0151 TSH 3RD GENERATON: 3 077   0151 LACTIC ACID: 1 1   0151 Delta 2hr hsTnI: 1   0152 IMPRESSION:     Diffuse tree-in-bud nodularity which may represent an infectious bronchiolitis such as with an atypical mycobacterial infection  0155 Will start on abx for infectious bronchiolitis   0328 Patient sleeping  MRI being read on [facility] currently  [address] REPORT    MR Brain IMPRESSION:  No acute findings  Constelation of findings are mos compatible with metastatic disease progression  HEART Risk Score    Flowsheet Row Most Recent Value   Heart Score Risk Calculator    History 0 Filed at: 05/29/2023 0154   ECG 0 Filed at: 05/29/2023 0154   Age 1 Filed at: 05/29/2023 0154   Risk Factors 1 Filed at: 05/29/2023 0154   Troponin 0 Filed at: 05/29/2023 0154   HEART Score 2 Filed at: 05/29/2023 0154                   Initial Sepsis Screening     Row Name 05/29/23 1028                Is the patient's history suggestive of a new or worsening infection? Yes (Proceed)  -MR        Suspected source of infection pneumonia;suspect infection, source unknown  -MR        Indicate SIRS criteria Tachycardia > 90 bpm  -MR        Are two or more of the above signs & symptoms of infection both present and new to the patient? No  -MR              User Key  (r) = Recorded By, (t) = Taken By, (c) = Cosigned By    [address] Name Provider Type     Coreen Nuñez MD Resident                SBIRT 20yo+    Flowsheet Row Most Recent Value   Initial Alcohol Screen: US AUDIT-C     1  How often do you have a drink containing alcohol? 0 Filed at: 05/28/2023 2257   2  How many drinks containing alcohol do you have on a typical day you are drinking? 0 Filed at: 05/28/2023 2257   3a  Male UNDER 65: How often do you have five or more drinks on one occasion? 0 Filed at: 05/28/2023 2257   3b  FEMALE Any Age, or MALE 65+: How often do you have 4 or more drinks on one occassion? 0 Filed at: 05/28/2023 2257   Audit-C Score 0 Filed at: 05/28/2023 2257   ANNY: How many times in the past year have you    Used an illegal drug or used a prescription medication for non-medical reasons?  Never Filed at: 05/28/2023 2257          Marilyn Rose Criteria for PE    Flowsheet Row Most Recent Value   Micah' Criteria for PE    Clinical signs and symptoms of DVT 0 Filed at: 05/29/2023 0002   PE is primary diagnosis or equally likely 3 Filed at: 05/29/2023 0002   HR >100 0 Filed at: 05/29/2023 0002   Immobilization at least 3 days or Surgery in the previous 4 weeks 0 Filed at: 05/29/2023 0002   Previous, objectively diagnosed PE or DVT 0 Filed at: 05/29/2023 0002   Hemoptysis 0 Filed at: 05/29/2023 0002   Malignancy with treatment within 6 months or palliative 1 Filed at: 05/29/2023 0002   Micah' Criteria Total 4 Filed at: 05/29/2023 0002            Medical Decision Making  61-year-old female with history of metastatic melanoma presents to the emergency department with new onset bilateral left-sided vision loss x4 days  In the department, patient is hemodynamically stable and afebrile, nontachycardic with normal work of breathing satting 94% on room air  She is alert and oriented x3, speaking to me in full sentences  She is not in acute distress  Her exam is notable for some Rales bilaterally, no wheeze or rhonchi  She has decreased visual acuity in both eyes, right is worse than left  She has left-sided homonymous hemianopia  Intraocular pressures are normal bilaterally  Laboratory work-up here is notable for a mild leukocytosis 11 07  Mild hypokalemia to 3 2, will supplement here  Elevated procalcitonin to 0 36  Normal lactic, TSH within normal limits  UA without signs of infection  CT chest is negative for PE, however the read does call infectious bronchiolitis  Will start on antibiotics  With her history of metastatic melanoma, and new acute bilateral vision loss, there is concern for a central cause, therefore will proceed with MR imaging, which reveals new metastatic intracranial lesions  Patient is updated on the findings of her laboratory results and imaging    She will be admitted to inpatient medicine service for continued evaluation  She is amenable to this plan  Amount and/or Complexity of Data Reviewed  Labs: ordered  Decision-making details documented in ED Course  Radiology: ordered and independent interpretation performed  Risk  Prescription drug management  Decision regarding hospitalization  Disposition  Final diagnoses:   Left homonymous hemianopsia   Malignant melanoma metastatic to [facility])   Acute bronchiolitis     Time reflects when diagnosis was documented in both MDM as applicable and the Disposition within this note     Time User Action Codes Description Comment    5/29/2023  3:41 AM Kalecy Sunni Add [T09 758] Left homonymous hemianopsia     5/29/2023  3:42 AM Dulcy Sunni Add [C79 31] Malignant melanoma metastatic to [facility])     5/29/2023  3:47 AM Dulcy Sunni Add [J21 9] Acute bronchiolitis       ED Disposition     ED Disposition   Admit    Condition   Stable    Date/Time   Mon May 29, 2023  4:10 AM    Comment   Case was discussed with [facility] resident and the patient's admission status was agreed to be Admission Status: observation status to the service of Dr Donnell Chu              Follow-up Information    None         Current Discharge Medication List      CONTINUE these medications which have NOT CHANGED    Details   albuterol (PROVENTIL HFA,VENTOLIN HFA) 90 mcg/act inhaler INHALE 1 PUFF EVERY 4 HOURS AS NEEDED FOR WHEEZING  Qty: 6 7 g, Refills: 3    Associated Diagnoses: Asthma      amLODIPine (NORVASC) 5 mg tablet Take 1 tablet (5 mg total) by mouth daily  Qty: 90 tablet, Refills: 1    Associated Diagnoses: Essential hypertension      atenolol (TENORMIN) 50 mg tablet TAKE 1 TABLET BY MOUTH EVERY DAY  Qty: 90 tablet, Refills: 0    Associated Diagnoses: Essential hypertension      hydrochlorothiazide (MICROZIDE) 12 5 mg capsule Take 12 5 mg by mouth daily      escitalopram (LEXAPRO) 10 mg tablet TAKE 1 TABLET BY MOUTH EVERY DAY  Qty: 90 tablet, Refills: 1 Associated Diagnoses: Essential hypertension      famotidine (PEPCID) 20 mg tablet TAKE 1 TABLET BY MOUTH TWICE A DAY  Qty: 180 tablet, Refills: 1    Associated Diagnoses: GERD (gastroesophageal reflux disease)      gentamicin (GARAMYCIN) 0 1 % cream Apply topically 3 (three) times a day  Qty: 30 g, Refills: 2    Associated Diagnoses: Surgical wound present           No discharge procedures on file  PDMP Review       Value Time User    PDMP Reviewed  Yes 5/28/2023 11:53 PM Karly Higginbotham MD           ED Provider  Attending physically available and evaluated Florian Harkins I managed the patient along with the ED Attending      Electronically Signed by         Jody Gustafson MD  05/29/23 [address] MD Christopher  05/29/23 [address] MD Christopher  05/29/23 0564

## 2023-05-29 NOTE — ASSESSMENT & PLAN NOTE
Met SIRS criteria on admission with heart rate of 100 and respiratory rate of 20  Presented with low-grade fever of 100 1, however endorses fever at home of 100 8  Reports cough acutely worsened after her second Pembrolizumab treatment  Given tree in bud nodularity noted on imaging, mildly elevated procalcitonin, low grade fever and elevated WBC, will initiate treatment for possible pneumonia  Suspect patient also has worsening pneumonitis in the setting of Pembrolizumab use  Plan: Will start Rocephin and Azithromycin  Strep pneumo and legionella antibodies ordered  Trend procalcitonin - if negative x 2 consider discontinuing Abx  Monitor VS and WBC

## 2023-05-29 NOTE — ASSESSMENT & PLAN NOTE
- MRI brain revealed punctate subacute infarct in the left corona radiata and interval worsening of intracranial hemorrhagic metastatic lesions associated with mild perilesional vasogenic edema without mass effect   - Stroke etiology likely due to hypercoagulable state from known melanoma  - Unfortunately, given multiple hemorrhagic metastatic lesions, patient is not a candidate for aspirin or anticoagulation therapy    - We will defer statin therapy at this time  - See remainder of assessment/plan above

## 2023-05-29 NOTE — ASSESSMENT & PLAN NOTE
Recent Labs     05/28/23  2252   K 3 2*     POA: K 2 9, treated with 20mEq KCl IV and 40 mEq PO of KCl  Suspect this is likely in the setting of poor PO intake  Per chart review patient has a long history of chronic diarrhea with hypokalemia, however she notes that this has resolved since she was started on keytruda  Plan:  Cont  Monitoring K  Replace as needed  Will check Mg with morning labs  Bessy Anderson

## 2023-05-29 NOTE — SEPSIS NOTE
Sepsis Note   Lindsay Farris 59 y o  female MRN: [id number]  Unit/Bed#: S -82 Encounter: [id number]       Initial Sepsis Screening     Row Name 05/29/23 1028                Is the patient's history suggestive of a new or worsening infection? Yes (Proceed)  -MR        Suspected source of infection pneumonia;suspect infection, source unknown  -MR        Indicate SIRS criteria Tachycardia > 90 bpm  -MR        Are two or more of the above signs & symptoms of infection both present and new to the patient? No  -MR              User Key  (r) = Recorded By, (t) = Taken By, (c) = Cosigned By    [address] Name Provider Type    MR Kimmy Worley MD Resident                    Body mass index is 24 43 kg/m²    Wt Readings from Last 1 Encounters:   05/29/23 70 8 kg (156 lb)     IBW (Ideal Body Weight): 61 6 kg    Ideal body weight: 61 6 kg (135 lb 12 9 oz)  Adjusted ideal body weight: 65 3 kg (143 lb 14 1 oz)

## 2023-05-29 NOTE — RESPIRATORY THERAPY NOTE
RT Protocol Note  Tatyana Mcduffie 59 y o  female MRN: [id number]  Unit/Bed#: S -62 Encounter: [id number]    Assessment    Principal Problem:    Left homonymous hemianopsia  Active Problems:    Mild intermittent asthma    Tobacco abuse    Hypokalemia    Hypertension    Metastatic melanoma (HCC)    SIRS (systemic inflammatory response syndrome) (HCC)      Home Pulmonary Medications:  Albuteral HFA Q6PRN       Past Medical History:   Diagnosis Date    Cervical disc disorder     Melanoma ([address] )     Stenosis of cervical spine region                    05/29/23 0603   Respiratory Protocol   Protocol Initiated? Yes   Protocol Selection Respiratory   Language Barrier? No   Medical & Social History Reviewed? Yes   Diagnostic Studies Reviewed? Yes   Physical Assessment Performed? Yes   Respiratory Plan Home Bronchodilator Patient pathway   Respiratory Assessment   Assessment Type Assess only   General Appearance Alert; Awake   Respiratory Pattern Dyspnea with exertion   Chest Assessment Chest expansion symmetrical   Bilateral Breath Sounds Diminished   Cough Non-productive   Resp Comments Pt assessed for Respiratory protocol  BS diminished, SPo2 97% on roomair  Continue with pt home bronchodilator tehrapy of Proventil HFA Q6prn  Additional Assessments   Pulse 68   Respirations 16   SpO2 97 %     Objective    Physical Exam:   Assessment Type: Assess only  General Appearance: Alert, Awake  Respiratory Pattern: Dyspnea with exertion  Chest Assessment: Chest expansion symmetrical  Bilateral Breath Sounds: Diminished  Cough: Non-productive    Vitals:  Blood pressure 124/74, pulse 68, temperature 100 1 °F (37 8 °C), temperature source Oral, resp  rate 16, weight 70 8 kg (156 lb), SpO2 97 %, not currently breastfeeding  Imaging and other studies: I have personally reviewed pertinent reports              Plan    Respiratory Plan: Home Bronchodilator Patient pathway        Resp Comments: Pt assessed for Respiratory protocol  BS diminished, SPo2 97% on roomair  Continue with pt home bronchodilator tehrapy of Proventil HFA Q6prn

## 2023-05-29 NOTE — ASSESSMENT & PLAN NOTE
- Diagnosed with melanoma on the right foot in March 2023  - Started on Keytruda 3 weeks ago, with second dose on 5/25  - She had MRI brain on 5/11/2023 which was suspicious for hemorrhagic brain metastasis in the right frontal lobe, left superior temporal lobe, right caudate, right medial occipital lobes, and right posterior thalamocapsular junction with tiny nonhemorrhagic metastasis in right cerebellum   - Repeat MRI brain w/wo contrast on 5/29 above with several new hemorrhagic brain metastatic lesions  - Heme-Onc and Rad-Onc consulted

## 2023-05-30 ENCOUNTER — DOCUMENTATION (OUTPATIENT)
Dept: HEMATOLOGY ONCOLOGY | Facility: CLINIC | Age: 63
End: 2023-05-30

## 2023-05-30 LAB
ALBUMIN SERPL BCP-MCNC: 3.2 G/DL (ref 3.5–5)
ALP SERPL-CCNC: 166 U/L (ref 34–104)
ALT SERPL W P-5'-P-CCNC: 12 U/L (ref 7–52)
ANION GAP SERPL CALCULATED.3IONS-SCNC: 7 MMOL/L (ref 4–13)
AST SERPL W P-5'-P-CCNC: 12 U/L (ref 13–39)
ATRIAL RATE: 89 BPM
BASOPHILS # BLD AUTO: 0.02 THOUSANDS/ÂΜL (ref 0–0.1)
BASOPHILS NFR BLD AUTO: 0 % (ref 0–1)
BILIRUB SERPL-MCNC: 0.64 MG/DL (ref 0.2–1)
BUN SERPL-MCNC: 7 MG/DL (ref 5–25)
CALCIUM ALBUM COR SERPL-MCNC: 8.8 MG/DL (ref 8.3–10.1)
CALCIUM SERPL-MCNC: 8.2 MG/DL (ref 8.4–10.2)
CHLORIDE SERPL-SCNC: 106 MMOL/L (ref 96–108)
CO2 SERPL-SCNC: 27 MMOL/L (ref 21–32)
CREAT SERPL-MCNC: 0.46 MG/DL (ref 0.6–1.3)
EOSINOPHIL # BLD AUTO: 0.16 THOUSAND/ÂΜL (ref 0–0.61)
EOSINOPHIL NFR BLD AUTO: 2 % (ref 0–6)
ERYTHROCYTE [DISTWIDTH] IN BLOOD BY AUTOMATED COUNT: 12.4 % (ref 11.6–15.1)
GFR SERPL CREATININE-BSD FRML MDRD: 106 ML/MIN/1.73SQ M
GLUCOSE SERPL-MCNC: 110 MG/DL (ref 65–140)
HCT VFR BLD AUTO: 35.6 % (ref 34.8–46.1)
HGB BLD-MCNC: 12.1 G/DL (ref 11.5–15.4)
IMM GRANULOCYTES # BLD AUTO: 0.04 THOUSAND/UL (ref 0–0.2)
IMM GRANULOCYTES NFR BLD AUTO: 1 % (ref 0–2)
LYMPHOCYTES # BLD AUTO: 1.02 THOUSANDS/ÂΜL (ref 0.6–4.47)
LYMPHOCYTES NFR BLD AUTO: 14 % (ref 14–44)
MAGNESIUM SERPL-MCNC: 2 MG/DL (ref 1.9–2.7)
MCH RBC QN AUTO: 35.7 PG (ref 26.8–34.3)
MCHC RBC AUTO-ENTMCNC: 34 G/DL (ref 31.4–37.4)
MCV RBC AUTO: 105 FL (ref 82–98)
MONOCYTES # BLD AUTO: 0.66 THOUSAND/ÂΜL (ref 0.17–1.22)
MONOCYTES NFR BLD AUTO: 9 % (ref 4–12)
NEUTROPHILS # BLD AUTO: 5.37 THOUSANDS/ÂΜL (ref 1.85–7.62)
NEUTS SEG NFR BLD AUTO: 74 % (ref 43–75)
NRBC BLD AUTO-RTO: 0 /100 WBCS
P AXIS: 51 DEGREES
PLATELET # BLD AUTO: 209 THOUSANDS/UL (ref 149–390)
PMV BLD AUTO: 9 FL (ref 8.9–12.7)
POTASSIUM SERPL-SCNC: 3.4 MMOL/L (ref 3.5–5.3)
PR INTERVAL: 122 MS
PROCALCITONIN SERPL-MCNC: 0.33 NG/ML
PROT SERPL-MCNC: 5.7 G/DL (ref 6.4–8.4)
QRS AXIS: 38 DEGREES
QRSD INTERVAL: 76 MS
QT INTERVAL: 354 MS
QTC INTERVAL: 430 MS
RBC # BLD AUTO: 3.39 MILLION/UL (ref 3.81–5.12)
RHODAMINE-AURAMINE STN SPEC: NORMAL
RHODAMINE-AURAMINE STN SPEC: NORMAL
SODIUM SERPL-SCNC: 140 MMOL/L (ref 135–147)
T WAVE AXIS: 40 DEGREES
VENTRICULAR RATE: 89 BPM
VIT B12 SERPL-MCNC: 121 PG/ML (ref 180–914)
WBC # BLD AUTO: 7.27 THOUSAND/UL (ref 4.31–10.16)

## 2023-05-30 PROCEDURE — 99232 SBSQ HOSP IP/OBS MODERATE 35: CPT | Performed by: INTERNAL MEDICINE

## 2023-05-30 PROCEDURE — 77295 3-D RADIOTHERAPY PLAN: CPT | Performed by: RADIOLOGY

## 2023-05-30 PROCEDURE — 77399 UNLISTED PX MED RADJ PHYSICS: CPT | Performed by: RADIOLOGY

## 2023-05-30 PROCEDURE — 77300 RADIATION THERAPY DOSE PLAN: CPT | Performed by: RADIOLOGY

## 2023-05-30 PROCEDURE — 93010 ELECTROCARDIOGRAM REPORT: CPT | Performed by: INTERNAL MEDICINE

## 2023-05-30 PROCEDURE — 94640 AIRWAY INHALATION TREATMENT: CPT

## 2023-05-30 PROCEDURE — 99223 1ST HOSP IP/OBS HIGH 75: CPT | Performed by: RADIOLOGY

## 2023-05-30 PROCEDURE — 87070 CULTURE OTHR SPECIMN AEROBIC: CPT

## 2023-05-30 PROCEDURE — 87116 MYCOBACTERIA CULTURE: CPT | Performed by: NURSE PRACTITIONER

## 2023-05-30 PROCEDURE — 84145 PROCALCITONIN (PCT): CPT

## 2023-05-30 PROCEDURE — 80053 COMPREHEN METABOLIC PANEL: CPT | Performed by: INTERNAL MEDICINE

## 2023-05-30 PROCEDURE — 82607 VITAMIN B-12: CPT | Performed by: INTERNAL MEDICINE

## 2023-05-30 PROCEDURE — 99233 SBSQ HOSP IP/OBS HIGH 50: CPT | Performed by: NURSE PRACTITIONER

## 2023-05-30 PROCEDURE — 87205 SMEAR GRAM STAIN: CPT

## 2023-05-30 PROCEDURE — 77263 THER RADIOLOGY TX PLNG CPLX: CPT | Performed by: RADIOLOGY

## 2023-05-30 PROCEDURE — 85025 COMPLETE CBC W/AUTO DIFF WBC: CPT | Performed by: INTERNAL MEDICINE

## 2023-05-30 PROCEDURE — 77290 THER RAD SIMULAJ FIELD CPLX: CPT | Performed by: RADIOLOGY

## 2023-05-30 PROCEDURE — 77334 RADIATION TREATMENT AID(S): CPT | Performed by: RADIOLOGY

## 2023-05-30 PROCEDURE — 94760 N-INVAS EAR/PLS OXIMETRY 1: CPT

## 2023-05-30 PROCEDURE — 83735 ASSAY OF MAGNESIUM: CPT | Performed by: INTERNAL MEDICINE

## 2023-05-30 PROCEDURE — 87206 SMEAR FLUORESCENT/ACID STAI: CPT | Performed by: NURSE PRACTITIONER

## 2023-05-30 RX ORDER — LANOLIN ALCOHOL/MO/W.PET/CERES
3 CREAM (GRAM) TOPICAL
Status: DISCONTINUED | OUTPATIENT
Start: 2023-05-30 | End: 2023-06-01 | Stop reason: HOSPADM

## 2023-05-30 RX ORDER — POTASSIUM CHLORIDE 20 MEQ/1
40 TABLET, EXTENDED RELEASE ORAL ONCE
Status: COMPLETED | OUTPATIENT
Start: 2023-05-30 | End: 2023-05-30

## 2023-05-30 RX ADMIN — ALBUTEROL SULFATE 1 PUFF: 90 AEROSOL, METERED RESPIRATORY (INHALATION) at 10:41

## 2023-05-30 RX ADMIN — FAMOTIDINE 20 MG: 20 TABLET ORAL at 16:56

## 2023-05-30 RX ADMIN — FAMOTIDINE 20 MG: 20 TABLET ORAL at 09:03

## 2023-05-30 RX ADMIN — ALBUTEROL SULFATE 1 PUFF: 90 AEROSOL, METERED RESPIRATORY (INHALATION) at 16:55

## 2023-05-30 RX ADMIN — ENOXAPARIN SODIUM 40 MG: 40 INJECTION SUBCUTANEOUS at 09:03

## 2023-05-30 RX ADMIN — ESCITALOPRAM OXALATE 10 MG: 10 TABLET ORAL at 09:03

## 2023-05-30 RX ADMIN — BENZONATATE 200 MG: 100 CAPSULE ORAL at 16:56

## 2023-05-30 RX ADMIN — POTASSIUM CHLORIDE 40 MEQ: 1500 TABLET, EXTENDED RELEASE ORAL at 09:03

## 2023-05-30 RX ADMIN — AMLODIPINE BESYLATE 5 MG: 5 TABLET ORAL at 09:03

## 2023-05-30 RX ADMIN — LEVALBUTEROL HYDROCHLORIDE 1.25 MG: 1.25 SOLUTION RESPIRATORY (INHALATION) at 08:12

## 2023-05-30 RX ADMIN — LEVALBUTEROL HYDROCHLORIDE 1.25 MG: 1.25 SOLUTION RESPIRATORY (INHALATION) at 13:08

## 2023-05-30 RX ADMIN — GUAIFENESIN 600 MG: 600 TABLET ORAL at 16:55

## 2023-05-30 RX ADMIN — LEVALBUTEROL HYDROCHLORIDE 1.25 MG: 1.25 SOLUTION RESPIRATORY (INHALATION) at 19:40

## 2023-05-30 RX ADMIN — HYDROCHLOROTHIAZIDE 12.5 MG: 12.5 TABLET ORAL at 09:03

## 2023-05-30 RX ADMIN — ATENOLOL 50 MG: 50 TABLET ORAL at 09:03

## 2023-05-30 RX ADMIN — GUAIFENESIN 600 MG: 600 TABLET ORAL at 09:03

## 2023-05-30 RX ADMIN — MELATONIN 3 MG: 3 TAB ORAL at 22:01

## 2023-05-30 RX ADMIN — CEFTRIAXONE SODIUM 1000 MG: 10 INJECTION, POWDER, FOR SOLUTION INTRAVENOUS at 06:16

## 2023-05-30 NOTE — PROGRESS NOTES
[facility]  Progress Note  Name: Deb Guo  MRN: [id number]  Unit/Bed#: S -02 I Date of Admission: 5/28/2023   Date of Service: 5/30/2023 I Hospital Day: 1    Assessment/Plan   Cerebrovascular accident (CVA) [facility])  Assessment & Plan  MRI showing ? Subacute stroke   Discussed with neurology - no asa or statin on board    SIRS (systemic inflammatory response syndrome) (HCC)  Assessment & Plan  Met SIRS criteria on admission with heart rate of 100 and respiratory rate of 20  Presented with low-grade fever of 100 1, however endorses fever at home of 100 8  Reports cough acutely worsened after her second Pembrolizumab treatment  Given tree in bud nodularity noted on imaging, mildly elevated procalcitonin, low grade fever and elevated WBC, will initiate treatment for possible pneumonia  Suspect patient also has worsening pneumonitis in the setting of Pembrolizumab use  Resolved      Metastatic melanoma [facility])  Assessment & Plan  Diagnosed with Stage III acral melanoma in march 2023  Follows up with Dr Monika Muñoz, recently started on Pembrolizumab  Completed 2nd cycle of Pembrolizumab on 5/25 after which SoB and cough worsened significantly and she endorsed left sided hemianopsia the day following infusion  Suspect symptoms at presentation are likely in the setting of medication side effect  Patient reports that she had appointment coming up with radiation oncology to discuss management of intracranial lesions found on imaging recently concerning for metastatic disease  Hypertension  Assessment & Plan  Continue PTA Atenolol 50mg daily, HCTZ 12 5mg daily and Amlodipine 5mg daily  Patient notes that her Amlodipine was recently increased, but she has continued taking 5mg daily  BP acceptable on arrival to the ED so will continue this regimen for now     Continue monitoring /82    Hypokalemia  Assessment & Plan  Recent Labs     05/28/23  2252 05/29/23  0701 "05/30/23  0547   K 3 2* 3 5 3 4*   MG  --  1 3* 2 0     POA: K 2 9, treated with 20mEq KCl IV and 40 mEq PO of KCl  Suspect this is likely in the setting of poor PO intake  Per chart review patient has a long history of chronic diarrhea with hypokalemia, however she notes that this has resolved since she was started on keytruda  Plan:  Cont  Monitoring K  Replaced K already today   Mag - normal today     Tobacco abuse  Assessment & Plan  Patient reports quitting once diagnosed with melanoma  Will hold off on ordering NRT  Mild intermittent asthma  Assessment & Plan  PTA on Albuterol rescue inhaler  Continue PTA Albuterol 1 puff q6h PRN     * Blurred vision in bilateral left visual fields  Assessment & Plan  POA: Patient reports inability to see the left side of her image  She notes that it appears patchy  On my evaluation there were no overt visual field deficits, however patient stated that everything seems \"gray\" on the left  I suspect this may be in the setting of possible disease progression, however even more likely this may be due to Pembrolizumab side effect, particularly given sudden onset after most recent infusion  Plan:   Consult neurology - recommendations appreciated  Will need specific comments wrt asa and statin  Consulted hematology oncology - appreciate their recs regarding ? Treatment side effect  Ophthalmology consult placed               VTE Pharmacologic Prophylaxis: VTE Score: 5 Moderate Risk (Score 3-4) - Pharmacological DVT Prophylaxis Ordered: heparin  Patient Centered Rounds: I performed bedside rounds with nursing staff today  Discussions with Specialists or Other Care Team Provider: Will discuss with hematology oncology and neurology     Education and Discussions with Family / Patient: Updated  (sister) via phone      Total Time Spent on Date of Encounter in care of patient: 30 in  This time was spent on one or more of the following: performing physical " "exam; counseling and coordination of care; obtaining or reviewing history; documenting in the medical record; reviewing/ordering tests, medications or procedures; communicating with other healthcare professionals and discussing with patient's family/caregivers  Current Length of Stay: 1 day(s)  Current Patient Status: Inpatient   Certification Statement: The patient will continue to require additional inpatient hospital stay due to further work up   Discharge Plan: Anticipate discharge in 24-48 hrs to to be determined    Code Status: Level 1 - Full Code    Subjective:   Patient seen and examined  \"I can't see the TV, can't read anything\"    Objective:     Vitals:   Temp (24hrs), Av 2 °F (36 8 °C), Min:98 1 °F (36 7 °C), Max:98 4 °F (36 9 °C)    Temp:  [98 1 °F (36 7 °C)-98 4 °F (36 9 °C)] 98 2 °F (36 8 °C)  HR:  [67-83] 78  Resp:  [16-18] 16  BP: (119-158)/(73-88) 158/82  SpO2:  [94 %-95 %] 95 %  Body mass index is 24 43 kg/m²  Input and Output Summary (last 24 hours):   No intake or output data in the 24 hours ending 23 1007    Physical Exam:   Physical Exam  Constitutional:       General: She is not in acute distress  Appearance: She is not ill-appearing or diaphoretic  HENT:      Head: Normocephalic  Mouth/Throat:      Mouth: Mucous membranes are moist    Eyes:      General: No scleral icterus  Right eye: No discharge  Left eye: No discharge  Pupils: Pupils are equal, round, and reactive to light  Cardiovascular:      Rate and Rhythm: Normal rate  Heart sounds: No murmur heard  No friction rub  No gallop  Pulmonary:      Effort: Pulmonary effort is normal  No respiratory distress  Breath sounds: No stridor  No wheezing, rhonchi or rales  Abdominal:      General: There is no distension  Palpations: There is no mass  Tenderness: There is no abdominal tenderness  There is no left CVA tenderness, guarding or rebound        Hernia: No hernia " is present  Skin:     Capillary Refill: Capillary refill takes less than 2 seconds  Coloration: Skin is not jaundiced or pale  Findings: No bruising, erythema or lesion  Neurological:      General: No focal deficit present  Mental Status: She is alert  Cranial Nerves: No cranial nerve deficit  Sensory: No sensory deficit  Motor: No weakness  Coordination: Coordination normal       Gait: Gait normal       Deep Tendon Reflexes: Reflexes normal       Comments: upgoing babinski   Psychiatric:         Mood and Affect: Mood normal           Additional Data:     Labs:  Results from last 7 days   Lab Units 05/30/23  0547   EOS PCT % 2   HEMATOCRIT % 35 6   HEMOGLOBIN g/dL 12 1   LYMPHS PCT % 14   MONOS PCT % 9   NEUTROS PCT % 74   PLATELETS Thousands/uL 209   WBC Thousand/uL 7 27     Results from last 7 days   Lab Units 05/30/23  0547   ANION GAP mmol/L 7   ALBUMIN g/dL 3 2*   ALK PHOS U/L 166*   ALT U/L 12   AST U/L 12*   BUN mg/dL 7   CALCIUM mg/dL 8 2*   CHLORIDE mmol/L 106   CO2 mmol/L 27   CREATININE mg/dL 0 46*   GLUCOSE RANDOM mg/dL 110   POTASSIUM mmol/L 3 4*   SODIUM mmol/L 140   TOTAL BILIRUBIN mg/dL 0 64     Results from last 7 days   Lab Units 05/28/23  2253   INR  1 25*         Results from last 7 days   Lab Units 05/29/23  0701   HEMOGLOBIN A1C % 4 7     Results from last 7 days   Lab Units 05/30/23  0547 05/29/23  0701 05/29/23  0036 05/28/23  2252   LACTIC ACID mmol/L  --   --  1 1  --    PROCALCITONIN ng/ml 0 33* 0 41*  --  0 36*       Lines/Drains:  Invasive Devices     Peripheral Intravenous Line  Duration           Peripheral IV 05/28/23 Left Antecubital 1 day                      Imaging: No pertinent imaging reviewed  Recent Cultures (last 7 days):   Results from last 7 days   Lab Units 05/29/23  0807 05/29/23  0036   BLOOD CULTURE   --  No Growth at 24 hrs  No Growth at 24 hrs     LEGIONELLA URINARY ANTIGEN  Negative  --        Last 24 Hours Medication List: Current Facility-Administered Medications   Medication Dose Route Frequency Provider Last Rate   • acetaminophen  650 mg Oral Q6H PRN Ngozi Desir MD     • albuterol  1 puff Inhalation Q6H PRN Ngozi Desir MD     • amLODIPine  5 mg Oral Daily Ngozi Desir MD     • atenolol  50 mg Oral Daily Ngozi Desir MD     • benzonatate  200 mg Oral TID PRN Malu Justice, DO     • cefTRIAXone  1,000 mg Intravenous Q24H Ngozi Desir MD 1,000 mg (05/30/23 0616)   • enoxaparin  40 mg Subcutaneous Daily Ngozi Desir MD     • escitalopram  10 mg Oral Daily Ngozi Desir MD     • famotidine  20 mg Oral BID Ngozi Desir MD     • guaiFENesin  600 mg Oral BID Ngozi Desir MD     • hydrochlorothiazide  12 5 mg Oral Daily Ngozi Desir MD     • levalbuterol  1 25 mg Nebulization TID PAVEL Munoz          Today, Patient Was Seen By: Candice Sanchez MD    **Please Note: This note may have been constructed using a voice recognition system  **

## 2023-05-30 NOTE — PROGRESS NOTES
In-basket message received from Dr Ruthann Raza to add patient to neuro [facility] on 5/31/2023  Chart reviewed and prep completed

## 2023-05-30 NOTE — UTILIZATION REVIEW
Initial Clinical Review    Admission: Date/Time/Statement:  5/29/23 0410 Observation AND CHANGED 5/29/23 TO INPATIENT DUE TO NEED FOR > 2 MIDNIGHT STAY WITH BILATERAL VISION IMPAIRMENT AND DIFFERENTIAL OF BRAIN METASTASES VS KEYTRUDA VS STROKE VS INTRAOCULAR DISEASE AND WILL NEED MULTIPLE CONSULTS  AND STEROIDS VS RADIATION TREATMENT  Admission Orders (From admission, onward)     Ordered        05/29/23 1208  Inpatient Admission  Once                      Orders Placed This Encounter   Procedures   • Inpatient Admission     Standing Status:   Standing     Number of Occurrences:   1     Order Specific Question:   Level of Care     Answer:   Med Surg [16]     Order Specific Question:   Estimated length of stay     Answer:   More than 2 Midnights     Order Specific Question:   Certification     Answer:   I certify that inpatient services are medically necessary for this patient for a duration of greater than two midnights  See H&P and MD Progress Notes for additional information about the patient's course of treatment  ED Arrival Information     Expected   -    Arrival   5/28/2023 22:26    Acuity   Emergent            Means of arrival   Walk-In    Escorted by   Self    Service   Hospitalist    Admission type   Emergency            Arrival complaint   COUGH / BLURRED VISION (CANCER PATIENT)           Chief Complaint   Patient presents with   • Blurred Vision     Patient presents for blurred vision  Is on Kaytruda for Melanoma  Patient began experiencing trouble with vision starting yesterday as well as SOB  Initial Presentation: 59 y o  female from home to ED admitted to observation and [facility] 1268 due to bilateral vision impairment/metastatic malignant melanoma/cough with abnormal ct  Presented due to bilateral left vision loss starting 4 days prior to arrival   + photophobia  Increased fatigue last 2 weeks and headache today    Temp tp 100 8   + cough, difficult to take deep breath or climb stairs  Chest tightness relieved with albuterol  On exam: Bilateral left sided hemianopsia  Intraocular pressure: Right eye pressure is 10 mmHg  Left eye pressure is 9 mmHg  Visual acuity:   LEFT 20/30  RIGHT 2/100  BOTH 20/50  INR 1 25  Procalcitonin 0 36   K 3 2  Wbc 11 07    Ct chest showed Diffuse tree-in-bud nodularity which may represent an infectious bronchiolitis  MRI brain metastatic disease progression,new lesions  In the ED given K, started on ceftriaxone  Plan is consult neurology, radiation oncology, oncology  BP control and avoid hypotension  Continue ceftriaxone, add azithromycin and consult Pulmonary  Xopenex treatments  Tessalon perles as needed  Mucinex  Monitor oxygen sats  5/29/23 per neurology - suspect occipital lesions is etiology of left sided visual neglect  Doubt effect of Keytruda  Concerned due to number of accumulated new lesions since prior MRI earlier in May  ? Punctate lesion ischemic  No antithrombolytic due to hemorrhagic metastases  Consult radiation oncology  5/29/23 per oncology - patient with metastatic melanoma with brain metastasis  Has progressive hemorrhagic multiple brain metastasis and Left homonymous hemianopia  Differential diagnosis include possible stroke including infectious embolic stroke, progression of brain metastasis as well as immune mediated side effect from pembrolizumab  Consult radiation oncology  5/29/23 per pulmonary - suspect patient has  Community acquired pneumonia with cough in setting of immunocompromise state  Doubt Keytruda pneumonitis or pleural disease  Recommend rocephin for 5 to 7 days  Patient planned for whole brain radiation tomorrow; 30 Gy in 10 fractions  Start Decadron  Date: 5/30/23   Day 2:  + cough with some mucous production and shortness of breath  Continue visual disturbance  On exam foot dressing  upgoing babinski   K 3 4  Continue ceftriaxone  Consult ophthalmology  5/30/23 per radiation oncology - patient has  newly diagnosed melanoma (acral type) with brain metastases which has progressed after starting pembrolizumab  Discussed radiation therapy which  is offered in order to delay intracranial progression or neurologic compromise related to brain metastases, this is not curative, recommended whole brain radiotherapy, 30 Gy in 10 fractions     She is not a candidate for stereotactic treatment  To add memantine to whole brain radiation  consider dexamethasone 4 mg BID with PPI   continue immunotherapy under the supervision of medical oncology    ED Triage Vitals   Temperature Pulse Respirations Blood Pressure SpO2   05/28/23 2235 05/28/23 2235 05/28/23 2235 05/28/23 2235 05/28/23 2235   100 1 °F (37 8 °C) 100 20 147/77 96 %      Temp Source Heart Rate Source Patient Position - Orthostatic VS BP Location FiO2 (%)   05/28/23 2235 05/28/23 2235 05/28/23 2235 05/28/23 2235 --   Oral Monitor Sitting Right arm       Pain Score       05/29/23 0633       No Pain          Wt Readings from Last 1 Encounters:   05/29/23 70 8 kg (156 lb)     Additional Vital Signs:   05/30/23 07:49:08 98 2 °F (36 8 °C) 78 16 158/82 107 95 % -- --   05/30/23 0617 -- -- -- -- -- -- None (Room air) --   05/29/23 21:51:23 98 4 °F (36 9 °C) 83 18 148/88 108 94 % -- --   05/29/23 2005 -- -- -- -- -- 95 % -- --   05/29/23 15:06:17 98 1 °F (36 7 °C) 67 17 119/73 88 95 % -- --   05/29/23 07:33:41 98 6 °F (37 °C) 78 18 107/77 87 94 % -- --   05/29/23 06:35:56 98 7 °F (37 1 °C) 85 18 136/89 105 96 % -- --   05/29/23 0432 -- 70 16 124/74 94 97 % None (Room air) Lying   05/29/23 0250 -- 72 20 134/70 96 95 % None (Room air) Lying   05/29/23 0122 -- 76 20 127/69 93 93 % None (Room air) Lying   05/29/23 0000 -- 87  20 146/72  102  95 %  None (Room air) Sitting   05/28/23 2345 -- 86 20 125/72 94 95 % None (Room air)      Date and Time Eye Opening Best Verbal Response Best Motor Response Ray Coma Scale Score 05/29/23 2046 4 5 6 15   05/28/23 2257 4 5 6 15   05/28/23 2235 4 5 6 15     Pertinent Labs/Diagnostic Test Results:   MRI brain w wo contrast   ED Interpretation by Emily Villasenor MD (05/29 0343)   [facility] PRELIMINARY REPORT:    COMPARISON:  MRI BRAIN W WO CONTRAST 5/11/2023 11:38 AM  FINDINGS:  Exam is degraded by motion artifact  Brain: No evidence of diffusion restriction to suspect acute infarct  Again demonstrated are bilateral  scattered small foci intrinsic T1 hyperintense signal with mild enhancement, susceptibility with  surrounding T2 hyperintense halo, measuring less than 1 cm, mildly increased, for example mesial  right occipital lobe or right cerebral peduncle, and increased in number, for example along the right  cerebellar hemisphere, from comparison exam and most consistent with metastatic foci  No  hemorrhage  No significant white matter disease  No edema  Cerebral ventricles: Normal  No ventriculomegaly  Bones/joints: Unremarkable  Paranasal sinuses: Normal as visualized  No acute sinusitis  Mastoid air cells: Left effusion  Orbital cavities: Lenses of the globes have been replaced  Soft tissues: Unremarkable  IMPRESSION:  No acute findings  Constelation    of findings are most compatible with metastatic disease progression  Thank you for allowing us to participate in the care of your patient  Dictated and Authenticated by: Brittnee Lee MD  05/29/2023 3:22 AM Allie Kluver Time (Dmitriy Inga Caciola 2579)      Final Result by Abdulaziz Calderon MD (05/29 6522)      1  Punctate focus of mild increased diffusion signal in the left corona radiata without obvious low signal on ADC map and no enhancement could indicate  tiny subacute infarct in right clinical setting  2   Interval worsening of intracranial hemorrhagic metastatic lesions  Associated mild perilesional vasogenic edema without mass effect        Workstation performed: [id number]         CTA ED chest PE Study   Final Result by [facility] Jacobo Hayden MD (05/29 1456)      Diffuse tree-in-bud nodularity which may represent an infectious bronchiolitis such as with an atypical mycobacterial infection  Workstation performed: [id number]         XR chest 1 view portable   ED Interpretation by Rosa Isela Miramontes MD (05/29 1015)   Increase haziness bilaterally, no evidence of pneumothorax, pleural effusion, focal consolidation  Interpreted by me  Final Result by Fior Gurrola MD (05/29 2131)      Mild bibasilar hypoventilatory changes  Tree-in-bud nodularity described on subsequent CTA chest PE study is not radiographically apparent  Workstation performed: [id number]           5/28/23 ecg Normal sinus rhythm  Cannot rule out Inferior infarct , age undetermined  Abnormal ECG  When compared with ECG of 12-DEC-2013 09:11,  Vent   rate has increased BY  30 BPM  Minimal criteria for Inferior infarct are now Present  Nonspecific T wave abnormality, worse in Inferior leads  Nonspecific T wave abnormality, worse in Anterolateral leads  Results from last 7 days   Lab Units 05/29/23  0036   SARS-COV-2  Negative     Results from last 7 days   Lab Units 05/30/23  0547 05/29/23  0701 05/28/23  2252   HEMATOCRIT % 35 6 33 8* 36 3   HEMOGLOBIN g/dL 12 1 11 4* 12 5   NEUTROS ABS Thousands/µL 5 37 5 68 8 95*   PLATELETS Thousands/uL 209 197 206   WBC Thousand/uL 7 27 7 69 11 07*     Results from last 7 days   Lab Units 05/30/23  0547 05/29/23  0701 05/28/23  2252   ANION GAP mmol/L 7 9 11   BUN mg/dL 7 8 9   CALCIUM mg/dL 8 2* 8 3* 8 9   CHLORIDE mmol/L 106 101 97   CO2 mmol/L 27 28 27   CREATININE mg/dL 0 46* 0 51* 0 58*   EGFR ml/min/1 73sq m 106 103 99   POTASSIUM mmol/L 3 4* 3 5 3 2*   MAGNESIUM mg/dL 2 0 1 3*  --    SODIUM mmol/L 140 138 135     Results from last 7 days   Lab Units 05/30/23  0547 05/29/23  0701 05/28/23  2252   ALBUMIN g/dL 3 2* 3 4* 3 7   ALK PHOS U/L 166* 159* 189*   ALT U/L 12 15 18   AST U/L 12* 22 34   TOTAL BILIRUBIN mg/dL 0 64 1 23* 1 03*   TOTAL PROTEIN g/dL 5 7* 6 0* 6 5         Results from last 7 days   Lab Units 05/30/23  0547 05/29/23  0701 05/28/23  2252   GLUCOSE RANDOM mg/dL 110 110 115     Results from last 7 days   Lab Units 05/29/23  0701   EAG mg/dl 88   HEMOGLOBIN A1C % 4 7     Results from last 7 days   Lab Units 05/29/23  0043 05/28/23  2252   HS TNI 0HR ng/L  --  5   HS TNI 2HR ng/L 6  --    HSTNI D2 ng/L 1  --          Results from last 7 days   Lab Units 05/28/23  2253   INR  1 25*   PROTIME seconds 15 9*   PTT seconds 35     Results from last 7 days   Lab Units 05/28/23  2253   TSH 3RD GENERATON uIU/mL 3 077     Results from last 7 days   Lab Units 05/30/23  0547 05/29/23  0701 05/28/23  2252   PROCALCITONIN ng/ml 0 33* 0 41* 0 36*     Results from last 7 days   Lab Units 05/29/23  0036   LACTIC ACID mmol/L 1 1     Results from last 7 days   Lab Units 05/29/23  0412   BACTERIA UA /hpf Occasional   BILIRUBIN UA  Negative   BLOOD UA  Small*   CLARITY UA  Clear   COLOR UA  Yellow   EPITHELIAL CELLS WET PREP /hpf Occasional   GLUCOSE UA mg/dl Negative   KETONES UA mg/dl Negative   LEUKOCYTES UA  Negative   NITRITE UA  Negative   PH UA  6 0   PROTEIN UA mg/dl Trace*   RBC UA /hpf 1-2   SPEC GRAV UA  >=1 050*   UROBILINOGEN UA (BE) mg/dl <2 0   WBC UA /hpf 1-2     Results from last 7 days   Lab Units 05/29/23  0807 05/29/23 0036   INFLUENZA A PCR   --  Negative   INFLUENZA B PCR   --  Negative   LEGIONELLA URINARY ANTIGEN  Negative  --    RSV PCR   --  Negative   STREP PNEUMONIAE ANTIGEN, URINE  Negative  --      Results from last 7 days   Lab Units 05/30/23  0629 05/29/23 0036   BLOOD CULTURE   --  No Growth at 24 hrs  No Growth at 24 hrs     GRAM STAIN RESULT  Rare Epithelial cells per low power field*  No polys seen*  Rare Gram positive cocci in pairs*  --                    ED Treatment:   Medication Administration from 05/28/2023 2226 to 05/29/2023 2322       Date/Time Order Dose Route Action Action by Comments     05/29/2023 0415 EDT potassium chloride 20 mEq IVPB (premix) 0 mEq Intravenous Stopped Yari Scott RN --     05/29/2023 0103 EDT potassium chloride 20 mEq IVPB (premix) 20 mEq Intravenous New Bag Honorio Uribe RN --     05/29/2023 0101 EDT potassium chloride (K-DUR,KLOR-CON) CR tablet 40 mEq 40 mEq Oral Given Honorio Uribe RN --     05/29/2023 0054 EDT iohexol (OMNIPAQUE) 350 MG/ML injection (SINGLE-DOSE) 85 mL 60 mL Intravenous Given Ed [place] --     05/29/2023 0223 EDT Gadobutrol injection (SINGLE-DOSE) SOLN 7 mL 7 mL Intravenous Given Jan Service --     05/29/2023 0413 EDT ceftriaxone (ROCEPHIN) 1 g/50 mL in dextrose IVPB 1,000 mg Intravenous New Bag Yari Scott RN --        Past Medical History:   Diagnosis Date   • Cervical disc disorder    • Melanoma ([facility] )    • Stenosis of cervical spine region      Present on Admission:  • Hypokalemia  • Mild intermittent asthma  • Tobacco abuse  • Hypertension  • Metastatic melanoma ([facility] )      Admitting Diagnosis: Left homonymous hemianopsia [H53 462]  Acute bronchiolitis [J21 9]  Malignant melanoma metastatic to brain ([facility] ) [C79 31]  Age/Sex: 59 y o  female  Admission Orders:  Scheduled Medications:  amLODIPine, 5 mg, Oral, Daily  atenolol, 50 mg, Oral, Daily  cefTRIAXone, 1,000 mg, Intravenous, Q24H  enoxaparin, 40 mg, Subcutaneous, Daily  escitalopram, 10 mg, Oral, Daily  famotidine, 20 mg, Oral, BID  guaiFENesin, 600 mg, Oral, BID  hydrochlorothiazide, 12 5 mg, Oral, Daily  levalbuterol, 1 25 mg, Nebulization, TID      Continuous IV Infusions:     PRN Meds:  acetaminophen, 650 mg, Oral, Q6H PRN  albuterol, 1 puff, Inhalation, Q6H PRN  benzonatate, 200 mg, Oral, TID PRN        IP CONSULT TO NEUROLOGY  IP CONSULT TO ONCOLOGY  IP CONSULT TO OPHTHALMOLOGY  IP CONSULT TO RADIATION ONCOLOGY  IP CONSULT TO PULMONOLOGY    Network Utilization Review Department  ATTENTION: Please call with any questions or concerns to [phone] and carefully listen to the prompts so that you are directed to the right person  All voicemails are confidential   Boni Michael all requests for admission clinical reviews, approved or denied determinations and any other requests to dedicated fax number below belonging to the campus where the patient is receiving treatment   List of dedicated fax numbers for the Facilities:  [address] DENIALS (Administrative/Medical Necessity) [phone]   [address] (Maternity/NICU/Pediatrics) [phone]   9 Katelyn Jovel [phone]   Rajesh Garland 77 [phone]   [address] [place] [address] [facility] [phone]   1551 [facility] 134 815 [facility] [phone]

## 2023-05-30 NOTE — ASSESSMENT & PLAN NOTE
"POA: Patient reports inability to see the left side of her image  She notes that it appears patchy  On my evaluation there were no overt visual field deficits, however patient stated that everything seems \"gray\" on the left  I suspect this may be in the setting of possible disease progression, however even more likely this may be due to Pembrolizumab side effect, particularly given sudden onset after most recent infusion  Plan:   Consult neurology - recommendations appreciated  Will need specific comments wrt asa and statin  Consulted hematology oncology - appreciate their recs regarding ?  Treatment side effect  Ophthalmology consult placed  "

## 2023-05-30 NOTE — ASSESSMENT & PLAN NOTE
Diagnosed with Stage III acral melanoma in march 2023  Follows up with Dr Gloria Young, recently started on Pembrolizumab  Completed 2nd cycle of Pembrolizumab on 5/25 after which SoB and cough worsened significantly and she endorsed left sided hemianopsia the day following infusion  Suspect symptoms at presentation are likely in the setting of medication side effect  Patient reports that she had appointment coming up with radiation oncology to discuss management of intracranial lesions found on imaging recently concerning for metastatic disease

## 2023-05-30 NOTE — ASSESSMENT & PLAN NOTE
Recent Labs     05/28/23  2252 05/29/23  0701 05/30/23  0547   K 3 2* 3 5 3 4*   MG  --  1 3* 2 0     POA: K 2 9, treated with 20mEq KCl IV and 40 mEq PO of KCl  Suspect this is likely in the setting of poor PO intake  Per chart review patient has a long history of chronic diarrhea with hypokalemia, however she notes that this has resolved since she was started on keytruda  Plan:  Cont  Monitoring K   Replaced K already today   Mag - normal today

## 2023-05-30 NOTE — PROGRESS NOTES
"Progress Note - Pulmonary   Augusta Bring 59 y o  female MRN: [id number]  Unit/Bed#: S -01 Encounter: [id number]    Assessment/Plan:    Abnormal CT chest with diffuse tree-in-bud nodular opacities and elevated procalcitonin in an immunocompromised patient              Complete 7-day course of ceftriaxone for community-acquired pneumonia  Follow-up sputum culture and sputum for AFB x3  Repeat imaging in 6 to 8 weeks      Blurred/double vision              Management per primary team and neurology  Ophthalmology consult is also pending      Mild persistent asthma without acute exacerbation              Continue Xopenex 3 times daily while inpatient  Home regimen is albuterol as needed  Will add ICS/LABA with albuterol as needed at discharge  Would benefit from outpatient pulmonary follow-up and complete PFTs given tobacco abuse history      Metastatic melanoma              Management per oncology and also follows with podiatry for her foot wound      Nicotine dependence in remission              Quit in March 2023  Continued complete cessation      Outpatient follow-up pending course  Chief Complaint:    \"I feel better  \"    Subjective:    Jose Martin Edmond is sitting out of bed in the chair after eating breakfast   She reports she feels better  She does still have a cough with some mucus production and mild shortness of breath associated with this  No pain  She continues with visual disturbance  Objective:    Vitals: Blood pressure 158/82, pulse 78, temperature 98 2 °F (36 8 °C), resp  rate 16, weight 70 8 kg (156 lb), SpO2 95 %, not currently breastfeeding  Room air,Body mass index is 24 43 kg/m²        Intake/Output Summary (Last 24 hours) at 5/30/2023 0812  Last data filed at 5/29/2023 0940  Gross per 24 hour   Intake --   Output 100 ml   Net -100 ml       Invasive Devices     Peripheral Intravenous Line  Duration           " Peripheral IV 05/28/23 Left Antecubital 1 day                Physical Exam:     Physical Exam  Vitals reviewed  Constitutional:       General: She is not in acute distress  Appearance: She is well-developed  She is not toxic-appearing or diaphoretic  HENT:      Head: Normocephalic and atraumatic  Eyes:      General: No scleral icterus  Neck:      Trachea: No tracheal deviation  Cardiovascular:      Rate and Rhythm: Normal rate and regular rhythm  Heart sounds: S1 normal and S2 normal  No murmur heard  No friction rub  No gallop  Pulmonary:      Effort: Pulmonary effort is normal  No tachypnea, accessory muscle usage or respiratory distress  Breath sounds: Normal breath sounds  No stridor  No decreased breath sounds, wheezing, rhonchi or rales  Chest:      Chest wall: No tenderness  Abdominal:      General: Bowel sounds are normal  There is no distension  Palpations: Abdomen is soft  Tenderness: There is no abdominal tenderness  Musculoskeletal:      Cervical back: Neck supple  Right lower leg: No edema  Left lower leg: No edema  Comments: Foot dressing intact  Skin:     General: Skin is warm and dry  Findings: No rash  Neurological:      Mental Status: She is alert and oriented to person, place, and time  GCS: GCS eye subscore is 4  GCS verbal subscore is 5  GCS motor subscore is 6  Psychiatric:         Speech: Speech normal          Behavior: Behavior normal  Behavior is cooperative         Labs:   CBC:   Lab Results   Component Value Date    HCT 35 6 05/30/2023    HGB 12 1 05/30/2023    MCH 35 7 (H) 05/30/2023    MCHC 34 0 05/30/2023     (H) 05/30/2023    MPV 9 0 05/30/2023    NRBC 0 05/30/2023     05/30/2023    RBC 3 39 (L) 05/30/2023    RDW 12 4 05/30/2023    WBC 7 27 05/30/2023   , CMP:   Lab Results   Component Value Date    ALKPHOS 166 (H) 05/30/2023    ALT 12 05/30/2023    AST 12 (L) 05/30/2023    BUN 7 05/30/2023 CALCIUM 8 2 (L) 05/30/2023     05/30/2023    CO2 27 05/30/2023    CREATININE 0 46 (L) 05/30/2023    EGFR 106 05/30/2023    K 3 4 (L) 05/30/2023    SODIUM 140 05/30/2023     Procalcitonin 0 33    Sputum culture and AFB culture x2 pending    Imaging and other studies: None

## 2023-05-30 NOTE — CONSULTS
Consultation - Radiation Oncology   Linwood Martell 59 y o  female MRN: [id number]  Unit/Bed#: S -81 Encounter: [id number]        History of Present Illness   Physician Requesting Consult: Arley Sparrow MD  Reason for Consult / Principal Problem: Brain metastases, melanoma    HPI: Linwood Martell is a 59y o  year old female who presented with a lesion on the right foot  Wide excision on 4/6/23 demonstrated melanoma, acral type, ulcerated with Breslow thickness 4 2 mm  This was Grupo level IV with 4 mitoses per mm2 there were microsatellites with present bu nonbrisk tumor-infiltrating lymphocytes  There was invasive melanoma at margin (one of the satellite nodules extended to the 12 o'clock end margin)  FNA of a right groin node in 4/25/23 demonstrated malignancy, positive for metastatic melanoma  PET/CT on 4/27/23 demonstrated FDG activity along the plantar right foot with hypermetabolic right pelvic lymphadenopathy, compatible with metastases  There was asymmetric enlargement of the right liver  MRI brain on 5/11/23 demonstrated multiple subcentimeter lesions including a 0 3 cm right frontal lobe enhancing hemorrhagic lesion with mild perilesional edema  Tiny enhancing hemorrhagic lesions were seen in the left superior temporal, right caudate, right medial occipital lobes and the right posterior thalmocapsular junction  There were also tiny enhancing lesions in the right cerebellum  She was evaluated by medical oncology and began pembrolizumab  She subsequently developed changes in vision (left homonymous hemianopia) without additional neurologic deficits  MRI brain on 5/29/23 demonstrated numerous enhancing lesions involving the bilateral cerebral and right cerebellar hemispheres, the largest was in the right hippocampus measuring 1 x 0 9 cm (previously 0 2 cm)    There was a punctate focus of mild increased diffusion signal in the left corona radiata without obvious low signal on the ADC map and no enhancement (could indicate a tiny subacute infarct in the right clinical setting)  Radiation oncology has been consulted for further recommendations  Upon interview, she endorses the above history  She has been having mild headaches  She reports visual field cuts  When looking at a clock face, she can appreciated from noon to three o'clock  This same visual field deficit persists when covering either eye  She denies focal numbness/weakness/tingling  She is without additional acute concerns  Inpatient consult to Radiation Oncology  Consult performed by: Doris Coley MD  Consult ordered by: Moshe Mario MD          Review of Systems Negative except as described above  Historical Information   Previous Oncology History:   Oncology History Overview Note   Radiation Oncology consult for metastatic melanoma with brain metastases, referred by Dr Sabino Guan  59year old female presented with a lesion on the bottom of her right foot a few months ago, initially thought to be a plantar wart or callus, no improvement with OTC remedies  She saw her PCP when size increased significantly, then evaluated by Podiatry and the lesion was excised in the office on 23  Pathology demonstrated melanoma, acral type, ulcerated, breslow thickness 4 2 mm  She was referred to Med Onc, Dr Sabino Guan and Dr Brett Garcia, Surg Onc  Patient has family history of melanoma in her father who  of a brain mass, a younger sister who had melanoma and breast cancer diagnosed in her 45s  She underwent additional fine needle aspirate biopsy of right groin lymph node with Dr Brett Garcia that is positive for metastatic melanoma  PET/CT revealed enlarged right liver with FDG activity, MRI abdomen is ordered for    She also underwent MRI brain with results suspicious for tiny hemorrhagic brain metastasis in right frontal lobe, left superior temporal lobe, right caudate, right medial occipital lobes, and right posterior thalamocapsular junction with tiny nonhemorrhagic metastasis in right cerebellum  Her case was discussed at [facility] on 5/17/23 with recommendations for Rad Onc referral  She started systemic therapy with Keytruda on 5/4/23 4/6/23 Skin, right foot, wide excision (Podiatry):  Melanoma, acral type, ulcerated, Breslow thickness: 4 2 mm  Pathological state pT4b  Procedure  Excision    Specimen Laterality  Right    TUMOR   Tumor Site  Skin of lower limb and hip: Right foot          Histologic Type  Acral melanoma    Maximum Tumor (Breslow) Thickness (Millimeters)  4 2 mm   Macroscopic Satellite Nodule(s)  Present    Ulceration  Present    Anatomic (Grupo) Level  IV (Melanoma invades reticular dermis)    Mitotic Rate  4 mitoses per mm2   Microsatellite(s)  Present    Lymphovascular Invasion  Not identified    Neurotropism  Not identified    Tumor-Infiltrating Lymphocytes  Present, nonbrisk    Tumor Regression  Not identified    MARGINS     Margin Status for Invasive Melanoma  Invasive melanoma present at margin    Margin(s) Involved by Invasive Melanoma  Peripheral: One of the satellite nodules extends to the 12 o'clock end margin  Margin Status for Melanoma in situ  All margins negative for melanoma in situ    REGIONAL LYMPH NODES     Regional Lymph Node Status  Not applicable (no regional lymph nodes submitted or found)    PATHOLOGIC STAGE CLASSIFICATION (pTNM, AJCC 8th Edition)     pT Category  pT4b    pN Category  pN1c          4/24/23 Med Onc, Dr Michel Hanson for newly dx melanoma of right foot  Discussed that this is at least Stage IIIc, satellite lesions incompletely cleared with wide local excision in the office  Patient also describes feeling a right thigh nodule recently that was tiny, maybe a size of a marble but now has grown to a large size size of a grape  It is not painful  Recommend biopsy  Plan: Pt will see Dr Cristobal Theodore, surg onc   PET scan and MRI ordered to determine treatment options including systemic therapy vs neoadjuvant approach to resection vs surgery then treatment  4/25/23 Surg Onc, Dr Wilberto Santos  FNA biopsy of the right inguinal lymph node performed  She tolerated this well  Will await the results of her PET/CT  If the PET/CT shows distant metastatic disease, recommend immunotherapy  Surgery to the foot could then be performed for palliative reasons  If she has stage III disease by the PET, recommend neoadjuvant immunotherapy for 3 months followed by surgical intervention at that completing adjuvant immunotherapy based on the SWOG 1801 study  Surgical intervention to the foot would likely be a transmetatarsal amputation for her best functional outcome assuming there is no significant local response to immunotherapy  She should also have a fiducial placed in the inguinal node prior to immunotherapy if she is stage III         4/25/23 Dr Wilberto Santos, FNA lymph node, right groin  - Positive for malignancy  - Metastatic melanoma      4/27/23 PET/CT  1  Increased FDG activity along the plantar right foot corresponds with the history of right foot plantar melanoma  2   Hypermetabolic right pelvic adenopathy compatible with metastases  3  Asymmetrically enlarged right liver with greater FDG intensity compared to the left liver  Recommend contrast MRI liver evaluation  4  No worrisome hypermetabolic lesions in the neck or chest        5/10/23 Dr Tor Jama, Podiatry, wound care  Pt is s/p excisional biopsy of melanoma  She has been changing her dressings daily  Prescribed gentamycin cream for presumed colonization with pseudomonas  She will change this daily  Dispensed a wedge shoe to help offload the ulcer site  Spoke with Dr Wilberto Santos to coordinate the timing on the TMA  His plan includes her completing 3 cycles of immunotherapy after which we do the TMA while he does the SLN biopsy together  I  Pt signed consent  She will f/u in 2 weeks        5/11/23 MRI brain w wo contrast  Findings suspicious for tiny hemorrhagic brain metastasis in right frontal lobe, left superior temporal lobe, right caudate, right medial occipital lobes, and right posterior thalamocapsular junction with tiny nonhemorrhagic metastasis in right cerebellum  Mild perilesional vasogenic edema in right frontal lobe  No acute intracranial abnormality  Mild chronic microangiopathy  5/17/23 NEURO ONCOLOGY CASE REVIEW  PHYSICIAN RECOMMENDED PLAN:   - Radiation oncology referral  - Consider continuing immunotherapy and repeat MRI brain in 2months      5/22/23 Med Onc, Dr Robe Simon  Pt started neoadjuvant treatment with immunotherapy, pembro (Keytruda) and tolerating well  Unfortunately imaging from 5/11/2023 demonstrates small subcentimeter metastatic disease  She has an appointment with radiation oncology next week  She is not having any side effects of the small brain metastases  She will continue treatment with pembrolizumab at this time  Will decide whether or not to switch her over to ipilimumab and nivolumab  Return in 3 weeks for follow-up  5/27/23 MRI abdomen (eval FDG activity in liver on PET)      Upcoming appts:  5/30/23 Podiatry  6/15/23 Dr Rodrick Campoverde  6/15/23 Infusion   7/25/23 Dr Yue Gillespie of right lower extremity including hip ([facility] [address] )   3/28/2023 Initial Diagnosis    Malignant melanoma of right lower extremity including hip ([facility] [address] )     4/6/2023 Surgery    Wide excision by podiatry:  Skin, foot, right, wide excision:  Melanoma, acral type, ulcerated, Breslow thickness: 4 2 mm  PATHOLOGIC STAGE CLASSIFICATION (pTNM, AJCC 8th Edition)     pT Category  pT4b          NGS testing  NRAS Exon 3 p  Q61R  PDL1 positive 1+ 1%  TMB low 3 Mut/Mb    ALK Fusion Unknown Significance RETREG1-ALK Exon 6     4/7/2023 -  Cancer Staged    Staging form: Melanoma of the Skin, AJCC 8th Edition  - Clinical stage from 4/7/2023: Stage III (cT4b, cN1c, cM0) - Signed by Concepcion Amaro MD on 2023 -  Cancer Staged    Staging form: Melanoma of the Skin, AJCC 8th Edition  - Pathologic stage from 2023: Stage IIIC (pT4b, pN1c, cM0) - Signed by Concepcion Amaro MD on 2023 Biopsy    Lymph Node, Right Groin, FNA:  - Positive for malignancy  - Metastatic melanoma        2023 -  Chemotherapy    alteplase (CATHFLO), 2 mg, Intracatheter, Every 1 Minute as needed, 2 of 6 cycles  pembrolizumab (KEYTRUDA) IVPB, 200 mg, Intravenous, Once, 2 of 6 cycles  Administration: 200 mg (2023), 200 mg (2023)     2023 -  Cancer Staged    Staging form: Melanoma of the Skin, AJCC 8th Edition  - Pathologic stage from 2023: Stage IV (pT4b, pN1c, pM1d(0)) - Signed by Concepcion Amaro MD on 2023           Past Medical History:   Diagnosis Date   • Cervical disc disorder    • Melanoma ([facility] [address] )    • Stenosis of cervical spine region      Past Surgical History:   Procedure Laterality Date   • BREAST BIOPSY Left 2009    core   • BREAST BIOPSY Right 2009    core   • CERVICAL FUSION  2013    cC3-C4, C4-C5, C5 C6, C6-C7, C7-T1   •  SECTION     • ELBOW SURGERY     • KNEE SURGERY Bilateral 20YRS AGO     Family History   Problem Relation Age of Onset   • Cancer Mother    • Brain cancer Mother [de-identified]   • Heart disease Father    • Breast cancer Sister 48   • Breast cancer Sister 45   • Breast cancer Maternal Aunt 60   • Breast cancer Maternal Aunt 60   • Breast cancer Maternal Aunt 60   • Breast cancer Cousin 50   • Breast cancer Cousin 52   • Breast cancer Cousin 52   • Prostate cancer Maternal Uncle 55   • No Known Problems Son    • No Known Problems Maternal Grandmother    • No Known Problems Maternal Grandfather    • No Known Problems Paternal Grandmother    • No Known Problems Paternal Grandfather    • BRCA1 Negative Sister    • BRCA2 Negative Sister    • No Known Problems Sister    • No Known Problems Maternal Aunt    • No Known Problems Maternal Aunt    • No Known Problems Maternal Aunt    • No Known Problems Maternal Aunt      Social History   Social History     Substance and Sexual Activity   Alcohol Use Yes    Comment: SOCIAL     Social History     Substance and Sexual Activity   Drug Use Not Currently     Social History     Tobacco Use   Smoking Status Former   • Packs/day: 0 02   • Types: Cigarettes   • Start date: 0   • Quit date: 3/1/2023   • Years since quittin 2   Smokeless Tobacco Never   Tobacco Comments    4 a day       Meds/Allergies   current meds:   Current Facility-Administered Medications   Medication Dose Route Frequency   • acetaminophen (TYLENOL) tablet 650 mg  650 mg Oral Q6H PRN   • albuterol (PROVENTIL HFA,VENTOLIN HFA) inhaler 1 puff  1 puff Inhalation Q6H PRN   • amLODIPine (NORVASC) tablet 5 mg  5 mg Oral Daily   • atenolol (TENORMIN) tablet 50 mg  50 mg Oral Daily   • benzonatate (TESSALON PERLES) capsule 200 mg  200 mg Oral TID PRN   • ceftriaxone (ROCEPHIN) 1 g/50 mL in dextrose IVPB  1,000 mg Intravenous Q24H   • enoxaparin (LOVENOX) subcutaneous injection 40 mg  40 mg Subcutaneous Daily   • escitalopram (LEXAPRO) tablet 10 mg  10 mg Oral Daily   • famotidine (PEPCID) tablet 20 mg  20 mg Oral BID   • guaiFENesin (MUCINEX) 12 hr tablet 600 mg  600 mg Oral BID   • hydrochlorothiazide (HYDRODIURIL) tablet 12 5 mg  12 5 mg Oral Daily   • levalbuterol (XOPENEX) inhalation solution 1 25 mg  1 25 mg Nebulization TID       Allergies   Allergen Reactions   • Methocarbamol Shortness Of Breath and Swelling   • Tramadol Shortness Of Breath and Swelling       Objective     Intake/Output Summary (Last 24 hours) at 2023 0734  Last data filed at 2023 0940  Gross per 24 hour   Intake --   Output 100 ml   Net -100 ml     Invasive Devices     Peripheral Intravenous Line  Duration           Peripheral IV 23 Left Antecubital 1 day              Physical Exam  HENT:      Head: Normocephalic and atraumatic  Eyes:      General: No scleral icterus  Extraocular Movements: Extraocular movements intact  Cardiovascular:      Rate and Rhythm: Normal rate  Pulses: Normal pulses  Pulmonary:      Effort: Pulmonary effort is normal    Abdominal:      General: Abdomen is flat  There is no distension  Musculoskeletal:      Cervical back: Normal range of motion  Comments: Bandage is c/d/i on right foot   Skin:     General: Skin is warm and dry  Neurological:      Mental Status: She is alert and oriented to person, place, and time  Sensory: No sensory deficit  Motor: No weakness  Comments:  No facial asymmetry, dysarthria, lateralized weakness or numbness, and or incoordination  Visual fields show deficits from 3 - 12 o'clock  Psychiatric:         Mood and Affect: Mood normal          Lab Results: I have personally reviewed pertinent reports  Imaging Studies: I have personally reviewed pertinent films in PACS  EKG, Pathology, and Other Studies: I have personally reviewed pertinent reports  Assessment/Plan     Assessment and Plan:  Ms Nuria Dubois is a 59year old woman with newly diagnosed melanoma (acral type) with brain metastases  These have progressed after starting pembrolizumab and she has now developed visual field deficits  I reviewed the diagnosis of brain metastases   I discussed the palliative role of whole brain radiotherapy in the management of this disease process   Radiation is offered in order to delay intracranial progression or neurologic compromise related to brain metastases, this is not curative   I reviewed alternatives to radiation including ongoing immunotherapy, supportive/hospice care   Without treatment, these brain lesions would likely continue to grow and ultimately result in a neurologic death  She is not a candidate for stereotactic treatment given the size, distribution and number of lesions    With a lesion directly involving the hippocampus, hippocampal avoidance is also not an option  Therefore, I have recommended whole brain radiotherapy, 30 Gy in 10 fractions  I discussed the logistics of radiotherapy to the brain which requires CT simulation and mask creation   I described the anticipated 10 fraction treatment course   The risks of radiotherapy to the brain were also discussed   These include but are not limited to fatigue, skin irritation, hair loss, swelling of the brain leading to headaches or steroid therapy, somnolence, xerostomia/dry mouth, permanent hair loss, radionecrosis, irreversible neurocognitive injury/memory impairment and secondary malignancy  Informed consent was obtained today  All questions were answered to her satisfaction      I discussed the potential benefits of adding memantine to whole brain radiation   She has elected to receive this medication  In regards to memantine dosing, this is initiated on the first day of radiotherapy and requires titration   Dosing is as follows: 5 mg daily X 7 days, 5 mg in the morning and 5 mg at night X 7 days, 10 mg in the morning and 5 mg at night X 7 days and then 10 mg BID for weeks 4-24        Given her visual disturbance, I would consider dexamethasone 4 mg BID with PPI        She will continue immunotherapy under the supervision of medical oncology  Her case will also be discussed in [address] [place] [address]      For now, we will proceed with CT simulation for whole brain radiation today  Treatment will begin as early as tomorrow and can start inpatient or outpatient  She understands she cannot drive  I agree with ophthalmology evaluation  Thank you for allowing me to participate in Ms Yepez's care  Code Status: Level 1 - Full Code  Advance Directive and Living Will:      Power of :    POLST:      Counseling / Coordination of Care  Total floor / unit time spent today 50 minutes   Greater than 50% of total time was spent with the patient and / or family counseling and / or coordination of care  A description of the counseling / coordination of care: I reviewed the diagnosis of brain metastases (imaging + pathology) and radiotherapy treatment options

## 2023-05-30 NOTE — ACP (ADVANCE CARE PLANNING)
Advanced Care Planning Progress Note    Serious Illness Conversation    1  What is your understanding now of where you are with your illness? Prognostic Understanding: appropriate understanding of prognosis  She has some problems understanding details about illness  Kathy Brown is had with her and her sister present  She wants to know everything , but she does want her sister to know everything as well in the event that she might forget  2  How much information about what is likely to be ahead with your illness would you like to have? Information: patient wants to be fully informed  But again she wants her sister Elvira Aguilar to know everything as well  3  What did you (clinician) communicate to the patient? I understand that you have a lot of hope regarding future surgical therapies for your foot and also and further radiation therapy for your brain metastasis  I also gather that you are hopeful that the immunotherapy you are getting from the medical oncology team will help treat your cancer  It is possible that you will respond favorably to all these therapies but there is also a very real chance that you could deteriorate  Especially since you have brain mets you may become more confused and not be able to make decisions for yourself  If you were to get sicker and have limited time it is important that we discuss what your wishes would be   4  If your health situation worsens, what are your most important goals? Goals: be at home, be physically comfortable, be emotionally at peace, have my medical decisions respected     5  What are the biggest fears and worries about the future and your health?   Fears/Worries: being a financial burden, being dependent, loss of dignity, loss of mobility  Patient remains hopeful that therapy will give some improvement, but ultimately if her eye sight gets worse and her functional status gets worse after radiation therapy , immunotherapy and surgery she wishes to focus on comfort measures  6  What abilities are so critical to your life that you cannot imagine living without them? Unacceptable Function: being unconscious, not being myself, being in pain or very uncomfortable, being in chronic severe pain, being unable to talk  Being unable to see  7  What gives you strength as you think about the future with your illness? Family and the thought of having more quality time with them  8  If you become sicker, how much are you willing to go through for the possibility of gaining more time? Be in the hospital: Yes Have a feeding tube: Yes   Be in the ICU: Yes Live in a nursing home: Yes   Be on a ventilator: Yes Be uncomfortable: Yes   Be on dialysis: Yes Undergo aggressive test and/or procedures: Yes   9  How much does your proxy and family know about your priorities and wishes? Wants her sister involved in all medical decision making  I’ve heard you say that quality of life  is really important to you  Keeping that in mind, and what we know about your illness, I recommend that we always keep your sister well informed of your clinical course and response to treatments  This will help us make sure that your treatment plans reflect what’s important to you  How does this plan sound to you? I will do everything I can to help you through this    Patient verbalized understanding of the plan     I have spent 9 minutes speaking with my patient on advanced care planning today or during this visit     Advanced directivetoni Juares MD

## 2023-05-30 NOTE — ASSESSMENT & PLAN NOTE
Continue PTA Atenolol 50mg daily, HCTZ 12 5mg daily and Amlodipine 5mg daily  Patient notes that her Amlodipine was recently increased, but she has continued taking 5mg daily  BP acceptable on arrival to the ED so will continue this regimen for now     Continue monitoring /82

## 2023-05-30 NOTE — ASSESSMENT & PLAN NOTE
Met SIRS criteria on admission with heart rate of 100 and respiratory rate of 20  Presented with low-grade fever of 100 1, however endorses fever at home of 100 8  Reports cough acutely worsened after her second Pembrolizumab treatment  Given tree in bud nodularity noted on imaging, mildly elevated procalcitonin, low grade fever and elevated WBC, will initiate treatment for possible pneumonia  Suspect patient also has worsening pneumonitis in the setting of Pembrolizumab use         Resolved

## 2023-05-30 NOTE — PROGRESS NOTES
"Medical Oncology/Hematology Progress Note  Macrina Wang, female, 59 y  o , 1960,  S /S -01, [id number]     Patient is a 61-year-old female with PMH of hypertension, asthma, cervical disc disorder, tobacco use  She is followed by Dr Sharona Wyman for her metastatic melanoma which was initially diagnosed in 3/2023  She is s/p two cycles of pembrolizumab  Last infusion was on 5/25/2023  She presented tot he ED on 5/28/23 with complaints of new vision changes  Patient reported seeing well on the left side of her visual field and having visions akin to \"coming in the house after being out in the bright sun\"  She reported that the vision changes started the morning after her second Keytruda infusion (5/25/23)  Brain MRI (5/29/23) showed interval worsening of intracranial hemorrhagic metastatic lesions  Associated mild perilesional vasogenic edema without mass effect  Radiation-oncology to commence with whole brain radiation  Assessment and Plan:  1  Metastatic melanoma, acral type    Homonymous hemianopia  -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  Initially diagnosed in April 2023    -Brain MRI (5/29/23) showed interval worsening of intracranial hemorrhagic metastatic lesions  Associated mild perilesional vasogenic edema without mass effect  -numerous enhancing lesions with intralesional hemorrhage involving bilateral cerebral and right cerebellar hemispheres  The largest representative is centered in the superior right hippocampal  body measuring 1 x 0 9 cm previously measured 0 2 cm      -There is also 0 5 cm similar appearing lesion along the ependymal surface of anterior horn of right lateral ventricle (series 11 image 79), previously measured 0 2 cm   -There is 0 8 cm hemorrhagic lesion in the posterior left temporal lobe without enhancement, not conspicuous in prior exam  There is mild perilesional edema without mass effect     -Status post 2 doses of pembrolizumab   Last infusion on " 5/25/2023    -Differential diagnosis include progression of brain lesions versus immune-mediated side effect from pembrolizumab  Patient will be starting on whole sherley radiation along with steroids  If hemianopia truly immune-mediated, may need to explore high-dose steroids  2   Abnormal imaging  Abnormal CT chest with diffuse tree-in-bud nodular opacities   Patient currently on antibiotics  Pulmonology on board, plan for sputum culture  -No visible shortness of breath  On room air  PLAN:  -Patient planned for whole brain radiation tomorrow; 30 Gy in 10 fractions  Appreciate radiation oncology for coordinating treatment  CT SIM to be done today      -Agree with dexamethasone 4 mg BID with PPI per Rad-onc     -Discussed with patient that we will continue with the plan for immunotherapy once medically-cleared for discharged  She was reminded that she has only completed two cycles of pembrolizumab and may not witness response in such a short amount of time  Along with her whole brain palliative radiation, the goal is to extend her life as much as she is able to tolerate treatment, along with treatment response     -Discussed imaging in relation to progression of hemorrhagic brain metastasis  Patient was visibly upset about the presentation of hospice care, but discussed that this was not an immediate concern  She was comforted to know when we can take one day at a time to assess response to treatment for both immunotherapy and whole brain radiation  However, she understood that it is not unreasonable to start conversations about goals of care in correlation with her metastatic disease process  She expressed that she is open to discussing comfort-directed measures, but as long as she is still in good physical health, she would like to pursue treatment for as long as she can  Her ECOG is 0-1   She is fairly active and able to perform self-care      -We will follow closely while inpatient to assess clinical "improvement with vision changes  Outpatient follow up plan: Appointment with Dr Ricardo Malik on 6/15/23    Thank you for this consult  Simran Zavala, [address], [place]  Hematology-Oncology     History of present illness:  Khang Singleton is a 59 y o  female  PMH of hypertension, asthma, cervical disc disorder, tobacco use  She is followed by Dr Ricardo Malik for her metastatic cutaneous melanoma, located in the right foot  Depth of invasion was ulcerated 4 2 mm  Lymph node was clinically positive, initially diagnosed in 3/2023  She is s/p two cycles of pembrolizumab  Last infusion was on 5/25/2023  She presented to the ED on 5/28/23 with complaints of new vision changes  Patient reported seeing well on the left side of her visual field and having visions akin to \"coming in the house after being out in the bright sun\"  She reported that the vision changes started the morning after her second Keytruda infusion (5/25/23)  Brain MRI (5/29/23) showed interval worsening of intracranial hemorrhagic metastatic lesions  Associated mild perilesional vasogenic edema without mass effect  Radiation-oncology to commence with whole brain radiation along with dexamethasone 4 mg BID  She has no ambulatory dysfunction; her performance status is 0-1  She denies subjective fevers, chills, dizziness, shortness of breath  Denies pain  Review of Systems:   Review of Systems   Constitutional: Negative for chills and fever  HENT: Negative for ear pain and sore throat  Eyes: Negative for pain and visual disturbance  Respiratory: Negative for cough and shortness of breath  Cardiovascular: Negative for chest pain and palpitations  Gastrointestinal: Negative for abdominal pain and vomiting  Genitourinary: Negative for dysuria and hematuria  Musculoskeletal: Negative for arthralgias and back pain  Skin: Positive for wound  Negative for color change and rash  R foot   Neurological: Negative for seizures and syncope          " Vision changes   Psychiatric/Behavioral: The patient is nervous/anxious  All other systems reviewed and are negative  Oncology History:   Cancer Staging   Malignant melanoma of right lower extremity including hip [facility])  Staging form: Melanoma of the Skin, AJCC 8th Edition  - Clinical stage from 2023: Stage III (cT4b, cN1c, cM0) - Signed by Mellissa Schafer MD on 2023  - Pathologic stage from 2023: Stage IIIC (pT4b, pN1c, cM0) - Signed by Mellissa Schafer MD on 2023  - Pathologic stage from 2023: Stage IV (pT4b, pN1c, pM1d(0)) - Signed by Mellissa Schafer MD on 2023    Oncology History Overview Note   Radiation Oncology consult for metastatic melanoma with brain metastases, referred by Dr Elsy Sousa  59year old female presented with a lesion on the bottom of her right foot a few months ago, initially thought to be a plantar wart or callus, no improvement with OTC remedies  She saw her PCP when size increased significantly, then evaluated by Podiatry and the lesion was excised in the office on 23  Pathology demonstrated melanoma, acral type, ulcerated, breslow thickness 4 2 mm  She was referred to Med Onc, Dr Elsy Sousa and Dr Michael Rubi, Surg Onc  Patient has family history of melanoma in her father who  of a brain mass, a younger sister who had melanoma and breast cancer diagnosed in her 45s  She underwent additional fine needle aspirate biopsy of right groin lymph node with Dr Michael Rubi that is positive for metastatic melanoma  PET/CT revealed enlarged right liver with FDG activity, MRI abdomen is ordered for   She also underwent MRI brain with results suspicious for tiny hemorrhagic brain metastasis in right frontal lobe, left superior temporal lobe, right caudate, right medial occipital lobes, and right posterior thalamocapsular junction with tiny nonhemorrhagic metastasis in right cerebellum   Her case was discussed at [facility] on 23 with recommendations for Rad Onc referral  She started systemic therapy with [place] on 5/4/23 4/6/23 Skin, right foot, wide excision (Podiatry):  Melanoma, acral type, ulcerated, Breslow thickness: 4 2 mm  Pathological state pT4b  Procedure  Excision    Specimen Laterality  Right    TUMOR   Tumor Site  Skin of lower limb and hip: Right foot          Histologic Type  Acral melanoma    Maximum Tumor (Breslow) Thickness (Millimeters)  4 2 mm   Macroscopic Satellite Nodule(s)  Present    Ulceration  Present    Anatomic (Grupo) Level  IV (Melanoma invades reticular dermis)    Mitotic Rate  4 mitoses per mm2   Microsatellite(s)  Present    Lymphovascular Invasion  Not identified    Neurotropism  Not identified    Tumor-Infiltrating Lymphocytes  Present, nonbrisk    Tumor Regression  Not identified    MARGINS     Margin Status for Invasive Melanoma  Invasive melanoma present at margin    Margin(s) Involved by Invasive Melanoma  Peripheral: One of the satellite nodules extends to the 12 o'clock end margin  Margin Status for Melanoma in situ  All margins negative for melanoma in situ    REGIONAL LYMPH NODES     Regional Lymph Node Status  Not applicable (no regional lymph nodes submitted or found)    PATHOLOGIC STAGE CLASSIFICATION (pTNM, AJCC 8th Edition)     pT Category  pT4b    pN Category  pN1c          4/24/23 Med Onc, Dr Eveline Sim for newly dx melanoma of right foot  Discussed that this is at least Stage IIIc, satellite lesions incompletely cleared with wide local excision in the office  Patient also describes feeling a right thigh nodule recently that was tiny, maybe a size of a marble but now has grown to a large size size of a grape  It is not painful  Recommend biopsy  Plan: Pt will see Dr Pa Ariza, surg onc  PET scan and MRI ordered to determine treatment options including systemic therapy vs neoadjuvant approach to resection vs surgery then treatment         4/25/23 Surg Onc, Dr Pa Ariza  FNA biopsy of the right inguinal lymph node performed  She tolerated this well  Will await the results of her PET/CT  If the PET/CT shows distant metastatic disease, recommend immunotherapy  Surgery to the foot could then be performed for palliative reasons  If she has stage III disease by the PET, recommend neoadjuvant immunotherapy for 3 months followed by surgical intervention at that completing adjuvant immunotherapy based on the SWOG 1801 study  Surgical intervention to the foot would likely be a transmetatarsal amputation for her best functional outcome assuming there is no significant local response to immunotherapy  She should also have a fiducial placed in the inguinal node prior to immunotherapy if she is stage III         4/25/23 Dr Cristobal Theodore, FNA lymph node, right groin  - Positive for malignancy  - Metastatic melanoma      4/27/23 PET/CT  1  Increased FDG activity along the plantar right foot corresponds with the history of right foot plantar melanoma  2   Hypermetabolic right pelvic adenopathy compatible with metastases  3  Asymmetrically enlarged right liver with greater FDG intensity compared to the left liver  Recommend contrast MRI liver evaluation  4  No worrisome hypermetabolic lesions in the neck or chest        5/10/23 Dr Gui Hand, Podiatry, wound care  Pt is s/p excisional biopsy of melanoma  She has been changing her dressings daily  Prescribed gentamycin cream for presumed colonization with pseudomonas  She will change this daily  Dispensed a wedge shoe to help offload the ulcer site  Spoke with Dr Cristobal Theodore to coordinate the timing on the TMA  His plan includes her completing 3 cycles of immunotherapy after which we do the TMA while he does the SLN biopsy together  I  Pt signed consent  She will f/u in 2 weeks        5/11/23 MRI brain w wo contrast  Findings suspicious for tiny hemorrhagic brain metastasis in right frontal lobe, left superior temporal lobe, right caudate, right medial occipital lobes, and right posterior thalamocapsular junction with tiny nonhemorrhagic metastasis in right cerebellum  Mild perilesional vasogenic edema in right frontal lobe  No acute intracranial abnormality  Mild chronic microangiopathy  5/17/23 NEURO ONCOLOGY CASE REVIEW  PHYSICIAN RECOMMENDED PLAN:   - Radiation oncology referral  - Consider continuing immunotherapy and repeat MRI brain in 2months      5/22/23 Med Onc, Dr Davey Schultz  Pt started neoadjuvant treatment with immunotherapy, pembro (Keytruda) and tolerating well  Unfortunately imaging from 5/11/2023 demonstrates small subcentimeter metastatic disease  She has an appointment with radiation oncology next week  She is not having any side effects of the small brain metastases  She will continue treatment with pembrolizumab at this time  Will decide whether or not to switch her over to ipilimumab and nivolumab  Return in 3 weeks for follow-up  5/27/23 MRI abdomen (eval FDG activity in liver on PET)      Upcoming appts:  5/30/23 Podiatry  6/15/23 Dr Karuna Hernandez  6/15/23 Infusion   7/25/23 Dr Kyaw Anand of right lower extremity including hip ([facility] [address] )   3/28/2023 Initial Diagnosis    Malignant melanoma of right lower extremity including hip ([facility] [address] )     4/6/2023 Surgery    Wide excision by podiatry:  Skin, foot, right, wide excision:  Melanoma, acral type, ulcerated, Breslow thickness: 4 2 mm  PATHOLOGIC STAGE CLASSIFICATION (pTNM, AJCC 8th Edition)     pT Category  pT4b          NGS testing  NRAS Exon 3 p  Q61R  PDL1 positive 1+ 1%  TMB low 3 Mut/Mb    ALK Fusion Unknown Significance RETREG1-ALK Exon 6     4/7/2023 -  Cancer Staged    Staging form: Melanoma of the Skin, AJCC 8th Edition  - Clinical stage from 4/7/2023: Stage III (cT4b, cN1c, cM0) - Signed by Yves Clemons MD on 4/24/2023 4/7/2023 -  Cancer Staged    Staging form: Melanoma of the Skin, AJCC 8th Edition  - Pathologic stage from 4/7/2023: Stage IIIC (pT4b, pN1c, cM0) - Signed by Jacquelin Conway MD on 2023 Biopsy    Lymph Node, Right Groin, FNA:  - Positive for malignancy  - Metastatic melanoma        2023 -  Chemotherapy    alteplase (CATHFLO), 2 mg, Intracatheter, Every 1 Minute as needed, 2 of 6 cycles  pembrolizumab (KEYTRUDA) IVPB, 200 mg, Intravenous, Once, 2 of 6 cycles  Administration: 200 mg (2023), 200 mg (2023)     2023 -  Cancer Staged    Staging form: Melanoma of the Skin, AJCC 8th Edition  - Pathologic stage from 2023: Stage IV (pT4b, pN1c, pM1d(0)) - Signed by Jacquelin Conway MD on 2023           Past Medical History:   Past Medical History:   Diagnosis Date   • Cervical disc disorder    • Melanoma ([facility] [address] )    • Stenosis of cervical spine region        Past Surgical History:   Procedure Laterality Date   • BREAST BIOPSY Left 2009    core   • BREAST BIOPSY Right 2009    core   • CERVICAL FUSION  2013    cC3-C4, C4-C5, C5 C6, C6-C7, C7-T1   •  SECTION     • ELBOW SURGERY     • KNEE SURGERY Bilateral 20YRS AGO       Family History   Problem Relation Age of Onset   • Cancer Mother    • Brain cancer Mother [de-identified]   • Heart disease Father    • Breast cancer Sister 48   • Breast cancer Sister 45   • Breast cancer Maternal Aunt 60   • Breast cancer Maternal Aunt 60   • Breast cancer Maternal Aunt 60   • Breast cancer Cousin 50   • Breast cancer Cousin 52   • Breast cancer Cousin 52   • Prostate cancer Maternal Uncle 55   • No Known Problems Son    • No Known Problems Maternal Grandmother    • No Known Problems Maternal Grandfather    • No Known Problems Paternal Grandmother    • No Known Problems Paternal Grandfather    • BRCA1 Negative Sister    • BRCA2 Negative Sister    • No Known Problems Sister    • No Known Problems Maternal Aunt    • No Known Problems Maternal Aunt    • No Known Problems Maternal Aunt    • No Known Problems Maternal Aunt        Social History     Socioeconomic History   • Marital status: Single     Spouse name: None   • Number of children: None   • Years of education: None   • Highest education level: None   Occupational History   • None   Tobacco Use   • Smoking status: Former     Packs/day: 0 02     Types: Cigarettes     Start date: 0     Quit date: 3/1/2023     Years since quittin 2   • Smokeless tobacco: Never   • Tobacco comments:     4 a day   Vaping Use   • Vaping Use: Never used   Substance and Sexual Activity   • Alcohol use: Yes     Comment: SOCIAL   • Drug use: Not Currently   • Sexual activity: Yes     Partners: Male   Other Topics Concern   • None   Social History Narrative   • None     Social Determinants of Health     Financial Resource Strain: Not on file   Food Insecurity: Not on file   Transportation Needs: Not on file   Physical Activity: Not on file   Stress: Not on file   Social Connections: Not on file   Intimate Partner Violence: Not on file   Housing Stability: Not on file         Current Facility-Administered Medications:   •  acetaminophen (TYLENOL) tablet 650 mg, 650 mg, Oral, Q6H PRN, Jordi Yepez MD, 650 mg at 23  •  albuterol (PROVENTIL HFA,VENTOLIN HFA) inhaler 1 puff, 1 puff, Inhalation, Q6H PRN, Jordi Yepez MD, 1 puff at 23 1232  •  amLODIPine (NORVASC) tablet 5 mg, 5 mg, Oral, Daily, Jordi Yepez MD, 5 mg at 23  •  atenolol (TENORMIN) tablet 50 mg, 50 mg, Oral, Daily, Jordi Yepez MD, 50 mg at 23  •  benzonatate (TESSALON PERLES) capsule 200 mg, 200 mg, Oral, TID PRN, Celso Sterling, DO, 200 mg at 23  •  ceftriaxone (ROCEPHIN) 1 g/50 mL in dextrose IVPB, 1,000 mg, Intravenous, Q24H, Jordi Yepez MD, Last Rate: 100 mL/hr at 23, 1,000 mg at 23  •  enoxaparin (LOVENOX) subcutaneous injection 40 mg, 40 mg, Subcutaneous, Daily, Jordi Yepez MD, 40 mg at 05/30/23 0903  •  escitalopram (LEXAPRO) tablet 10 mg, 10 mg, Oral, Daily, Merlyn Reed Fiorella Marcelo MD, 10 mg at 05/30/23 7500  •  famotidine (PEPCID) tablet 20 mg, 20 mg, Oral, BID, Hiren Ahumada MD, 20 mg at 05/30/23 0903  •  guaiFENesin (MUCINEX) 12 hr tablet 600 mg, 600 mg, Oral, BID, Hiren Ahumada MD, 600 mg at 05/30/23 0903  •  hydrochlorothiazide (HYDRODIURIL) tablet 12 5 mg, 12 5 mg, Oral, Daily, Hiren Ahumada MD, 12 5 mg at 05/30/23 0903  •  levalbuterol (Cloria Mealing) inhalation solution 1 25 mg, 1 25 mg, Nebulization, TID, PAVEL Bernal, 1 25 mg at 05/30/23 6211    Medications Prior to Admission   Medication   • albuterol (PROVENTIL HFA,VENTOLIN HFA) 90 mcg/act inhaler   • amLODIPine (NORVASC) 5 mg tablet   • atenolol (TENORMIN) 50 mg tablet   • hydrochlorothiazide (MICROZIDE) 12 5 mg capsule   • escitalopram (LEXAPRO) 10 mg tablet   • famotidine (PEPCID) 20 mg tablet   • gentamicin (GARAMYCIN) 0 1 % cream       Allergies   Allergen Reactions   • Methocarbamol Shortness Of Breath and Swelling   • Tramadol Shortness Of Breath and Swelling         Physical Exam:     /82   Pulse 78   Temp 98 2 °F (36 8 °C)   Resp 16   Wt 70 8 kg (156 lb)   SpO2 95%   BMI 24 43 kg/m²     Physical Exam  HENT:      Head: Normocephalic  Mouth/Throat:      Mouth: Mucous membranes are moist    Eyes:      Conjunctiva/sclera: Conjunctivae normal       Comments: No overt strabismus observed   Cardiovascular:      Rate and Rhythm: Normal rate and regular rhythm  Pulses: Normal pulses  Heart sounds: Normal heart sounds  Pulmonary:      Effort: Pulmonary effort is normal       Breath sounds: Normal breath sounds  Abdominal:      General: Abdomen is flat  Palpations: Abdomen is soft  Skin:     General: Skin is warm and dry  Comments: R foot wound, bandage C/D/I   Neurological:      General: No focal deficit present  Mental Status: She is oriented to person, place, and time  Psychiatric:         Behavior: Behavior normal          Thought Content:  Thought "content normal          Judgment: Judgment normal            Recent Results (from the past 48 hour(s))   CBC and differential    Collection Time: 05/28/23 10:52 PM   Result Value Ref Range    WBC 11 07 (H) 4 31 - 10 16 Thousand/uL    RBC 3 55 (L) 3 81 - 5 12 Million/uL    Hemoglobin 12 5 11 5 - 15 4 g/dL    Hematocrit 36 3 34 8 - 46 1 %     (H) 82 - 98 fL    MCH 35 2 (H) 26 8 - 34 3 pg    MCHC 34 4 31 4 - 37 4 g/dL    RDW 12 0 11 6 - 15 1 %    MPV 8 7 (L) 8 9 - 12 7 fL    Platelets 153 733 - 244 Thousands/uL    nRBC 0 /100 WBCs    Neutrophils Relative 82 (H) 43 - 75 %    Immat GRANS % 0 0 - 2 %    Lymphocytes Relative 9 (L) 14 - 44 %    Monocytes Relative 9 4 - 12 %    Eosinophils Relative 0 0 - 6 %    Basophils Relative 0 0 - 1 %    Neutrophils Absolute 8 95 (H) 1 85 - 7 62 Thousands/µL    Immature Grans Absolute 0 04 0 00 - 0 20 Thousand/uL    Lymphocytes Absolute 0 99 0 60 - 4 47 Thousands/µL    Monocytes Absolute 1 03 0 17 - 1 22 Thousand/µL    Eosinophils Absolute 0 03 0 00 - 0 61 Thousand/µL    Basophils Absolute 0 03 0 00 - 0 10 Thousands/µL   Comprehensive metabolic panel    Collection Time: 05/28/23 10:52 PM   Result Value Ref Range    Sodium 135 135 - 147 mmol/L    Potassium 3 2 (L) 3 5 - 5 3 mmol/L    Chloride 97 96 - 108 mmol/L    CO2 27 21 - 32 mmol/L    ANION GAP 11 4 - 13 mmol/L    BUN 9 5 - 25 mg/dL    Creatinine 0 58 (L) 0 60 - 1 30 mg/dL    Glucose 115 65 - 140 mg/dL    Calcium 8 9 8 4 - 10 2 mg/dL    AST 34 13 - 39 U/L    ALT 18 7 - 52 U/L    Alkaline Phosphatase 189 (H) 34 - 104 U/L    Total Protein 6 5 6 4 - 8 4 g/dL    Albumin 3 7 3 5 - 5 0 g/dL    Total Bilirubin 1 03 (H) 0 20 - 1 00 mg/dL    eGFR 99 ml/min/1 73sq m   HS Troponin 0hr (reflex protocol)    Collection Time: 05/28/23 10:52 PM   Result Value Ref Range    hs TnI 0hr 5 \"Refer to ACS Flowchart\"- see link ng/L   Procalcitonin    Collection Time: 05/28/23 10:52 PM   Result Value Ref Range    Procalcitonin 0 36 (H) <=0 25 ng/ml " "  Protime-INR    Collection Time: 05/28/23 10:53 PM   Result Value Ref Range    Protime 15 9 (H) 11 6 - 14 5 seconds    INR 1 25 (H) 0 84 - 1 19   APTT    Collection Time: 05/28/23 10:53 PM   Result Value Ref Range    PTT 35 23 - 37 seconds   TSH, 3rd generation with Free T4 reflex    Collection Time: 05/28/23 10:53 PM   Result Value Ref Range    TSH 3RD GENERATON 3 077 0 450 - 4 500 uIU/mL   FLU/RSV/COVID - if FLU/RSV clinically relevant    Collection Time: 05/29/23 12:36 AM    Specimen: Nose; Nares   Result Value Ref Range    SARS-CoV-2 Negative Negative    INFLUENZA A PCR Negative Negative    INFLUENZA B PCR Negative Negative    RSV PCR Negative Negative   Blood culture #1    Collection Time: 05/29/23 12:36 AM    Specimen: Arm, Right; Blood   Result Value Ref Range    Blood Culture No Growth at 24 hrs  Blood culture #2    Collection Time: 05/29/23 12:36 AM    Specimen: Arm, Left; Blood   Result Value Ref Range    Blood Culture No Growth at 24 hrs      Lactic acid, plasma (w/reflex if result > 2 0)    Collection Time: 05/29/23 12:36 AM   Result Value Ref Range    LACTIC ACID 1 1 0 5 - 2 0 mmol/L   HS Troponin I 2hr    Collection Time: 05/29/23 12:43 AM   Result Value Ref Range    hs TnI 2hr 6 \"Refer to ACS Flowchart\"- see link ng/L    Delta 2hr hsTnI 1 <20 ng/L   UA w Reflex to Microscopic w Reflex to Culture    Collection Time: 05/29/23  4:12 AM    Specimen: Urine, Clean Catch   Result Value Ref Range    Color, UA Yellow     Clarity, UA Clear     Specific Gravity, UA >=1 050 (H) 1 003 - 1 030    pH, UA 6 0 4 5, 5 0, 5 5, 6 0, 6 5, 7 0, 7 5, 8 0    Leukocytes, UA Negative Negative    Nitrite, UA Negative Negative    Protein, UA Trace (A) Negative mg/dl    Glucose, UA Negative Negative mg/dl    Ketones, UA Negative Negative mg/dl    Urobilinogen, UA <2 0 <2 0 mg/dl mg/dl    Bilirubin, UA Negative Negative    Occult Blood, UA Small (A) Negative   Urine Microscopic    Collection Time: 05/29/23  4:12 AM   Result " Value Ref Range    RBC, UA 1-2 None Seen, 1-2 /hpf    WBC, UA 1-2 None Seen, 1-2 /hpf    Epithelial Cells Occasional None Seen, Occasional /hpf    Bacteria, UA Occasional None Seen, Occasional /hpf   Comprehensive metabolic panel    Collection Time: 05/29/23  7:01 AM   Result Value Ref Range    Sodium 138 135 - 147 mmol/L    Potassium 3 5 3 5 - 5 3 mmol/L    Chloride 101 96 - 108 mmol/L    CO2 28 21 - 32 mmol/L    ANION GAP 9 4 - 13 mmol/L    BUN 8 5 - 25 mg/dL    Creatinine 0 51 (L) 0 60 - 1 30 mg/dL    Glucose 110 65 - 140 mg/dL    Calcium 8 3 (L) 8 4 - 10 2 mg/dL    Corrected Calcium 8 8 8 3 - 10 1 mg/dL    AST 22 13 - 39 U/L    ALT 15 7 - 52 U/L    Alkaline Phosphatase 159 (H) 34 - 104 U/L    Total Protein 6 0 (L) 6 4 - 8 4 g/dL    Albumin 3 4 (L) 3 5 - 5 0 g/dL    Total Bilirubin 1 23 (H) 0 20 - 1 00 mg/dL    eGFR 103 ml/min/1 73sq m   Magnesium    Collection Time: 05/29/23  7:01 AM   Result Value Ref Range    Magnesium 1 3 (L) 1 9 - 2 7 mg/dL   CBC and differential    Collection Time: 05/29/23  7:01 AM   Result Value Ref Range    WBC 7 69 4 31 - 10 16 Thousand/uL    RBC 3 23 (L) 3 81 - 5 12 Million/uL    Hemoglobin 11 4 (L) 11 5 - 15 4 g/dL    Hematocrit 33 8 (L) 34 8 - 46 1 %     (H) 82 - 98 fL    MCH 35 3 (H) 26 8 - 34 3 pg    MCHC 33 7 31 4 - 37 4 g/dL    RDW 12 2 11 6 - 15 1 %    MPV 9 0 8 9 - 12 7 fL    Platelets [phone] 402 Thousands/uL    nRBC 0 /100 WBCs    Neutrophils Relative 74 43 - 75 %    Immat GRANS % 0 0 - 2 %    Lymphocytes Relative 13 (L) 14 - 44 %    Monocytes Relative 12 4 - 12 %    Eosinophils Relative 1 0 - 6 %    Basophils Relative 0 0 - 1 %    Neutrophils Absolute 5 68 1 85 - 7 62 Thousands/µL    Immature Grans Absolute 0 03 0 00 - 0 20 Thousand/uL    Lymphocytes Absolute 0 96 0 60 - 4 47 Thousands/µL    Monocytes Absolute 0 91 0 17 - 1 22 Thousand/µL    Eosinophils Absolute 0 08 0 00 - 0 61 Thousand/µL    Basophils Absolute 0 03 0 00 - 0 10 Thousands/µL   Procalcitonin Collection Time: 05/29/23  7:01 AM   Result Value Ref Range    Procalcitonin 0 41 (H) <=0 25 ng/ml   Lipid panel    Collection Time: 05/29/23  7:01 AM   Result Value Ref Range    Cholesterol 123 See Comment mg/dL    Triglycerides 149 See Comment mg/dL    HDL, Direct 30 (L) >=50 mg/dL    LDL Calculated 63 0 - 100 mg/dL    Non-HDL-Chol (CHOL-HDL) 93 mg/dl   Hemoglobin A1C    Collection Time: 05/29/23  7:01 AM   Result Value Ref Range    Hemoglobin A1C 4 7 Normal 3 8-5 6%; PreDiabetic 5 7-6 4%;  Diabetic >=6 5%; Glycemic control for adults with diabetes <7 0% %    EAG 88 mg/dl   Legionella antigen, Urine    Collection Time: 05/29/23  8:07 AM    Specimen: Urine, Clean Catch   Result Value Ref Range    Legionella Urinary Antigen Negative Negative   Strep Pneumoniae, Urine    Collection Time: 05/29/23  8:07 AM    Specimen: Urine, Clean Catch   Result Value Ref Range    Strep pneumoniae antigen, urine Negative Negative   Procalcitonin, Next Day AM Collection    Collection Time: 05/30/23  5:47 AM   Result Value Ref Range    Procalcitonin 0 33 (H) <=0 25 ng/ml   CBC and differential    Collection Time: 05/30/23  5:47 AM   Result Value Ref Range    WBC 7 27 4 31 - 10 16 Thousand/uL    RBC 3 39 (L) 3 81 - 5 12 Million/uL    Hemoglobin 12 1 11 5 - 15 4 g/dL    Hematocrit 35 6 34 8 - 46 1 %     (H) 82 - 98 fL    MCH 35 7 (H) 26 8 - 34 3 pg    MCHC 34 0 31 4 - 37 4 g/dL    RDW 12 4 11 6 - 15 1 %    MPV 9 0 8 9 - 12 7 fL    Platelets [phone] 738 Thousands/uL    nRBC 0 /100 WBCs    Neutrophils Relative 74 43 - 75 %    Immat GRANS % 1 0 - 2 %    Lymphocytes Relative 14 14 - 44 %    Monocytes Relative 9 4 - 12 %    Eosinophils Relative 2 0 - 6 %    Basophils Relative 0 0 - 1 %    Neutrophils Absolute 5 37 1 85 - 7 62 Thousands/µL    Immature Grans Absolute 0 04 0 00 - 0 20 Thousand/uL    Lymphocytes Absolute 1 02 0 60 - 4 47 Thousands/µL    Monocytes Absolute 0 66 0 17 - 1 22 Thousand/µL    Eosinophils Absolute 0 16 0 00 - 0 61 Thousand/µL    Basophils Absolute 0 02 0 00 - 0 10 Thousands/µL   Magnesium    Collection Time: 05/30/23  5:47 AM   Result Value Ref Range    Magnesium 2 0 1 9 - 2 7 mg/dL   Comprehensive metabolic panel    Collection Time: 05/30/23  5:47 AM   Result Value Ref Range    Sodium 140 135 - 147 mmol/L    Potassium 3 4 (L) 3 5 - 5 3 mmol/L    Chloride 106 96 - 108 mmol/L    CO2 27 21 - 32 mmol/L    ANION GAP 7 4 - 13 mmol/L    BUN 7 5 - 25 mg/dL    Creatinine 0 46 (L) 0 60 - 1 30 mg/dL    Glucose 110 65 - 140 mg/dL    Calcium 8 2 (L) 8 4 - 10 2 mg/dL    Corrected Calcium 8 8 8 3 - 10 1 mg/dL    AST 12 (L) 13 - 39 U/L    ALT 12 7 - 52 U/L    Alkaline Phosphatase 166 (H) 34 - 104 U/L    Total Protein 5 7 (L) 6 4 - 8 4 g/dL    Albumin 3 2 (L) 3 5 - 5 0 g/dL    Total Bilirubin 0 64 0 20 - 1 00 mg/dL    eGFR 106 ml/min/1 73sq m       XR chest 1 view portable    Result Date: 5/29/2023  Narrative: CHEST INDICATION:   chest tightness, SOB  COMPARISON: 12/12/2013  EXAM PERFORMED/VIEWS:  XR CHEST PORTABLE FINDINGS: Cardiomediastinal silhouette appears unremarkable  Mild bibasilar hypoventilatory changes  Tree-in-bud nodularity described on subsequent CTA chest PE study is not radiographically apparent  No pneumothorax or pleural effusion  Osseous structures appear within normal limits for patient age  Impression: Mild bibasilar hypoventilatory changes  Tree-in-bud nodularity described on subsequent CTA chest PE study is not radiographically apparent  Workstation performed: [id number]     MRI brain w wo contrast    Result Date: 5/29/2023  Narrative: MRI BRAIN WITH AND WITHOUT CONTRAST INDICATION: blurred vision  COMPARISON: Brain MRI 5/11/2023 TECHNIQUE: Multiplanar, multisequence imaging of the brain was performed before and after gadolinium administration  IV Contrast:  7 mL of Gadobutrol injection (SINGLE-DOSE) IMAGE QUALITY:   Diagnostic   FINDINGS: BRAIN PARENCHYMA: Redemonstrated numerous enhancing lesions with intralesional hemorrhage involving bilateral cerebral and right cerebellar hemispheres  The largest representative is centered in the superior right hippocampal  body measuring 1 x 0 9 cm (series 11 image 62), previously measured 0 2 cm  There is also 0 5 cm similar appearing lesion along the ependymal surface of anterior horn of right lateral ventricle (series 11 image 79), previously measured 0 2 cm  There is 0 8 cm hemorrhagic lesion in the posterior left temporal lobe without enhancement (series 5 image 11), not conspicuous in prior exam  There is mild perilesional edema without mass effect  There is punctuate focus of increased diffusion signal in the left corona radiata without obvious low signal in ADC map and no associated enhancement (series 3 image 20)  Nonspecific  foci of T2/FLAIR hyperintensities involving periventricular and subcortical white matter, most compatible with mild microangiopathic change  VENTRICLES:  Normal for the patient's age  SELLA AND PITUITARY GLAND:  Normal  ORBITS: Prior bilateral lens replacement  PARANASAL SINUSES:  Normal  VASCULATURE:  Evaluation of the major intracranial vasculature demonstrates appropriate flow voids  CALVARIUM AND SKULL BASE:  Normal  EXTRACRANIAL SOFT TISSUES:  Normal      Impression: 1  Punctate focus of mild increased diffusion signal in the left corona radiata without obvious low signal on ADC map and no enhancement could indicate  tiny subacute infarct in right clinical setting  2   Interval worsening of intracranial hemorrhagic metastatic lesions  Associated mild perilesional vasogenic edema without mass effect  Workstation performed: [id number]     CTA ED chest PE Study    Result Date: 5/29/2023  Narrative: CTA - CHEST WITH IV CONTRAST - PULMONARY ANGIOGRAM INDICATION:   hx of metastatic melanoma with new and worsening sob  COMPARISON: None   TECHNIQUE: CTA examination of the chest was performed using angiographic technique according to a protocol specifically tailored to evaluate for pulmonary embolism  Multiplanar 2D reformatted images were created from the source data  In addition, coronal 3D MIP postprocessing was performed on the acquisition scanner  Radiation dose length product (DLP) for this visit:  277 mGy-cm   This examination, like all CT scans performed in the [facility], was performed utilizing techniques to minimize radiation dose exposure, including the use of iterative reconstruction and automated exposure control  IV Contrast:  60 mL of iohexol (OMNIPAQUE) FINDINGS: PULMONARY ARTERIAL TREE:  No pulmonary embolus is seen  LUNGS: Diffuse tree-in-bud nodularity  There is no tracheal or endobronchial lesion  PLEURA: Trace right pleural effusion  Daryl Bumps HEART/GREAT VESSELS:  Unremarkable for patient's age  No thoracic aortic aneurysm  MEDIASTINUM AND ARMINDA: Mediastinal and bilateral hilar adenopathy  For instance there is an 11 mm subcarinal lymph node    CHEST WALL AND LOWER NECK:   Unremarkable  VISUALIZED STRUCTURES IN THE UPPER ABDOMEN:  Unremarkable  OSSEOUS STRUCTURES:  No acute fracture or destructive osseous lesion  Impression: Diffuse tree-in-bud nodularity which may represent an infectious bronchiolitis such as with an atypical mycobacterial infection  Workstation performed: [id number]     MRI brain w wo contrast    Result Date: 5/16/2023  Narrative: MRI BRAIN WITH AND WITHOUT CONTRAST INDICATION: C43 71: Malignant melanoma of right lower limb, including hip    melanoma staging COMPARISON: Nuclear medicine PET/CT 4/27/2023  MRI cervical spine with and without contrast 3/28/2023  MRI brain an MRA head without contrast 11/7/2013  TECHNIQUE: Multiplanar, multisequence imaging of the brain was performed before and after gadolinium administration  Imaging performed on 3 0T MRI IV Contrast:  7 mL of Gadobutrol injection (SINGLE-DOSE) IMAGE QUALITY:   Diagnostic   FINDINGS: BRAIN PARENCHYMA: 0 3 cm right frontal lobe enhancing hemorrhagic lesion with mild perilesional vasogenic edema (10:238), suspicious for hemorrhagic brain metastasis  Tiny enhancing hemorrhagic lesions in left superior temporal, right caudate, right medial occipital lobes, and right right posterior thalamocapsular junction (10:161, 158, 139, 132), suspicious for hemorrhagic brain metastasis  Tiny enhancing lesion in right cerebellum (10:81), suspicious for nonhemorrhagic brain metastasis  No mass effect or midline shift  Chronic microhemorrhage in right temporal lobe  Diffusion imaging is unremarkable  No acute infarction  Tiny right frontal and left parietal developmental venous anomalies  Small scattered hyperintensities on T2/FLAIR imaging are noted in the periventricular and subcortical white matter demonstrating an appearance that is statistically most likely to represent mild microangiopathic change  VENTRICLES:  Normal for the patient's age  SELLA AND PITUITARY GLAND:  Normal  ORBITS: Sequela of bilateral cataract surgery  PARANASAL SINUSES:  Normal  VASCULATURE:  Evaluation of the major intracranial vasculature demonstrates appropriate flow voids  CALVARIUM AND SKULL BASE:  Normal  MASTOIDS: Trace left mastoid effusion  EXTRACRANIAL SOFT TISSUES:  Normal      Impression: Findings suspicious for tiny hemorrhagic brain metastasis in right frontal lobe, left superior temporal lobe, right caudate, right medial occipital lobes, and right posterior thalamocapsular junction with tiny nonhemorrhagic metastasis in right cerebellum  Mild perilesional vasogenic edema in right frontal lobe  No acute intracranial abnormality  Mild chronic microangiopathy  The study was marked in [facility] for immediate notification  Workstation performed: [id number]       Labs and pertinent reports reviewed  This note has been generated by voice recognition software system  Therefore, there may be spelling, grammar, and or syntax errors  Please contact if questions arise

## 2023-05-30 NOTE — PLAN OF CARE
Problem: PAIN - ADULT  Goal: Verbalizes/displays adequate comfort level or baseline comfort level  Description: Interventions:  - Encourage patient to monitor pain and request assistance  - Assess pain using appropriate pain scale  - Administer analgesics based on type and severity of pain and evaluate response  - Implement non-pharmacological measures as appropriate and evaluate response  - Consider cultural and social influences on pain and pain management  - Notify physician/advanced practitioner if interventions unsuccessful or patient reports new pain  Outcome: Progressing     Problem: INFECTION - ADULT  Goal: Absence or prevention of progression during hospitalization  Description: INTERVENTIONS:  - Assess and monitor for signs and symptoms of infection  - Monitor lab/diagnostic results  - Monitor all insertion sites, i e  indwelling lines, tubes, and drains  - Monitor endotracheal if appropriate and nasal secretions for changes in amount and color  - [place] appropriate cooling/warming therapies per order  - Administer medications as ordered  - Instruct and encourage patient and family to use good hand hygiene technique  - Identify and instruct in appropriate isolation precautions for identified infection/condition  Outcome: Progressing  Goal: Absence of fever/infection during neutropenic period  Description: INTERVENTIONS:  - Monitor WBC    Outcome: Progressing     Problem: SAFETY ADULT  Goal: Patient will remain free of falls  Description: INTERVENTIONS:  - Educate patient/family on patient safety including physical limitations  - Instruct patient to call for assistance with activity   - Consult OT/PT to assist with strengthening/mobility   - Keep Call bell within reach  - Keep bed low and locked with side rails adjusted as appropriate  - Keep care items and personal belongings within reach  - Initiate and maintain comfort rounds  - Make Fall Risk Sign visible to staff  - Offer Toileting every 2 Hours, in advance of need  - Initiate/Maintain bed alarm  - Obtain necessary fall risk management equipment  - Apply yellow socks and bracelet for high fall risk patients  - Consider moving patient to room near nurses station  Outcome: Progressing  Goal: Maintain or return to baseline ADL function  Description: INTERVENTIONS:  -  Assess patient's ability to carry out ADLs; assess patient's baseline for ADL function and identify physical deficits which impact ability to perform ADLs (bathing, care of mouth/teeth, toileting, grooming, dressing, etc )  - Assess/evaluate cause of self-care deficits   - Assess range of motion  - Assess patient's mobility; develop plan if impaired  - Assess patient's need for assistive devices and provide as appropriate  - Encourage maximum independence but intervene and supervise when necessary  - Involve family in performance of ADLs  - Assess for home care needs following discharge   - Consider OT consult to assist with ADL evaluation and planning for discharge  - Provide patient education as appropriate  Outcome: Progressing  Goal: Maintains/Returns to pre admission functional level  Description: INTERVENTIONS:  - Perform BMAT or MOVE assessment daily    - Set and communicate daily mobility goal to care team and patient/family/caregiver  - Collaborate with rehabilitation services on mobility goals if consulted  - Perform Range of Motion 2 times a day  - Reposition patient every 2 hours    - Dangle patient 3 times a day  - Stand patient 3 times a day  - Ambulate patient 3 times a day  - Out of bed to chair 3 times a day   - Out of bed for meals 3 times a day  - Out of bed for toileting  - Record patient progress and toleration of activity level   Outcome: Progressing     Problem: DISCHARGE PLANNING  Goal: Discharge to home or other facility with appropriate resources  Description: INTERVENTIONS:  - Identify barriers to discharge w/patient and caregiver  - Arrange for needed discharge resources and transportation as appropriate  - Identify discharge learning needs (meds, wound care, etc )  - Arrange for interpretive services to assist at discharge as needed  - Refer to Case Management Department for coordinating discharge planning if the patient needs post-hospital services based on physician/advanced practitioner order or complex needs related to functional status, cognitive ability, or social support system  Outcome: Progressing     Problem: Knowledge Deficit  Goal: Patient/family/caregiver demonstrates understanding of disease process, treatment plan, medications, and discharge instructions  Description: Complete learning assessment and assess knowledge base    Interventions:  - Provide teaching at level of understanding  - Provide teaching via preferred learning methods  Outcome: Progressing

## 2023-05-31 ENCOUNTER — DOCUMENTATION (OUTPATIENT)
Dept: RADIATION ONCOLOGY | Facility: HOSPITAL | Age: 63
End: 2023-05-31

## 2023-05-31 LAB
ANION GAP SERPL CALCULATED.3IONS-SCNC: 8 MMOL/L (ref 4–13)
BUN SERPL-MCNC: 6 MG/DL (ref 5–25)
CALCIUM SERPL-MCNC: 8.6 MG/DL (ref 8.4–10.2)
CHLORIDE SERPL-SCNC: 104 MMOL/L (ref 96–108)
CO2 SERPL-SCNC: 27 MMOL/L (ref 21–32)
CREAT SERPL-MCNC: 0.5 MG/DL (ref 0.6–1.3)
GFR SERPL CREATININE-BSD FRML MDRD: 104 ML/MIN/1.73SQ M
GLUCOSE SERPL-MCNC: 97 MG/DL (ref 65–140)
POTASSIUM SERPL-SCNC: 3.7 MMOL/L (ref 3.5–5.3)
RHODAMINE-AURAMINE STN SPEC: NORMAL
SODIUM SERPL-SCNC: 139 MMOL/L (ref 135–147)

## 2023-05-31 PROCEDURE — 94760 N-INVAS EAR/PLS OXIMETRY 1: CPT

## 2023-05-31 PROCEDURE — 77412 RADIATION TX DELIVERY LVL 3: CPT | Performed by: STUDENT IN AN ORGANIZED HEALTH CARE EDUCATION/TRAINING PROGRAM

## 2023-05-31 PROCEDURE — 99232 SBSQ HOSP IP/OBS MODERATE 35: CPT | Performed by: INTERNAL MEDICINE

## 2023-05-31 PROCEDURE — 77331 SPECIAL RADIATION DOSIMETRY: CPT | Performed by: RADIOLOGY

## 2023-05-31 PROCEDURE — 77280 THER RAD SIMULAJ FIELD SMPL: CPT | Performed by: STUDENT IN AN ORGANIZED HEALTH CARE EDUCATION/TRAINING PROGRAM

## 2023-05-31 PROCEDURE — 94640 AIRWAY INHALATION TREATMENT: CPT

## 2023-05-31 PROCEDURE — 77427 RADIATION TX MANAGEMENT X5: CPT | Performed by: RADIOLOGY

## 2023-05-31 PROCEDURE — 80048 BASIC METABOLIC PNL TOTAL CA: CPT | Performed by: INTERNAL MEDICINE

## 2023-05-31 RX ORDER — BUDESONIDE AND FORMOTEROL FUMARATE DIHYDRATE 160; 4.5 UG/1; UG/1
2 AEROSOL RESPIRATORY (INHALATION) 2 TIMES DAILY
Status: DISCONTINUED | OUTPATIENT
Start: 2023-05-31 | End: 2023-06-01 | Stop reason: HOSPADM

## 2023-05-31 RX ORDER — ALBUTEROL SULFATE 90 UG/1
2 AEROSOL, METERED RESPIRATORY (INHALATION) 4 TIMES DAILY
Status: DISCONTINUED | OUTPATIENT
Start: 2023-05-31 | End: 2023-06-01 | Stop reason: HOSPADM

## 2023-05-31 RX ORDER — HYDROCODONE POLISTIREX AND CHLORPHENIRAMINE POLISTIREX 10; 8 MG/5ML; MG/5ML
5 SUSPENSION, EXTENDED RELEASE ORAL EVERY 12 HOURS PRN
Status: DISCONTINUED | OUTPATIENT
Start: 2023-05-31 | End: 2023-06-01 | Stop reason: HOSPADM

## 2023-05-31 RX ORDER — METOCLOPRAMIDE HYDROCHLORIDE 5 MG/ML
10 INJECTION INTRAMUSCULAR; INTRAVENOUS EVERY 8 HOURS SCHEDULED
Status: DISCONTINUED | OUTPATIENT
Start: 2023-05-31 | End: 2023-06-01 | Stop reason: HOSPADM

## 2023-05-31 RX ORDER — ALBUTEROL SULFATE 2.5 MG/3ML
2.5 SOLUTION RESPIRATORY (INHALATION) EVERY 6 HOURS PRN
Status: DISCONTINUED | OUTPATIENT
Start: 2023-05-31 | End: 2023-06-01 | Stop reason: HOSPADM

## 2023-05-31 RX ORDER — KETOROLAC TROMETHAMINE 30 MG/ML
30 INJECTION, SOLUTION INTRAMUSCULAR; INTRAVENOUS EVERY 8 HOURS
Status: DISCONTINUED | OUTPATIENT
Start: 2023-05-31 | End: 2023-06-01 | Stop reason: HOSPADM

## 2023-05-31 RX ORDER — DIPHENHYDRAMINE HYDROCHLORIDE 50 MG/ML
25 INJECTION INTRAMUSCULAR; INTRAVENOUS EVERY 8 HOURS PRN
Status: DISCONTINUED | OUTPATIENT
Start: 2023-05-31 | End: 2023-06-01 | Stop reason: HOSPADM

## 2023-05-31 RX ORDER — MEMANTINE HYDROCHLORIDE 5 MG/1
5 TABLET ORAL DAILY
Status: DISCONTINUED | OUTPATIENT
Start: 2023-05-31 | End: 2023-06-01 | Stop reason: HOSPADM

## 2023-05-31 RX ADMIN — ALBUTEROL SULFATE 1 PUFF: 90 AEROSOL, METERED RESPIRATORY (INHALATION) at 00:26

## 2023-05-31 RX ADMIN — MEMANTINE 5 MG: 5 TABLET ORAL at 19:13

## 2023-05-31 RX ADMIN — HYDROCODONE POLISTIREX AND CHLORPHENIRAMINE POLISTIREX 5 ML: 10; 8 SUSPENSION, EXTENDED RELEASE ORAL at 13:58

## 2023-05-31 RX ADMIN — ALBUTEROL SULFATE 2 PUFF: 90 AEROSOL, METERED RESPIRATORY (INHALATION) at 13:58

## 2023-05-31 RX ADMIN — GUAIFENESIN 600 MG: 600 TABLET ORAL at 09:54

## 2023-05-31 RX ADMIN — ESCITALOPRAM OXALATE 10 MG: 10 TABLET ORAL at 09:53

## 2023-05-31 RX ADMIN — FAMOTIDINE 20 MG: 20 TABLET ORAL at 19:13

## 2023-05-31 RX ADMIN — ACETAMINOPHEN 650 MG: 325 TABLET ORAL at 14:02

## 2023-05-31 RX ADMIN — GUAIFENESIN 600 MG: 600 TABLET ORAL at 19:13

## 2023-05-31 RX ADMIN — ATENOLOL 50 MG: 50 TABLET ORAL at 09:54

## 2023-05-31 RX ADMIN — AMLODIPINE BESYLATE 5 MG: 5 TABLET ORAL at 09:53

## 2023-05-31 RX ADMIN — MELATONIN 3 MG: 3 TAB ORAL at 22:14

## 2023-05-31 RX ADMIN — BUDESONIDE AND FORMOTEROL FUMARATE DIHYDRATE 2 PUFF: 160; 4.5 AEROSOL RESPIRATORY (INHALATION) at 19:15

## 2023-05-31 RX ADMIN — LEVALBUTEROL HYDROCHLORIDE 1.25 MG: 1.25 SOLUTION RESPIRATORY (INHALATION) at 07:12

## 2023-05-31 RX ADMIN — ALBUTEROL SULFATE 2 PUFF: 90 AEROSOL, METERED RESPIRATORY (INHALATION) at 19:13

## 2023-05-31 RX ADMIN — FAMOTIDINE 20 MG: 20 TABLET ORAL at 09:53

## 2023-05-31 RX ADMIN — HYDROCHLOROTHIAZIDE 12.5 MG: 12.5 TABLET ORAL at 09:54

## 2023-05-31 RX ADMIN — DIPHENHYDRAMINE HYDROCHLORIDE 25 MG: 50 INJECTION, SOLUTION INTRAMUSCULAR; INTRAVENOUS at 22:39

## 2023-05-31 RX ADMIN — KETOROLAC TROMETHAMINE 30 MG: 30 INJECTION, SOLUTION INTRAMUSCULAR at 21:54

## 2023-05-31 RX ADMIN — ACETAMINOPHEN 650 MG: 325 TABLET ORAL at 00:38

## 2023-05-31 RX ADMIN — BENZONATATE 200 MG: 100 CAPSULE ORAL at 00:37

## 2023-05-31 RX ADMIN — METOCLOPRAMIDE 10 MG: 5 INJECTION, SOLUTION INTRAMUSCULAR; INTRAVENOUS at 21:54

## 2023-05-31 RX ADMIN — ALBUTEROL SULFATE 2 PUFF: 90 AEROSOL, METERED RESPIRATORY (INHALATION) at 09:55

## 2023-05-31 RX ADMIN — BENZONATATE 200 MG: 100 CAPSULE ORAL at 22:14

## 2023-05-31 RX ADMIN — CEFTRIAXONE SODIUM 1000 MG: 10 INJECTION, POWDER, FOR SOLUTION INTRAVENOUS at 05:02

## 2023-05-31 RX ADMIN — ALBUTEROL SULFATE 2 PUFF: 90 AEROSOL, METERED RESPIRATORY (INHALATION) at 22:15

## 2023-05-31 RX ADMIN — ENOXAPARIN SODIUM 40 MG: 40 INJECTION SUBCUTANEOUS at 09:53

## 2023-05-31 NOTE — ASSESSMENT & PLAN NOTE
Met SIRS criteria on admission with heart rate of 100 and respiratory rate of 20  Presented with low-grade fever of 100 1, however endorses fever at home of 100 8  Reports cough acutely worsened after her second Pembrolizumab treatment  Given tree in bud nodularity noted on imaging, mildly elevated procalcitonin, low grade fever and elevated WBC, will initiate treatment for possible pneumonia  Suspect patient also has worsening pneumonitis in the setting of Pembrolizumab use         Resolved- noted antibiotics recommended by pulmonology plan for 7 days  Can transition to oral once stable for DC

## 2023-05-31 NOTE — PROGRESS NOTES
NEURO ONCOLOGY CASE REVIEW     DATE: 5/31/23  PRESENTING DOCTOR: Dr Charmayne Caffey    DIAGNOSIS:  Metastatic melanoma; Brain metastases       Loree Guzman is a 59 y o  female who was presented at Neuro Oncology Case Review today  Newly diagnosed melanoma (acral type) with brain metastases  Brain lesions have progressed after starting pembrolizumab and she presented to the ED on 5/28/23 with visual field deficits  She is currently inpatient at [facility]  She was seen for inpatient radiation consultation 5/30/23, palliative whole brain radiation therapy and initiating memantine was discussed, patient agreeable  She is followed by Dr Trevor Pierre for her systemic therapy  She was presented today to review new MRI brain imaging and discuss RT recommendations  PHYSICIAN RECOMMENDED PLAN:   - Continue with plan to begin whole brain radiation therapy today as inpatient      6/15/23 Hematology onc follow up, Dr Kwaku Starks    Imaging Reviewed:   5/29/23 MRI brain:   1  Punctate focus of mild increased diffusion signal in the left corona radiata without obvious low signal on ADC map and no enhancement could indicate  tiny subacute infarct in right clinical setting  2   Interval worsening of intracranial hemorrhagic metastatic lesions  Associated mild perilesional vasogenic edema without mass effect  5/11/23 MRI brain      Team agreed to plan  NCCN guidelines were readily available for review at this discussion  The final treatment plan will be left at the discretion of the patient and the treating physician  DISCLAIMERS:  TO THE TREATING PHYSICIAN:  This conference is a meeting of clinicians from various specialty areas who evaluate and discuss patients for whom a multidisciplinary treatment approach is being considered   Please note that the above opinion was a consensus of the conference attendees and is intended only to assist in quality care of the discussed patient  The responsibility for follow up on the input given during the conference, along with any final decisions regarding plan of care, is that of the patient and the patient's provider  Accordingly, appointments have only been recommended based on this information; and have NOT been scheduled unless otherwise noted  TO THE PATIENT:  This summary is a brief record of major aspects of your cancer treatment  You may choose to can share a your copy with any of your doctors or nurses  However, this is not a detailed or comprehensive record of your care

## 2023-05-31 NOTE — ASSESSMENT & PLAN NOTE
Continue PTA Atenolol 50mg daily, HCTZ 12 5mg daily and Amlodipine 5mg daily  Patient notes that her Amlodipine was recently increased, but she has continued taking 5mg daily  BP acceptable on arrival to the ED so will continue this regimen for now     Continue monitoring /85

## 2023-05-31 NOTE — ASSESSMENT & PLAN NOTE
MRI showing ?  Subacute stroke   Discussed with neurology  Patient was not a candidate for antithrombotic therapy given metastatic lesions with hemorrhages    No recs regarding asa or statin from neurology and would defer this to them

## 2023-05-31 NOTE — ASSESSMENT & PLAN NOTE
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

## 2023-05-31 NOTE — PROGRESS NOTES
"Progress Note - Pulmonary   Tory Aly 59 y o  female MRN: [id number]  Unit/Bed#: S -01 Encounter: [id number]    Assessment/Plan:    1  Acute pulmonary insufficiency likely multifaceted as listed below        -Currently on room air-94%, does not wear home O2        -Maintain saturations greater than 89%        -Pulmonary toileting: Deep breathing cough, OOB as tolerated, IS Q 1 hr    2  Abnormal chest CT        -Bilateral diffuse tree-in-bud nodularity        -Likely secondary to underlying infectious etiology        -Procalcitonin: 0 33--0 41--0 36        -Day # 3/7-ceftriaxone        -We will repeat imaging in 6 to 8 weeks-if persistent may consider bronchoscopy    3  Mild persistent asthma without acute exacerbation        -May have a component of overlap        -Inpatient: We will try monitoring off Xopenex nebulizer, will add Proventil 2 puffs 3 times daily,        -Please discharge patient on this home regimen: Symbicort 160-4 5 mcg 2 puffs twice daily, Proventil 2 puffs every 6 as needed albuterol nebulizer every 6 as needed         -Please provide patient with nebulizer         -No indication for steroid therapy at this time    4  Melanoma with metastatic disease to the brain        -Radiation oncology following        -Scheduled for brain radiation today        -May need high-dose steroid    5  Recent tobacco abuse        -Patient quit smoking 3/2023        -Congratulated and encouraged on continued tobacco cessation        -Patient would benefit from formal PFT testing      -Outpatient follow-up or discharge instruction  -Pulmonary will sign off         Chief Complaint:    \" I feel better I just keep coughing\"    Subjective:    Beatris Boss was comfortably sitting in her bed  She reports that she is still having significant bouts of dry coughing  Patient currently denying any fevers, chills, abscess, headaches, night sweats, pleuritic chest pain, or palpations      Objective:    Vitals: Blood " pressure 155/95, pulse 69, temperature 99 2 °F (37 3 °C), resp  rate 17, weight 70 8 kg (156 lb), SpO2 96 %, not currently breastfeeding  ra,Body mass index is 24 43 kg/m²  No intake or output data in the 24 hours ending 05/31/23 0848    Invasive Devices     Peripheral Intravenous Line  Duration           Peripheral IV 05/28/23 Left Antecubital 2 days                Physical Exam:  Physical Exam  Constitutional:       General: She is not in acute distress  Appearance: Normal appearance  She is normal weight  She is not ill-appearing  HENT:      Head: Normocephalic and atraumatic  Nose: Nose normal  No congestion or rhinorrhea  Mouth/Throat:      Mouth: Mucous membranes are dry  Pharynx: Oropharynx is clear  No oropharyngeal exudate or posterior oropharyngeal erythema  Cardiovascular:      Rate and Rhythm: Normal rate and regular rhythm  Pulses: Normal pulses  Heart sounds: Normal heart sounds  No murmur heard  No friction rub  No gallop  Pulmonary:      Effort: Pulmonary effort is normal  No tachypnea, bradypnea, accessory muscle usage or respiratory distress  Breath sounds: No stridor, decreased air movement or transmitted upper airway sounds  No decreased breath sounds, wheezing, rhonchi or rales  Comments: Overall clear breath sounds  Chest:      Chest wall: No tenderness  Abdominal:      General: Abdomen is flat  Bowel sounds are normal  There is no distension  Palpations: Abdomen is soft  There is no mass  Musculoskeletal:         General: No swelling or tenderness  Normal range of motion  Cervical back: Normal range of motion  No rigidity or tenderness  Skin:     General: Skin is warm and dry  Coloration: Skin is not jaundiced or pale  Neurological:      General: No focal deficit present  Mental Status: She is alert and oriented to person, place, and time  Mental status is at baseline     Psychiatric:         Mood and Affect: Mood "normal          Behavior: Behavior normal          Labs: I have personally reviewed pertinent lab results  , ABG: No results found for: \"BEART\", \"SRP1WUS\", \"E6BCKOXQ\", \"GOS1ISV\", \"PHART\", \"PO2ART\", \"SOURCE\", BNP: No results found for: \"BNP\", CBC: No results found for: \"ADJUSTEDWBC\", \"HCT\", \"HGB\", \"MCH\", \"MCHC\", \"MCV\", \"MPV\", \"NRBC\", \"PLT\", \"RBC\", \"RDW\", \"WBC\", CMP: No results found for: \"ALKPHOS\", \"ALT\", \"ANIONGAP\", \"AST\", \"BILITOT\", \"BUN\", \"CALCIUM\", \"CL\", \"CO2\", \"CREATININE\", \"EGFR\", \"GLUCOSE\", \"K\", \"PROT\", \"SODIUM\", Troponin: No results found for: \"TROPONINI\"        Imaging and other studies: I have personally reviewed pertinent films in PACS     Bilateral diffuse tree-in-bud  "

## 2023-05-31 NOTE — PROGRESS NOTES
"[facility]  Progress Note  Name: Arlyn Chau  MRN: [id number]  Unit/Bed#: S -27 I Date of Admission: 5/28/2023   Date of Service: 5/31/2023 I Hospital Day: 2    Assessment/Plan   * Blurred vision in bilateral left visual fields  Assessment & Plan  POA: Patient reports inability to see the left side of her image  She notes that it appears patchy  On my evaluation there were no overt visual field deficits, however patient stated that everything seems \"gray\" on the left  I suspect this may be in the setting of possible disease progression, however even more likely this may be due to Pembrolizumab side effect, particularly given sudden onset after most recent infusion  Plan:   Consult neurology - recommendations appreciated  No recs for asa or statin in their documentation will confirm with them prior to DC  Heme-onc- discussed with CRNP - they will give  Specific recs with regards to steroids today  Ophthalmology evaluated the patient, recommendations appreciated  No intrinsic occular abnormalities noted by them and they recommend consideration for symptoms to be secondary to brain mets versus cancer therapy  Cerebrovascular accident (CVA) [facility])  Assessment & Plan  MRI showing ? Subacute stroke   Discussed with neurology  Patient was not a candidate for antithrombotic therapy given metastatic lesions with hemorrhages    No recs regarding asa or statin from neurology and would defer this to them     SIRS (systemic inflammatory response syndrome) ([facility] [address] )  Assessment & Plan  Met SIRS criteria on admission with heart rate of 100 and respiratory rate of 20  Presented with low-grade fever of 100 1, however endorses fever at home of 100 8  Reports cough acutely worsened after her second Pembrolizumab treatment  Given tree in bud nodularity noted on imaging, mildly elevated procalcitonin, low grade fever and elevated WBC, will initiate treatment for possible pneumonia   " Suspect patient also has worsening pneumonitis in the setting of Pembrolizumab use  Resolved- noted antibiotics recommended by pulmonology plan for 7 days  Can transition to oral once stable for DC      Metastatic melanoma [facility])  Assessment & Plan  Diagnosed with Stage III acral melanoma in march 2023  Follows up with Dr Elsy Sousa, recently started on Pembrolizumab  Completed 2nd cycle of Pembrolizumab on 5/25 after which SoB and cough worsened significantly and she endorsed left sided hemianopsia the day following infusion  Suspect symptoms at presentation are likely in the setting of medication side effect  Patient reports that she had appointment coming up with radiation oncology to discuss management of intracranial lesions found on imaging recently concerning for metastatic disease  Hypertension  Assessment & Plan  Continue PTA Atenolol 50mg daily, HCTZ 12 5mg daily and Amlodipine 5mg daily  Patient notes that her Amlodipine was recently increased, but she has continued taking 5mg daily  BP acceptable on arrival to the ED so will continue this regimen for now  Continue monitoring /85    Hypokalemia  Assessment & Plan  Recent Labs     05/28/23  2252 05/29/23  0701 05/30/23  0547   K 3 2* 3 5 3 4*   MG  --  1 3* 2 0     POA: K 2 9, treated with 20mEq KCl IV and 40 mEq PO of KCl  Suspect this is likely in the setting of poor PO intake  Per chart review patient has a long history of chronic diarrhea with hypokalemia, however she notes that this has resolved since she was started on keytruda  Plan:  Check BMP and mag tomorrow    Tobacco abuse  Assessment & Plan  Patient reports quitting once diagnosed with melanoma  Will hold off on ordering NRT      Mild intermittent asthma  Assessment & Plan  PTA on Albuterol rescue inhaler  Continue PTA Albuterol 1 puff q6h PRN   No acute exacerbation               VTE Pharmacologic Prophylaxis: VTE Score: 5 Moderate Risk (Score 3-4) - Pharmacological DVT Prophylaxis Ordered: heparin  Patient Centered Rounds: I performed bedside rounds with nursing staff today  Discussions with Specialists or Other Care Team Provider: Discussed with med onc  Education and Discussions with Family / Patient: called [facility] -   Total Time Spent on Date of Encounter in care of patient: 30 min  This time was spent on one or more of the following: performing physical exam; counseling and coordination of care; obtaining or reviewing history; documenting in the medical record; reviewing/ordering tests, medications or procedures; communicating with other healthcare professionals and discussing with patient's family/caregivers  Current Length of Stay: 2 day(s)  Current Patient Status: Inpatient   Certification Statement: The patient will continue to require additional inpatient hospital stay due to deterimine if any utility for steroids   Discharge Plan: Anticipate discharge tomorrow to home  Code Status: Level 1 - Full Code    Subjective:   Patient seen and examined  No new concerns and symptoms    Objective:     Vitals:   Temp (24hrs), Av 6 °F (37 °C), Min:98 °F (36 7 °C), Max:99 7 °F (37 6 °C)    Temp:  [98 °F (36 7 °C)-99 7 °F (37 6 °C)] 98 °F (36 7 °C)  HR:  [64-70] 64  Resp:  [16-17] 17  BP: (133-155)/(79-95) 146/85  SpO2:  [93 %-98 %] 93 %  Body mass index is 24 43 kg/m²  Input and Output Summary (last 24 hours):   No intake or output data in the 24 hours ending 23 1646    Physical Exam:   Physical Exam  Constitutional:       General: She is not in acute distress  Appearance: She is not ill-appearing, toxic-appearing or diaphoretic  HENT:      Head: Normocephalic  Right Ear: Tympanic membrane normal       Mouth/Throat:      Mouth: Mucous membranes are moist    Eyes:      General: No scleral icterus  Right eye: No discharge  Left eye: No discharge  Pupils: Pupils are equal, round, and reactive to light  Cardiovascular:      Rate and Rhythm: Normal rate  Heart sounds: No murmur heard  No friction rub  No gallop  Pulmonary:      Effort: Pulmonary effort is normal  No respiratory distress  Breath sounds: No stridor  No wheezing, rhonchi or rales  Chest:      Chest wall: No tenderness  Abdominal:      General: Abdomen is flat  There is no distension  Palpations: There is no mass  Tenderness: There is no abdominal tenderness  There is no right CVA tenderness, left CVA tenderness, guarding or rebound  Hernia: No hernia is present  Musculoskeletal:         General: No swelling, tenderness, deformity or signs of injury  Right lower leg: No edema  Left lower leg: No edema  Skin:     Capillary Refill: Capillary refill takes less than 2 seconds  Coloration: Skin is not jaundiced or pale  Findings: No bruising, erythema, lesion or rash  Neurological:      Mental Status: She is alert  Cranial Nerves: No cranial nerve deficit  Sensory: No sensory deficit  Motor: No weakness        Coordination: Coordination normal       Gait: Gait normal       Deep Tendon Reflexes: Reflexes normal    Psychiatric:         Mood and Affect: Mood normal           Additional Data:     Labs:  Results from last 7 days   Lab Units 05/30/23  0547   EOS PCT % 2   HEMATOCRIT % 35 6   HEMOGLOBIN g/dL 12 1   LYMPHS PCT % 14   MONOS PCT % 9   NEUTROS PCT % 74   PLATELETS Thousands/uL 209   WBC Thousand/uL 7 27     Results from last 7 days   Lab Units 05/31/23  1117 05/30/23  0547   ANION GAP mmol/L 8 7   ALBUMIN g/dL  --  3 2*   ALK PHOS U/L  --  166*   ALT U/L  --  12   AST U/L  --  12*   BUN mg/dL 6 7   CALCIUM mg/dL 8 6 8 2*   CHLORIDE mmol/L 104 106   CO2 mmol/L 27 27   CREATININE mg/dL 0 50* 0 46*   GLUCOSE RANDOM mg/dL 97 110   POTASSIUM mmol/L 3 7 3 4*   SODIUM mmol/L 139 140   TOTAL BILIRUBIN mg/dL  --  0 64     Results from last 7 days   Lab Units 05/28/23  2253   INR  1 25* Results from last 7 days   Lab Units 05/29/23  0701   HEMOGLOBIN A1C % 4 7     Results from last 7 days   Lab Units 05/30/23  0547 05/29/23  0701 05/29/23  0036 05/28/23  2252   LACTIC ACID mmol/L  --   --  1 1  --    PROCALCITONIN ng/ml 0 33* 0 41*  --  0 36*       Lines/Drains:  Invasive Devices     Peripheral Intravenous Line  Duration           Peripheral IV 05/28/23 Left Antecubital 2 days                      Imaging: No pertinent imaging reviewed  Recent Cultures (last 7 days):   Results from last 7 days   Lab Units 05/30/23  0629 05/29/23  0807 05/29/23  0036   BLOOD CULTURE   --   --  No Growth at 48 hrs  No Growth at 48 hrs  GRAM STAIN RESULT  Rare Epithelial cells per low power field*  No polys seen*  Rare Gram positive cocci in pairs*  --   --    LEGIONELLA URINARY ANTIGEN   --  Negative  --    SPUTUM CULTURE  Culture results to follow    --   --        Last 24 Hours Medication List:   Current Facility-Administered Medications   Medication Dose Route Frequency Provider Last Rate   • acetaminophen  650 mg Oral Q6H PRN Sharan Martinez MD     • albuterol  2 puff Inhalation 4x Daily PAVEL Schwarz     • albuterol  2 5 mg Nebulization Q6H PRN PAVEL Schwarz     • amLODIPine  5 mg Oral Daily Sharan Martinez MD     • atenolol  50 mg Oral Daily Sharan Martinez MD     • benzonatate  200 mg Oral TID PRN Verito Shelton DO     • budesonide-formoterol  2 puff Inhalation BID PAVEL Schwarz     • cefTRIAXone  1,000 mg Intravenous Q24H Sharan Martinez MD 1,000 mg (05/31/23 0502)   • enoxaparin  40 mg Subcutaneous Daily Sharan Martinez MD     • escitalopram  10 mg Oral Daily Sharan Martinez MD     • famotidine  20 mg Oral BID Sharan Martinez MD     • guaiFENesin  600 mg Oral BID Sharan Martinez MD     • hydrochlorothiazide  12 5 mg Oral Daily Sharan Martinez MD     • Hydrocod Hipolito-Chlorphe Hipolito ER  5 mL Oral Q12H PRN PAVEL Schwarz     • melatonin  3 mg Oral HS Malaika De Leon MD          Today, Patient Was Seen By: Wayne Johnston MD    **Please Note: This note may have been constructed using a voice recognition system  **

## 2023-05-31 NOTE — ASSESSMENT & PLAN NOTE
Recent Labs     05/28/23  2252 05/29/23  0701 05/30/23  0547   K 3 2* 3 5 3 4*   MG  --  1 3* 2 0     POA: K 2 9, treated with 20mEq KCl IV and 40 mEq PO of KCl  Suspect this is likely in the setting of poor PO intake  Per chart review patient has a long history of chronic diarrhea with hypokalemia, however she notes that this has resolved since she was started on keytruda       Plan:  Check BMP and mag tomorrow

## 2023-05-31 NOTE — ASSESSMENT & PLAN NOTE
"POA: Patient reports inability to see the left side of her image  She notes that it appears patchy  On my evaluation there were no overt visual field deficits, however patient stated that everything seems \"gray\" on the left  I suspect this may be in the setting of possible disease progression, however even more likely this may be due to Pembrolizumab side effect, particularly given sudden onset after most recent infusion  Plan:   Consult neurology - recommendations appreciated  No recs for asa or statin in their documentation will confirm with them prior to DC  Heme-onc- discussed with CRNP - they will give  Specific recs with regards to steroids today  Ophthalmology evaluated the patient, recommendations appreciated  No intrinsic occular abnormalities noted by them and they recommend consideration for symptoms to be secondary to brain mets versus cancer therapy     "

## 2023-06-01 VITALS
SYSTOLIC BLOOD PRESSURE: 144 MMHG | BODY MASS INDEX: 24.43 KG/M2 | TEMPERATURE: 98.8 F | RESPIRATION RATE: 16 BRPM | HEART RATE: 63 BPM | OXYGEN SATURATION: 93 % | WEIGHT: 156 LBS | DIASTOLIC BLOOD PRESSURE: 84 MMHG

## 2023-06-01 PROBLEM — J18.9 PNA (PNEUMONIA): Status: ACTIVE | Noted: 2023-06-01

## 2023-06-01 LAB
BACTERIA SPT RESP CULT: ABNORMAL
GRAM STN SPEC: ABNORMAL

## 2023-06-01 PROCEDURE — 99239 HOSP IP/OBS DSCHRG MGMT >30: CPT | Performed by: INTERNAL MEDICINE

## 2023-06-01 PROCEDURE — 77412 RADIATION TX DELIVERY LVL 3: CPT | Performed by: STUDENT IN AN ORGANIZED HEALTH CARE EDUCATION/TRAINING PROGRAM

## 2023-06-01 PROCEDURE — 99233 SBSQ HOSP IP/OBS HIGH 50: CPT | Performed by: NURSE PRACTITIONER

## 2023-06-01 RX ORDER — GUAIFENESIN 600 MG/1
600 TABLET, EXTENDED RELEASE ORAL 2 TIMES DAILY
Qty: 28 TABLET | Refills: 0 | Status: SHIPPED | OUTPATIENT
Start: 2023-06-01 | End: 2023-06-17

## 2023-06-01 RX ORDER — CEFDINIR 300 MG/1
300 CAPSULE ORAL EVERY 12 HOURS SCHEDULED
Qty: 6 CAPSULE | Refills: 0 | Status: SHIPPED | OUTPATIENT
Start: 2023-06-02 | End: 2023-06-05

## 2023-06-01 RX ORDER — DEXAMETHASONE 4 MG/1
4 TABLET ORAL EVERY 6 HOURS SCHEDULED
Qty: 28 TABLET | Refills: 0 | Status: SHIPPED | OUTPATIENT
Start: 2023-06-01 | End: 2023-06-07 | Stop reason: SDUPTHER

## 2023-06-01 RX ORDER — MEMANTINE HYDROCHLORIDE 5 MG/1
5 TABLET ORAL DAILY
Qty: 7 TABLET | Refills: 0 | Status: SHIPPED | OUTPATIENT
Start: 2023-06-02 | End: 2023-06-07 | Stop reason: CLARIF

## 2023-06-01 RX ORDER — DEXAMETHASONE 4 MG/1
4 TABLET ORAL EVERY 6 HOURS SCHEDULED
Status: DISCONTINUED | OUTPATIENT
Start: 2023-06-01 | End: 2023-06-01 | Stop reason: HOSPADM

## 2023-06-01 RX ADMIN — ALBUTEROL SULFATE 2 PUFF: 90 AEROSOL, METERED RESPIRATORY (INHALATION) at 09:09

## 2023-06-01 RX ADMIN — KETOROLAC TROMETHAMINE 30 MG: 30 INJECTION, SOLUTION INTRAMUSCULAR at 05:06

## 2023-06-01 RX ADMIN — HYDROCHLOROTHIAZIDE 12.5 MG: 12.5 TABLET ORAL at 09:09

## 2023-06-01 RX ADMIN — FAMOTIDINE 20 MG: 20 TABLET ORAL at 09:09

## 2023-06-01 RX ADMIN — ALBUTEROL SULFATE 2 PUFF: 90 AEROSOL, METERED RESPIRATORY (INHALATION) at 13:25

## 2023-06-01 RX ADMIN — AMLODIPINE BESYLATE 5 MG: 5 TABLET ORAL at 09:09

## 2023-06-01 RX ADMIN — BUDESONIDE AND FORMOTEROL FUMARATE DIHYDRATE 2 PUFF: 160; 4.5 AEROSOL RESPIRATORY (INHALATION) at 09:09

## 2023-06-01 RX ADMIN — MEMANTINE 5 MG: 5 TABLET ORAL at 09:09

## 2023-06-01 RX ADMIN — ENOXAPARIN SODIUM 40 MG: 40 INJECTION SUBCUTANEOUS at 09:08

## 2023-06-01 RX ADMIN — ESCITALOPRAM OXALATE 10 MG: 10 TABLET ORAL at 09:09

## 2023-06-01 RX ADMIN — CEFTRIAXONE SODIUM 1000 MG: 10 INJECTION, POWDER, FOR SOLUTION INTRAVENOUS at 05:09

## 2023-06-01 RX ADMIN — ATENOLOL 50 MG: 50 TABLET ORAL at 09:09

## 2023-06-01 RX ADMIN — GUAIFENESIN 600 MG: 600 TABLET ORAL at 09:09

## 2023-06-01 RX ADMIN — DEXAMETHASONE 4 MG: 4 TABLET ORAL at 13:25

## 2023-06-01 RX ADMIN — METOCLOPRAMIDE 10 MG: 5 INJECTION, SOLUTION INTRAMUSCULAR; INTRAVENOUS at 05:06

## 2023-06-01 NOTE — DISCHARGE INSTR - AVS FIRST PAGE
Please continue taking Omnicef 300 mg twice daily starting tomorrow for 3 days  Please start taking Decadron 4 mg every 6 hours until you see Dr Pearson Apo have been started on a new medication called memantine 5 mg  This medication will need to be tapered up please continue to follow-up with heme oncology radiation oncology regarding this medication taper  This is to help with your radiation treatments  The following is the taper that was recommended by radiation oncology    Taper: 5 mg daily X 7 days, 5 mg in the morning and 5 mg at night X 7 days, 10 mg in the morning and 5 mg at night X 7 days and then 10 mg BID for weeks 4-24

## 2023-06-01 NOTE — DISCHARGE SUMMARY
"[facility]  Discharge- Melyssa Her 1960, 59 y o  female MRN: [id number]  Unit/Bed#: S -01 Encounter: [id number]  Primary Care Provider: Cece Degroot DO   Date and time admitted to hospital: 5/28/2023 10:36 PM    * Blurred vision in bilateral left visual fields  Assessment & Plan  POA: Patient reports inability to see the left side of her image  She notes that it appears patchy  On my evaluation there were no overt visual field deficits, however patient stated that everything seems \"gray\" on the left  I suspect this may be in the setting of possible disease progression, however even more likely this may be due to Pembrolizumab side effect, particularly given sudden onset after most recent infusion  · Patient was eval by neurology, heme-onc and radiation oncology  Based based on findings and clinical progression, this is likely secondary to metastatic disease  · Patient has received 2 rounds of radiation without much improved improvement of her bilateral homonymous hemianopsia  · Patient was started on 4 mg of milligrams of Decadron every 6 hours until she sees oncology as an outpatient    SIRS (systemic inflammatory response syndrome) ([facility] [address] )  Assessment & Plan  Met SIRS criteria on admission with heart rate of 100 and respiratory rate of 20  Presented with low-grade fever of 100 1, however endorses fever at home of 100 8  Reports cough acutely worsened after her second Pembrolizumab treatment  Given tree in bud nodularity noted on imaging, mildly elevated procalcitonin, low grade fever and elevated WBC, will initiate treatment for possible pneumonia  Suspect patient also has worsening pneumonitis in the setting of Pembrolizumab use         Resolved- noted antibiotics recommended by pulmonology plan for 7 days  Can transition to oral once stable for DC      PNA (pneumonia)  Assessment & Plan  Patient's presentation consistent with pneumonia    Patient was " evaluated by pulmonology, recommendations appreciated  Patient received ceftriaxone while inpatient, will discharge with 3 more days of cefdinir to complete a course of 7 days  Patient patient will need a CT scan scan in 6 weeks to ensure resolution      Cerebrovascular accident (CVA) [facility])  Assessment & Plan  MRI showing ? Subacute stroke   Discussed with neurology  Patient was not a candidate for antithrombotic therapy given metastatic lesions with hemorrhages    Metastatic melanoma [facility])  Assessment & Plan  Diagnosed with Stage III acral melanoma in march 2023  Follows up with Dr Gui Martinez, recently started on Pembrolizumab  Completed 2nd cycle of Pembrolizumab on 5/25 after which SoB and cough worsened significantly and she endorsed left sided hemianopsia the day following infusion  Suspect symptoms at presentation are likely in the setting of medication side effect  Patient reports that she had appointment coming up with radiation oncology to discuss management of intracranial lesions found on imaging recently concerning for metastatic disease  Hypertension  Assessment & Plan  Continue PTA Atenolol 50mg daily, HCTZ 12 5mg daily and Amlodipine 5mg daily  Patient notes that her Amlodipine was recently increased, but she has continued taking 5mg daily  BP acceptable on arrival to the ED so will continue this regimen for now  Continue monitoring /85    Hypokalemia  Assessment & Plan  POA: K 2 9, treated with 20mEq KCl IV and 40 mEq PO of KCl  Suspect this is likely in the setting of poor PO intake  Per chart review patient has a long history of chronic diarrhea with hypokalemia, however she notes that this has resolved since she was started on keytruda  Tobacco abuse  Assessment & Plan  Patient reports quitting once diagnosed with melanoma  Will hold off on ordering NRT      Mild intermittent asthma  Assessment & Plan  PTA on Albuterol rescue inhaler  Continue PTA Albuterol 1 puff q6h PRN   No acute exacerbation      Discharging Resident Physician: Maylin Christianson MD  Attending: No att  providers found  PCP: Obi Samuel DO  Admission Date: 5/28/2023  Discharge Date: 06/01/23    Disposition:     Home    Reason for Admission: Bilateral Homonymous hemianopsia    Consultations During Hospital Stay:  · Neurology, oncology, radiation oncology    Procedures Performed:     ·   Significant Findings / Test Results:     RADIOLOGY RESULTS   Final Result by  (06/01 0723)      MRI brain w wo contrast   ED Interpretation by Kimmy Worley MD (05/29 3981)   [facility] PRELIMINARY REPORT:    COMPARISON:  MRI BRAIN W WO CONTRAST 5/11/2023 11:38 AM  FINDINGS:  Exam is degraded by motion artifact  Brain: No evidence of diffusion restriction to suspect acute infarct  Again demonstrated are bilateral  scattered small foci intrinsic T1 hyperintense signal with mild enhancement, susceptibility with  surrounding T2 hyperintense halo, measuring less than 1 cm, mildly increased, for example mesial  right occipital lobe or right cerebral peduncle, and increased in number, for example along the right  cerebellar hemisphere, from comparison exam and most consistent with metastatic foci  No  hemorrhage  No significant white matter disease  No edema  Cerebral ventricles: Normal  No ventriculomegaly  Bones/joints: Unremarkable  Paranasal sinuses: Normal as visualized  No acute sinusitis  Mastoid air cells: Left effusion  Orbital cavities: Lenses of the globes have been replaced  Soft tissues: Unremarkable  IMPRESSION:  No acute findings  Constelation    of findings are most compatible with metastatic disease progression  Thank you for allowing us to participate in the care of your patient  Dictated and Authenticated by: London Turner MD  05/29/2023 3:22 AM [facility] Radha Time ([address])      Final Result by Pooja Stephens MD (05/29 1043)      1    Punctate focus of mild increased diffusion signal in the left corona radiata without obvious low signal on ADC map and no enhancement could indicate  tiny subacute infarct in right clinical setting  2   Interval worsening of intracranial hemorrhagic metastatic lesions  Associated mild perilesional vasogenic edema without mass effect  Workstation performed: [id number]         CTA ED chest PE Study   Final Result by Ronnie Wilkerson MD (05/29 5564)      Diffuse tree-in-bud nodularity which may represent an infectious bronchiolitis such as with an atypical mycobacterial infection  Workstation performed: [id number]         XR chest 1 view portable   ED Interpretation by Abel Kebede MD (05/29 1015)   Increase haziness bilaterally, no evidence of pneumothorax, pleural effusion, focal consolidation  Interpreted by me  Final Result by Hillary Yarbrough MD (05/29 2131)      Mild bibasilar hypoventilatory changes  Tree-in-bud nodularity described on subsequent CTA chest PE study is not radiographically apparent  Workstation performed: [id number]           ·     Incidental Findings:   ·      Test Results Pending at Discharge (will require follow up):   ·      Outpatient Tests Requested:  ·     Complications:  none    Hospital Course:     Blanca Van is a 59 y o  female patient who originally presented to the hospital on 5/28/2023 due to visual disturbances  On examination she had bilateral homonymous hemianopsia  Patient was recently diagnosed with metastatic melanoma and MRI of the brain at the time of admission reveled worsening intracranial hemorrhagic lesions with mild edema  Patient was also having cough and uri symptoms, therefore ct of the chest was obtained which showed tree in bud nodularity with possibility of pna  Patient was started on ceftriaxone and eventually dc with omnicef to complete a total course of 7 days  Given patient was on [place] there was some concern that her symptoms were due to this medication  Patient was started on decadron and will follow up response as an outpatient  Patient also was started on WBR during this admission  She was started on memantine with an uptaper by radiation onc:  5 mg daily X 7 days, 5 mg in the morning and 5 mg at night X 7 days, 10 mg in the morning and 5 mg at night X 7 days and then 10 mg BID for weeks 4-24  Condition at Discharge: stable     Discharge Day Visit / Exam:     Subjective:  Patient is doing well  No overnight events  Vitals: Blood Pressure: 144/84 (06/01/23 1506)  Pulse: 63 (06/01/23 1506)  Temperature: 98 8 °F (37 1 °C) (06/01/23 1506)  Temp Source: Oral (06/01/23 0653)  Respirations: 16 (06/01/23 1506)  Weight - Scale: 70 8 kg (156 lb) (05/29/23 0039)  SpO2: 93 % (06/01/23 1506)  Exam:   Physical Exam  Vitals reviewed  Constitutional:       General: She is not in acute distress  Appearance: Normal appearance  HENT:      Head: Normocephalic and atraumatic  Nose: No congestion or rhinorrhea  Mouth/Throat:      Pharynx: Oropharynx is clear  Eyes:      General: No scleral icterus  Right eye: No discharge  Left eye: No discharge  Extraocular Movements: Extraocular movements intact  Conjunctiva/sclera: Conjunctivae normal       Pupils: Pupils are equal, round, and reactive to light  Cardiovascular:      Rate and Rhythm: Normal rate and regular rhythm  Pulses: Normal pulses  Heart sounds: Normal heart sounds  No murmur heard  No gallop  Pulmonary:      Effort: Pulmonary effort is normal  No respiratory distress  Breath sounds: No wheezing or rales  Abdominal:      General: Abdomen is flat  Bowel sounds are normal  There is no distension  Palpations: Abdomen is soft  Tenderness: There is no abdominal tenderness  Musculoskeletal:         General: No swelling, tenderness or deformity  Right lower leg: No edema  Left lower leg: No edema     Skin:     General: Skin is warm and dry  Capillary Refill: Capillary refill takes less than 2 seconds  Coloration: Skin is not pale  Findings: No bruising, erythema or rash  Neurological:      Mental Status: She is alert and oriented to person, place, and time  Mental status is at baseline  Cranial Nerves: Cranial nerve deficit present  Motor: No weakness  Discharge instructions/Information to patient and family:   See after visit summary for information provided to patient and family  Provisions for Follow-Up Care:  See after visit summary for information related to follow-up care and any pertinent home health orders  Planned Readmission: no     Discharge Medications:  See after visit summary for reconciled discharge medications provided to patient and family        ** Please Note: This note has been constructed using a voice recognition system **

## 2023-06-01 NOTE — ASSESSMENT & PLAN NOTE
MRI showing ?  Subacute stroke   Discussed with neurology  Patient was not a candidate for antithrombotic therapy given metastatic lesions with hemorrhages

## 2023-06-01 NOTE — ASSESSMENT & PLAN NOTE
"POA: Patient reports inability to see the left side of her image  She notes that it appears patchy  On my evaluation there were no overt visual field deficits, however patient stated that everything seems \"gray\" on the left  I suspect this may be in the setting of possible disease progression, however even more likely this may be due to Pembrolizumab side effect, particularly given sudden onset after most recent infusion  · Patient was eval by neurology, heme-onc and radiation oncology  Based based on findings and clinical progression, this is likely secondary to metastatic disease    · Patient has received 2 rounds of radiation without much improved improvement of her bilateral homonymous hemianopsia  · Patient was started on 4 mg of milligrams of Decadron every 6 hours until she sees oncology as an outpatient  "

## 2023-06-01 NOTE — ASSESSMENT & PLAN NOTE
Diagnosed with Stage III acral melanoma in march 2023  Follows up with Dr Flo Mckeon, recently started on Pembrolizumab  Completed 2nd cycle of Pembrolizumab on 5/25 after which SoB and cough worsened significantly and she endorsed left sided hemianopsia the day following infusion  Suspect symptoms at presentation are likely in the setting of medication side effect  Patient reports that she had appointment coming up with radiation oncology to discuss management of intracranial lesions found on imaging recently concerning for metastatic disease

## 2023-06-01 NOTE — ASSESSMENT & PLAN NOTE
POA: K 2 9, treated with 20mEq KCl IV and 40 mEq PO of KCl  Suspect this is likely in the setting of poor PO intake  Per chart review patient has a long history of chronic diarrhea with hypokalemia, however she notes that this has resolved since she was started on keytruda

## 2023-06-01 NOTE — PROGRESS NOTES
"Medical Oncology/Hematology Progress Note  Muriel Santiago, female, 59 y  o , 1960,  S /S -01, [id number]     Patient is a 81-year-old female with PMH of hypertension, asthma, cervical disc disorder, tobacco use  She is followed by Dr Perlita Lebron for her metastatic melanoma which was initially diagnosed in 3/2023  She is s/p two cycles of pembrolizumab  Last infusion was on 5/25/2023  She presented tot he ED on 5/28/23 with complaints of new vision changes  Patient reported seeing well on the left side of her visual field and having visions akin to \"coming in the house after being out in the bright sun\"  She reported that the vision changes started the morning after her second Keytruda infusion (5/25/23)  Brain MRI (5/29/23) showed interval worsening of intracranial hemorrhagic metastatic lesions  Associated mild perilesional vasogenic edema without mass effect  She is s/p 2 sessions of whole brain radiation  Assessment and Plan:  1  Metastatic melanoma, acral type    Homonymous hemianopia  -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  Initially diagnosed in April 2023    -Brain MRI (5/29/23) showed interval worsening of intracranial hemorrhagic metastatic lesions  Associated mild perilesional vasogenic edema without mass effect  -numerous enhancing lesions with intralesional hemorrhage involving bilateral cerebral and right cerebellar hemispheres  The largest representative is centered in the superior right hippocampal  body measuring 1 x 0 9 cm previously measured 0 2 cm      -There is also 0 5 cm similar appearing lesion along the ependymal surface of anterior horn of right lateral ventricle (series 11 image 79), previously measured 0 2 cm   -There is 0 8 cm hemorrhagic lesion in the posterior left temporal lobe without enhancement, not conspicuous in prior exam  There is mild perilesional edema without mass effect     -Status post 2 doses of pembrolizumab   Last infusion on " "5/25/2023    -Differential diagnosis include progression of brain lesions versus immune-mediated side effect from pembrolizumab  Patient will be starting on whole sherley radiation along with steroids  If hemianopia truly immune-mediated, may need to explore high-dose steroids  2   Abnormal imaging  Abnormal CT chest with diffuse tree-in-bud nodular opacities   Patient currently on antibiotics  Pulmonology on board, plan for sputum culture  -No visible shortness of breath  On room air  PLAN:  -Patient s/p two fractions of whole brain radiation; plan is 30 Gy in 10 fractions      -Patient continues to complain of blurry vision  Although she understands that we will not necessarily experience immediate relief from two sessions of whole brain radiation, will start her on  dexamethasone 4 mg q 6 hours for symptom relief  Tapering will be coordinated by Dr Sharona Wyman in the outpatient setting  She has an appointment on 6/15/23     -Recommend PPI with steroids as ulcer px    -Patient very excited to finally get discharged today  She reported feeling well and positive about her cancer journey  Outpatient follow up plan: Appointment with Dr Sharona Wyman on 6/15/23    Thank you for this consult  Ion Mcnally, [address], CRNP  Hematology-Oncology     History of present illness:  Macrina Wang is a 59 y o  female  PMH of hypertension, asthma, cervical disc disorder, tobacco use  She is followed by Dr Sharona Wyman for her metastatic cutaneous melanoma, located in the right foot  Depth of invasion was ulcerated 4 2 mm  Lymph node was clinically positive, initially diagnosed in 3/2023  She is s/p two cycles of pembrolizumab  Last infusion was on 5/25/2023  She presented to the ED on 5/28/23 with complaints of new vision changes  Patient reported seeing well on the left side of her visual field and having visions akin to \"coming in the house after being out in the bright sun\"   She reported that the vision changes started the morning " after her second Keytruda infusion (5/25/23)  Brain MRI (5/29/23) showed interval worsening of intracranial hemorrhagic metastatic lesions  Associated mild perilesional vasogenic edema without mass effect  Of note, radiation-oncology has administered two fractions of whole brain radiation  She will be started on dexamethasone 4 mg q6 hours for symptom relief  She has no ambulatory dysfunction; her performance status is 0-1  She denies subjective fevers, chills, dizziness, shortness of breath  Denies pain  Review of Systems:   Review of Systems   Constitutional: Negative for chills and fever  HENT: Negative for ear pain and sore throat  Eyes: Negative for pain and visual disturbance  Respiratory: Negative for cough and shortness of breath  Cardiovascular: Negative for chest pain and palpitations  Gastrointestinal: Negative for abdominal pain and vomiting  Genitourinary: Negative for dysuria and hematuria  Musculoskeletal: Negative for arthralgias and back pain  Skin: Positive for wound  Negative for color change and rash  R foot   Neurological: Negative for seizures and syncope  Vision changes, blurry vision   Psychiatric/Behavioral: The patient is not nervous/anxious  All other systems reviewed and are negative  Oncology History:   Cancer Staging   Malignant melanoma of right lower extremity including hip [facility])  Staging form: Melanoma of the Skin, AJCC 8th Edition  - Clinical stage from 4/7/2023: Stage III (cT4b, cN1c, cM0) - Signed by Kwadwo Miramontes MD on 4/24/2023  - Pathologic stage from 4/7/2023: Stage IIIC (pT4b, pN1c, cM0) - Signed by Kwadwo Miramontes MD on 4/24/2023  - Pathologic stage from 5/11/2023: Stage IV (pT4b, pN1c, pM1d(0)) - Signed by Kwadwo Miramontes MD on 5/25/2023    Oncology History Overview Note   Radiation Oncology consult for metastatic melanoma with brain metastases, referred by Dr Robe Simon      59year old female presented with a lesion on the bottom of her right foot a few months ago, initially thought to be a plantar wart or callus, no improvement with OTC remedies  She saw her PCP when size increased significantly, then evaluated by Podiatry and the lesion was excised in the office on 23  Pathology demonstrated melanoma, acral type, ulcerated, breslow thickness 4 2 mm  She was referred to Med Onc, Dr Hung Alejandro and Dr Orlando Linder, Surg Onc  Patient has family history of melanoma in her father who  of a brain mass, a younger sister who had melanoma and breast cancer diagnosed in her 45s  She underwent additional fine needle aspirate biopsy of right groin lymph node with Dr Orlando Linder that is positive for metastatic melanoma  PET/CT revealed enlarged right liver with FDG activity, MRI abdomen is ordered for   She also underwent MRI brain with results suspicious for tiny hemorrhagic brain metastasis in right frontal lobe, left superior temporal lobe, right caudate, right medial occipital lobes, and right posterior thalamocapsular junction with tiny nonhemorrhagic metastasis in right cerebellum   Her case was discussed at [facility] on 23 with recommendations for Rad Onc referral  She started systemic therapy with Keytruda on 23 Skin, right foot, wide excision (Podiatry):  Melanoma, acral type, ulcerated, Breslow thickness: 4 2 mm  Pathological state pT4b  Procedure  Excision    Specimen Laterality  Right    TUMOR   Tumor Site  Skin of lower limb and hip: Right foot          Histologic Type  Acral melanoma    Maximum Tumor (Breslow) Thickness (Millimeters)  4 2 mm   Macroscopic Satellite Nodule(s)  Present    Ulceration  Present    Anatomic (Grupo) Level  IV (Melanoma invades reticular dermis)    Mitotic Rate  4 mitoses per mm2   Microsatellite(s)  Present    Lymphovascular Invasion  Not identified    Neurotropism  Not identified    Tumor-Infiltrating Lymphocytes  Present, nonbrisk    Tumor Regression  Not identified    MARGINS Margin Status for Invasive Melanoma  Invasive melanoma present at margin    Margin(s) Involved by Invasive Melanoma  Peripheral: One of the satellite nodules extends to the 12 o'clock end margin  Margin Status for Melanoma in situ  All margins negative for melanoma in situ    REGIONAL LYMPH NODES     Regional Lymph Node Status  Not applicable (no regional lymph nodes submitted or found)    PATHOLOGIC STAGE CLASSIFICATION (pTNM, AJCC 8th Edition)     pT Category  pT4b    pN Category  pN1c          4/24/23 Med Onc, Dr Mejia Sersugey for newly dx melanoma of right foot  Discussed that this is at least Stage IIIc, satellite lesions incompletely cleared with wide local excision in the office  Patient also describes feeling a right thigh nodule recently that was tiny, maybe a size of a marble but now has grown to a large size size of a grape  It is not painful  Recommend biopsy  Plan: Pt will see Dr Michael Rubi, surg onc  PET scan and MRI ordered to determine treatment options including systemic therapy vs neoadjuvant approach to resection vs surgery then treatment  4/25/23 Surg Onc, Dr Michael Rubi  FNA biopsy of the right inguinal lymph node performed  She tolerated this well  Will await the results of her PET/CT  If the PET/CT shows distant metastatic disease, recommend immunotherapy  Surgery to the foot could then be performed for palliative reasons  If she has stage III disease by the PET, recommend neoadjuvant immunotherapy for 3 months followed by surgical intervention at that completing adjuvant immunotherapy based on the SWOG 1801 study  Surgical intervention to the foot would likely be a transmetatarsal amputation for her best functional outcome assuming there is no significant local response to immunotherapy  She should also have a fiducial placed in the inguinal node prior to immunotherapy if she is stage III         4/25/23 Dr Michael Rubi, FNA lymph node, right groin  - Positive for malignancy  - Metastatic melanoma      4/27/23 PET/CT  1  Increased FDG activity along the plantar right foot corresponds with the history of right foot plantar melanoma  2   Hypermetabolic right pelvic adenopathy compatible with metastases  3  Asymmetrically enlarged right liver with greater FDG intensity compared to the left liver  Recommend contrast MRI liver evaluation  4  No worrisome hypermetabolic lesions in the neck or chest        5/10/23 Dr Alin Weems, Podiatry, wound care  Pt is s/p excisional biopsy of melanoma  She has been changing her dressings daily  Prescribed gentamycin cream for presumed colonization with pseudomonas  She will change this daily  Dispensed a wedge shoe to help offload the ulcer site  Spoke with Dr Gomez Soares to coordinate the timing on the TMA  His plan includes her completing 3 cycles of immunotherapy after which we do the TMA while he does the SLN biopsy together  I  Pt signed consent  She will f/u in 2 weeks  5/11/23 MRI brain w wo contrast  Findings suspicious for tiny hemorrhagic brain metastasis in right frontal lobe, left superior temporal lobe, right caudate, right medial occipital lobes, and right posterior thalamocapsular junction with tiny nonhemorrhagic metastasis in right cerebellum  Mild perilesional vasogenic edema in right frontal lobe  No acute intracranial abnormality  Mild chronic microangiopathy  5/17/23 NEURO ONCOLOGY CASE REVIEW  PHYSICIAN RECOMMENDED PLAN:   - Radiation oncology referral  - Consider continuing immunotherapy and repeat MRI brain in 2months      5/22/23 Med Onc, Dr Ricardo Malik  Pt started neoadjuvant treatment with immunotherapy, pembro (Keytruda) and tolerating well  Unfortunately imaging from 5/11/2023 demonstrates small subcentimeter metastatic disease  She has an appointment with radiation oncology next week  She is not having any side effects of the small brain metastases    She will continue treatment with pembrolizumab at this time  Will decide whether or not to switch her over to ipilimumab and nivolumab  Return in 3 weeks for follow-up  5/27/23 MRI abdomen (eval FDG activity in liver on PET)      Upcoming appts:  5/30/23 Podiatry  6/15/23 Dr Cristian Rodgers  6/15/23 Infusion   7/25/23 Dr Newby Roles of right lower extremity including hip ([facility] )   3/28/2023 Initial Diagnosis    Malignant melanoma of right lower extremity including hip ([place] [address] )     4/6/2023 Surgery    Wide excision by podiatry:  Skin, foot, right, wide excision:  Melanoma, acral type, ulcerated, Breslow thickness: 4 2 mm  PATHOLOGIC STAGE CLASSIFICATION (pTNM, AJCC 8th Edition)     pT Category  pT4b          NGS testing  NRAS Exon 3 p  Q61R  PDL1 positive 1+ 1%  TMB low 3 Mut/Mb    ALK Fusion Unknown Significance RETREG1-ALK Exon 6     4/7/2023 -  Cancer Staged    Staging form: Melanoma of the Skin, AJCC 8th Edition  - Clinical stage from 4/7/2023: Stage III (cT4b, cN1c, cM0) - Signed by Nain Chavez MD on 4/24/2023 4/7/2023 -  Cancer Staged    Staging form: Melanoma of the Skin, AJCC 8th Edition  - Pathologic stage from 4/7/2023: Stage IIIC (pT4b, pN1c, cM0) - Signed by Nain Chavez MD on 4/24/2023 4/25/2023 Biopsy    Lymph Node, Right Groin, FNA:  - Positive for malignancy  - Metastatic melanoma        5/4/2023 -  Chemotherapy    alteplase (CATHFLO), 2 mg, Intracatheter, Every 1 Minute as needed, 2 of 6 cycles  pembrolizumab (KEYTRUDA) IVPB, 200 mg, Intravenous, Once, 2 of 6 cycles  Administration: 200 mg (5/4/2023), 200 mg (5/25/2023)     5/11/2023 -  Cancer Staged    Staging form: Melanoma of the Skin, AJCC 8th Edition  - Pathologic stage from 5/11/2023: Stage IV (pT4b, pN1c, pM1d(0)) - Signed by Nain Chavez MD on 5/25/2023           Past Medical History:   Past Medical History:   Diagnosis Date   • Cervical disc disorder    • Melanoma ([address] )    • Stenosis of cervical spine region        Past Surgical History:   Procedure Laterality Date   • BREAST BIOPSY Left 2009    core   • BREAST BIOPSY Right 2009    core   • CERVICAL FUSION  2013    cC3-C4, C4-C5, C5 C6, C6-C7, C7-T1   •  SECTION     • ELBOW SURGERY     • KNEE SURGERY Bilateral 20YRS AGO       Family History   Problem Relation Age of Onset   • Cancer Mother    • Brain cancer Mother [de-identified]   • Heart disease Father    • Breast cancer Sister 48   • Breast cancer Sister 45   • Breast cancer Maternal Aunt 61   • Breast cancer Maternal Aunt 60   • Breast cancer Maternal Aunt 60   • Breast cancer Cousin 50   • Breast cancer Cousin 52   • Breast cancer Cousin 52   • Prostate cancer Maternal Uncle 55   • No Known Problems Son    • No Known Problems Maternal Grandmother    • No Known Problems Maternal Grandfather    • No Known Problems Paternal Grandmother    • No Known Problems Paternal Grandfather    • BRCA1 Negative Sister    • BRCA2 Negative Sister    • No Known Problems Sister    • No Known Problems Maternal Aunt    • No Known Problems Maternal Aunt    • No Known Problems Maternal Aunt    • No Known Problems Maternal Aunt        Social History     Socioeconomic History   • Marital status: Single     Spouse name: None   • Number of children: None   • Years of education: None   • Highest education level: None   Occupational History   • None   Tobacco Use   • Smoking status: Former     Packs/day: 0 02     Types: Cigarettes     Start date: 0     Quit date: 3/1/2023     Years since quittin 2   • Smokeless tobacco: Never   • Tobacco comments:     4 a day   Vaping Use   • Vaping Use: Never used   Substance and Sexual Activity   • Alcohol use: Yes     Comment: SOCIAL   • Drug use: Not Currently   • Sexual activity: Yes     Partners: Male   Other Topics Concern   • None   Social History Narrative   • None     Social Determinants of Health     Financial Resource Strain: Not on file   Food Insecurity: Not on file   Transportation Needs: Not on file Physical Activity: Not on file   Stress: Not on file   Social Connections: Not on file   Intimate Partner Violence: Not on file   Housing Stability: Not on file         Current Facility-Administered Medications:   •  acetaminophen (TYLENOL) tablet 650 mg, 650 mg, Oral, Q6H PRN, Rocky Gallegos MD, 650 mg at 05/31/23 1402  •  albuterol (PROVENTIL HFA,VENTOLIN HFA) inhaler 2 puff, 2 puff, Inhalation, 4x Daily, PAVEL Chaidez, 2 puff at 06/01/23 0909  •  albuterol inhalation solution 2 5 mg, 2 5 mg, Nebulization, Q6H PRN, PAVEL Chaidez  •  amLODIPine (NORVASC) tablet 5 mg, 5 mg, Oral, Daily, Rocky Gallegos MD, 5 mg at 06/01/23 0909  •  atenolol (TENORMIN) tablet 50 mg, 50 mg, Oral, Daily, Rocky Gallegos MD, 50 mg at 06/01/23 0909  •  benzonatate (TESSALON PERLES) capsule 200 mg, 200 mg, Oral, TID PRN, Varun Boone DO, 200 mg at 05/31/23 2214  •  budesonide-formoterol (SYMBICORT) 160-4 5 mcg/act inhaler 2 puff, 2 puff, Inhalation, BID, PAVEL Chaidze, 2 puff at 06/01/23 0909  •  ceftriaxone (ROCEPHIN) 1 g/50 mL in dextrose IVPB, 1,000 mg, Intravenous, Q24H, Rocky Gallegos MD, Last Rate: 100 mL/hr at 06/01/23 0509, 1,000 mg at 06/01/23 0509  •  dexamethasone (DECADRON) tablet 4 mg, 4 mg, Oral, Q6H ZAIDA, PAVEL Mc  •  diphenhydrAMINE (BENADRYL) injection 25 mg, 25 mg, Intravenous, Q8H PRN, Rocky Gallegos MD, 25 mg at 05/31/23 2239  •  enoxaparin (LOVENOX) subcutaneous injection 40 mg, 40 mg, Subcutaneous, Daily, Rocky Gallegos MD, 40 mg at 06/01/23 0908  •  escitalopram (LEXAPRO) tablet 10 mg, 10 mg, Oral, Daily, Rocky Gallegos MD, 10 mg at 06/01/23 0909  •  famotidine (PEPCID) tablet 20 mg, 20 mg, Oral, BID, Rocky Gallegos MD, 20 mg at 06/01/23 0909  •  guaiFENesin (MUCINEX) 12 hr tablet 600 mg, 600 mg, Oral, BID, Rocky Gallegos MD, 600 mg at 06/01/23 0909  •  hydrochlorothiazide (HYDRODIURIL) tablet 12 5 mg, 12 5 mg, Oral, Daily, See RAINEY Belle Carrion MD, 12 5 mg at 06/01/23 0909  •  Hydrocod Hipolito-Chlorphe Hipolito ER (TUSSIONEX) ER suspension 5 mL, 5 mL, Oral, Q12H PRN, PAVEL Mitchell, 5 mL at 05/31/23 1358  •  ketorolac (TORADOL) injection 30 mg, 30 mg, Intravenous, Q8H, Logan Wayne MD, 30 mg at 06/01/23 0506  •  melatonin tablet 3 mg, 3 mg, Oral, HS, Logan Wayne MD, 3 mg at 05/31/23 2214  •  memantine [facility]) tablet 5 mg, 5 mg, Oral, Daily, Gregory Polk MD, 5 mg at 06/01/23 0909  •  metoclopramide (REGLAN) injection 10 mg, 10 mg, Intravenous, Q8H [facility], Logan Wayne MD, 10 mg at 06/01/23 0506    Medications Prior to Admission   Medication   • albuterol (PROVENTIL HFA,VENTOLIN HFA) 90 mcg/act inhaler   • amLODIPine (NORVASC) 5 mg tablet   • atenolol (TENORMIN) 50 mg tablet   • hydrochlorothiazide (MICROZIDE) 12 5 mg capsule   • escitalopram (LEXAPRO) 10 mg tablet   • famotidine (PEPCID) 20 mg tablet   • gentamicin (GARAMYCIN) 0 1 % cream       Allergies   Allergen Reactions   • Methocarbamol Shortness Of Breath and Swelling   • Tramadol Shortness Of Breath and Swelling         Physical Exam:     /74 (BP Location: Right arm)   Pulse 59   Temp 98 4 °F (36 9 °C) (Oral)   Resp 18   Wt 70 8 kg (156 lb)   SpO2 93%   BMI 24 43 kg/m²     Physical Exam  HENT:      Head: Normocephalic  Mouth/Throat:      Mouth: Mucous membranes are moist    Eyes:      Conjunctiva/sclera: Conjunctivae normal       Comments: No overt strabismus observed   Cardiovascular:      Rate and Rhythm: Normal rate and regular rhythm  Pulses: Normal pulses  Heart sounds: Normal heart sounds  Pulmonary:      Effort: Pulmonary effort is normal       Breath sounds: Normal breath sounds  Abdominal:      General: Abdomen is flat  Palpations: Abdomen is soft  Skin:     General: Skin is warm and dry  Comments: R foot wound, bandage C/D/I   Neurological:      General: No focal deficit present        Mental Status: She is oriented to person, place, and time  Psychiatric:         Behavior: Behavior normal          Thought Content: Thought content normal          Judgment: Judgment normal            Recent Results (from the past 48 hour(s))   AFB Culture with Stain    Collection Time: 05/30/23  4:16 PM    Specimen: Expectorated Sputum; Other   Result Value Ref Range    AFB Stain No acid fast bacilli seen    Basic metabolic panel    Collection Time: 05/31/23 11:17 AM   Result Value Ref Range    Sodium 139 135 - 147 mmol/L    Potassium 3 7 3 5 - 5 3 mmol/L    Chloride 104 96 - 108 mmol/L    CO2 27 21 - 32 mmol/L    ANION GAP 8 4 - 13 mmol/L    BUN 6 5 - 25 mg/dL    Creatinine 0 50 (L) 0 60 - 1 30 mg/dL    Glucose 97 65 - 140 mg/dL    Calcium 8 6 8 4 - 10 2 mg/dL    eGFR 104 ml/min/1 73sq m       XR chest 1 view portable    Result Date: 5/29/2023  Narrative: CHEST INDICATION:   chest tightness, SOB  COMPARISON: 12/12/2013  EXAM PERFORMED/VIEWS:  XR CHEST PORTABLE FINDINGS: Cardiomediastinal silhouette appears unremarkable  Mild bibasilar hypoventilatory changes  Tree-in-bud nodularity described on subsequent CTA chest PE study is not radiographically apparent  No pneumothorax or pleural effusion  Osseous structures appear within normal limits for patient age  Impression: Mild bibasilar hypoventilatory changes  Tree-in-bud nodularity described on subsequent CTA chest PE study is not radiographically apparent  Workstation performed: [id number]     MRI brain w wo contrast    Result Date: 5/29/2023  Narrative: MRI BRAIN WITH AND WITHOUT CONTRAST INDICATION: blurred vision  COMPARISON: Brain MRI 5/11/2023 TECHNIQUE: Multiplanar, multisequence imaging of the brain was performed before and after gadolinium administration  IV Contrast:  7 mL of Gadobutrol injection (SINGLE-DOSE) IMAGE QUALITY:   Diagnostic   FINDINGS: BRAIN PARENCHYMA: Redemonstrated numerous enhancing lesions with intralesional hemorrhage involving bilateral cerebral and right cerebellar hemispheres  The largest representative is centered in the superior right hippocampal  body measuring 1 x 0 9 cm (series 11 image 62), previously measured 0 2 cm  There is also 0 5 cm similar appearing lesion along the ependymal surface of anterior horn of right lateral ventricle (series 11 image 79), previously measured 0 2 cm  There is 0 8 cm hemorrhagic lesion in the posterior left temporal lobe without enhancement (series 5 image 11), not conspicuous in prior exam  There is mild perilesional edema without mass effect  There is punctuate focus of increased diffusion signal in the left corona radiata without obvious low signal in ADC map and no associated enhancement (series 3 image 20)  Nonspecific  foci of T2/FLAIR hyperintensities involving periventricular and subcortical white matter, most compatible with mild microangiopathic change  VENTRICLES:  Normal for the patient's age  SELLA AND PITUITARY GLAND:  Normal  ORBITS: Prior bilateral lens replacement  PARANASAL SINUSES:  Normal  VASCULATURE:  Evaluation of the major intracranial vasculature demonstrates appropriate flow voids  CALVARIUM AND SKULL BASE:  Normal  EXTRACRANIAL SOFT TISSUES:  Normal      Impression: 1  Punctate focus of mild increased diffusion signal in the left corona radiata without obvious low signal on ADC map and no enhancement could indicate  tiny subacute infarct in right clinical setting  2   Interval worsening of intracranial hemorrhagic metastatic lesions  Associated mild perilesional vasogenic edema without mass effect  Workstation performed: [id number]     CTA ED chest PE Study    Result Date: 5/29/2023  Narrative: CTA - CHEST WITH IV CONTRAST - PULMONARY ANGIOGRAM INDICATION:   hx of metastatic melanoma with new and worsening sob  COMPARISON: None  TECHNIQUE: CTA examination of the chest was performed using angiographic technique according to a protocol specifically tailored to evaluate for pulmonary embolism  Multiplanar 2D reformatted images were created from the source data  In addition, coronal 3D MIP postprocessing was performed on the acquisition scanner  Radiation dose length product (DLP) for this visit:  277 mGy-cm   This examination, like all CT scans performed in the [facility], was performed utilizing techniques to minimize radiation dose exposure, including the use of iterative reconstruction and automated exposure control  IV Contrast:  60 mL of iohexol (OMNIPAQUE) FINDINGS: PULMONARY ARTERIAL TREE:  No pulmonary embolus is seen  LUNGS: Diffuse tree-in-bud nodularity  There is no tracheal or endobronchial lesion  PLEURA: Trace right pleural effusion  [facility]/GREAT [facility]ES[facility]:  Unremarkable for patient's age  No thoracic aortic aneurysm  MEDIASTINUM AND ARMINDA: Mediastinal and bilateral hilar adenopathy  For instance there is an 11 mm subcarinal lymph node    CHEST WALL AND LOWER NECK:   Unremarkable  VISUALIZED STRUCTURES IN THE UPPER ABDOMEN:  Unremarkable  OSSEOUS STRUCTURES:  No acute fracture or destructive osseous lesion  Impression: Diffuse tree-in-bud nodularity which may represent an infectious bronchiolitis such as with an atypical mycobacterial infection  Workstation performed: [id number]     MRI brain w wo contrast    Result Date: 5/16/2023  Narrative: MRI BRAIN WITH AND WITHOUT CONTRAST INDICATION: C43 71: Malignant melanoma of right lower limb, including hip    melanoma staging COMPARISON: Nuclear medicine PET/CT 4/27/2023  MRI cervical spine with and without contrast 3/28/2023  MRI brain an MRA head without contrast 11/7/2013  TECHNIQUE: Multiplanar, multisequence imaging of the brain was performed before and after gadolinium administration  Imaging performed on 3 0T MRI IV Contrast:  7 mL of Gadobutrol injection (SINGLE-DOSE) IMAGE QUALITY:   Diagnostic   FINDINGS: BRAIN PARENCHYMA: 0 3 cm right frontal lobe enhancing hemorrhagic lesion with mild perilesional vasogenic edema (10:238), suspicious for hemorrhagic brain metastasis  Tiny enhancing hemorrhagic lesions in left superior temporal, right caudate, right medial occipital lobes, and right right posterior thalamocapsular junction (10:161, 158, 139, 132), suspicious for hemorrhagic brain metastasis  Tiny enhancing lesion in right cerebellum (10:81), suspicious for nonhemorrhagic brain metastasis  No mass effect or midline shift  Chronic microhemorrhage in right temporal lobe  Diffusion imaging is unremarkable  No acute infarction  Tiny right frontal and left parietal developmental venous anomalies  Small scattered hyperintensities on T2/FLAIR imaging are noted in the periventricular and subcortical white matter demonstrating an appearance that is statistically most likely to represent mild microangiopathic change  VENTRICLES:  Normal for the patient's age  SELLA AND PITUITARY GLAND:  Normal  ORBITS: Sequela of bilateral cataract surgery  PARANASAL SINUSES:  Normal  VASCULATURE:  Evaluation of the major intracranial vasculature demonstrates appropriate flow voids  CALVARIUM AND SKULL BASE:  Normal  MASTOIDS: Trace left mastoid effusion  EXTRACRANIAL SOFT TISSUES:  Normal      Impression: Findings suspicious for tiny hemorrhagic brain metastasis in right frontal lobe, left superior temporal lobe, right caudate, right medial occipital lobes, and right posterior thalamocapsular junction with tiny nonhemorrhagic metastasis in right cerebellum  Mild perilesional vasogenic edema in right frontal lobe  No acute intracranial abnormality  Mild chronic microangiopathy  The study was marked in [facility] for immediate notification  Workstation performed: [id number]       Labs and pertinent reports reviewed  This note has been generated by voice recognition software system  Therefore, there may be spelling, grammar, and or syntax errors  Please contact if questions arise

## 2023-06-01 NOTE — PLAN OF CARE
Problem: PAIN - ADULT  Goal: Verbalizes/displays adequate comfort level or baseline comfort level  Description: Interventions:  - Encourage patient to monitor pain and request assistance  - Assess pain using appropriate pain scale  - Administer analgesics based on type and severity of pain and evaluate response  - Implement non-pharmacological measures as appropriate and evaluate response  - Consider cultural and social influences on pain and pain management  - Notify physician/advanced practitioner if interventions unsuccessful or patient reports new pain  Outcome: Progressing     Problem: SAFETY ADULT  Goal: Patient will remain free of falls  Description: INTERVENTIONS:  - Educate patient/family on patient safety including physical limitations  - Instruct patient to call for assistance with activity   - Consult OT/PT to assist with strengthening/mobility   - Keep Call bell within reach  - Keep bed low and locked with side rails adjusted as appropriate  - Keep care items and personal belongings within reach  - Initiate and maintain comfort rounds  - Make Fall Risk Sign visible to staff  - Apply yellow socks and bracelet for high fall risk patients  - Consider moving patient to room near nurses station  Outcome: Progressing

## 2023-06-02 ENCOUNTER — HOSPITAL ENCOUNTER (INPATIENT)
Facility: HOSPITAL | Age: 63
LOS: 2 days | Discharge: LEFT BEFORE DISCHARGE | End: 2023-06-05
Attending: EMERGENCY MEDICINE | Admitting: GENERAL PRACTICE
Payer: COMMERCIAL

## 2023-06-02 ENCOUNTER — TELEPHONE (OUTPATIENT)
Dept: INTERNAL MEDICINE CLINIC | Facility: CLINIC | Age: 63
End: 2023-06-02

## 2023-06-02 ENCOUNTER — APPOINTMENT (EMERGENCY)
Dept: RADIOLOGY | Facility: HOSPITAL | Age: 63
End: 2023-06-02
Payer: COMMERCIAL

## 2023-06-02 ENCOUNTER — APPOINTMENT (EMERGENCY)
Dept: CT IMAGING | Facility: HOSPITAL | Age: 63
End: 2023-06-02
Payer: COMMERCIAL

## 2023-06-02 DIAGNOSIS — H53.8 BLURRED VISION: ICD-10-CM

## 2023-06-02 DIAGNOSIS — C43.9 METASTATIC MELANOMA (HCC): ICD-10-CM

## 2023-06-02 DIAGNOSIS — J18.9 PNEUMONIA: Primary | ICD-10-CM

## 2023-06-02 DIAGNOSIS — J18.9 PNA (PNEUMONIA): ICD-10-CM

## 2023-06-02 DIAGNOSIS — C43.71 MALIGNANT MELANOMA OF RIGHT LOWER EXTREMITY INCLUDING HIP (HCC): ICD-10-CM

## 2023-06-02 LAB
ALBUMIN SERPL BCP-MCNC: 3.8 G/DL (ref 3.5–5)
ALP SERPL-CCNC: 259 U/L (ref 34–104)
ALT SERPL W P-5'-P-CCNC: 19 U/L (ref 7–52)
ANION GAP SERPL CALCULATED.3IONS-SCNC: 9 MMOL/L (ref 4–13)
AST SERPL W P-5'-P-CCNC: 28 U/L (ref 13–39)
BASOPHILS # BLD MANUAL: 0 THOUSAND/UL (ref 0–0.1)
BASOPHILS NFR MAR MANUAL: 0 % (ref 0–1)
BILIRUB SERPL-MCNC: 0.82 MG/DL (ref 0.2–1)
BUN SERPL-MCNC: 13 MG/DL (ref 5–25)
CALCIUM SERPL-MCNC: 9.6 MG/DL (ref 8.4–10.2)
CARDIAC TROPONIN I PNL SERPL HS: 7 NG/L
CHLORIDE SERPL-SCNC: 102 MMOL/L (ref 96–108)
CO2 SERPL-SCNC: 26 MMOL/L (ref 21–32)
CREAT SERPL-MCNC: 0.57 MG/DL (ref 0.6–1.3)
CRP SERPL QL: 67.1 MG/L
EOSINOPHIL # BLD MANUAL: 0 THOUSAND/UL (ref 0–0.4)
EOSINOPHIL NFR BLD MANUAL: 0 % (ref 0–6)
ERYTHROCYTE [DISTWIDTH] IN BLOOD BY AUTOMATED COUNT: 12.1 % (ref 11.6–15.1)
GFR SERPL CREATININE-BSD FRML MDRD: 99 ML/MIN/1.73SQ M
GLUCOSE SERPL-MCNC: 132 MG/DL (ref 65–140)
HCT VFR BLD AUTO: 38.8 % (ref 34.8–46.1)
HGB BLD-MCNC: 13.3 G/DL (ref 11.5–15.4)
LACTATE SERPL-SCNC: 1.5 MMOL/L (ref 0.5–2)
LYMPHOCYTES # BLD AUTO: 0.23 THOUSAND/UL (ref 0.6–4.47)
LYMPHOCYTES # BLD AUTO: 3 % (ref 14–44)
MCH RBC QN AUTO: 35.6 PG (ref 26.8–34.3)
MCHC RBC AUTO-ENTMCNC: 34.3 G/DL (ref 31.4–37.4)
MCV RBC AUTO: 104 FL (ref 82–98)
MONOCYTES # BLD AUTO: 0.23 THOUSAND/UL (ref 0–1.22)
MONOCYTES NFR BLD: 3 % (ref 4–12)
MYELOCYTES NFR BLD MANUAL: 1 % (ref 0–1)
NEUTROPHILS # BLD MANUAL: 7.26 THOUSAND/UL (ref 1.85–7.62)
NEUTS BAND NFR BLD MANUAL: 2 % (ref 0–8)
NEUTS SEG NFR BLD AUTO: 91 % (ref 43–75)
PLATELET # BLD AUTO: 250 THOUSANDS/UL (ref 149–390)
PLATELET BLD QL SMEAR: ADEQUATE
PMV BLD AUTO: 8.8 FL (ref 8.9–12.7)
POTASSIUM SERPL-SCNC: 4.5 MMOL/L (ref 3.5–5.3)
PROCALCITONIN SERPL-MCNC: 0.42 NG/ML
PROT SERPL-MCNC: 6.8 G/DL (ref 6.4–8.4)
RBC # BLD AUTO: 3.74 MILLION/UL (ref 3.81–5.12)
RBC MORPH BLD: PRESENT
ROULEAUX BLD QL SMEAR: PRESENT
SODIUM SERPL-SCNC: 137 MMOL/L (ref 135–147)
STOMATOCYTES BLD QL SMEAR: PRESENT
WBC # BLD AUTO: 7.81 THOUSAND/UL (ref 4.31–10.16)

## 2023-06-02 PROCEDURE — 36415 COLL VENOUS BLD VENIPUNCTURE: CPT

## 2023-06-02 PROCEDURE — 96365 THER/PROPH/DIAG IV INF INIT: CPT

## 2023-06-02 PROCEDURE — 71045 X-RAY EXAM CHEST 1 VIEW: CPT

## 2023-06-02 PROCEDURE — 86140 C-REACTIVE PROTEIN: CPT | Performed by: EMERGENCY MEDICINE

## 2023-06-02 PROCEDURE — 96375 TX/PRO/DX INJ NEW DRUG ADDON: CPT

## 2023-06-02 PROCEDURE — 83605 ASSAY OF LACTIC ACID: CPT | Performed by: EMERGENCY MEDICINE

## 2023-06-02 PROCEDURE — 85007 BL SMEAR W/DIFF WBC COUNT: CPT | Performed by: EMERGENCY MEDICINE

## 2023-06-02 PROCEDURE — 85027 COMPLETE CBC AUTOMATED: CPT | Performed by: EMERGENCY MEDICINE

## 2023-06-02 PROCEDURE — 99285 EMERGENCY DEPT VISIT HI MDM: CPT

## 2023-06-02 PROCEDURE — 71250 CT THORAX DX C-: CPT

## 2023-06-02 PROCEDURE — 84145 PROCALCITONIN (PCT): CPT | Performed by: EMERGENCY MEDICINE

## 2023-06-02 PROCEDURE — 99285 EMERGENCY DEPT VISIT HI MDM: CPT | Performed by: EMERGENCY MEDICINE

## 2023-06-02 PROCEDURE — 80053 COMPREHEN METABOLIC PANEL: CPT | Performed by: EMERGENCY MEDICINE

## 2023-06-02 PROCEDURE — 96367 TX/PROPH/DG ADDL SEQ IV INF: CPT

## 2023-06-02 PROCEDURE — 87040 BLOOD CULTURE FOR BACTERIA: CPT | Performed by: EMERGENCY MEDICINE

## 2023-06-02 PROCEDURE — 84484 ASSAY OF TROPONIN QUANT: CPT | Performed by: EMERGENCY MEDICINE

## 2023-06-02 PROCEDURE — G1004 CDSM NDSC: HCPCS

## 2023-06-02 RX ORDER — ACETAMINOPHEN 325 MG/1
975 TABLET ORAL ONCE
Status: COMPLETED | OUTPATIENT
Start: 2023-06-02 | End: 2023-06-02

## 2023-06-02 RX ORDER — METOCLOPRAMIDE HYDROCHLORIDE 5 MG/ML
10 INJECTION INTRAMUSCULAR; INTRAVENOUS ONCE
Status: COMPLETED | OUTPATIENT
Start: 2023-06-02 | End: 2023-06-02

## 2023-06-02 RX ORDER — DIPHENHYDRAMINE HYDROCHLORIDE 50 MG/ML
25 INJECTION INTRAMUSCULAR; INTRAVENOUS ONCE
Status: COMPLETED | OUTPATIENT
Start: 2023-06-02 | End: 2023-06-02

## 2023-06-02 RX ORDER — DEXAMETHASONE SODIUM PHOSPHATE 4 MG/ML
8 INJECTION, SOLUTION INTRA-ARTICULAR; INTRALESIONAL; INTRAMUSCULAR; INTRAVENOUS; SOFT TISSUE ONCE
Status: COMPLETED | OUTPATIENT
Start: 2023-06-02 | End: 2023-06-02

## 2023-06-02 RX ADMIN — ACETAMINOPHEN 975 MG: 325 TABLET ORAL at 22:50

## 2023-06-02 RX ADMIN — CEFEPIME 2000 MG: 2 INJECTION, POWDER, FOR SOLUTION INTRAVENOUS at 22:58

## 2023-06-02 RX ADMIN — DIPHENHYDRAMINE HYDROCHLORIDE 25 MG: 50 INJECTION, SOLUTION INTRAMUSCULAR; INTRAVENOUS at 23:00

## 2023-06-02 RX ADMIN — AZITHROMYCIN 500 MG: 500 INJECTION, POWDER, LYOPHILIZED, FOR SOLUTION INTRAVENOUS at 23:18

## 2023-06-02 RX ADMIN — DEXAMETHASONE SODIUM PHOSPHATE 8 MG: 4 INJECTION, SOLUTION INTRAMUSCULAR; INTRAVENOUS at 22:55

## 2023-06-02 RX ADMIN — METOCLOPRAMIDE 10 MG: 5 INJECTION, SOLUTION INTRAMUSCULAR; INTRAVENOUS at 23:04

## 2023-06-02 NOTE — TELEPHONE ENCOUNTER
Spoke with patient   Checked on her because we did receive her recent hospital d/c  Pt still   Feels the same she stated  Still has blurred vision, has lightheadedness, dizziness, pt said she still has a terrible cough  Pt said she feels terrible still and is not sure why they even discharged her  Wanted to let CM know that pt is not feeling better         Please advise

## 2023-06-02 NOTE — ASSESSMENT & PLAN NOTE
Patient's presentation consistent with pneumonia    Patient was evaluated by pulmonology, recommendations appreciated  Patient received ceftriaxone while inpatient, will discharge with 3 more days of cefdinir to complete a course of 7 days  Patient patient will need a CT scan scan in 6 weeks to ensure resolution

## 2023-06-02 NOTE — UTILIZATION REVIEW
NOTIFICATION OF ADMISSION DISCHARGE   This is a Notification of Discharge from [facility]  Please be advised that this patient has been discharge from our facility  Below you will find the admission and discharge date and time including the patient’s disposition  UTILIZATION REVIEW CONTACT:  Yong Segovia MA  Utilization   Network Utilization Review Department  Phone: [phone] x carefully listen to the prompts  All voicemails are confidential   Email: [email or web address]     ADMISSION INFORMATION  PRESENTATION DATE: 5/28/2023 10:36 PM  OBERVATION ADMISSION DATE:   INPATIENT ADMISSION DATE: 5/29/23 12:09 PM   DISCHARGE DATE: 6/1/2023  4:33 PM   DISPOSITION:Home/Self Care    IMPORTANT INFORMATION:  Send all requests for admission clinical reviews, approved or denied determinations and any other requests to dedicated fax number below belonging to the campus where the patient is receiving treatment   List of dedicated fax numbers:  [address] DENIALS (Administrative/Medical Necessity) [phone]   [address] (Maternity/NICU/Pediatrics) [phone]   [facility] [phone]   [place] [address] [phone]   [address] [place] [address]  [phone]   [address] [facility][note 3] [phone]   [address] [place] [address] [phone]   [address] [place] [address] [phone]   [facility]  [phone]   [address] [facility] [phone]   [address] [facility] [address] [facility] [phone]

## 2023-06-03 LAB
2HR DELTA HS TROPONIN: -1 NG/L
ALBUMIN SERPL BCP-MCNC: 3.5 G/DL (ref 3.5–5)
ALP SERPL-CCNC: 223 U/L (ref 34–104)
ALT SERPL W P-5'-P-CCNC: 16 U/L (ref 7–52)
ANION GAP SERPL CALCULATED.3IONS-SCNC: 9 MMOL/L (ref 4–13)
AST SERPL W P-5'-P-CCNC: 18 U/L (ref 13–39)
BACTERIA BLD CULT: NORMAL
BACTERIA BLD CULT: NORMAL
BASOPHILS # BLD AUTO: 0.01 THOUSANDS/ÂΜL (ref 0–0.1)
BASOPHILS NFR BLD AUTO: 0 % (ref 0–1)
BILIRUB SERPL-MCNC: 0.87 MG/DL (ref 0.2–1)
BUN SERPL-MCNC: 14 MG/DL (ref 5–25)
CALCIUM SERPL-MCNC: 9.1 MG/DL (ref 8.4–10.2)
CARDIAC TROPONIN I PNL SERPL HS: 6 NG/L
CHLORIDE SERPL-SCNC: 104 MMOL/L (ref 96–108)
CO2 SERPL-SCNC: 24 MMOL/L (ref 21–32)
CREAT SERPL-MCNC: 0.45 MG/DL (ref 0.6–1.3)
EOSINOPHIL # BLD AUTO: 0 THOUSAND/ÂΜL (ref 0–0.61)
EOSINOPHIL NFR BLD AUTO: 0 % (ref 0–6)
ERYTHROCYTE [DISTWIDTH] IN BLOOD BY AUTOMATED COUNT: 12 % (ref 11.6–15.1)
GFR SERPL CREATININE-BSD FRML MDRD: 107 ML/MIN/1.73SQ M
GLUCOSE SERPL-MCNC: 144 MG/DL (ref 65–140)
HCT VFR BLD AUTO: 40.1 % (ref 34.8–46.1)
HGB BLD-MCNC: 13.4 G/DL (ref 11.5–15.4)
IMM GRANULOCYTES # BLD AUTO: 0.04 THOUSAND/UL (ref 0–0.2)
IMM GRANULOCYTES NFR BLD AUTO: 1 % (ref 0–2)
LYMPHOCYTES # BLD AUTO: 0.41 THOUSANDS/ÂΜL (ref 0.6–4.47)
LYMPHOCYTES NFR BLD AUTO: 6 % (ref 14–44)
MAGNESIUM SERPL-MCNC: 1.5 MG/DL (ref 1.9–2.7)
MCH RBC QN AUTO: 34.9 PG (ref 26.8–34.3)
MCHC RBC AUTO-ENTMCNC: 33.4 G/DL (ref 31.4–37.4)
MCV RBC AUTO: 104 FL (ref 82–98)
MONOCYTES # BLD AUTO: 0.22 THOUSAND/ÂΜL (ref 0.17–1.22)
MONOCYTES NFR BLD AUTO: 3 % (ref 4–12)
NEUTROPHILS # BLD AUTO: 6.62 THOUSANDS/ÂΜL (ref 1.85–7.62)
NEUTS SEG NFR BLD AUTO: 90 % (ref 43–75)
NRBC BLD AUTO-RTO: 0 /100 WBCS
PLATELET # BLD AUTO: 209 THOUSANDS/UL (ref 149–390)
PMV BLD AUTO: 8.8 FL (ref 8.9–12.7)
POTASSIUM SERPL-SCNC: 4.7 MMOL/L (ref 3.5–5.3)
PROCALCITONIN SERPL-MCNC: 0.34 NG/ML
PROT SERPL-MCNC: 6.4 G/DL (ref 6.4–8.4)
RBC # BLD AUTO: 3.84 MILLION/UL (ref 3.81–5.12)
SODIUM SERPL-SCNC: 137 MMOL/L (ref 135–147)
WBC # BLD AUTO: 7.3 THOUSAND/UL (ref 4.31–10.16)

## 2023-06-03 PROCEDURE — 85025 COMPLETE CBC W/AUTO DIFF WBC: CPT

## 2023-06-03 PROCEDURE — 99223 1ST HOSP IP/OBS HIGH 75: CPT | Performed by: INTERNAL MEDICINE

## 2023-06-03 PROCEDURE — 83735 ASSAY OF MAGNESIUM: CPT

## 2023-06-03 PROCEDURE — 80053 COMPREHEN METABOLIC PANEL: CPT

## 2023-06-03 PROCEDURE — 99222 1ST HOSP IP/OBS MODERATE 55: CPT | Performed by: INTERNAL MEDICINE

## 2023-06-03 PROCEDURE — 36415 COLL VENOUS BLD VENIPUNCTURE: CPT

## 2023-06-03 PROCEDURE — 99497 ADVNCD CARE PLAN 30 MIN: CPT | Performed by: INTERNAL MEDICINE

## 2023-06-03 PROCEDURE — 84145 PROCALCITONIN (PCT): CPT

## 2023-06-03 RX ORDER — ALBUTEROL SULFATE 90 UG/1
1 AEROSOL, METERED RESPIRATORY (INHALATION) EVERY 6 HOURS PRN
Status: DISCONTINUED | OUTPATIENT
Start: 2023-06-03 | End: 2023-06-06 | Stop reason: HOSPADM

## 2023-06-03 RX ORDER — METOCLOPRAMIDE HYDROCHLORIDE 5 MG/ML
10 INJECTION INTRAMUSCULAR; INTRAVENOUS ONCE
Status: COMPLETED | OUTPATIENT
Start: 2023-06-03 | End: 2023-06-03

## 2023-06-03 RX ORDER — MEMANTINE HYDROCHLORIDE 5 MG/1
5 TABLET ORAL DAILY
Status: DISCONTINUED | OUTPATIENT
Start: 2023-06-03 | End: 2023-06-06 | Stop reason: HOSPADM

## 2023-06-03 RX ORDER — ESCITALOPRAM OXALATE 10 MG/1
10 TABLET ORAL DAILY
Status: DISCONTINUED | OUTPATIENT
Start: 2023-06-03 | End: 2023-06-06 | Stop reason: HOSPADM

## 2023-06-03 RX ORDER — ATENOLOL 50 MG/1
50 TABLET ORAL DAILY
Status: DISCONTINUED | OUTPATIENT
Start: 2023-06-03 | End: 2023-06-06 | Stop reason: HOSPADM

## 2023-06-03 RX ORDER — DEXAMETHASONE 4 MG/1
4 TABLET ORAL EVERY 6 HOURS SCHEDULED
Status: DISCONTINUED | OUTPATIENT
Start: 2023-06-03 | End: 2023-06-06 | Stop reason: HOSPADM

## 2023-06-03 RX ORDER — BUDESONIDE AND FORMOTEROL FUMARATE DIHYDRATE 160; 4.5 UG/1; UG/1
2 AEROSOL RESPIRATORY (INHALATION) 2 TIMES DAILY
Status: DISCONTINUED | OUTPATIENT
Start: 2023-06-03 | End: 2023-06-05

## 2023-06-03 RX ORDER — AMLODIPINE BESYLATE 5 MG/1
5 TABLET ORAL DAILY
Status: DISCONTINUED | OUTPATIENT
Start: 2023-06-03 | End: 2023-06-06 | Stop reason: HOSPADM

## 2023-06-03 RX ORDER — ACETAMINOPHEN 325 MG/1
650 TABLET ORAL EVERY 6 HOURS PRN
Status: DISCONTINUED | OUTPATIENT
Start: 2023-06-03 | End: 2023-06-03

## 2023-06-03 RX ORDER — GUAIFENESIN 600 MG/1
600 TABLET, EXTENDED RELEASE ORAL 2 TIMES DAILY
Status: DISCONTINUED | OUTPATIENT
Start: 2023-06-03 | End: 2023-06-03 | Stop reason: SDUPTHER

## 2023-06-03 RX ORDER — ENOXAPARIN SODIUM 100 MG/ML
40 INJECTION SUBCUTANEOUS DAILY
Status: DISCONTINUED | OUTPATIENT
Start: 2023-06-03 | End: 2023-06-06 | Stop reason: HOSPADM

## 2023-06-03 RX ORDER — FAMOTIDINE 20 MG/1
20 TABLET, FILM COATED ORAL 2 TIMES DAILY
Status: DISCONTINUED | OUTPATIENT
Start: 2023-06-03 | End: 2023-06-06 | Stop reason: HOSPADM

## 2023-06-03 RX ORDER — DIPHENHYDRAMINE HYDROCHLORIDE 50 MG/ML
25 INJECTION INTRAMUSCULAR; INTRAVENOUS ONCE
Status: COMPLETED | OUTPATIENT
Start: 2023-06-03 | End: 2023-06-03

## 2023-06-03 RX ORDER — BUTALBITAL, ACETAMINOPHEN AND CAFFEINE 50; 325; 40 MG/1; MG/1; MG/1
1 TABLET ORAL EVERY 6 HOURS PRN
Status: DISCONTINUED | OUTPATIENT
Start: 2023-06-03 | End: 2023-06-06 | Stop reason: HOSPADM

## 2023-06-03 RX ORDER — DEXAMETHASONE SODIUM PHOSPHATE 4 MG/ML
4 INJECTION, SOLUTION INTRA-ARTICULAR; INTRALESIONAL; INTRAMUSCULAR; INTRAVENOUS; SOFT TISSUE ONCE
Status: COMPLETED | OUTPATIENT
Start: 2023-06-03 | End: 2023-06-03

## 2023-06-03 RX ORDER — AZITHROMYCIN 250 MG/1
500 TABLET, FILM COATED ORAL EVERY 24 HOURS
Status: DISCONTINUED | OUTPATIENT
Start: 2023-06-03 | End: 2023-06-06 | Stop reason: HOSPADM

## 2023-06-03 RX ORDER — HYDROCHLOROTHIAZIDE 12.5 MG/1
12.5 TABLET ORAL DAILY
Status: DISCONTINUED | OUTPATIENT
Start: 2023-06-03 | End: 2023-06-06 | Stop reason: HOSPADM

## 2023-06-03 RX ORDER — MAGNESIUM SULFATE HEPTAHYDRATE 40 MG/ML
2 INJECTION, SOLUTION INTRAVENOUS ONCE
Status: COMPLETED | OUTPATIENT
Start: 2023-06-03 | End: 2023-06-03

## 2023-06-03 RX ORDER — BENZONATATE 100 MG/1
100 CAPSULE ORAL 3 TIMES DAILY PRN
Status: DISCONTINUED | OUTPATIENT
Start: 2023-06-03 | End: 2023-06-06 | Stop reason: HOSPADM

## 2023-06-03 RX ORDER — GUAIFENESIN 600 MG/1
600 TABLET, EXTENDED RELEASE ORAL 2 TIMES DAILY
Status: DISCONTINUED | OUTPATIENT
Start: 2023-06-03 | End: 2023-06-06 | Stop reason: HOSPADM

## 2023-06-03 RX ADMIN — DEXAMETHASONE SODIUM PHOSPHATE 4 MG: 4 INJECTION, SOLUTION INTRAMUSCULAR; INTRAVENOUS at 22:06

## 2023-06-03 RX ADMIN — GUAIFENESIN 600 MG: 600 TABLET ORAL at 17:57

## 2023-06-03 RX ADMIN — ACETAMINOPHEN 650 MG: 325 TABLET ORAL at 13:25

## 2023-06-03 RX ADMIN — CEFTRIAXONE SODIUM 1000 MG: 10 INJECTION, POWDER, FOR SOLUTION INTRAVENOUS at 09:00

## 2023-06-03 RX ADMIN — AZITHROMYCIN MONOHYDRATE 500 MG: 250 TABLET ORAL at 21:32

## 2023-06-03 RX ADMIN — ENOXAPARIN SODIUM 40 MG: 40 INJECTION SUBCUTANEOUS at 09:04

## 2023-06-03 RX ADMIN — BENZONATATE 100 MG: 100 CAPSULE ORAL at 22:09

## 2023-06-03 RX ADMIN — FAMOTIDINE 20 MG: 20 TABLET ORAL at 17:57

## 2023-06-03 RX ADMIN — MEMANTINE 5 MG: 5 TABLET ORAL at 09:02

## 2023-06-03 RX ADMIN — METOCLOPRAMIDE 10 MG: 5 INJECTION, SOLUTION INTRAMUSCULAR; INTRAVENOUS at 22:06

## 2023-06-03 RX ADMIN — AMLODIPINE BESYLATE 5 MG: 5 TABLET ORAL at 09:02

## 2023-06-03 RX ADMIN — MAGNESIUM SULFATE HEPTAHYDRATE 2 G: 40 INJECTION, SOLUTION INTRAVENOUS at 09:08

## 2023-06-03 RX ADMIN — BUDESONIDE AND FORMOTEROL FUMARATE DIHYDRATE 2 PUFF: 160; 4.5 AEROSOL RESPIRATORY (INHALATION) at 19:37

## 2023-06-03 RX ADMIN — DEXAMETHASONE 4 MG: 4 TABLET ORAL at 11:25

## 2023-06-03 RX ADMIN — GUAIFENESIN 600 MG: 600 TABLET ORAL at 09:02

## 2023-06-03 RX ADMIN — METOCLOPRAMIDE 10 MG: 5 INJECTION, SOLUTION INTRAMUSCULAR; INTRAVENOUS at 08:57

## 2023-06-03 RX ADMIN — FAMOTIDINE 20 MG: 20 TABLET ORAL at 09:02

## 2023-06-03 RX ADMIN — ESCITALOPRAM 10 MG: 10 TABLET, FILM COATED ORAL at 09:02

## 2023-06-03 RX ADMIN — DEXAMETHASONE 4 MG: 4 TABLET ORAL at 17:57

## 2023-06-03 RX ADMIN — ATENOLOL 50 MG: 50 TABLET ORAL at 09:06

## 2023-06-03 RX ADMIN — DIPHENHYDRAMINE HYDROCHLORIDE 25 MG: 50 INJECTION, SOLUTION INTRAMUSCULAR; INTRAVENOUS at 22:06

## 2023-06-03 RX ADMIN — DIPHENHYDRAMINE HYDROCHLORIDE 25 MG: 50 INJECTION, SOLUTION INTRAMUSCULAR; INTRAVENOUS at 08:56

## 2023-06-03 RX ADMIN — DEXAMETHASONE 4 MG: 4 TABLET ORAL at 05:18

## 2023-06-03 RX ADMIN — HYDROCHLOROTHIAZIDE 12.5 MG: 12.5 TABLET ORAL at 09:02

## 2023-06-03 NOTE — ED PROVIDER NOTES
History  Chief Complaint   Patient presents with   • Flu Symptoms     Pt presents to ED for cough, body aches, fevers, and headaches  States she was seen and admitted last Sunday for similar thing  73-year-old female coming in for evaluation of persistent cough, myalgias, headaches, fevers up to 100 8  Hx of metastatic melanoma, was recently admitted with cough and fever, on antibiotics in the hospital and d/c'd on cefdinir, but feels like she is worsening  Cough is non-productive  History provided by:  Patient   used: No    Flu Symptoms  Presenting symptoms: cough, fatigue, fever, headache and shortness of breath    Associated symptoms: chills        Prior to Admission Medications   Prescriptions Last Dose Informant Patient Reported? Taking? albuterol (PROVENTIL HFA,VENTOLIN HFA) 90 mcg/act inhaler 6/2/2023 Self No Yes   Sig: INHALE 1 PUFF EVERY 4 HOURS AS NEEDED FOR WHEEZING   amLODIPine (NORVASC) 5 mg tablet 6/2/2023 Self No Yes   Sig: Take 1 tablet (5 mg total) by mouth daily   atenolol (TENORMIN) 50 mg tablet 6/2/2023  No Yes   Sig: TAKE 1 TABLET BY MOUTH EVERY DAY   cefdinir (OMNICEF) 300 mg capsule 6/2/2023  No Yes   Sig: Take 1 capsule (300 mg total) by mouth every 12 (twelve) hours for 3 days Do not start before June 2, 2023     dexamethasone (DECADRON) 4 mg tablet 6/2/2023  No Yes   Sig: Take 1 tablet (4 mg total) by mouth every 6 (six) hours for 7 days   escitalopram (LEXAPRO) 10 mg tablet 6/2/2023  No Yes   Sig: TAKE 1 TABLET BY MOUTH EVERY DAY   famotidine (PEPCID) 20 mg tablet 6/2/2023 Self No Yes   Sig: TAKE 1 TABLET BY MOUTH TWICE A DAY   gentamicin (GARAMYCIN) 0 1 % cream Past Week  No Yes   Sig: Apply topically 3 (three) times a day   guaiFENesin (MUCINEX) 600 mg 12 hr tablet Past Week  No Yes   Sig: Take 1 tablet (600 mg total) by mouth 2 (two) times a day for 14 days   hydrochlorothiazide (MICROZIDE) 12 5 mg capsule 6/2/2023 Self Yes Yes   Sig: Take 12 5 mg by mouth daily   memantine (NAMENDA) 5 mg tablet Unknown  No No   Sig: Take 1 tablet (5 mg total) by mouth daily for 7 days Do not start before 2023  Facility-Administered Medications: None       Past Medical History:   Diagnosis Date   • Cervical disc disorder    • Melanoma ([facility] [address] )    • Stenosis of cervical spine region        Past Surgical History:   Procedure Laterality Date   • BREAST BIOPSY Left 2009    core   • BREAST BIOPSY Right 2009    core   • CERVICAL FUSION  2013    cC3-C4, C4-C5, C5 C6, C6-C7, C7-T1   •  SECTION     • ELBOW SURGERY     • KNEE SURGERY Bilateral 20YRS AGO       Family History   Problem Relation Age of Onset   • Cancer Mother    • Brain cancer Mother [de-identified]   • Heart disease Father    • Breast cancer Sister 48   • Breast cancer Sister 45   • Breast cancer Maternal Aunt 60   • Breast cancer Maternal Aunt 60   • Breast cancer Maternal Aunt 60   • Breast cancer Cousin 50   • Breast cancer Cousin 52   • Breast cancer Cousin 52   • Prostate cancer Maternal Uncle 55   • No Known Problems Son    • No Known Problems Maternal Grandmother    • No Known Problems Maternal Grandfather    • No Known Problems Paternal Grandmother    • No Known Problems Paternal Grandfather    • BRCA1 Negative Sister    • BRCA2 Negative Sister    • No Known Problems Sister    • No Known Problems Maternal Aunt    • No Known Problems Maternal Aunt    • No Known Problems Maternal Aunt    • No Known Problems Maternal Aunt      I have reviewed and agree with the history as documented      E-Cigarette/Vaping   • E-Cigarette Use Never User      E-Cigarette/Vaping Substances   • Nicotine No    • THC No    • CBD No    • Flavoring No    • Other No    • Unknown No      Social History     Tobacco Use   • Smoking status: Former     Packs/day: 0 02     Types: Cigarettes     Start date: 0     Quit date: 3/1/2023     Years since quittin 2   • Smokeless tobacco: Never   • Tobacco comments:     4 a day   Vaping Use • Vaping Use: Never used   Substance Use Topics   • Alcohol use: Yes     Comment: SOCIAL   • Drug use: Not Currently       Review of Systems   Constitutional: Positive for chills, fatigue and fever  Respiratory: Positive for cough and shortness of breath  Neurological: Positive for headaches  All other systems reviewed and are negative  Physical Exam  Physical Exam  Vitals and nursing note reviewed  Constitutional:       General: She is not in acute distress  Appearance: Normal appearance  She is well-developed and normal weight  She is not ill-appearing, toxic-appearing or diaphoretic  HENT:      Head: Normocephalic and atraumatic  Right Ear: External ear normal       Left Ear: External ear normal       Nose: Nose normal       Mouth/Throat:      Mouth: Mucous membranes are moist       Pharynx: Oropharynx is clear  Eyes:      Conjunctiva/sclera: Conjunctivae normal    Cardiovascular:      Rate and Rhythm: Normal rate and regular rhythm  Pulses: Normal pulses  Heart sounds: Normal heart sounds  Pulmonary:      Effort: Pulmonary effort is normal  No respiratory distress  Breath sounds: Normal breath sounds  No stridor  No wheezing, rhonchi or rales  Chest:      Chest wall: No tenderness  Abdominal:      General: Abdomen is flat  Bowel sounds are normal  There is no distension or abdominal bruit  There are no signs of injury  Palpations: Abdomen is soft  There is no shifting dullness  Tenderness: There is no abdominal tenderness  Genitourinary:     Adnexa: Right adnexa normal and left adnexa normal    Musculoskeletal:         General: Normal range of motion  Cervical back: Normal range of motion and neck supple  Skin:     General: Skin is warm and dry  Capillary Refill: Capillary refill takes less than 2 seconds  Neurological:      General: No focal deficit present  Mental Status: She is alert and oriented to person, place, and time  Mental status is at baseline  Psychiatric:         Mood and Affect: Mood normal          Behavior: Behavior normal          Vital Signs  ED Triage Vitals   Temperature Pulse Respirations Blood Pressure SpO2   06/02/23 1814 06/02/23 1814 06/02/23 1814 06/02/23 1814 06/02/23 1814   99 4 °F (37 4 °C) 80 20 168/77 94 %      Temp Source Heart Rate Source Patient Position - Orthostatic VS BP Location FiO2 (%)   06/02/23 1814 06/02/23 1814 06/02/23 1814 06/02/23 1814 --   Oral Monitor Sitting Right arm       Pain Score       06/02/23 2250       Med Not Given for Pain - for MAR use only           Vitals:    06/02/23 1814 06/02/23 2300 06/03/23 0000 06/03/23 0103   BP: 168/77 (!) 192/97 157/93 (!) 178/84   Pulse: 80 65 65 64   Patient Position - Orthostatic VS: Sitting Lying           Visual Acuity      ED Medications  Medications   metoclopramide (REGLAN) injection 10 mg (10 mg Intravenous Given 6/2/23 2304)   dexamethasone (DECADRON) injection 8 mg (8 mg Intravenous Given 6/2/23 2255)   diphenhydrAMINE (BENADRYL) injection 25 mg (25 mg Intravenous Given 6/2/23 2300)   cefepime (MAXIPIME) 2 g/50 mL dextrose IVPB (0 mg Intravenous Stopped 6/2/23 2320)   azithromycin (ZITHROMAX) 500 mg in sodium chloride 0 9% 250mL IVPB 500 mg (500 mg Intravenous New Bag 6/2/23 2318)   acetaminophen (TYLENOL) tablet 975 mg (975 mg Oral Given 6/2/23 2250)       Diagnostic Studies  Results Reviewed     Procedure Component Value Units Date/Time    HS Troponin I 2hr [[id number]]  (Normal) Collected: 06/02/23 2330    Lab Status: Final result Specimen: Blood from Arm, Left Updated: 06/03/23 0011     hs TnI 2hr 6 ng/L      Delta 2hr hsTnI -1 ng/L     Lactic acid, plasma (w/reflex if result > 2 0) [[id number]]  (Normal) Collected: 06/02/23 2250    Lab Status: Final result Specimen: Blood from Arm, Right Updated: 06/02/23 2888     LACTIC ACID 1 5 mmol/L     Narrative:      Result may be elevated if tourniquet was used during collection      Blood culture #2 [[id number]] Collected: 06/02/23 2255    Lab Status: In process Specimen: Blood from Arm, Left Updated: 06/02/23 2319    Blood culture #1 [[id number]] Collected: 06/02/23 2250    Lab Status: In process Specimen: Blood from Arm, Right Updated: 06/02/23 2306    HS Troponin I 4hr [[id number]]     Lab Status: No result Specimen: Blood     Procalcitonin [[id number]]  (Abnormal) Collected: 06/02/23 2101    Lab Status: Final result Specimen: Blood from Arm, Right Updated: 06/02/23 2250     Procalcitonin 0 42 ng/ml     C-reactive protein [[id number]]  (Abnormal) Collected: 06/02/23 2101    Lab Status: Final result Specimen: Blood from Arm, Right Updated: 06/02/23 2250     CRP 67 1 mg/L     CBC and differential [[id number]]  (Abnormal) Collected: 06/02/23 2101    Lab Status: Final result Specimen: Blood from Arm, Right Updated: 06/02/23 2144     WBC 7 81 Thousand/uL      RBC 3 74 Million/uL      Hemoglobin 13 3 g/dL      Hematocrit 38 8 %       fL      MCH 35 6 pg      MCHC 34 3 g/dL      RDW 12 1 %      MPV 8 8 fL      Platelets 460 Thousands/uL     Narrative: This is an appended report  These results have been appended to a previously verified report      Manual Differential(PHLEBS Do Not Order) [[id number]]  (Abnormal) Collected: 06/02/23 2101    Lab Status: Final result Specimen: Blood from Arm, Right Updated: 06/02/23 2144     Segmented % 91 %      Bands % 2 %      Lymphocytes % 3 %      Monocytes % 3 %      Eosinophils, % 0 %      Basophils % 0 %      Myelocytes % 1 %      Absolute Neutrophils 7 26 Thousand/uL      Lymphocytes Absolute 0 23 Thousand/uL      Monocytes Absolute 0 23 Thousand/uL      Eosinophils Absolute 0 00 Thousand/uL      Basophils Absolute 0 00 Thousand/uL      Total Counted --     RBC Morphology Present     Rouleaux Present     Stomatocytes Present     Platelet Estimate Adequate    HS Troponin 0hr (reflex protocol) [[id number]]  (Normal) Collected: 06/02/23 2101    Lab Status: Final result Specimen: Blood from Arm, Right Updated: 06/02/23 2143     hs TnI 0hr 7 ng/L     Comprehensive metabolic panel [[id number]]  (Abnormal) Collected: 06/02/23 2101    Lab Status: Final result Specimen: Blood from Arm, Right Updated: 06/02/23 2139     Sodium 137 mmol/L      Potassium 4 5 mmol/L      Chloride 102 mmol/L      CO2 26 mmol/L      ANION GAP 9 mmol/L      BUN 13 mg/dL      Creatinine 0 57 mg/dL      Glucose 132 mg/dL      Calcium 9 6 mg/dL      AST 28 U/L      ALT 19 U/L      Alkaline Phosphatase 259 U/L      Total Protein 6 8 g/dL      Albumin 3 8 g/dL      Total Bilirubin 0 82 mg/dL      eGFR 99 ml/min/1 73sq m     Narrative:      [place] guidelines for Chronic Kidney Disease (CKD):   •  Stage 1 with normal or high GFR (GFR > 90 mL/min/1 73 square meters)  •  Stage 2 Mild CKD (GFR = 60-89 mL/min/1 73 square meters)  •  Stage 3A Moderate CKD (GFR = 45-59 mL/min/1 73 square meters)  •  Stage 3B Moderate CKD (GFR = 30-44 mL/min/1 73 square meters)  •  Stage 4 Severe CKD (GFR = 15-29 mL/min/1 73 square meters)  •  Stage 5 End Stage CKD (GFR <15 mL/min/1 73 square meters)  Note: GFR calculation is accurate only with a steady state creatinine                 CT chest without contrast   Final Result by Ester Esqueda MD (06/02 5806)      Increasingly prominent nodular and irregular thickening interlobular septal thickening as well as tree-in-bud nodularity  Although findings may reflect infectious bronchiolitis and superimposed CHF given patient's history of malignant melanoma    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 as well        Small bilateral pleural effusions                     Workstation performed: [id number]         XR chest 1 view portable    (Results Pending)              Procedures  Procedures         ED Course                                             Medical Decision Making  73-year-old female with worsening cough, shortness of breath, fevers and chills  She was just admitted on IV antibiotics, discharged on oral antibiotics, cefdinir  Chest x-ray and CT confirm worsening pulmonary disease, concerning for pneumonia versus alternate diagnoses such as malignant melanoma, pneumonitis  Will admit to the hospital on IV antibiotics, considering patient is immunocompromised and symptoms worsening, failing outpatient treatment       Pneumonia: acute illness or injury  Amount and/or Complexity of Data Reviewed  Labs: ordered  Radiology: ordered  Risk  OTC drugs  Prescription drug management  Decision regarding hospitalization  Disposition  Final diagnoses:   Pneumonia     Time reflects when diagnosis was documented in both MDM as applicable and the Disposition within this note     Time User Action Codes Description Comment    6/3/2023 12:48 AM Génesisanton Hinds Add [J18 9] Pneumonia       ED Disposition     ED Disposition   Admit    Condition   Stable    Date/Time   Sat Gumaro 3, 2023 12:48 AM    Comment   Case was discussed with Dr Carlos Roblero and the patient's admission status was agreed to be Admission Status: inpatient status to the service of Dr Mera Golden   Follow-up Information    None         Patient's Medications   Discharge Prescriptions    No medications on file       No discharge procedures on file      PDMP Review       Value Time User    PDMP Reviewed  Yes 5/28/2023 11:53 PM Kenrick Stewart MD          ED Provider  Electronically Signed by           Manav Patel DO  06/03/23 0201

## 2023-06-03 NOTE — ASSESSMENT & PLAN NOTE
On presentation for bilateral left-sided visual field deficits  Was seen by neurology and heme-onc with suspicion for progression of disease versus pembrolizumab side effect  Received 2 rounds of radiation while inpatient without much improvement in deficits  Was started on 4 mg of Decadron every 6 hours until her next oncology visit    Discharged on 6/1  At this time she continues to endorse visual field deficits    Plan:    Continue Decadron 4 mg every 6 hours

## 2023-06-03 NOTE — ASSESSMENT & PLAN NOTE
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

## 2023-06-03 NOTE — ASSESSMENT & PLAN NOTE
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

## 2023-06-03 NOTE — ASSESSMENT & PLAN NOTE
POA: worsening nonproductive cough with associated B[place]ante  Concern for infectious etiology vs lymphangitis carcinomatosis  WBC WNL, however Procal still elevated at 0 42    Plan:   Will start antibiotics as above  Mucinex 600mg BID  Continue PTA Symbicort  Rest of plan as above

## 2023-06-03 NOTE — ASSESSMENT & PLAN NOTE
Continue PTA amlodipine 5 mg daily, atenolol 50 mg daily, HCTZ 12 5 mg daily  Continue monitoring BP

## 2023-06-03 NOTE — CONSULTS
Lindsay Farris  1960    HEMATOLOGY/ONCOLOGY CONSULTATION REPORT    DISCUSSION/SUMMARY:    61-year-old female with metastatic melanoma  Issues:    Cough  Concern is infectious versus disease progression (pulmonary parenchymal spread)  Respiratory status is relatively stable  Patient has been started on antibiotics, Mucinex  Pulmonary evaluation has been requested  Hemorrhagic CNS metastases  Radiation oncology will need to be recontacted; not clear at this moment if patient has completed her treatment  Vision is being monitored  Metastatic melanoma  Patient was recently started on pembrolizumab  Disease seems to be growing fast   Dr Davey Schultz is away at a conference this weekend  When patient is more stable, decisions will need to be made to see if patient will continue with the present regimen (pembrolizumab) or be switched to a new regimen  If not yet seen, patient should be offered evaluation by palliative care non-emergently  The above was discussed with the patient; all questions were answered  Please do not hesitate to contact me if you have any questions or need additional information  Thank you for this consult     _________________________________________________________________________________    Chief Complaint   Patient presents with   • Flu Symptoms     Pt presents to ED for cough, body aches, fevers, and headaches  States she was seen and admitted last Sunday for similar thing  Oncology History Overview Note   Radiation Oncology consult for metastatic melanoma with brain metastases, referred by Dr Davey Schultz  59year old female presented with a lesion on the bottom of her right foot a few months ago, initially thought to be a plantar wart or callus, no improvement with OTC remedies  She saw her PCP when size increased significantly, then evaluated by Podiatry and the lesion was excised in the office on 4/6/23   Pathology demonstrated melanoma, acral type, ulcerated, breslow thickness 4 2 mm  She was referred to Med Onc, Dr Valerie Meraz and Dr Sudheer Ho, Surg Onc  Patient has family history of melanoma in her father who  of a brain mass, a younger sister who had melanoma and breast cancer diagnosed in her 45s  She underwent additional fine needle aspirate biopsy of right groin lymph node with Dr Sudheer Ho that is positive for metastatic melanoma  PET/CT revealed enlarged right liver with FDG activity, MRI abdomen is ordered for   She also underwent MRI brain with results suspicious for tiny hemorrhagic brain metastasis in right frontal lobe, left superior temporal lobe, right caudate, right medial occipital lobes, and right posterior thalamocapsular junction with tiny nonhemorrhagic metastasis in right cerebellum  Her case was discussed at [facility] on 23 with recommendations for Rad Onc referral  She started systemic therapy with Keytruda on 23 Skin, right foot, wide excision (Podiatry):  Melanoma, acral type, ulcerated, Breslow thickness: 4 2 mm  Pathological state pT4b  Procedure  Excision    Specimen Laterality  Right    TUMOR   Tumor Site  Skin of lower limb and hip: Right foot          Histologic Type  Acral melanoma    Maximum Tumor (Breslow) Thickness (Millimeters)  4 2 mm   Macroscopic Satellite Nodule(s)  Present    Ulceration  Present    Anatomic (Grupo) Level  IV (Melanoma invades reticular dermis)    Mitotic Rate  4 mitoses per mm2   Microsatellite(s)  Present    Lymphovascular Invasion  Not identified    Neurotropism  Not identified    Tumor-Infiltrating Lymphocytes  Present, nonbrisk    Tumor Regression  Not identified    MARGINS     Margin Status for Invasive Melanoma  Invasive melanoma present at margin    Margin(s) Involved by Invasive Melanoma  Peripheral: One of the satellite nodules extends to the 12 o'clock end margin       Margin Status for Melanoma in situ  All margins negative for melanoma in [address] [place] [address] Lymph Node Status  Not applicable (no regional lymph nodes submitted or found)    PATHOLOGIC STAGE CLASSIFICATION (pTNM, AJCC 8th Edition)     pT Category  pT4b    pN Category  pN1c          4/24/23 Med Onc, Dr Merari Childers for newly dx melanoma of right foot  Discussed that this is at least Stage IIIc, satellite lesions incompletely cleared with wide local excision in the office  Patient also describes feeling a right thigh nodule recently that was tiny, maybe a size of a marble but now has grown to a large size size of a grape  It is not painful  Recommend biopsy  Plan: Pt will see Dr Delphine Sams, surg onc  PET scan and MRI ordered to determine treatment options including systemic therapy vs neoadjuvant approach to resection vs surgery then treatment  4/25/23 Surg Onc, Dr Delphine Sams  FNA biopsy of the right inguinal lymph node performed  She tolerated this well  Will await the results of her PET/CT  If the PET/CT shows distant metastatic disease, recommend immunotherapy  Surgery to the foot could then be performed for palliative reasons  If she has stage III disease by the PET, recommend neoadjuvant immunotherapy for 3 months followed by surgical intervention at that completing adjuvant immunotherapy based on the SWOG 1801 study  Surgical intervention to the foot would likely be a transmetatarsal amputation for her best functional outcome assuming there is no significant local response to immunotherapy  She should also have a fiducial placed in the inguinal node prior to immunotherapy if she is stage III         4/25/23 Dr Delphine Sams, FNA lymph node, right groin  - Positive for malignancy  - Metastatic melanoma      4/27/23 PET/CT  1  Increased FDG activity along the plantar right foot corresponds with the history of right foot plantar melanoma  2   Hypermetabolic right pelvic adenopathy compatible with metastases     3  Asymmetrically enlarged right liver with greater FDG intensity compared to the left liver  Recommend contrast MRI liver evaluation  4  No worrisome hypermetabolic lesions in the neck or chest        5/10/23 Dr Derrick Pradhan, Podiatry, wound care  Pt is s/p excisional biopsy of melanoma  She has been changing her dressings daily  Prescribed gentamycin cream for presumed colonization with pseudomonas  She will change this daily  Dispensed a wedge shoe to help offload the ulcer site  Spoke with Dr Kevin Johnson to coordinate the timing on the TMA  His plan includes her completing 3 cycles of immunotherapy after which we do the TMA while he does the SLN biopsy together  I  Pt signed consent  She will f/u in 2 weeks  5/11/23 MRI brain w wo contrast  Findings suspicious for tiny hemorrhagic brain metastasis in right frontal lobe, left superior temporal lobe, right caudate, right medial occipital lobes, and right posterior thalamocapsular junction with tiny nonhemorrhagic metastasis in right cerebellum  Mild perilesional vasogenic edema in right frontal lobe  No acute intracranial abnormality  Mild chronic microangiopathy  5/17/23 NEURO ONCOLOGY CASE REVIEW  PHYSICIAN RECOMMENDED PLAN:   - Radiation oncology referral  - Consider continuing immunotherapy and repeat MRI brain in 2months      5/22/23 Med Onc, Dr Nancy Shelton  Pt started neoadjuvant treatment with immunotherapy, pembro (Keytruda) and tolerating well  Unfortunately imaging from 5/11/2023 demonstrates small subcentimeter metastatic disease  She has an appointment with radiation oncology next week  She is not having any side effects of the small brain metastases  She will continue treatment with pembrolizumab at this time  Will decide whether or not to switch her over to ipilimumab and nivolumab  Return in 3 weeks for follow-up        5/27/23 MRI abdomen (eval FDG activity in liver on PET)      Upcoming appts:  5/30/23 Podiatry  6/15/23 Dr Brian Sprague  6/15/23 Infusion   7/25/23 Dr Caity Benavides melanoma of right lower extremity including hip ([facility] [address] )   3/28/2023 Initial Diagnosis    Malignant [facility] right [facility]     4/6/2023 Surgery    Wide excision by podiatry:  Skin, foot, right, wide excision:  Melanoma, acral type, ulcerated, Breslow thickness: 4 2 mm  PATHOLOGIC STAGE CLASSIFICATION (pTNM, AJCC 8th Edition)     pT Category  pT4b          NGS testing  NRAS Exon 3 p  Q61R  PDL1 positive 1+ 1%  TMB low 3 Mut/Mb    ALK Fusion Unknown Significance RETREG1-ALK Exon 6     4/7/2023 -  Cancer Staged    Staging form: Melanoma of the Skin, AJCC 8th Edition  - Clinical stage from 4/7/2023: Stage III (cT4b, cN1c, cM0) - Signed by Robyn Marin MD on 4/24/2023 4/7/2023 -  Cancer Staged    Staging form: Melanoma of the Skin, AJCC 8th Edition  - Pathologic stage from 4/7/2023: Stage IIIC (pT4b, pN1c, cM0) - Signed by Robyn Marin MD on 4/24/2023 4/25/2023 Biopsy    Lymph Node, Right Groin, FNA:  - Positive for malignancy  - Metastatic melanoma  5/4/2023 -  Chemotherapy    alteplase (CATHFLO), 2 mg, Intracatheter, Every 1 Minute as needed, 2 of 6 cycles  pembrolizumab (KEYTRUDA) IVPB, 200 mg, Intravenous, Once, 2 of 6 cycles  Administration: 200 mg (5/4/2023), 200 mg (5/25/2023)     5/11/2023 -  Cancer Staged    Staging form: Melanoma of the Skin, AJCC 8th Edition  - Pathologic stage from 5/11/2023: Stage IV (pT4b, pN1c, pM1d(0)) - Signed by Robyn Marin MD on 5/25/2023         History of Present Illness: 79-year-old female recently found to have metastatic melanoma initially starting on right foot/sole  Recent biopsy of a right inguinal lymph node demonstrated metastatic disease  Patient has been seen/evaluated by Dr Malachy Baumgarten and was recently started on pembrolizumab  Patient presents with fever and cough      Patient was recently admitted with bilateral visual defects and found to have possible CVA in the setting of metastatic hemorrhagic brain lesions  Presently vision is okay/about the same as before  Patient also with headaches  Presently Mrs Yepez states feeling +/-  Cough is persistent  No chest pain or pressure  Minimal dyspnea on exertion  No sputum or hemoptysis  Activities are limited  Review of Systems   Constitutional: Positive for activity change and fatigue  HENT: Negative  Eyes: Negative  Respiratory: Positive for cough and shortness of breath  Cardiovascular: Negative  Gastrointestinal: Negative  Endocrine: Negative  Genitourinary: Negative  Musculoskeletal: Negative  Skin: Negative  Allergic/Immunologic: Negative  Neurological: Positive for headaches  Hematological: Negative  Psychiatric/Behavioral: Negative  All other systems reviewed and are negative      Patient Active Problem List   Diagnosis   • Mild intermittent asthma   • Esophageal dysmotility   • Allergic rhinitis with postnasal drip   • Tobacco abuse   • NTAE (obstructive sleep apnea)   • Cough   • Vitamin D deficiency   • High blood triglycerides   • Diarrhea   • Asthma   • Need for vaccination   • Unintentional weight loss   • Anemia   • Enlarged liver   • Low folic acid   • High vitamin A level   • High serum vitamin E   • Low serum vitamin B12   • Hypokalemia   • Alcoholism (HCC)   • Anxiety disorder   • Cervical spinal stenosis   • Diverticular disease of colon   • Erosive esophagitis   • Hemorrhoids   • History of gastroesophageal reflux (GERD)   • Hypertension   • Irritable bowel syndrome   • Need for influenza vaccination   • Postoperative examination   • Pure hypercholesterolemia   • Thoracic and lumbosacral neuritis   • Pre-operative clearance   • Angiomyolipoma   • Chest pain on respiration   • Left flank pain   • Change in stool habits   • Epicondylitis, lateral (tennis elbow)   • Gallbladder disease   • Limb pain   • Impaired fasting glucose   • Microhematuria   • Pelvic pain in female   • Venous insufficiency • Annual physical exam   • Fatigue   • Foot pain   • S/P cervical spinal fusion   • Fatty liver   • Abnormal bowel movement   • Hypertensive urgency   • Hypertriglyceridemia   • Electrolyte imbalance   • Myofascial pain syndrome, cervical   • Cervicalgia   • Occipital headache   • Cervical post-laminectomy syndrome   • Malignant melanoma of right lower extremity including hip ([facility] [address] )   • Metastatic melanoma ([facility] [address] )   • Surgical wound present   • High risk medication use   • Blurred vision in bilateral left visual fields   • SIRS (systemic inflammatory response syndrome) ([facility] [address] )   • Cerebrovascular accident (CVA) ([facility] [address] )   • PNA (pneumonia)     Past Medical History:   Diagnosis Date   • Cervical disc disorder    • Melanoma ([facility] [address] )    • Stenosis of cervical spine region      Past Surgical History:   Procedure Laterality Date   • BREAST BIOPSY Left 2009    core   • BREAST BIOPSY Right 2009    core   • CERVICAL FUSION  2013    cC3-C4, C4-C5, C5 C6, C6-C7, C7-T1   •  SECTION     • ELBOW SURGERY     • KNEE SURGERY Bilateral 20YRS AGO     Family History   Problem Relation Age of Onset   • Cancer Mother    • Brain cancer Mother [de-identified]   • Heart disease Father    • Breast cancer Sister 48   • Breast cancer Sister 45   • Breast cancer Maternal Aunt 60   • Breast cancer Maternal Aunt 60   • Breast cancer Maternal Aunt 60   • Breast cancer Cousin 50   • Breast cancer Cousin 52   • Breast cancer Cousin 52   • Prostate cancer Maternal Uncle 55   • No Known Problems Son    • No Known Problems Maternal Grandmother    • No Known Problems Maternal Grandfather    • No Known Problems Paternal Grandmother    • No Known Problems Paternal Grandfather    • BRCA1 Negative Sister    • BRCA2 Negative Sister    • No Known Problems Sister    • No Known Problems Maternal Aunt    • No Known Problems Maternal Aunt    • No Known Problems Maternal Aunt    • No Known Problems Maternal Aunt      Social History     Socioeconomic History   • Marital status: Single     Spouse name: Not on file   • Number of children: Not on file   • Years of education: Not on file   • Highest education level: Not on file   Occupational History   • Not on file   Tobacco Use   • Smoking status: Former     Packs/day: 0 02     Types: Cigarettes     Start date: 0     Quit date: 3/1/2023     Years since quittin 2   • Smokeless tobacco: Never   • Tobacco comments:     4 a day   Vaping Use   • Vaping Use: Never used   Substance and Sexual Activity   • Alcohol use: Yes     Comment: SOCIAL   • Drug use: Not Currently   • Sexual activity: Yes     Partners: Male   Other Topics Concern   • Not on file   Social History Narrative   • Not on file     Social Determinants of Health     Financial Resource Strain: Not on file   Food Insecurity: Not on file   Transportation Needs: Not on file   Physical Activity: Not on file   Stress: Not on file   Social Connections: Not on file   Intimate Partner Violence: Not on file   Housing Stability: Not on file       Current Facility-Administered Medications:   •  acetaminophen (TYLENOL) tablet 650 mg, 650 mg, Oral, Q6H PRN, Maribell Maher MD  •  albuterol (PROVENTIL HFA,VENTOLIN HFA) inhaler 1 puff, 1 puff, Inhalation, Q6H PRN, Maribell Maher MD  •  amLODIPine (NORVASC) tablet 5 mg, 5 mg, Oral, Daily, Maribell Maher MD, 5 mg at 23 0902  •  atenolol (TENORMIN) tablet 50 mg, 50 mg, Oral, Daily, Maribell Maher MD, 50 mg at 23 0906  •  azithromycin (ZITHROMAX) tablet 500 mg, 500 mg, Oral, Q24H, Maribell Maher MD  •  budesonide-formoterol (SYMBICORT) 160-4 5 mcg/act inhaler 2 puff, 2 puff, Inhalation, BID, Maribell Maher MD  •  ceftriaxone (ROCEPHIN) 1 g/50 mL in dextrose IVPB, 1,000 mg, Intravenous, Q24H, Maribell Maher MD, Stopped at 23 0930  •  dexamethasone (DECADRON) tablet 4 mg, 4 mg, Oral, Q6H [facility], Maribell Maher MD, 4 mg at 23 1125  •  enoxaparin (LOVENOX) subcutaneous injection 40 mg, 40 mg, Subcutaneous, Daily, Missy Syed MD, 40 mg at 06/03/23 0904  •  escitalopram (LEXAPRO) tablet 10 mg, 10 mg, Oral, Daily, Missy Syed MD, 10 mg at 06/03/23 0902  •  famotidine (PEPCID) tablet 20 mg, 20 mg, Oral, BID, Missy Syed MD, 20 mg at 06/03/23 0902  •  guaiFENesin (MUCINEX) 12 hr tablet 600 mg, 600 mg, Oral, BID, Missy Syed MD, 600 mg at 06/03/23 0902  •  hydrochlorothiazide (HYDRODIURIL) tablet 12 5 mg, 12 5 mg, Oral, Daily, Missy Syed MD, 12 5 mg at 06/03/23 0902  •  memantine (NAMENDA) tablet 5 mg, 5 mg, Oral, Daily, Missy Syed MD, 5 mg at 06/03/23 0902    Current Outpatient Medications:   •  albuterol (PROVENTIL HFA,VENTOLIN HFA) 90 mcg/act inhaler, INHALE 1 PUFF EVERY 4 HOURS AS NEEDED FOR WHEEZING, Disp: 6 7 g, Rfl: 3  •  amLODIPine (NORVASC) 5 mg tablet, Take 1 tablet (5 mg total) by mouth daily, Disp: 90 tablet, Rfl: 1  •  atenolol (TENORMIN) 50 mg tablet, TAKE 1 TABLET BY MOUTH EVERY DAY, Disp: 90 tablet, Rfl: 0  •  cefdinir (OMNICEF) 300 mg capsule, Take 1 capsule (300 mg total) by mouth every 12 (twelve) hours for 3 days Do not start before June 2, 2023 , Disp: 6 capsule, Rfl: 0  •  dexamethasone (DECADRON) 4 mg tablet, Take 1 tablet (4 mg total) by mouth every 6 (six) hours for 7 days, Disp: 28 tablet, Rfl: 0  •  escitalopram (LEXAPRO) 10 mg tablet, TAKE 1 TABLET BY MOUTH EVERY DAY, Disp: 90 tablet, Rfl: 1  •  famotidine (PEPCID) 20 mg tablet, TAKE 1 TABLET BY MOUTH TWICE A DAY, Disp: 180 tablet, Rfl: 1  •  gentamicin (GARAMYCIN) 0 1 % cream, Apply topically 3 (three) times a day, Disp: 30 g, Rfl: 2  •  guaiFENesin (MUCINEX) 600 mg 12 hr tablet, Take 1 tablet (600 mg total) by mouth 2 (two) times a day for 14 days, Disp: 28 tablet, Rfl: 0  •  hydrochlorothiazide (MICROZIDE) 12 5 mg capsule, Take 12 5 mg by mouth daily, Disp: , Rfl:   •  memantine (NAMENDA) 5 mg tablet, Take 1 tablet (5 mg total) by mouth daily for 7 days Do not start before June 2, 2023 , Disp: 7 tablet, Rfl: 0    Allergies   Allergen Reactions   • Methocarbamol Shortness Of Breath and Swelling   • Tramadol Shortness Of Breath and Swelling       Vitals:    06/03/23 1210   BP: 137/75   Pulse: 57   Resp: 18   Temp:    SpO2: 95%     Physical Exam  Constitutional:       Appearance: She is well-developed  HENT:      Head: Normocephalic and atraumatic  Right Ear: External ear normal       Left Ear: External ear normal    Eyes:      Conjunctiva/sclera: Conjunctivae normal       Pupils: Pupils are equal, round, and reactive to light  Cardiovascular:      Rate and Rhythm: Normal rate and regular rhythm  Heart sounds: Normal heart sounds  Pulmonary:      Effort: Pulmonary effort is normal       Breath sounds: Normal breath sounds  Comments: Fair entry bilaterally, coarse bilateral rhonchi  Abdominal:      General: Bowel sounds are normal       Palpations: Abdomen is soft  Musculoskeletal:         General: Normal range of motion  Cervical back: Normal range of motion and neck supple  Skin:     General: Skin is warm  Neurological:      Mental Status: She is alert and oriented to person, place, and time  Deep Tendon Reflexes: Reflexes are normal and symmetric  Psychiatric:         Behavior: Behavior normal          Thought Content:  Thought content normal          Judgment: Judgment normal      Extremities: 0 disc +1 sign right lower extremity no cords, pulses are 1+  Lymphatics: + Right inguinal adenopathy    Labs     Latest Reference Range & Units 06/03/23 05:17   WBC 4 31 - 10 16 Thousand/uL 7 30   Red Blood Cell Count 3 81 - 5 12 Million/uL 3 84   Hemoglobin 11 5 - 15 4 g/dL 13 4   HCT 34 8 - 46 1 % 40 1   MCV 82 - 98 fL 104 (H)   MCH 26 8 - 34 3 pg 34 9 (H)   MCHC 31 4 - 37 4 g/dL 33 4   RDW 11 6 - 15 1 % 12 0   Platelet Count 486 - 390 Thousands/uL 209        Latest Reference Range & Units 06/03/23 05:17   Sodium 135 - 147 mmol/L 137   Potassium 3 5 - 5 3 mmol/L 4 7 Chloride 96 - 108 mmol/L 104   CO2 21 - 32 mmol/L 24   Anion Gap 4 - 13 mmol/L 9   BUN 5 - 25 mg/dL 14   Creatinine 0 60 - 1 30 mg/dL 0 45 (L)   Glucose, Random 65 - 140 mg/dL 144 (H)   Calcium 8 4 - 10 2 mg/dL 9 1   AST 13 - 39 U/L 18   ALT 7 - 52 U/L 16   Alkaline Phosphatase 34 - 104 U/L 223 (H)   Total Protein 6 4 - 8 4 g/dL 6 4   Albumin 3 5 - 5 0 g/dL 3 5   TOTAL BILIRUBIN 0 20 - 1 00 mg/dL 0 87   eGFR ml/min/1 73sq m 107   Magnesium 1 9 - 2 7 mg/dL 1 5 (L)     Imaging    XR chest 1 view portable    Result Date: 6/3/2023  Narrative: CHEST INDICATION:   chest tightness  COMPARISON: CXR 5/28/2023 and chest CT 6/2/2023  EXAM PERFORMED/VIEWS:  XR CHEST PORTABLE  FINDINGS: Cardiomediastinal silhouette normal  Moderate interstitial edema with small effusions  No pneumothorax  Upper abdomen normal  Bones normal for age  Cervicothoracic fusion  Impression: Moderate interstitial edema with small effusions  Workstation performed: [id number]     CT chest without contrast    Result Date: 6/2/2023  Narrative: CT CHEST WITHOUT IV CONTRAST INDICATION:   Cough, persistent cough, sob worsening    COMPARISON: 5/29/2023  TECHNIQUE: CT examination of the chest was performed without intravenous contrast  Multiplanar 2D reformatted images were created from the source data  This examination, like all CT scans performed in the [facility], was performed utilizing techniques to minimize radiation dose exposure, including the use of iterative reconstruction and automated exposure control  Radiation dose length product (DLP) for this visit:  197 mGy-cm FINDINGS: LUNGS: Diffuse tree-in-bud nodularity as well as increasingly prominent nodular and irregular thickening of the interlobular septa  Small focus of air along the medial right lower lobe and right heart margin  There is no tracheal or endobronchial lesion   Crescentic pneumatocele along the right heart border and right lower lobe, similar to CT examinations dating back to 2005  PLEURA: Small bilateral pleural effusions  HEART/GREAT VESSELS: Heart is unremarkable for patient's age  No thoracic aortic aneurysm  MEDIASTINUM AND ARMINDA:  Unremarkable  CHEST WALL AND LOWER NECK:  Unremarkable  VISUALIZED STRUCTURES IN THE UPPER ABDOMEN:  Unremarkable  OSSEOUS STRUCTURES:  No acute fracture or destructive osseous lesion  Impression: Increasingly prominent nodular and irregular thickening interlobular septal thickening as well as tree-in-bud nodularity  Although findings may reflect infectious bronchiolitis and superimposed CHF given patient's history of malignant melanoma 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 as well  Small bilateral pleural effusions Workstation performed: [id number]     XR chest 1 view portable    Result Date: 5/29/2023  Narrative: CHEST INDICATION:   chest tightness, SOB  COMPARISON: 12/12/2013  EXAM PERFORMED/VIEWS:  XR CHEST PORTABLE FINDINGS: Cardiomediastinal silhouette appears unremarkable  Mild bibasilar hypoventilatory changes  Tree-in-bud nodularity described on subsequent CTA chest PE study is not radiographically apparent  No pneumothorax or pleural effusion  Osseous structures appear within normal limits for patient age  Impression: Mild bibasilar hypoventilatory changes  Tree-in-bud nodularity described on subsequent CTA chest PE study is not radiographically apparent  Workstation performed: [id number]     MRI brain w wo contrast    Result Date: 5/29/2023  Narrative: MRI BRAIN WITH AND WITHOUT CONTRAST INDICATION: blurred vision  COMPARISON: Brain MRI 5/11/2023 TECHNIQUE: Multiplanar, multisequence imaging of the brain was performed before and after gadolinium administration  IV Contrast:  7 mL of Gadobutrol injection (SINGLE-DOSE) IMAGE QUALITY:   Diagnostic   FINDINGS: BRAIN PARENCHYMA: Redemonstrated numerous enhancing lesions with intralesional hemorrhage involving bilateral cerebral and right cerebellar hemispheres  The largest representative is centered in the superior right hippocampal  body measuring 1 x 0 9 cm (series 11 image 62), previously measured 0 2 cm  There is also 0 5 cm similar appearing lesion along the ependymal surface of anterior horn of right lateral ventricle (series 11 image 79), previously measured 0 2 cm  There is 0 8 cm hemorrhagic lesion in the posterior left temporal lobe without enhancement (series 5 image 11), not conspicuous in prior exam  There is mild perilesional edema without mass effect  There is punctuate focus of increased diffusion signal in the left corona radiata without obvious low signal in ADC map and no associated enhancement (series 3 image 20)  Nonspecific  foci of T2/FLAIR hyperintensities involving periventricular and subcortical white matter, most compatible with mild microangiopathic change  VENTRICLES:  Normal for the patient's age  SELLA AND PITUITARY GLAND:  Normal  ORBITS: Prior bilateral lens replacement  PARANASAL SINUSES:  Normal  VASCULATURE:  Evaluation of the major intracranial vasculature demonstrates appropriate flow voids  CALVARIUM AND SKULL BASE:  Normal  EXTRACRANIAL SOFT TISSUES:  Normal      Impression: 1  Punctate focus of mild increased diffusion signal in the left corona radiata without obvious low signal on ADC map and no enhancement could indicate  tiny subacute infarct in right clinical setting  2   Interval worsening of intracranial hemorrhagic metastatic lesions  Associated mild perilesional vasogenic edema without mass effect  Workstation performed: [id number]     CTA ED chest PE Study    Result Date: 5/29/2023  Narrative: CTA - CHEST WITH IV CONTRAST - PULMONARY ANGIOGRAM INDICATION:   hx of metastatic melanoma with new and worsening sob  COMPARISON: None   TECHNIQUE: CTA examination of the chest was performed using angiographic technique according to a protocol specifically tailored to evaluate for pulmonary embolism  Multiplanar 2D reformatted images were created from the source data  In addition, coronal 3D MIP postprocessing was performed on the acquisition scanner  Radiation dose length product (DLP) for this visit:  277 mGy-cm   This examination, like all CT scans performed in the [facility], was performed utilizing techniques to minimize radiation dose exposure, including the use of iterative reconstruction and automated exposure control  IV Contrast:  60 mL of iohexol (OMNIPAQUE) FINDINGS: PULMONARY ARTERIAL TREE:  No pulmonary embolus is seen  LUNGS: Diffuse tree-in-bud nodularity  There is no tracheal or endobronchial lesion  PLEURA: Trace right pleural effusion  Mosetta Lute HEART/GREAT VESSELS:  Unremarkable for patient's age  No thoracic aortic aneurysm  MEDIASTINUM AND ARMINDA: Mediastinal and bilateral hilar adenopathy  For instance there is an 11 mm subcarinal lymph node    CHEST WALL AND LOWER NECK:   Unremarkable  VISUALIZED STRUCTURES IN THE UPPER ABDOMEN:  Unremarkable  OSSEOUS STRUCTURES:  No acute fracture or destructive osseous lesion  Impression: Diffuse tree-in-bud nodularity which may represent an infectious bronchiolitis such as with an atypical mycobacterial infection  Workstation performed: [id number]     MRI brain w wo contrast    Result Date: 5/16/2023  Narrative: MRI BRAIN WITH AND WITHOUT CONTRAST INDICATION: C43 71: Malignant melanoma of right lower limb, including hip    melanoma staging COMPARISON: Nuclear medicine PET/CT 4/27/2023  MRI cervical spine with and without contrast 3/28/2023  MRI brain an MRA head without contrast 11/7/2013  TECHNIQUE: Multiplanar, multisequence imaging of the brain was performed before and after gadolinium administration  Imaging performed on 3 0T MRI IV Contrast:  7 mL of Gadobutrol injection (SINGLE-DOSE) IMAGE QUALITY:   Diagnostic   FINDINGS: BRAIN PARENCHYMA: 0 3 cm right frontal lobe enhancing hemorrhagic lesion with mild perilesional vasogenic edema (10:238), suspicious for hemorrhagic brain metastasis  Tiny enhancing hemorrhagic lesions in left superior temporal, right caudate, right medial occipital lobes, and right right posterior thalamocapsular junction (10:161, 158, 139, 132), suspicious for hemorrhagic brain metastasis  Tiny enhancing lesion in right cerebellum (10:81), suspicious for nonhemorrhagic brain metastasis  No mass effect or midline shift  Chronic microhemorrhage in right temporal lobe  Diffusion imaging is unremarkable  No acute infarction  Tiny right frontal and left parietal developmental venous anomalies  Small scattered hyperintensities on T2/FLAIR imaging are noted in the periventricular and subcortical white matter demonstrating an appearance that is statistically most likely to represent mild microangiopathic change  VENTRICLES:  Normal for the patient's age  SELLA AND PITUITARY GLAND:  Normal  ORBITS: Sequela of bilateral cataract surgery  PARANASAL SINUSES:  Normal  VASCULATURE:  Evaluation of the major intracranial vasculature demonstrates appropriate flow voids  CALVARIUM AND SKULL BASE:  Normal  MASTOIDS: Trace left mastoid effusion  EXTRACRANIAL SOFT TISSUES:  Normal      Impression: Findings suspicious for tiny hemorrhagic brain metastasis in right frontal lobe, left superior temporal lobe, right caudate, right medial occipital lobes, and right posterior thalamocapsular junction with tiny nonhemorrhagic metastasis in right cerebellum  Mild perilesional vasogenic edema in right frontal lobe  No acute intracranial abnormality  Mild chronic microangiopathy  The study was marked in [facility] for immediate notification  Workstation performed: [id number]     I reviewed the above laboratory and imaging data      Pathology    Case Report   Non-gynecologic Cytology                          Case: [id number]                                   Authorizing Provider: Vlad Arevalo MD           Collected:           04/25/2023 1015               Ordering Location:     [facility]     Received:            04/25/2023 [facility]                                      Surgical Oncology                                                                                    [facility]                                                          Pathologist:           Kelechi Diaz DO                                                      Specimens:   A) - Groin, right                                                                                    B) - Groin, right                                                                          Final Diagnosis   A  And B  Lymph Node, Right Groin, FNA (ThinPrep and Smears):  - Positive for malignancy  - Metastatic melanoma  See Note  Electronically signed by Kelechi Diaz DO on 5/1/2023 at  1:32 PM       Case Report   Surgical Pathology Report                         Case: [id number]                                    Authorizing Provider: Lindy Carl DPM       Collected:           04/06/2023 0000               Ordering Location:     [facility]      Received:            04/07/2023 [address] [place] [address] Specialty                                                                                   Laboratory                                                                    Pathologist:           Jaelyn Graham MD                                                              Specimen:    Foot, Right, Pigmented lesion right foot                                                   Addendum 2   At the request of Dr Bay Friedman, paraffin BLOCK A10 containing the patient's cancer cells were sent to [facility] for MI Profile testing   Upon completion of testing, the [facility] Laboratory report will be directly sent to the requesting physician as well as posted in the Media Tab of the patient's Bul[facility] EMR by the [facility] Pathology Department      Please note: The [facility]'s analysis and report is performed independently of [facility], and neither the Hospital nor the Pathology Department screen, review or comment upon [address] report  Because the role of testing in cancer diagnosis and management is subject to evolving development, usage and interpretation, we strongly urge that the test limitations described in the [facility] report be carefully read, appropriately shared with the patient, and critically considered when reaching treatment decisions      This pathology material was removed from archive for purpose of molecular testing  It was reviewed and approved by Dr Sadi Munson        Addendum electronically signed by Pema Gonzales MD on 4/28/2023 at  6:19 PM   Addendum   This addendum is created to report the result of an immunohistochemical study  Immunohistochemical studies were performed with adequate controls  The tumor cells are positive for SOX10 and Melan-A       The final diagnosis remains unchanged  Addendum electronically signed by Pema Gonzales MD on 4/28/2023 at  1:21 PM   Final Diagnosis   A  Skin, foot, right, wide excision:  Melanoma, acral type, ulcerated, Breslow thickness: 4 2 mm  See synoptic report and note         Note:  Multiple melanoma satellite nodules are seen, predominantly involving the papillary dermis  One of the satellite nodules extends to the 12 o'clock end margin  All other peripheral margins and deep margin are free       The pathologic stage (pTNM, AJCC 8th edition) is pT4b pN1c (at least)     Immunohistochemical stains for SOX10 and Melan-A will be reported in an addendum       The findings were discussed with Dr Holly Easton  04/12/23   Per discussion with Kendra Guzmán, satellite nodule was seen macroscopically (clinically detected) near the 12 o'clock margin        Electronically signed by Pema Gonzales MD on 4/12/2023 at  9:45 PM

## 2023-06-03 NOTE — H&P
[facility]  [facility]  Name: Shanae Pearce 59 y o  female I MRN: [id number]  Unit/Bed#: ED-04 I Date of Admission: 6/2/2023   Date of Service: 6/3/2023 I Hospital Day: 0      Assessment/Plan   * [facility]ic [facility]  Assessment & Plan  Met SIRS criteria with fever of 100 4 that the patient reports from home and respiratory rate of 20  Worsening nonproductive cough and shortness of breath POA  Was discharged on Cefdinir on 6/1 where she was treated as CAP for findings similar as below  CT chest without contrast on 6/2:  Impression: Increasingly prominent nodular and irregular thickening interlobular septal thickening as well as tree-in-bud nodularity  Although findings may reflect infectious bronchiolitis and superimposed CHF given patient's history of malignant melanoma 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 as well  Small bilateral pleural effusions   Procalcitonin-0 42 POA  AFB, COVID-19, strep pneumo and Legionella negative on 5/31 admission  Sputum culture growing rare gram-positive cocci in pairs, mixed respiratory amrik  This is likely in the setting of infectious etiology versus metastatic disease progression    Plan:  Pulmonology consulted-recommendations appreciated  Will start patient on Ceftriaxone (today would be d6 of Abx) and Azithromycin  Blood Cultures pending - follow up  Suspect patient may need bronchoscopy  Trend fever curves  Monitor WBC  Follow up repeat procalcitonin              Cough  Assessment & Plan  POA: worsening nonproductive cough with associated Bustamante  Concern for infectious etiology vs lymphangitis carcinomatosis  WBC WNL, however Procal still elevated at 0 42    Plan:   Will start antibiotics as above  Mucinex 600mg BID  Continue PTA Symbicort  Rest of plan as above      Blurred vision in bilateral left visual fields  Assessment & Plan  On presentation for bilateral left-sided visual field deficits  Was seen by neurology and heme-onc with suspicion for progression of disease versus pembrolizumab side effect  Received 2 rounds of radiation while inpatient without much improvement in deficits  Was started on 4 mg of Decadron every 6 hours until her next oncology visit  Discharged on 6/1  At this time she continues to endorse visual field deficits    Plan: We will consult hematology oncology  Continue Decadron 4 mg every 6 hours    Metastatic melanoma [facility])  Assessment & Plan  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  Recently admitted for visual field deficits with MRI demonstrating intracranial hemorrhagic metastatic lesions  Received 2 doses of pembrolizumab with last infusion on 5/25  Status post whole brain radiation  We will consult hematology oncology  We will also place consult for radiation oncology  Continue dexamethasone 4 mg every 6 hours     Cerebrovascular accident (CVA) ([facility] [address] )  [address] prior [address] showing questionable subacute stroke  Patient was evaluated by neurology at the time, found to be poor candidate for antithrombotic therapy given metastatic lesions with hemorrhages  Continue monitoring for changes in mental status or new neurologic deficits    Hypertension  Assessment & Plan  Continue PTA amlodipine 5 mg daily, atenolol 50 mg daily, HCTZ 12 5 mg daily  Continue monitoring BP       VTE Pharmacologic Prophylaxis: VTE Score: 5 High Risk (Score >/= 5) - Pharmacological DVT Prophylaxis Ordered: enoxaparin (Lovenox)  Sequential Compression Devices Ordered  Code Status: Level 1 - Full Code   Discussion with family: Patient declined call to   Anticipated Length of Stay: Patient will be admitted on an inpatient basis with an anticipated length of stay of greater than 2 midnights secondary to pneumonia      Chief Complaint: fever and cough    History of Present Illness:  Ward Chauhan is a 59 y o  female with a PMH of Metastatic melanoma, NATE, asthma who presents with worsening nonproductive cough and fever  Cirilo Ely was recently admitted for bilateral visual field deficits and found to have possible CVA in the setting of metastatic brain lesions with hemorrhages  At the time, CTA of the chest demonstrated tree in bud opacities that were thought to be of infectious etiology and patient was treated as CAP with Rocephin and discharged on 6/1 with Cefdinir  She returns today due to worsening of her cough, which continues to be nonproductive  She also endorses a substernal burning that is worse with coughing  She also endorses a frontal headache which is resolved at the time of my evaluation  She states that she felt unwell and checked her temperature at home which was 100 4  She then presented to the ED at the urging of her PCP  Review of Systems:  Review of Systems   Constitutional: Positive for fatigue and fever  Negative for appetite change  HENT: Positive for congestion and rhinorrhea  Negative for sinus pressure, sinus pain, sore throat and trouble swallowing  Eyes: Positive for visual disturbance  Negative for redness  Respiratory: Positive for cough and shortness of breath (after coughing)  Cardiovascular: Positive for chest pain (with coughing)  Negative for palpitations and leg swelling  Gastrointestinal: Positive for nausea  Negative for vomiting  Genitourinary: Negative for difficulty urinating and dysuria  Musculoskeletal: Negative for arthralgias and back pain  Skin: Negative for rash  Neurological: Positive for headaches  Hematological: Positive for adenopathy  Does not bruise/bleed easily  Psychiatric/Behavioral: Negative for confusion         Past Medical and Surgical History:   Past Medical History:   Diagnosis Date   • Cervical disc disorder    • Melanoma ([facility] [address] )    • Stenosis of cervical spine region        Past Surgical History:   Procedure Laterality Date   • BREAST BIOPSY Left     core   • BREAST BIOPSY Right     core   • CERVICAL FUSION  2013    cC3-C4, C4-C5, C5 C6, C6-C7, C7-T1   •  SECTION     • ELBOW SURGERY     • KNEE SURGERY Bilateral 20YRS AGO       Meds/Allergies:  Prior to Admission medications    Medication Sig Start Date End Date Taking? Authorizing Provider   albuterol (PROVENTIL HFA,VENTOLIN HFA) 90 mcg/act inhaler INHALE 1 PUFF EVERY 4 HOURS AS NEEDED FOR WHEEZING 23  Yes William Villanueva DO   amLODIPine (NORVASC) 5 mg tablet Take 1 tablet (5 mg total) by mouth daily 22  Yes William Villanueva DO   atenolol (TENORMIN) 50 mg tablet TAKE 1 TABLET BY MOUTH EVERY DAY 23  Yes Leonardo Schultz DO   cefdinir (OMNICEF) 300 mg capsule Take 1 capsule (300 mg total) by mouth every 12 (twelve) hours for 3 days Do not start before 23 Yes Shahriar Richmond MD   dexamethasone (DECADRON) 4 mg tablet Take 1 tablet (4 mg total) by mouth every 6 (six) hours for 7 days 23 Yes Shahriar Richmond MD   escitalopram (LEXAPRO) 10 mg tablet TAKE 1 TABLET BY MOUTH EVERY DAY 23  Yes Leonardo Schultz DO   famotidine (PEPCID) 20 mg tablet TAKE 1 TABLET BY MOUTH TWICE A DAY 23  Yes Leonardo Schultz DO   gentamicin (GARAMYCIN) 0 1 % cream Apply topically 3 (three) times a day 5/10/23  Yes Danie Pierre DPM   guaiFENesin (MUCINEX) 600 mg 12 hr tablet Take 1 tablet (600 mg total) by mouth 2 (two) times a day for 14 days 6/1/23 6/15/23 Yes Shahriar Richmond MD   hydrochlorothiazide (MICROZIDE) 12 5 mg capsule Take 12 5 mg by mouth daily   Yes Historical Provider, MD   memantine (NAMENDA) 5 mg tablet Take 1 tablet (5 mg total) by mouth daily for 7 days Do not start before 23  Shahriar Richmond MD     I have reviewed home medications with patient personally  Allergies:    Allergies   Allergen Reactions   • Methocarbamol Shortness Of Breath and Swelling   • Tramadol Shortness Of Breath and Swelling       Social History:  Marital Status: Single  Patient Pre-hospital Living Situation: Home  Patient Pre-hospital Level of Mobility: walks  Patient Pre-hospital Diet Restrictions: None  Substance Use History:   Social History     Substance and Sexual Activity   Alcohol Use Yes    Comment: SOCIAL     Social History     Tobacco Use   Smoking Status Former   • Packs/day: 0 02   • Types: Cigarettes   • Start date: 0   • Quit date: 3/1/2023   • Years since quittin 2   Smokeless Tobacco Never   Tobacco Comments    4 a day     Social History     Substance and Sexual Activity   Drug Use Not Currently       Family History:  Family History   Problem Relation Age of Onset   • Cancer Mother    • Brain cancer Mother [de-identified]   • Heart disease Father    • Breast cancer Sister 48   • Breast cancer Sister 45   • Breast cancer Maternal Aunt 61   • Breast cancer Maternal Aunt 60   • Breast cancer Maternal Aunt 60   • Breast cancer Cousin 50   • Breast cancer Cousin 52   • Breast cancer Cousin 52   • Prostate cancer Maternal Uncle 55   • No Known Problems Son    • No Known Problems Maternal Grandmother    • No Known Problems Maternal Grandfather    • No Known Problems Paternal Grandmother    • No Known Problems Paternal Grandfather    • BRCA1 Negative Sister    • BRCA2 Negative Sister    • No Known Problems Sister    • No Known Problems Maternal Aunt    • No Known Problems Maternal Aunt    • No Known Problems Maternal Aunt    • No Known Problems Maternal Aunt        Physical Exam:     Vitals:   Blood Pressure: (!) 178/84 (23)  Pulse: 64 (23)  Temperature: 99 4 °F (37 4 °C) (23)  Temp Source: Oral (23)  Respirations: 18 (23)  Weight - Scale: 75 6 kg (166 lb 10 7 oz) (23 2300)  SpO2: 96 % (23)    Physical Exam  Vitals and nursing note reviewed  Constitutional:       General: She is not in acute distress  Appearance: Normal appearance  She is not ill-appearing, toxic-appearing or diaphoretic  HENT:      Head: Normocephalic and atraumatic  Nose: Nose normal       Mouth/Throat:      Mouth: Mucous membranes are moist       Pharynx: Oropharynx is clear  Eyes:      General: No scleral icterus  Right eye: No discharge  Left eye: No discharge  Extraocular Movements: Extraocular movements intact  Conjunctiva/sclera: Conjunctivae normal    Cardiovascular:      Rate and Rhythm: Normal rate and regular rhythm  Pulses: Normal pulses  Heart sounds: Normal heart sounds  No murmur heard  Pulmonary:      Effort: Pulmonary effort is normal  No accessory muscle usage or respiratory distress  Breath sounds: Examination of the right-upper field reveals decreased breath sounds  Examination of the left-upper field reveals decreased breath sounds  Examination of the right-middle field reveals decreased breath sounds  Examination of the left-middle field reveals decreased breath sounds  Examination of the right-lower field reveals decreased breath sounds  Examination of the left-lower field reveals decreased breath sounds  Decreased breath sounds present  No wheezing, rhonchi or rales  Abdominal:      General: Abdomen is flat  Bowel sounds are normal  There is no distension  Palpations: Abdomen is soft  Tenderness: There is no abdominal tenderness  There is no guarding or rebound  Musculoskeletal:      Cervical back: Normal range of motion and neck supple  Right lower leg: No edema  Left lower leg: No edema  Lymphadenopathy:      Lower Body: Right inguinal adenopathy present  No left inguinal adenopathy  Skin:     General: Skin is warm and dry  Coloration: Skin is not jaundiced or pale  Neurological:      Mental Status: She is alert and oriented to person, place, and time  Mental status is at baseline     Psychiatric:         Mood and Affect: Mood normal          Behavior: Behavior normal          Thought Content: Thought content normal          Judgment: Judgment normal           Additional Data:     Lab Results:  Results from last 7 days   Lab Units 06/02/23  2101 05/30/23  0547   BANDS PCT % 2  --    EOS PCT % 0 2   HEMATOCRIT % 38 8 35 6   HEMOGLOBIN g/dL 13 3 12 1   LYMPHS PCT %  --  14   LYMPHO PCT % 3*  --    MONOS PCT %  --  9   MONO PCT % 3*  --    NEUTROS PCT %  --  74   PLATELETS Thousands/uL 250 209   WBC Thousand/uL 7 81 7 27     Results from last 7 days   Lab Units 06/02/23  2101   ANION GAP mmol/L 9   ALBUMIN g/dL 3 8   ALK PHOS U/L 259*   ALT U/L 19   AST U/L 28   BUN mg/dL 13   CALCIUM mg/dL 9 6   CHLORIDE mmol/L 102   CO2 mmol/L 26   CREATININE mg/dL 0 57*   GLUCOSE RANDOM mg/dL 132   POTASSIUM mmol/L 4 5   SODIUM mmol/L 137   TOTAL BILIRUBIN mg/dL 0 82     Results from last 7 days   Lab Units 05/28/23  2253   INR  1 25*         Results from last 7 days   Lab Units 05/29/23  0701   HEMOGLOBIN A1C % 4 7     Results from last 7 days   Lab Units 06/02/23  2250 06/02/23  2101 05/30/23  0547 05/29/23  0701 05/29/23  0036 05/28/23  2252   LACTIC ACID mmol/L 1 5  --   --   --  1 1  --    PROCALCITONIN ng/ml  --  0 42* 0 33* 0 41*  --  0 36*       Lines/Drains:  Invasive Devices     Peripheral Intravenous Line  Duration           Peripheral IV 06/02/23 Left;Ventral (anterior) Forearm <1 day    Peripheral IV 06/02/23 Right;Ventral (anterior) Forearm <1 day                    Imaging: Reviewed radiology reports from this admission including: chest CT scan  CT chest without contrast   Final Result by Ephraim Beatty MD (06/02 2347)      Increasingly prominent nodular and irregular thickening interlobular septal thickening as well as tree-in-bud nodularity  Although findings may reflect infectious bronchiolitis and superimposed CHF given patient's history of malignant melanoma    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

## 2023-06-03 NOTE — ACP (ADVANCE CARE PLANNING)
Advanced Care Planning Progress Note    Serious Illness Conversation    1  What is your understanding now of where you are with your illness? Prognostic Understanding: appropriate understanding of prognosis     2  How much information about what is likely to be ahead with your illness would you like to have? Information: patient wants to be fully informed     3  What did you (clinician) communicate to the patient? 4  If your health situation worsens, what are your most important goals? Goals: be physically comfortable  No pain     5  What are the biggest fears and worries about the future and your health? Fears/Worries: pain, being a physical burden     6  What abilities are so critical to your life that you cannot imagine living without them? Unacceptable Function: being in chronic severe pain     7  What gives you strength as you think about the future with your illness? 8  If you become sicker, how much are you willing to go through for the possibility of gaining more time? Have a feeding tube: No   Be in the ICU: Yes    Be on a ventilator: Yes    Be on dialysis: No    9  How much does your proxy and family know about your priorities and wishes? Discussion Discussion: extensive discussion with family about goals and wishes     Talya Downing heard you say that keep looking for various treatment options for melanoma is really important to you  Keeping that in mind, and what we know about your illness, I recommend that we follow oncology recommendations, currently treated suspected pneumonia  This will help us make sure that your treatment plans reflect what’s important to you  How does this plan sound to you? I will do everything I can to help you through this    Patient verbalized understanding of the plan     I have spent 35minutes speaking with my patient on advanced care planning today or during this visit     Advanced directives         Humaira Almeida MD

## 2023-06-03 NOTE — ASSESSMENT & PLAN NOTE
Poncho Cardoza prior admission showing questionable subacute stroke  Patient was evaluated by neurology at the time, found to be poor candidate for antithrombotic therapy given metastatic lesions with hemorrhages  Continue monitoring for changes in mental status or new neurologic deficits

## 2023-06-03 NOTE — ASSESSMENT & PLAN NOTE
On presentation for bilateral left-sided visual field deficits  Was seen by neurology and heme-onc with suspicion for progression of disease versus pembrolizumab side effect  Received 2 rounds of radiation while inpatient without much improvement in deficits  Was started on 4 mg of Decadron every 6 hours until her next oncology visit  Discharged on 6/1  At this time she continues to endorse visual field deficits    Plan:   We will consult hematology oncology  Continue Decadron 4 mg every 6 hours

## 2023-06-03 NOTE — CONSULTS
Consult Note - Pulmonary and Critical Care Medicine  Khang Nurse 59 y o  female MRN: [id number]  Unit/Bed#: S -74 Encounter: [id number]    Consult requested location: Unit/Bed#: S -01  Physician Requesting Consult: Kelly Hi DO   Reason for Consult: CT chest abnormality      HPI:    61-year-old female with past medical history of metastatic melanoma, recent pneumonia presents for worsening shortness of breath and cough over the past day  Patient also had subjective fever and chills  No significant improvement since admission  No nausea, or vomiting   + General malaise over several weeks  Also continues to have worsening vision difficulties that has been attributed to CVA  ROS:  Twelve point review of systems was negative except for what is otherwise mentioned        Past Medical Hx  Past Medical History:   Diagnosis Date   • Cervical disc disorder    • Melanoma ([address] )    • Stenosis of cervical spine region            Past Surgical Hx  Past Surgical History:   Procedure Laterality Date   • BREAST BIOPSY Left 2009    core   • BREAST BIOPSY Right 2009    core   • CERVICAL FUSION  2013    cC3-C4, C4-C5, C5 C6, C6-C7, C7-T1   •  SECTION     • ELBOW SURGERY     • KNEE SURGERY Bilateral 20YRS AGO       Family Hx  Family History   Problem Relation Age of Onset   • Cancer Mother    • Brain cancer Mother [de-identified]   • Heart disease Father    • Breast cancer Sister 48   • Breast cancer Sister 45   • Breast cancer Maternal Aunt 61   • Breast cancer Maternal Aunt 60   • Breast cancer Maternal Aunt 60   • Breast cancer Cousin 50   • Breast cancer Cousin 52   • Breast cancer Cousin 52   • Prostate cancer Maternal Uncle 55   • No Known Problems Son    • No Known Problems Maternal Grandmother    • No Known Problems Maternal Grandfather    • No Known Problems Paternal Grandmother    • No Known Problems Paternal Grandfather    • BRCA1 Negative Sister    • BRCA2 Negative Sister    • No Known Problems Sister    • No Known Problems Maternal Aunt    • No Known Problems Maternal Aunt    • No Known Problems Maternal Aunt    • No Known Problems Maternal Aunt            Occupational History:   No known previous inhalation injuries       Medications    Current Facility-Administered Medications:   •  acetaminophen (TYLENOL) tablet 650 mg, 650 mg, Oral, Q6H PRN, Chaparro Mathews MD, 650 mg at 06/03/23 1325  •  albuterol (PROVENTIL HFA,VENTOLIN HFA) inhaler 1 puff, 1 puff, Inhalation, Q6H PRN, Chaparro Mathews MD  •  amLODIPine (NORVASC) tablet 5 mg, 5 mg, Oral, Daily, Chaparro Mathews MD, 5 mg at 06/03/23 0902  •  atenolol (TENORMIN) tablet 50 mg, 50 mg, Oral, Daily, Chaparro Mathews MD, 50 mg at 06/03/23 0906  •  azithromycin (ZITHROMAX) tablet 500 mg, 500 mg, Oral, Q24H, Chaparro Mathews MD  •  budesonide-formoterol (SYMBICORT) 160-4 5 mcg/act inhaler 2 puff, 2 puff, Inhalation, BID, Chaparro Mathews MD  •  ceftriaxone (ROCEPHIN) 1 g/50 mL in dextrose IVPB, 1,000 mg, Intravenous, Q24H, Chaparro Mathews MD, Stopped at 06/03/23 0930  •  dexamethasone (DECADRON) tablet 4 mg, 4 mg, Oral, Q6H [facility], Chaparro Mathews MD, 4 mg at 06/03/23 1125  •  enoxaparin (LOVENOX) subcutaneous injection 40 mg, 40 mg, Subcutaneous, Daily, Chaparro Mathews MD, 40 mg at 06/03/23 0904  •  escitalopram (LEXAPRO) tablet 10 mg, 10 mg, Oral, Daily, Chaparro Mathews MD, 10 mg at 06/03/23 0902  •  famotidine (PEPCID) tablet 20 mg, 20 mg, Oral, BID, Chaparro Mathews MD, 20 mg at 06/03/23 0902  •  guaiFENesin (MUCINEX) 12 hr tablet 600 mg, 600 mg, Oral, BID, Chaparro Mathews MD, 600 mg at 06/03/23 0902  •  hydrochlorothiazide (HYDRODIURIL) tablet 12 5 mg, 12 5 mg, Oral, Daily, Chaparro Mathews MD, 12 5 mg at 06/03/23 0902  •  memantine (NAMENDA) tablet 5 mg, 5 mg, Oral, Daily, Chaparro Mathews MD, 5 mg at 06/03/23 0902       Social History:   Social History     Socioeconomic History   • Marital status: Single     Spouse name: Not on file   • Number of children: Not on file   • Years of education: Not on file   • Highest education level: Not on file   Occupational History   • Not on file   Tobacco Use   • Smoking status: Former     Packs/day: 0 02     Types: Cigarettes     Start date: 0     Quit date: 3/1/2023     Years since quittin 2   • Smokeless tobacco: Never   • Tobacco comments:     4 a day   Vaping Use   • Vaping Use: Never used   Substance and Sexual Activity   • Alcohol use: Yes     Comment: SOCIAL   • Drug use: Not Currently   • Sexual activity: Yes     Partners: Male   Other Topics Concern   • Not on file   Social History Narrative   • Not on file     Social Determinants of Health     Financial Resource Strain: Not on file   Food Insecurity: Not on file   Transportation Needs: Not on file   Physical Activity: Not on file   Stress: Not on file   Social Connections: Not on file   Intimate Partner Violence: Not on file   Housing Stability: Not on file           Vitals: Blood pressure 144/78, pulse 57, temperature 98 4 °F (36 9 °C), resp  rate 18, weight 75 6 kg (166 lb 10 7 oz), SpO2 94 %, not currently breastfeeding , Body mass index is 26 1 kg/m²  Physical Exam:  No acute distress  S1-S2  Clear to auscultation bilaterally  Soft nontender nondistended  Awake and alert    Labs: I personally viewed and interpreted but not limited to the following laboratory studies:  Procalcitonin 6/3/2023: 0 34    Imaging:  I personally viewed and interpreted the following imaging studies:  CT chest 2023 shows bibasilar predominant loose consolidations with trace bilateral pleural effusions both slightly increased since previous CT chest    Assessment:  1  CT chest abnormality  2   Metastatic melanoma    Plan:  • Continue antibiotics per primary service  • Consider infectious disease consult  • Consider checking BNP  • Consider repeating echocardiogram  • If patient fails to improve with IV antibiotics, consider bronchoscopy for evaluation of resistant organisms and/or malignant cause of CT chest abnormality  • Plan discussed with primary team      [facility]

## 2023-06-04 LAB
ANION GAP SERPL CALCULATED.3IONS-SCNC: 11 MMOL/L (ref 4–13)
BASOPHILS # BLD AUTO: 0.02 THOUSANDS/ÂΜL (ref 0–0.1)
BASOPHILS NFR BLD AUTO: 0 % (ref 0–1)
BUN SERPL-MCNC: 19 MG/DL (ref 5–25)
CALCIUM SERPL-MCNC: 9.1 MG/DL (ref 8.4–10.2)
CHLORIDE SERPL-SCNC: 102 MMOL/L (ref 96–108)
CO2 SERPL-SCNC: 22 MMOL/L (ref 21–32)
CREAT SERPL-MCNC: 0.59 MG/DL (ref 0.6–1.3)
EOSINOPHIL # BLD AUTO: 0 THOUSAND/ÂΜL (ref 0–0.61)
EOSINOPHIL NFR BLD AUTO: 0 % (ref 0–6)
ERYTHROCYTE [DISTWIDTH] IN BLOOD BY AUTOMATED COUNT: 12.1 % (ref 11.6–15.1)
GFR SERPL CREATININE-BSD FRML MDRD: 98 ML/MIN/1.73SQ M
GLUCOSE SERPL-MCNC: 126 MG/DL (ref 65–140)
HCT VFR BLD AUTO: 39.8 % (ref 34.8–46.1)
HGB BLD-MCNC: 13.6 G/DL (ref 11.5–15.4)
IMM GRANULOCYTES # BLD AUTO: 0.07 THOUSAND/UL (ref 0–0.2)
IMM GRANULOCYTES NFR BLD AUTO: 1 % (ref 0–2)
LYMPHOCYTES # BLD AUTO: 0.53 THOUSANDS/ÂΜL (ref 0.6–4.47)
LYMPHOCYTES NFR BLD AUTO: 5 % (ref 14–44)
MAGNESIUM SERPL-MCNC: 1.6 MG/DL (ref 1.9–2.7)
MCH RBC QN AUTO: 36 PG (ref 26.8–34.3)
MCHC RBC AUTO-ENTMCNC: 34.2 G/DL (ref 31.4–37.4)
MCV RBC AUTO: 105 FL (ref 82–98)
MONOCYTES # BLD AUTO: 0.66 THOUSAND/ÂΜL (ref 0.17–1.22)
MONOCYTES NFR BLD AUTO: 6 % (ref 4–12)
NEUTROPHILS # BLD AUTO: 9.53 THOUSANDS/ÂΜL (ref 1.85–7.62)
NEUTS SEG NFR BLD AUTO: 88 % (ref 43–75)
NRBC BLD AUTO-RTO: 0 /100 WBCS
PLATELET # BLD AUTO: 268 THOUSANDS/UL (ref 149–390)
PMV BLD AUTO: 9.1 FL (ref 8.9–12.7)
POTASSIUM SERPL-SCNC: 3.8 MMOL/L (ref 3.5–5.3)
PROCALCITONIN SERPL-MCNC: 0.32 NG/ML
RBC # BLD AUTO: 3.78 MILLION/UL (ref 3.81–5.12)
SODIUM SERPL-SCNC: 135 MMOL/L (ref 135–147)
WBC # BLD AUTO: 10.81 THOUSAND/UL (ref 4.31–10.16)

## 2023-06-04 PROCEDURE — 85025 COMPLETE CBC W/AUTO DIFF WBC: CPT

## 2023-06-04 PROCEDURE — 83735 ASSAY OF MAGNESIUM: CPT

## 2023-06-04 PROCEDURE — 99232 SBSQ HOSP IP/OBS MODERATE 35: CPT | Performed by: INTERNAL MEDICINE

## 2023-06-04 PROCEDURE — 80048 BASIC METABOLIC PNL TOTAL CA: CPT

## 2023-06-04 PROCEDURE — 84145 PROCALCITONIN (PCT): CPT

## 2023-06-04 RX ORDER — ACETAMINOPHEN 325 MG/1
650 TABLET ORAL EVERY 8 HOURS PRN
Status: DISCONTINUED | OUTPATIENT
Start: 2023-06-04 | End: 2023-06-06 | Stop reason: HOSPADM

## 2023-06-04 RX ADMIN — MEMANTINE 5 MG: 5 TABLET ORAL at 07:54

## 2023-06-04 RX ADMIN — FAMOTIDINE 20 MG: 20 TABLET ORAL at 07:54

## 2023-06-04 RX ADMIN — DEXAMETHASONE 4 MG: 4 TABLET ORAL at 11:42

## 2023-06-04 RX ADMIN — BENZONATATE 100 MG: 100 CAPSULE ORAL at 07:53

## 2023-06-04 RX ADMIN — ESCITALOPRAM 10 MG: 10 TABLET, FILM COATED ORAL at 07:54

## 2023-06-04 RX ADMIN — GUAIFENESIN 600 MG: 600 TABLET ORAL at 07:54

## 2023-06-04 RX ADMIN — ENOXAPARIN SODIUM 40 MG: 40 INJECTION SUBCUTANEOUS at 07:53

## 2023-06-04 RX ADMIN — CEFTRIAXONE SODIUM 1000 MG: 10 INJECTION, POWDER, FOR SOLUTION INTRAVENOUS at 09:23

## 2023-06-04 RX ADMIN — AZITHROMYCIN MONOHYDRATE 500 MG: 250 TABLET ORAL at 21:13

## 2023-06-04 RX ADMIN — ACETAMINOPHEN 650 MG: 325 TABLET ORAL at 12:25

## 2023-06-04 RX ADMIN — BUTALBITAL, ACETAMINOPHEN AND CAFFEINE 1 TABLET: 50; 325; 40 TABLET ORAL at 00:14

## 2023-06-04 RX ADMIN — DEXAMETHASONE 4 MG: 4 TABLET ORAL at 05:26

## 2023-06-04 RX ADMIN — HYDROCHLOROTHIAZIDE 12.5 MG: 12.5 TABLET ORAL at 07:54

## 2023-06-04 RX ADMIN — BENZONATATE 100 MG: 100 CAPSULE ORAL at 12:25

## 2023-06-04 RX ADMIN — BENZONATATE 100 MG: 100 CAPSULE ORAL at 21:13

## 2023-06-04 RX ADMIN — DEXAMETHASONE 4 MG: 4 TABLET ORAL at 23:44

## 2023-06-04 RX ADMIN — AMLODIPINE BESYLATE 5 MG: 5 TABLET ORAL at 07:54

## 2023-06-04 RX ADMIN — BUTALBITAL, ACETAMINOPHEN AND CAFFEINE 1 TABLET: 50; 325; 40 TABLET ORAL at 07:53

## 2023-06-04 RX ADMIN — BUDESONIDE AND FORMOTEROL FUMARATE DIHYDRATE 2 PUFF: 160; 4.5 AEROSOL RESPIRATORY (INHALATION) at 17:29

## 2023-06-04 RX ADMIN — DEXAMETHASONE 4 MG: 4 TABLET ORAL at 17:26

## 2023-06-04 RX ADMIN — BUDESONIDE AND FORMOTEROL FUMARATE DIHYDRATE 2 PUFF: 160; 4.5 AEROSOL RESPIRATORY (INHALATION) at 07:56

## 2023-06-04 RX ADMIN — ATENOLOL 50 MG: 50 TABLET ORAL at 07:54

## 2023-06-04 RX ADMIN — FAMOTIDINE 20 MG: 20 TABLET ORAL at 17:26

## 2023-06-04 RX ADMIN — GUAIFENESIN 600 MG: 600 TABLET ORAL at 17:26

## 2023-06-04 NOTE — UTILIZATION REVIEW
Initial Clinical Review    Admission: Date/Time/Statement:   Admission Orders (From admission, onward)     Ordered        06/03/23 0048  INPATIENT ADMISSION  Once                      Orders Placed This Encounter   Procedures   • INPATIENT ADMISSION     Standing Status:   Standing     Number of Occurrences:   1     Order Specific Question:   Level of Care     Answer:   Med Surg [16]     Order Specific Question:   Estimated length of stay     Answer:   More than 2 Midnights     Order Specific Question:   Certification     Answer:   I certify that inpatient services are medically necessary for this patient for a duration of greater than two midnights  See H&P and MD Progress Notes for additional information about the patient's course of treatment  ED Arrival Information     Expected   -    Arrival   6/2/2023 17:51    Acuity   Urgent            Means of arrival   Walk-In    Escorted by   Self    Service   Hospitalist    Admission type   Emergency            Arrival complaint   Migraine           Chief Complaint   Patient presents with   • Flu Symptoms     Pt presents to ED for cough, body aches, fevers, and headaches  States she was seen and admitted last Sunday for similar thing  Initial Presentation: 59 y o  female with a PMH of Metastatic melanoma, NATE, asthma who presents with worsening nonproductive cough and fever  Luisa Villa was recently admitted for bilateral visual field deficits and found to have possible CVA in the setting of metastatic brain lesions with hemorrhages  At the time, CTA of the chest demonstrated tree in bud opacities that were thought to be of infectious etiology and patient was treated as CAP with Rocephin and discharged on 6/1 with Cefdinir  She returns today due to worsening of her cough, which continues to be nonproductive  She also endorses a substernal burning that is worse with coughing  She also endorses a frontal headache which is resolved at the time of my evaluation   She states that she felt unwell and checked her temperature at home which was 100 4  Plan: Inpatient admission for evaluation and treatment of SIRS, cough, blurred vision in left visual fields, metastatic melanoma, CVA, HTN: Pulmonology consult, IV ceftriaxone and azithromycin, blood cultures pending, follow procal, Mucinex bid, Hem/Onc consult, continue decadron, Radiation Oncology consult, continue home amlodipine, atenolol and HCTZ  Hem/Onc consult: Patient was recently started on pembrolizumab  Disease seems to be growing fast  When patient is more stable, decisions will need to be made to see if patient will continue with the present regimen (pembrolizumab) or be switched to a new regimen  Pulmonology consult: continue antibiotics  If patient fails to improve with IV antibiotics, consider bronchoscopy for evaluation of resistant organisms and/or malignant cause of CT chest abnormality    Date: 6/4   Day 2:     Internal medicine: continue IV antibiotics  Continue sot have intermittent cough  Seen by pulmonology and for possible bronchoscopy on 6/5/2023  Continue IV Decadron 4 mg every 6 hours       ED Triage Vitals   Temperature Pulse Respirations Blood Pressure SpO2   06/02/23 1814 06/02/23 1814 06/02/23 1814 06/02/23 1814 06/02/23 1814   99 4 °F (37 4 °C) 80 20 168/77 94 %      Temp Source Heart Rate Source Patient Position - Orthostatic VS BP Location FiO2 (%)   06/02/23 1814 06/02/23 1814 06/02/23 1814 06/02/23 1814 --   Oral Monitor Sitting Right arm       Pain Score       06/02/23 2250       Med Not Given for Pain - for MAR use only          Wt Readings from Last 1 Encounters:   06/04/23 77 8 kg (171 lb 8 3 oz)     Additional Vital Signs:     Date/Time Temp Pulse Resp BP MAP (mmHg) SpO2 O2 Device   06/04/23 08:32:18 98 8 °F (37 1 °C) 66 20 143/81 102 94 % --   06/03/23 21:49:47 97 8 °F (36 6 °C) 64 16 152/84 107 92 % --   06/03/23 2137 98 8 °F (37 1 °C) 72 18 136/74 -- 94 % None (Room air)   06/03/23 2100 -- -- -- -- -- -- None (Room air)   06/03/23 13:27:03 98 4 °F (36 9 °C) 57 18 144/78 100 94 % --   06/03/23 1210 -- 57 18 137/75 -- 95 % None (Room air)   06/03/23 0902 -- 60 18 164/91 121 98 % None (Room air)   06/03/23 0800 -- 57 18 161/97 125 97 % None (Room air)   06/03/23 0758 98 6 °F (37 °C) -- -- -- -- -- --   06/03/23 0103 -- 64 18 178/84 Abnormal  121 96 % None (Room air)   06/03/23 0000 -- 65 18 157/93 121 95 % None (Room air)   06/02/23 2300 -- 65 20 192/97 Abnormal  138 97 % None (Room air)     Pertinent Labs/Diagnostic Test Results:   CT chest without contrast   Final Result by Andrew Mckeon MD (06/02 2347)      Increasingly prominent nodular and irregular thickening interlobular septal thickening as well as tree-in-bud nodularity  Although findings may reflect infectious bronchiolitis and superimposed CHF given patient's history of malignant melanoma    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 as well  Small bilateral pleural effusions                     Workstation performed: [id number]         XR chest 1 view portable   Final Result by Мария Alejandro MD (06/03 1145)      Moderate interstitial edema with small effusions                 Workstation performed: [id number]             Lab Units 06/04/23  0543 06/03/23  0517 06/02/23  2101   BANDS PCT %  --   --  2   HEMATOCRIT % 39 8 40 1 38 8   HEMOGLOBIN g/dL 13 6 13 4 13 3   NEUTROS ABS Thousands/µL 9 53* 6 62  --    PLATELETS Thousands/uL 268 209 250   WBC Thousand/uL 10 81* 7 30 7 81         Lab Units 06/04/23  0543 06/03/23  0517 06/02/23  2101   ANION GAP mmol/L 11 9 9   BUN mg/dL 19 14 13   CALCIUM mg/dL 9 1 9 1 9 6   CHLORIDE mmol/L 102 104 102   CO2 mmol/L 22 24 26   CREATININE mg/dL 0 59* 0 45* 0 57*   EGFR ml/min/1 73sq m 98 107 99   POTASSIUM mmol/L 3 8 4 7 4 5   MAGNESIUM mg/dL 1 6* 1 5*  --    SODIUM mmol/L 135 137 137     Lab Units 06/03/23  0517 06/02/23  2101   ALBUMIN g/dL 3 5 3 8   ALK PHOS U/L 223* 259*   ALT U/L 16 19   AST U/L 18 28   TOTAL BILIRUBIN mg/dL 0 87 0 82   TOTAL PROTEIN g/dL 6 4 6 8         Lab Units 06/04/23  0543 06/03/23  0517 06/02/23  2101   GLUCOSE RANDOM mg/dL 126 144* 132           Lab Units 06/02/23  2330 06/02/23  2101   HS TNI 0HR ng/L  --  7   HS TNI 2HR ng/L 6  --    HSTNI D2 ng/L -1  --        Lab Units 06/04/23  0543 06/03/23  0517 06/02/23  2101   PROCALCITONIN ng/ml 0 32* 0 34* 0 42*     Lab Units 06/02/23  2250   LACTIC ACID mmol/L 1 5           Results from last 7 days   Lab Units 06/02/23  2101   CRP mg/L 67 1*             Lab Units 06/02/23  2255 06/02/23  2250   BLOOD CULTURE  No Growth at 24 hrs  No Growth at 24 hrs     GRAM STAIN RESULT   --   --    SPUTUM CULTURE   --   --          ED Treatment:   Medication Administration from 06/02/2023 1751 to 06/03/2023 1315       Date/Time Order Dose Route Action     06/02/2023 2304 EDT metoclopramide (REGLAN) injection 10 mg 10 mg Intravenous Given     06/02/2023 2255 EDT dexamethasone (DECADRON) injection 8 mg 8 mg Intravenous Given     06/02/2023 2300 EDT diphenhydrAMINE (BENADRYL) injection 25 mg 25 mg Intravenous Given     06/02/2023 2258 EDT cefepime (MAXIPIME) 2 g/50 mL dextrose IVPB 2,000 mg Intravenous New Bag     06/02/2023 2318 EDT azithromycin (ZITHROMAX) 500 mg in sodium chloride 0 9% 250mL IVPB 500 mg 500 mg Intravenous New Bag     06/02/2023 2250 EDT acetaminophen (TYLENOL) tablet 975 mg 975 mg Oral Given     06/03/2023 0902 EDT amLODIPine (NORVASC) tablet 5 mg 5 mg Oral Given     06/03/2023 0906 EDT atenolol (TENORMIN) tablet 50 mg 50 mg Oral Given     06/03/2023 1125 EDT dexamethasone (DECADRON) tablet 4 mg 4 mg Oral Given     06/03/2023 0518 EDT dexamethasone (DECADRON) tablet 4 mg 4 mg Oral Given     06/03/2023 0902 EDT escitalopram (LEXAPRO) tablet 10 mg 10 mg Oral Given     06/03/2023 0902 EDT famotidine (PEPCID) tablet 20 mg 20 mg Oral Given     06/03/2023 0902 EDT guaiFENesin (MUCINEX) 12 hr tablet 600 mg 600 mg Oral Given     06/03/2023 0902 EDT hydrochlorothiazide (HYDRODIURIL) tablet 12 5 mg 12 5 mg Oral Given     06/03/2023 0902 EDT memantine (NAMENDA) tablet 5 mg 5 mg Oral Given     06/03/2023 7781 EDT enoxaparin (LOVENOX) subcutaneous injection 40 mg 40 mg Subcutaneous Given     06/03/2023 0900 EDT ceftriaxone (ROCEPHIN) 1 g/50 mL in dextrose IVPB 1,000 mg Intravenous New Bag     06/03/2023 0857 EDT metoclopramide (REGLAN) injection 10 mg 10 mg Intravenous Given     06/03/2023 0856 EDT diphenhydrAMINE (BENADRYL) injection 25 mg 25 mg Intravenous Given     06/03/2023 0908 EDT magnesium sulfate 2 g/50 mL IVPB (premix) 2 g 2 g Intravenous New Bag        Past Medical History:   Diagnosis Date   • Cervical disc disorder    • Melanoma ([place] [address] )    • Stenosis of cervical spine region      Present on Admission:  • SIRS (systemic inflammatory response syndrome) (HCC)  • Blurred vision in bilateral left visual fields  • Metastatic melanoma (HCC)  • Cough  • Hypertension  • Cerebrovascular accident (CVA) ([place] [address] )      Admitting Diagnosis: Pneumonia [J18 9]  Metastatic melanoma ([place] [address] ) [C43 9]  Flu-like symptoms [R68 89]  Age/Sex: 59 y o  female  Admission Orders:  Scheduled Medications:  amLODIPine, 5 mg, Oral, Daily  atenolol, 50 mg, Oral, Daily  azithromycin, 500 mg, Oral, Q24H  budesonide-formoterol, 2 puff, Inhalation, BID  cefTRIAXone, 1,000 mg, Intravenous, Q24H  dexamethasone, 4 mg, Oral, Q6H ZAIDA  enoxaparin, 40 mg, Subcutaneous, Daily  escitalopram, 10 mg, Oral, Daily  famotidine, 20 mg, Oral, BID  guaiFENesin, 600 mg, Oral, BID  hydrochlorothiazide, 12 5 mg, Oral, Daily  memantine, 5 mg, Oral, Daily      Continuous IV Infusions:     PRN Meds:  albuterol, 1 puff, Inhalation, Q6H PRN  benzonatate, 100 mg, Oral, TID PRN  butalbital-acetaminophen-caffeine, 1 tablet, Oral, Q6H PRN        IP CONSULT TO PULMONOLOGY  IP CONSULT TO ONCOLOGY    Network Utilization Review Department  ATTENTION: Please call with any questions or concerns to [phone] and carefully listen to the prompts so that you are directed to the right person  All voicemails are confidential   Blaine Guardado all requests for admission clinical reviews, approved or denied determinations and any other requests to dedicated fax number below belonging to the campus where the patient is receiving treatment   List of dedicated fax numbers for the Facilities:  [address] DENIALS (Administrative/Medical Necessity) [phone]   [address] (Maternity/NICU/Pediatrics) [phone]   3 Katelyn Jovel [phone]   [facility] 77 [phone]   [address] [place] [address] [facility] [phone]   [facility]8 [facility] 134 815 [facility] [phone]

## 2023-06-04 NOTE — PROGRESS NOTES
[facility]  Progress Note  Name: Macrina Wang I  MRN: [id number]  Unit/Bed#: S -45 I Date of Admission: 6/2/2023   Date of Service: 6/4/2023 I Hospital Day: 1    Assessment/Plan   * SIRS (systemic inflammatory response syndrome) (HCC)  Assessment & Plan  Met SIRS criteria with fever of 100 4 that the patient reports from home and respiratory rate of 20  Worsening nonproductive cough and shortness of breath POA  Was discharged on Cefdinir on 6/1 where she was treated as CAP for findings similar as below  CT chest without contrast on 6/2:  Impression: Increasingly prominent nodular and irregular thickening interlobular septal thickening as well as tree-in-bud nodularity  Although findings may reflect infectious bronchiolitis and superimposed CHF given patient's history of malignant melanoma 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 as well  Small bilateral pleural effusions   Procalcitonin-0 42 POA  AFB, COVID-19, strep pneumo and Legionella negative on 5/31 admission  Sputum culture growing rare gram-positive cocci in pairs, mixed respiratory amrik  This is likely in the setting of infectious etiology versus metastatic disease progression    Plan:  Continue on IV antibiotics, per patient sputum is very minimal and clear  Continues to have intermittent cough  Seen by pulmonology and for possible bronchoscopy on 6/5/2023  Cerebrovascular accident (CVA) [facility])  [address] prior admission showing questionable subacute stroke  Patient was evaluated by neurology at the time, found to be poor candidate for antithrombotic therapy given metastatic lesions with hemorrhages  Continue monitoring for changes in mental status or new neurologic deficits    Blurred vision in bilateral left visual fields  Assessment & Plan  On presentation for bilateral left-sided visual field deficits    Was seen by neurology and heme-onc with suspicion for progression of disease versus pembrolizumab side effect  Received 2 rounds of radiation while inpatient without much improvement in deficits  Was started on 4 mg of Decadron every 6 hours until her next oncology visit  Discharged on 6/1  At this time she continues to endorse visual field deficits    Plan:    Continue Decadron 4 mg every 6 hours    Metastatic melanoma [facility])  Assessment & Plan  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  Recently admitted for visual field deficits with MRI demonstrating intracranial hemorrhagic metastatic lesions  Received 2 doses of pembrolizumab with last infusion on 5/25  Status post whole brain radiation  Appreciate oncology consultation  Continue dexamethasone 4 mg every 6 hours                  Subjective/Objective     Subjective:   Patient complains of intermittent cough, clear scanty sputum production  Denies any chest pain, shortness of breath      Objective:  Vitals: Blood pressure 143/81, pulse 66, temperature 98 8 °F (37 1 °C), resp  rate 20, weight 77 8 kg (171 lb 8 3 oz), SpO2 94 %, not currently breastfeeding  ,Body mass index is 26 86 kg/m²      No intake or output data in the 24 hours ending 06/04/23 1336    Invasive Devices     Peripheral Intravenous Line  Duration           Peripheral IV 06/02/23 Left;Ventral (anterior) Forearm 1 day    Peripheral IV 06/02/23 Right;Ventral (anterior) Forearm 1 day                Physical Exam:  General appearance:  Patient not in acute distress  Eyes:  Pupils equal reacting to light  ENT:  Moist oral mucous membranes  CVS:  S1-S2 heard, regular rate and rhythm, no pedal edema  Chest:  Bilateral air entry present, clear to auscultation  Abdomen:  Soft, nontender, bowel sounds present  CNS:  No focal neurological deficits  Genitourinary: deferred  Skin:  No acute rash   psychiatric:  No psychosis  Musculoskeletal:  No joint deformities    Lab, Imaging and other studies: I have personally reviewed pertinent reports  VTE Pharmacologic Prophylaxis: Enoxaparin (Lovenox)  VTE Mechanical Prophylaxis: sequential compression device     Discussed with the patient regarding further management and plan

## 2023-06-04 NOTE — ASSESSMENT & PLAN NOTE
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

## 2023-06-04 NOTE — PLAN OF CARE
Problem: PAIN - ADULT  Goal: Verbalizes/displays adequate comfort level or baseline comfort level  Description: Interventions:  - Encourage patient to monitor pain and request assistance  - Assess pain using appropriate pain scale  - Administer analgesics based on type and severity of pain and evaluate response  - Implement non-pharmacological measures as appropriate and evaluate response  - Consider cultural and social influences on pain and pain management  - Notify physician/advanced practitioner if interventions unsuccessful or patient reports new pain  Outcome: Progressing     Problem: INFECTION - ADULT  Goal: Absence or prevention of progression during hospitalization  Description: INTERVENTIONS:  - Assess and monitor for signs and symptoms of infection  - Monitor lab/diagnostic results  - Monitor all insertion sites, i e  indwelling lines, tubes, and drains  - Monitor endotracheal if appropriate and nasal secretions for changes in amount and color  - [place] appropriate cooling/warming therapies per order  - Administer medications as ordered  - Instruct and encourage patient and family to use good hand hygiene technique  - Identify and instruct in appropriate isolation precautions for identified infection/condition  Outcome: Progressing     Problem: SAFETY ADULT  Goal: Patient will remain free of falls  Description: INTERVENTIONS:  - Educate patient/family on patient safety including physical limitations  - Instruct patient to call for assistance with activity   - Consult OT/PT to assist with strengthening/mobility   - Keep Call bell within reach  - Keep bed low and locked with side rails adjusted as appropriate  - Keep care items and personal belongings within reach  - Initiate and maintain comfort rounds  - Make Fall Risk Sign visible to staff  - Offer Toileting every  Hours, in advance of need  - Initiate/Maintain alarm  - Obtain necessary fall risk management equipment: Apply yellow socks and bracelet for high fall risk patients  - Consider moving patient to room near nurses station  Outcome: Progressing     Problem: DISCHARGE PLANNING  Goal: Discharge to home or other facility with appropriate resources  Description: INTERVENTIONS:  - Identify barriers to discharge w/patient and caregiver  - Arrange for needed discharge resources and transportation as appropriate  - Identify discharge learning needs (meds, wound care, etc )  - Arrange for interpretive services to assist at discharge as needed  - Refer to Case Management Department for coordinating discharge planning if the patient needs post-hospital services based on physician/advanced practitioner order or complex needs related to functional status, cognitive ability, or social support system  Outcome: Progressing     Problem: Knowledge Deficit  Goal: Patient/family/caregiver demonstrates understanding of disease process, treatment plan, medications, and discharge instructions  Description: Complete learning assessment and assess knowledge base    Interventions:  - Provide teaching at level of understanding  - Provide teaching via preferred learning methods  Outcome: Progressing     Problem: RESPIRATORY - ADULT  Goal: Achieves optimal ventilation and oxygenation  Description: INTERVENTIONS:  - Assess for changes in respiratory status  - Assess for changes in mentation and behavior  - Position to facilitate oxygenation and minimize respiratory effort  - Oxygen administered by appropriate delivery if ordered  - Initiate smoking cessation education as indicated  - Encourage broncho-pulmonary hygiene including cough, deep breathe, Incentive Spirometry  - Assess the need for suctioning and aspirate as needed  - Assess and instruct to report SOB or any respiratory difficulty  - Respiratory Therapy support as indicated  Outcome: Progressing

## 2023-06-04 NOTE — ASSESSMENT & PLAN NOTE
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

## 2023-06-04 NOTE — ASSESSMENT & PLAN NOTE
Jose Cisneros prior admission showing questionable subacute stroke  Patient was evaluated by neurology at the time, found to be poor candidate for antithrombotic therapy given metastatic lesions with hemorrhages  Continue monitoring for changes in mental status or new neurologic deficits

## 2023-06-04 NOTE — PLAN OF CARE
Problem: PAIN - ADULT  Goal: Verbalizes/displays adequate comfort level or baseline comfort level  Description: Interventions:  - Encourage patient to monitor pain and request assistance  - Assess pain using appropriate pain scale  - Administer analgesics based on type and severity of pain and evaluate response  - Implement non-pharmacological measures as appropriate and evaluate response  - Consider cultural and social influences on pain and pain management  - Notify physician/advanced practitioner if interventions unsuccessful or patient reports new pain  Outcome: Progressing     Problem: INFECTION - ADULT  Goal: Absence or prevention of progression during hospitalization  Description: INTERVENTIONS:  - Assess and monitor for signs and symptoms of infection  - Monitor lab/diagnostic results  - Monitor all insertion sites, i e  indwelling lines, tubes, and drains  - Monitor endotracheal if appropriate and nasal secretions for changes in amount and color  - [place] appropriate cooling/warming therapies per order  - Administer medications as ordered  - Instruct and encourage patient and family to use good hand hygiene technique  - Identify and instruct in appropriate isolation precautions for identified infection/condition  Outcome: Progressing     Problem: SAFETY ADULT  Goal: Patient will remain free of falls  Description: INTERVENTIONS:  - Educate patient/family on patient safety including physical limitations  - Instruct patient to call for assistance with activity   - Consult OT/PT to assist with strengthening/mobility   - Keep Call bell within reach  - Keep bed low and locked with side rails adjusted as appropriate  - Keep care items and personal belongings within reach  - Initiate and maintain comfort rounds  - Make Fall Risk Sign visible to staff  - Offer Toileting every 2 Hours, in advance of need  - Obtain necessary fall risk management equipment  - Apply yellow socks and bracelet for high fall risk patients  - Consider moving patient to room near nurses station  Outcome: Progressing     Problem: DISCHARGE PLANNING  Goal: Discharge to home or other facility with appropriate resources  Description: INTERVENTIONS:  - Identify barriers to discharge w/patient and caregiver  - Arrange for needed discharge resources and transportation as appropriate  - Identify discharge learning needs (meds, wound care, etc )  - Arrange for interpretive services to assist at discharge as needed  - Refer to Case Management Department for coordinating discharge planning if the patient needs post-hospital services based on physician/advanced practitioner order or complex needs related to functional status, cognitive ability, or social support system  Outcome: Progressing     Problem: Knowledge Deficit  Goal: Patient/family/caregiver demonstrates understanding of disease process, treatment plan, medications, and discharge instructions  Description: Complete learning assessment and assess knowledge base    Interventions:  - Provide teaching at level of understanding  - Provide teaching via preferred learning methods  Outcome: Progressing     Problem: RESPIRATORY - ADULT  Goal: Achieves optimal ventilation and oxygenation  Description: INTERVENTIONS:  - Assess for changes in respiratory status  - Assess for changes in mentation and behavior  - Position to facilitate oxygenation and minimize respiratory effort  - Oxygen administered by appropriate delivery if ordered  - Initiate smoking cessation education as indicated  - Encourage broncho-pulmonary hygiene including cough, deep breathe, Incentive Spirometry  - Assess the need for suctioning and aspirate as needed  - Assess and instruct to report SOB or any respiratory difficulty  - Respiratory Therapy support as indicated  Outcome: Progressing

## 2023-06-05 ENCOUNTER — TELEPHONE (OUTPATIENT)
Dept: HEMATOLOGY ONCOLOGY | Facility: CLINIC | Age: 63
End: 2023-06-05

## 2023-06-05 ENCOUNTER — TRANSITIONAL CARE MANAGEMENT (OUTPATIENT)
Dept: INTERNAL MEDICINE CLINIC | Facility: CLINIC | Age: 63
End: 2023-06-05

## 2023-06-05 VITALS
BODY MASS INDEX: 26.73 KG/M2 | WEIGHT: 170.64 LBS | DIASTOLIC BLOOD PRESSURE: 73 MMHG | TEMPERATURE: 98.8 F | OXYGEN SATURATION: 97 % | RESPIRATION RATE: 16 BRPM | SYSTOLIC BLOOD PRESSURE: 121 MMHG | HEART RATE: 59 BPM

## 2023-06-05 LAB
B PARAP IS1001 DNA NPH QL NAA+NON-PROBE: NOT DETECTED
B PERT.PT PRMT NPH QL NAA+NON-PROBE: NOT DETECTED
C PNEUM DNA NPH QL NAA+NON-PROBE: NOT DETECTED
FLUAV RNA NPH QL NAA+NON-PROBE: NOT DETECTED
FLUBV RNA NPH QL NAA+NON-PROBE: NOT DETECTED
HADV DNA NPH QL NAA+NON-PROBE: NOT DETECTED
HCOV 229E RNA NPH QL NAA+NON-PROBE: NOT DETECTED
HCOV HKU1 RNA NPH QL NAA+NON-PROBE: NOT DETECTED
HCOV NL63 RNA NPH QL NAA+NON-PROBE: NOT DETECTED
HCOV OC43 RNA NPH QL NAA+NON-PROBE: NOT DETECTED
HMPV RNA NPH QL NAA+NON-PROBE: NOT DETECTED
HPIV1 RNA NPH QL NAA+NON-PROBE: NOT DETECTED
HPIV2 RNA NPH QL NAA+NON-PROBE: NOT DETECTED
HPIV3 RNA NPH QL NAA+NON-PROBE: NOT DETECTED
HPIV4 RNA NPH QL NAA+NON-PROBE: NOT DETECTED
M PNEUMO DNA NPH QL NAA+NON-PROBE: NOT DETECTED
RSV RNA NPH QL NAA+NON-PROBE: NOT DETECTED
RV+EV RNA NPH QL NAA+NON-PROBE: NOT DETECTED
SARS-COV-2 RNA NPH QL NAA+NON-PROBE: NOT DETECTED

## 2023-06-05 PROCEDURE — TCMXX

## 2023-06-05 PROCEDURE — 99232 SBSQ HOSP IP/OBS MODERATE 35: CPT | Performed by: PHYSICIAN ASSISTANT

## 2023-06-05 PROCEDURE — 0202U NFCT DS 22 TRGT SARS-COV-2: CPT | Performed by: INTERNAL MEDICINE

## 2023-06-05 PROCEDURE — 99232 SBSQ HOSP IP/OBS MODERATE 35: CPT | Performed by: INTERNAL MEDICINE

## 2023-06-05 RX ORDER — BENZONATATE 100 MG/1
100 CAPSULE ORAL 3 TIMES DAILY PRN
Qty: 20 CAPSULE | Refills: 0 | Status: SHIPPED | OUTPATIENT
Start: 2023-06-05 | End: 2023-06-17

## 2023-06-05 RX ORDER — BUTALBITAL, ACETAMINOPHEN AND CAFFEINE 50; 325; 40 MG/1; MG/1; MG/1
1 TABLET ORAL EVERY 6 HOURS PRN
Qty: 30 TABLET | Refills: 0 | Status: CANCELLED | OUTPATIENT
Start: 2023-06-05 | End: 2023-06-15

## 2023-06-05 RX ADMIN — FAMOTIDINE 20 MG: 20 TABLET ORAL at 08:47

## 2023-06-05 RX ADMIN — CEFTRIAXONE SODIUM 1000 MG: 10 INJECTION, POWDER, FOR SOLUTION INTRAVENOUS at 10:53

## 2023-06-05 RX ADMIN — ESCITALOPRAM 10 MG: 10 TABLET, FILM COATED ORAL at 08:47

## 2023-06-05 RX ADMIN — MEMANTINE 5 MG: 5 TABLET ORAL at 08:48

## 2023-06-05 RX ADMIN — FAMOTIDINE 20 MG: 20 TABLET ORAL at 17:44

## 2023-06-05 RX ADMIN — DEXAMETHASONE 4 MG: 4 TABLET ORAL at 05:38

## 2023-06-05 RX ADMIN — ENOXAPARIN SODIUM 40 MG: 40 INJECTION SUBCUTANEOUS at 08:47

## 2023-06-05 RX ADMIN — HYDROCHLOROTHIAZIDE 12.5 MG: 12.5 TABLET ORAL at 08:47

## 2023-06-05 RX ADMIN — DEXAMETHASONE 4 MG: 4 TABLET ORAL at 14:50

## 2023-06-05 RX ADMIN — BUDESONIDE AND FORMOTEROL FUMARATE DIHYDRATE 2 PUFF: 160; 4.5 AEROSOL RESPIRATORY (INHALATION) at 08:50

## 2023-06-05 RX ADMIN — AMLODIPINE BESYLATE 5 MG: 5 TABLET ORAL at 08:48

## 2023-06-05 RX ADMIN — BUTALBITAL, ACETAMINOPHEN AND CAFFEINE 1 TABLET: 50; 325; 40 TABLET ORAL at 14:33

## 2023-06-05 RX ADMIN — ATENOLOL 50 MG: 50 TABLET ORAL at 08:47

## 2023-06-05 RX ADMIN — GUAIFENESIN 600 MG: 600 TABLET ORAL at 17:44

## 2023-06-05 RX ADMIN — GUAIFENESIN 600 MG: 600 TABLET ORAL at 08:48

## 2023-06-05 NOTE — PROGRESS NOTES
[facility]  Progress Note  Name: Gideon Avila I  MRN: [id number]  Unit/Bed#: S -89 I Date of Admission: 6/2/2023   Date of Service: 6/5/2023 I Hospital Day: 2    Assessment/Plan   * SIRS (systemic inflammatory response syndrome) (HCC)  Assessment & Plan  Met SIRS criteria with fever of 100 4 that the patient reports from home and respiratory rate of 20  Worsening nonproductive cough and shortness of breath POA  Was discharged on Cefdinir on 6/1 where she was treated as CAP for findings similar as below    CT chest without contrast on 6/2:  Impression: Increasingly prominent nodular and irregular thickening interlobular septal thickening as well as tree-in-bud nodularity  Although findings may reflect infectious bronchiolitis and superimposed CHF given patient's history of malignant melanoma 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 as well  Small bilateral pleural effusions     Procalcitonin-0 42 POA  AFB, COVID-19, strep pneumo and Legionella negative on 5/31 admission  Sputum culture growing rare gram-positive cocci in pairs, mixed respiratory amrik  This is likely in the setting of infectious etiology versus metastatic disease progression    Plan:  · Continue on IV antibiotics, per patient sputum is very minimal and clear  · Continues to have intermittent cough  · Pulmonology consulted - possible bronchoscopy if no improvement    Cough  Assessment & Plan  POA: worsening nonproductive cough with associated [place]  Concern for infectious etiology vs lymphangitis carcinomatosis     WBC WNL, however Procal still elevated at 0 42    Plan:  · Will start antibiotics as above  · Mucinex 600mg BID  · Continue PTA Symbicort  · Rest of plan as above      Cerebrovascular accident (CVA) [facility])  [address] prior admission showing questionable subacute stroke  Patient was evaluated by neurology at the time, found to be poor candidate for antithrombotic therapy given metastatic lesions with hemorrhages  Continue monitoring for changes in mental status or new neurologic deficits    Blurred vision in bilateral left visual fields  Assessment & Plan  On presentation for bilateral left-sided visual field deficits  Was seen by neurology and heme-onc with suspicion for progression of disease versus pembrolizumab side effect  Received 2 rounds of radiation while inpatient without much improvement in deficits  Was started on 4 mg of Decadron every 6 hours until her next oncology visit  Discharged on 6/1  At this time she continues to endorse visual field deficits    Plan:  · Continue Decadron 4 mg every 6 hours    Metastatic melanoma [facility])  Assessment & Plan  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  Recently admitted for visual field deficits with MRI demonstrating intracranial hemorrhagic metastatic lesions  Received 2 doses of pembrolizumab with last infusion on 5/25  Status post whole brain radiation  Appreciate oncology consultation  Continue dexamethasone 4 mg every 6 hours     Hypertension  Assessment & Plan  Continue PTA amlodipine 5 mg daily, atenolol 50 mg daily, HCTZ 12 5 mg daily  Continue monitoring BP    VTE Pharmacologic Prophylaxis: VTE Score: 5 High Risk (Score >/= 5) - Pharmacological DVT Prophylaxis Ordered: enoxaparin (Lovenox)  Sequential Compression Devices Ordered  Patient Centered Rounds: I performed bedside rounds with nursing staff today  Discussions with Specialists or Other Care Team Provider: None    Education and Discussions with Family / Patient: Attempted to update  (significant other) via phone  Left voicemail  Current Length of Stay: 2 day(s)  Current Patient Status: Inpatient   Discharge Plan: Anticipate discharge in 24-48 hrs to home      Code Status: Level 1 - Full Code    Subjective:   Patient is a 61-year-old female who presented to the ED as a result of worsening nonproductive cough and fever  Today, patient states that she still coughing during the night which wakes her up  Patient was questioning whether she will be getting her radiation treatment or not  It was discussed that we will reach out to radiation oncology and try to answer that question for her  Other than that, patient has no other questions or complaints at this time  There were no acute events overnight  Objective:     Vitals:   Temp (24hrs), Av 7 °F (37 1 °C), Min:98 6 °F (37 °C), Max:98 9 °F (37 2 °C)    Temp:  [98 6 °F (37 °C)-98 9 °F (37 2 °C)] 98 6 °F (37 °C)  HR:  [59-62] 59  Resp:  [18-20] 18  BP: (136-159)/(72-92) 136/72  SpO2:  [93 %-97 %] 93 %  Body mass index is 26 73 kg/m²  Input and Output Summary (last 24 hours):   No intake or output data in the 24 hours ending 23 1343    Physical Exam:   Physical Exam  Vitals reviewed  Constitutional:       Appearance: Normal appearance  HENT:      Head: Normocephalic and atraumatic  Mouth/Throat:      Mouth: Mucous membranes are moist    Eyes:      Extraocular Movements: Extraocular movements intact  Conjunctiva/sclera: Conjunctivae normal       Pupils: Pupils are equal, round, and reactive to light  Cardiovascular:      Rate and Rhythm: Normal rate and regular rhythm  Heart sounds: Normal heart sounds  Pulmonary:      Effort: Pulmonary effort is normal  No respiratory distress  Breath sounds: Normal breath sounds  No wheezing  Abdominal:      General: Abdomen is flat  Bowel sounds are normal  There is no distension  Palpations: Abdomen is soft  Tenderness: There is no abdominal tenderness  Musculoskeletal:         General: No tenderness  Normal range of motion  Right lower leg: No edema  Left lower leg: No edema  Skin:     General: Skin is warm  Neurological:      General: No focal deficit present  Mental Status: She is alert     Psychiatric:         Mood and Affect: Mood normal          Behavior: Behavior normal          Thought Content: Thought content normal          Judgment: Judgment normal        Additional Data:     Labs:  Results from last 7 days   Lab Units 06/04/23  0543 06/03/23  0517 06/02/23  2101   BANDS PCT %  --   --  2   EOS PCT % 0   < > 0   HEMATOCRIT % 39 8   < > 38 8   HEMOGLOBIN g/dL 13 6   < > 13 3   LYMPHS PCT % 5*   < >  --    LYMPHO PCT %  --   --  3*   MONOS PCT % 6   < >  --    MONO PCT %  --   --  3*   NEUTROS PCT % 88*   < >  --    PLATELETS Thousands/uL 268   < > 250   WBC Thousand/uL 10 81*   < > 7 81    < > = values in this interval not displayed  Results from last 7 days   Lab Units 06/04/23 0543 06/03/23  0517   ANION GAP mmol/L 11 9   ALBUMIN g/dL  --  3 5   ALK PHOS U/L  --  223*   ALT U/L  --  16   AST U/L  --  18   BUN mg/dL 19 14   CALCIUM mg/dL 9 1 9 1   CHLORIDE mmol/L 102 104   CO2 mmol/L 22 24   CREATININE mg/dL 0 59* 0 45*   GLUCOSE RANDOM mg/dL 126 144*   POTASSIUM mmol/L 3 8 4 7   SODIUM mmol/L 135 137   TOTAL BILIRUBIN mg/dL  --  0 87                 Results from last 7 days   Lab Units 06/04/23 0543 06/03/23 0517 06/02/23 2250 06/02/23 2101 05/30/23  0547   LACTIC ACID mmol/L  --   --  1 5  --   --    PROCALCITONIN ng/ml 0 32* 0 34*  --  0 42* 0 33*       Lines/Drains:  Invasive Devices     Peripheral Intravenous Line  Duration           Peripheral IV 06/02/23 Left;Ventral (anterior) Forearm 2 days    Peripheral IV 06/02/23 Right;Ventral (anterior) Forearm 2 days                Imaging: Reviewed radiology reports from this admission including: chest xray and chest CT scan    Recent Cultures (last 7 days):   Results from last 7 days   Lab Units 06/02/23 2255 06/02/23 2250 05/30/23  0629   BLOOD CULTURE  No Growth at 48 hrs   No Growth at 48 hrs   --    GRAM STAIN RESULT   --   --  Rare Epithelial cells per low power field*  No polys seen*  Rare Gram positive cocci in pairs*   SPUTUM CULTURE   --   --  2+ Growth of       Last 24 Hours Medication List:   Current Facility-Administered Medications   Medication Dose Route Frequency Provider Last Rate   • acetaminophen  650 mg Oral Q8H PRN Manpreet Scott MD     • albuterol  1 puff Inhalation Q6H PRN Sharan Martinez MD     • amLODIPine  5 mg Oral Daily Sharan Martinez MD     • atenolol  50 mg Oral Daily Sharan Martinez MD     • azithromycin  500 mg Oral Q24H Sharan Martinez MD     • benzonatate  100 mg Oral TID PRN Franklyn Matute MD     • budesonide-formoterol  2 puff Inhalation BID Sharan Martinez MD     • butalbital-acetaminophen-caffeine  1 tablet Oral Q6H PRN Franklyn Matute MD     • cefTRIAXone  1,000 mg Intravenous Q24H Sharan Martinez MD 1,000 mg (06/05/23 1053)   • dexamethasone  4 mg Oral Q6H [facility] Sharan Martinez MD     • enoxaparin  40 mg Subcutaneous Daily Sharan Martinez MD     • escitalopram  10 mg Oral Daily Sharan Martinez MD     • famotidine  20 mg Oral BID Sharan Martinez MD     • guaiFENesin  600 mg Oral BID Sharan Martinez MD     • hydrochlorothiazide  12 5 mg Oral Daily Sharan Martinez MD     • memantine  5 mg Oral Daily Sharan Martinez MD          Today, Patient Was Seen By: Rachna Nunez DO    **Please Note: This note may have been constructed using a voice recognition system  **

## 2023-06-05 NOTE — TELEPHONE ENCOUNTER
Dr Susy Palacios is out of the office until Wednesday  Discussed with rounding provider- Isidoro Antony PA-C  She will stop by patient's room today to discuss  Called patient and made her aware

## 2023-06-05 NOTE — PROGRESS NOTES
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 in DDX     4/27/23 pet/ct greater FDG intensity in the right side of the liver compared to the left  MRI of the abdomen is scheduled outpatient 6/18/23  Recommend for patient to complete radiation as scheduled  She will f/u with primary oncologist, Dr Andreea Dunlap, to determine if she will continue on immunotherapy or if there is a recommended treatment change  /73   Pulse 59   Temp 98 8 °F (37 1 °C)   Resp 16   Wt 77 4 kg (170 lb 10 2 oz)   SpO2 97%   BMI 26 73 kg/m²   Patient is alert, oriented, NAD, breathing without difficulty  Prior to injection, verified patient identity using patient's name and date of birth.      Screening Questionnaire for Pediatric Immunization     Is the child sick today?   No    Does the child have allergies to medications, food a vaccine component, or latex?   No    Has the child had a serious reaction to a vaccine in the past?   No    Has the child had a health problem with lung, heart, kidney or metabolic disease (e.g., diabetes), asthma, or a blood disorder?  Is he/she on long-term aspirin therapy?   No    If the child to be vaccinated is 2 through 4 years of age, has a healthcare provider told you that the child had wheezing or asthma in the  past 12 months?   No   If your child is a baby, have you ever been told he or she has had intussusception ?   No    Has the child, sibling or parent had a seizure, has the child had brain or other nervous system problems?   No    Does the child have cancer, leukemia, AIDS, or any immune system          problem?   No    In the past 3 months, has the child taken medications that affect the immune system such as prednisone, other steroids, or anticancer drugs; drugs for the treatment of rheumatoid arthritis, Crohn s disease, or psoriasis; or had radiation treatments?   No   In the past year, has the child received a transfusion of blood or blood products, or been given immune (gamma) globulin or an antiviral drug?   No    Is the child/teen pregnant or is there a chance that she could become         pregnant during the next month?   No    Has the child received any vaccinations in the past 4 weeks?   No      Immunization questionnaire answers were all negative.        [facility] eligibility self-screening form given to patient.    Per orders of Dr. Linares, injection of Menactra given by Sarah Trejo CMA. Patient instructed to remain in clinic for 15 minutes afterwards, and to report any adverse reaction to me immediately.    Screening performed by Sarah Trejo CMA on 3/19/2019  at 3:58 PM.

## 2023-06-05 NOTE — ASSESSMENT & PLAN NOTE
On presentation for bilateral left-sided visual field deficits  Was seen by neurology and heme-onc with suspicion for progression of disease versus pembrolizumab side effect  Received 2 rounds of radiation while inpatient without much improvement in deficits  Was started on 4 mg of Decadron every 6 hours until her next oncology visit    Discharged on 6/1  At this time she continues to endorse visual field deficits    Plan:  · Continue Decadron 4 mg every 6 hours

## 2023-06-05 NOTE — ASSESSMENT & PLAN NOTE
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

## 2023-06-05 NOTE — DISCHARGE SUMMARY
[facility]  Discharge- Blanca Reasoner 1960, 59 y o  female MRN: [id number]  Unit/Bed#: S MS 83989 Encounter: [id number]  Primary Care Provider: Moon Baird DO   Date and time admitted to hospital: 6/2/2023  6:28 PM    * SIRS (systemic inflammatory response syndrome) ([facility] [address] )  Assessment & Plan  Met SIRS criteria with fever of 100 4 that the patient reports from home and respiratory rate of 20  Worsening nonproductive cough and shortness of breath POA  Was discharged on Cefdinir on 6/1 where she was treated as CAP for findings similar as below    CT chest without contrast on 6/2:  Impression: Increasingly prominent nodular and irregular thickening interlobular septal thickening as well as tree-in-bud nodularity  Although findings may reflect infectious bronchiolitis and superimposed CHF given patient's history of malignant melanoma 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 as well  Small bilateral pleural effusions     Procalcitonin-0 42 POA  AFB, COVID-19, strep pneumo and Legionella negative on 5/31 admission  Sputum culture growing rare gram-positive cocci in pairs, mixed respiratory amrik  This is likely in the setting of infectious etiology versus metastatic disease progression    Plan:  · Continue Cedinir 300 mg for 2 more doses   · Follow with PCP outpatient    Cough  Assessment & Plan  POA: worsening nonproductive cough with associated Bustamante  Concern for infectious etiology vs lymphangitis carcinomatosis     WBC WNL, however Procal still elevated at 0 42    Plan:  · Follow with PCP      Cerebrovascular accident (CVA) [facility])  [address] prior admission showing questionable subacute stroke  Patient was evaluated by neurology at the time, found to be poor candidate for antithrombotic therapy given metastatic lesions with hemorrhages  Continue monitoring for changes in mental status or new neurologic deficits    Blurred vision in bilateral left visual fields  Assessment & Plan  On presentation for bilateral left-sided visual field deficits  Was seen by neurology and heme-onc with suspicion for progression of disease versus pembrolizumab side effect  Received 2 rounds of radiation while inpatient without much improvement in deficits  Was started on 4 mg of Decadron every 6 hours until her next oncology visit  Discharged on 6/1  At this time she continues to endorse visual field deficits    Plan:  · Continue Decadron 4 mg every 6 hours    Metastatic melanoma [facility])  Assessment & Plan  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  Recently admitted for visual field deficits with MRI demonstrating intracranial hemorrhagic metastatic lesions  Received 2 doses of pembrolizumab with last infusion on 5/25  Status post whole brain radiation    Plan:  · Follow [facility] outpatient    Hypertension  Assessment & Plan  Continue PTA amlodipine 5 mg daily, atenolol 50 mg daily, HCTZ 12 5 mg daily  Continue monitoring BP      Medical Problems     Resolved Problems  Date Reviewed: 6/5/2023   None       Discharging Resident: Alessandro Milner DO  Discharging Attending: Renay Claros MD  PCP: Adal Marshall DO  Admission Date:   Admission Orders (From admission, onward)     Ordered        06/03/23 0048  INPATIENT ADMISSION  Once                      Discharge Date: 06/05/23    Consultations During Hospital Stay:  · None    Procedures Performed:   · None    Significant Findings / Test Results:   CT chest without contrast   Final Result by Marjorie Bosworth, MD (06/02 8786)      Increasingly prominent nodular and irregular thickening interlobular septal thickening as well as tree-in-bud nodularity   Although findings may reflect infectious bronchiolitis and superimposed CHF given patient's history of malignant melanoma    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 as well  Small bilateral pleural effusions                     Workstation performed: [id number]         XR chest 1 view portable   Final Result by Marta Monroy MD (06/03 1145)      Moderate interstitial edema with small effusions  Workstation performed: [id number]         ·     Incidental Findings:   · None    Test Results Pending at Discharge (will require follow up): · None     Outpatient Tests Requested:  · None    Complications:  Patient left AMA    Reason for Admission: SIRS    Hospital Course:   Melyssa Lowe is a 59 y o  female patient who originally presented to the hospital on 6/2/2023 due to nonproductive cough and fever  Patient was recently admitted and discharged on 6/1 with cefdinir for which was thought to be tree-in-bud opacities with an infectious etiology  Patient returned to the ED on 6-23 due to worsening of her cough along with a temperature of 100 4 at home  In the ED, patient was found to meet SIRS criteria with a fever and tachypnea  Her procalcitonin was also 0 42  Patient was started on ceftriaxone and azithromycin  During this hospital stay, patient received antibiotics for her tree-in-bud nodularity  Pulmonology was also consulted  They planned to do a bronchoscopy if there was no improvement seen in her coughing even with antibiotics  Amidst this time, the patient informed that she was very frustrated in regards to the quality of care she was receiving and wanted to leave  Despite multiple efforts of trying to explain to the patient the risks of leaving the hospital at this time, patient decided to leave AMA  Patient was prescribed Cedinir 300 mg for 2 more doses to complete the antibiotic course  Patient should follow-up with PCP and Hematology Oncology upon discharge  Please see above list of diagnoses and related plan for additional information       Condition at Discharge: stable    Discharge Day Visit / Exam:   ** Please see the Progress Note from today  **    Discharge instructions/Information to patient and family:   See after visit summary for information provided to patient and family  Provisions for Follow-Up Care:  See after visit summary for information related to follow-up care and any pertinent home health orders  Disposition:   Home    Planned Readmission: None    Discharge Medications:  See after visit summary for reconciled discharge medications provided to patient and/or family        **Please Note: This note may have been constructed using a voice recognition system**

## 2023-06-05 NOTE — ASSESSMENT & PLAN NOTE
POA: worsening nonproductive cough with associated Bustamante  Concern for infectious etiology vs lymphangitis carcinomatosis     WBC WNL, however Procal still elevated at 0 42    Plan:  · Will start antibiotics as above  · Mucinex 600mg BID  · Continue PTA Symbicort  · Rest of plan as above

## 2023-06-05 NOTE — TELEPHONE ENCOUNTER
Patient Call    Who are you speaking with? Patient    If it is not the patient, are they listed on an active communication consent form? N/A   What is the reason for this call? Patient is calling to speak with Dr Gloria Young she is currently at [address] [facility] and states no one has come to see her  Also the keep canceling her test     Does this require a call back? yes   If a call back is required, please list [facility] call back number [phone]   If a call back is required, advise that a message will be forwarded to their care team and someone will return their call as soon as possible  Did you relay this information to the patient?  yes

## 2023-06-05 NOTE — PROGRESS NOTES
Progress Note - Pulmonary   Shanae Richview 59 y o  female MRN: [id number]  Unit/Bed#: S -01 Encounter: [id number]      Assessment & Recommendations:  1  Respiratory insufficiency  · IS, OOB-chair, tessalon for cough control  · Titrate O2 to keep SpO2 >88%    2  Abnormal CT Chest - recently diagnosed with pneumonia, cultures negative - DDx includes lymphangitic spread of malignancy, pulmonary edema, possible pneumonitis (though less ground glass component than would be expected), and atypical infectious agent  · Complete antibiotics tomorrow  · Check RP2 panel and TTE for now  · If not having clinical improvements, consider bronchoscopy in next 2-3 days - she was in agreement with this plan    3  Metastatic melanoma with brain metastasis undergoing XRT  · Per medical and radiation oncology    Subjective:   59year old woman with metastatic melanoma - hemorrhagic brain metastasis (2nd cycle Keytruda 5/25), cervical stenosis, mild intermittent asthma on DU, HTN, former smoker  She presented 6/3 for increased dyspnea  She had recent admission and was discharged 6/1 for pneumonia  She was seen by Dr Blessing Echeverria yesterday and plans for continued antibiotic therapy  She reports having chills at home and increased dyspnea  She noted she did not resume abx at d/c - states she was not able to get Abx filled  She has scant nonpurulent sputum production, she denied chest pain, no rigors, no hemoptysis  Objective:     Vitals: Blood pressure 136/72, pulse 59, temperature 98 6 °F (37 °C), resp  rate 18, weight 77 4 kg (170 lb 10 2 oz), SpO2 93 %, not currently breastfeeding , RA, Body mass index is 26 73 kg/m²      No intake or output data in the 24 hours ending 06/05/23 1148      Physical Exam  Gen: WDWN woman in chair, awake, alert, oriented x 3, no acute distress  HEENT: Mucous membranes moist, no oral lesions, no thrush  NECK: No accessory muscle use, JVP not elevated  Cardiac: Regular, single S1, single S2, no murmurs, no rubs, no gallops  Lungs: no wheeze or sig rales appreciated, no pleural rubs  Abdomen: normoactive bowel sounds, soft nontender, nondistended, no rebound or rigidity, no guarding  Extremities: no cyanosis, no clubbing, no edema, RLE in walking support    Labs: I have personally reviewed pertinent lab results    Laboratory and Diagnostics  Results from last 7 days   Lab Units 06/04/23  0543 06/03/23  0517 06/02/23 2101 05/30/23  0547   BANDS PCT %  --   --  2  --    EOS PCT % 0 0 0 2   HEMATOCRIT % 39 8 40 1 38 8 35 6   HEMOGLOBIN g/dL 13 6 13 4 13 3 12 1   MONOS PCT % 6 3*  --  9   MONO PCT %  --   --  3*  --    NEUTROS PCT % 88* 90*  --  74   PLATELETS Thousands/uL 268 209 250 209   WBC Thousand/uL 10 81* 7 30 7 81 7 27     Results from last 7 days   Lab Units 06/04/23  0543 06/03/23  0517 06/02/23 2101 05/31/23  1117 05/30/23  0547   ANION GAP mmol/L 11 9 9 8 7   ALBUMIN g/dL  --  3 5 3 8  --  3 2*   ALK PHOS U/L  --  223* 259*  --  166*   ALT U/L  --  16 19  --  12   AST U/L  --  18 28  --  12*   BUN mg/dL 19 14 13 6 7   CALCIUM mg/dL 9 1 9 1 9 6 8 6 8 2*   CHLORIDE mmol/L 102 104 102 104 106   CO2 mmol/L 22 24 26 27 27   CREATININE mg/dL 0 59* 0 45* 0 57* 0 50* 0 46*   GLUCOSE RANDOM mg/dL 126 144* 132 97 110   POTASSIUM mmol/L 3 8 4 7 4 5 3 7 3 4*   SODIUM mmol/L 135 137 137 139 140   TOTAL BILIRUBIN mg/dL  --  0 87 0 82  --  0 64     Results from last 7 days   Lab Units 06/04/23  0543 06/03/23  0517 05/30/23  0547   MAGNESIUM mg/dL 1 6* 1 5* 2 0               Results from last 7 days   Lab Units 06/02/23  2250   LACTIC ACID mmol/L 1 5     Results from last 7 days   Lab Units 06/02/23  2101   CRP mg/L 67 1*                 Results from last 7 days   Lab Units 06/04/23  0543 06/03/23  0517 06/02/23  2101 05/30/23  0547   PROCALCITONIN ng/ml 0 32* 0 34* 0 42* 0 33*       Microbiology:  Bld Cx 6/2 NGTD  Sputum AFB 5/30 - neg AFB x3  Sputum 5/30 - 2+ MRF  Strep/legionella ag neg  FLU/RSV/COVID neg    Imaging and other studies: I have personally reviewed pertinent reports  and I have personally reviewed pertinent films in PACS  CT chest 6/2 - stable RLL pneumatocele, mid-lower lung field nodular infiltrates with some consolidation, some ILS thickening, bilateral effusions - small    Stress Echo 2/2023 EF 60-65%, normal RV, neg EKG for ischemia or echo for ischemia     Jean Claude Escamilla DO, [facility]

## 2023-06-05 NOTE — ASSESSMENT & PLAN NOTE
Sylvain Myers prior admission showing questionable subacute stroke  Patient was evaluated by neurology at the time, found to be poor candidate for antithrombotic therapy given metastatic lesions with hemorrhages  Continue monitoring for changes in mental status or new neurologic deficits

## 2023-06-06 ENCOUNTER — TELEPHONE (OUTPATIENT)
Dept: RADIATION ONCOLOGY | Facility: HOSPITAL | Age: 63
End: 2023-06-06

## 2023-06-06 ENCOUNTER — TELEPHONE (OUTPATIENT)
Dept: SURGICAL ONCOLOGY | Facility: CLINIC | Age: 63
End: 2023-06-06

## 2023-06-06 LAB
MYCOBACTERIUM SPEC CULT: NORMAL
RHODAMINE-AURAMINE STN SPEC: NORMAL

## 2023-06-06 NOTE — TELEPHONE ENCOUNTER
LM for patient to call back with dosage information on dexamethasone and memantine  Office phone number provided

## 2023-06-06 NOTE — QUICK NOTE
"Was informed by RN that patient wanted to leave AMA  Went and spoke to patient at the bedside  She informed me that she is frustrated with the quality of care that she is receiving and wants to leave because \"nothing is being done for me here that I can't do at home\" she is AOx4 and was competent on my exam  She reports that providers are not updating her with the plan of care  She was tearful during our conversation and feels overwhelmed about her disease progression  She states that she only found out that her disease had progressed to stage IV via mychart  She states that she wants to go home to be with her dog and boyfriend and follow up with her Physicians outpatient  She will contact Dr Lyly Madden office tomorrow to see if she can get an earlier appointment   She will also contact her PCP office tomorrow for a follow up appointment within one week  I introduced Palliative care to her and she was very receptive  Ambulatory referral placed  We discussed that she will continue with Cefdinir 300 mg BID for one day and Decadron 4 mg every 6 hours as needed until her follow up with Medical Oncology  Discussed the risks of leaving AMA, including death         "

## 2023-06-06 NOTE — ASSESSMENT & PLAN NOTE
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

## 2023-06-06 NOTE — ASSESSMENT & PLAN NOTE
POA: worsening nonproductive cough with associated Bustamante  Concern for infectious etiology vs lymphangitis carcinomatosis     WBC WNL, however Procal still elevated at 0 42    Plan:  · Follow with PCP

## 2023-06-06 NOTE — ASSESSMENT & PLAN NOTE
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  Recently admitted for visual field deficits with MRI demonstrating intracranial hemorrhagic metastatic lesions    Received 2 doses of pembrolizumab with last infusion on 5/25  Status post whole brain radiation    Plan:  · Follow [facility] outpatient

## 2023-06-06 NOTE — DISCHARGE INSTR - AVS FIRST PAGE
Dear Wili Alaniz,     It was our pleasure to care for you here at [facility]  It is our hope that we were always able to exceed the expected standards for your care during your stay  You were hospitalized due to Pneumonia  You were cared for on the fourth floor by Tootie Llanes MD under the service of Yoon Antony MD with the [facility] who covers for your primary care physician (PCP), Lucille Gamez DO, while you were hospitalized  If you have any questions or concerns related to this hospitalization, you may contact us at [phone]  For follow up as well as any medication refills, we recommend that you follow up with your primary care physician  A registered nurse will reach out to you by phone within a few days after your discharge to answer any additional questions that you may have after going home  However, at this time we provide for you here, the most important instructions / recommendations at discharge:     Notable Medication Adjustments -   Cedinir 300 mg has been added  Please take one tablet tomorrow morning and evening  This will complete your antibiotic course  Testing Required after Discharge -   none  Important follow up information -   Please contact Dr Willy Byers office tomorrow to obtain a sooner appointment  Please contact your PCP's office in the morning for a Transition of care appointment   Other Instructions -     Please review this entire after visit summary as additional general instructions including medication list, appointments, activity, diet, any pertinent wound care, and other additional recommendations from your care team that may be provided for you        Sincerely,     Tootie Llanes MD

## 2023-06-06 NOTE — UTILIZATION REVIEW
NOTIFICATION OF ADMISSION DISCHARGE   This is a Notification of Discharge from [address]  Please be advised that this patient has been discharge from our facility  Below you will find the admission and discharge date and time including the patient’s disposition  UTILIZATION REVIEW CONTACT:  Raymond Grace MA  Utilization   Network Utilization Review Department  Phone: [phone] x carefully listen to the prompts  All voicemails are confidential   Email: [email or web address]     ADMISSION INFORMATION  PRESENTATION DATE: 6/2/2023  6:28 PM  OBERVATION ADMISSION DATE:   INPATIENT ADMISSION DATE: 6/3/23 12:48 AM   DISCHARGE DATE: 6/5/2023 11:17 PM   DISPOSITION:Left against medical advice or discontinued care    IMPORTANT INFORMATION:  Send all requests for admission clinical reviews, approved or denied determinations and any other requests to dedicated fax number below belonging to the campus where the patient is receiving treatment   List of dedicated fax numbers:  [address] DENIALS (Administrative/Medical Necessity) [phone]   [address] (Maternity/NICU/Pediatrics) [phone]   [facility] [phone]   [place] [address] [phone]   [address] [phone]   [address] [facility] [phone] 90 [facility] [phone]   [address] [place] [address] 119 [phone]   [facility]  [phone]   [address] [facility] [phone] [address] [facility] [address] [facility] [phone]

## 2023-06-06 NOTE — TELEPHONE ENCOUNTER
I was looking to contact the patient to discuss applying for Co Pay for [facility] but she is having radiation treatments from 06/06/23 thru 06/09/23  I see a Self pay Balance that would suggest possible assistance needed through the [employer]  Sending e mail to them to send application to the patient

## 2023-06-06 NOTE — ASSESSMENT & PLAN NOTE
Job Abts prior admission showing questionable subacute stroke  Patient was evaluated by neurology at the time, found to be poor candidate for antithrombotic therapy given metastatic lesions with hemorrhages  Continue monitoring for changes in mental status or new neurologic deficits

## 2023-06-06 NOTE — TELEPHONE ENCOUNTER
Neurovascular Discharge Follow Up  Hospitalization: 6/2/23-6/5/23    Called patient with no answer  Left a voice message requesting for a call  Provided the office's phone number

## 2023-06-07 ENCOUNTER — TELEPHONE (OUTPATIENT)
Dept: RADIATION ONCOLOGY | Facility: HOSPITAL | Age: 63
End: 2023-06-07

## 2023-06-07 ENCOUNTER — TELEPHONE (OUTPATIENT)
Dept: HEMATOLOGY ONCOLOGY | Facility: CLINIC | Age: 63
End: 2023-06-07

## 2023-06-07 DIAGNOSIS — C79.31 MALIGNANT NEOPLASM METASTATIC TO BRAIN (HCC): Primary | ICD-10-CM

## 2023-06-07 DIAGNOSIS — C79.31 MALIGNANT MELANOMA METASTATIC TO BRAIN (HCC): ICD-10-CM

## 2023-06-07 RX ORDER — DEXAMETHASONE 4 MG/1
4 TABLET ORAL
Qty: 30 TABLET | Refills: 0 | Status: SHIPPED | OUTPATIENT
Start: 2023-06-07 | End: 2023-06-17

## 2023-06-07 RX ORDER — MEMANTINE HYDROCHLORIDE 5 MG-10 MG
KIT ORAL
Qty: 49 TABLET | Refills: 0 | Status: SHIPPED | OUTPATIENT
Start: 2023-06-07 | End: 2023-06-17

## 2023-06-07 NOTE — TELEPHONE ENCOUNTER
Per Dr Cleo Foss TT:   memantine instructions - the pharmqxy should dispense 28 5 mg tablets and 21 10 mg tablets per the order  She should continue 5 mg daily today and then transition to 5 mg twice daily for 1 week, then 5 mg and 10 mg in  doses for 1 week then 10 mg twice daily     start steroid taper - dex 4 mg three times daily for 5 days, then dex 4 mg bid for 5 days, then dex 4 mg morning and 2 mg at night for 5 days and then 2 mg BID for 5 days following by 2 mg once daily for 5 days and stop     Call to [facility] [place], reviewed Memantine and dexamethasone schedule above  Instructed to start steroid taper today  Patient given written Memantine schedule when she was in radiation office earlier today  Fernie informed that she will receive dexamethasone taper calendar tomorrow when she comes in for radiation therapy treatment  Fernie notified of [facility] med[facility] and will need to be picked up by Friday, 6/9 since he only has 5 Memantine tabs left  Also instructed to increase famotidine from 20 mg daily to BID  Fernie verbalized instructions  Instructed to call if any questions, my Teams phone number provided

## 2023-06-07 NOTE — TELEPHONE ENCOUNTER
"Received voicemail from patient's sister Wendy Arciniega:    [facility], this is Jeff Tire  I had left two previous messages  My sister is Ananth Espinoza  She is a patient of Doctor Larry Solis  Her birthday is [birth year]  She's at a point now where we don't know  Is it Hospice time? Is it? I found some clinicals and I wanted to send them to Doctor Nba Ordaz, but we need to meet Lowalfredo Hartley is not doing well mentally or physically  I live in [place]  I will be in town this Friday  If by any chance we could meet that would be awesome  I will be available anytime Friday and Saturday too  So if you can please give me a call back [phone]  Thank you  Bye \"    Returned call to Wendy Arciniega  She is requesting patient's appointment be moved up from next week to discuss plan of care  She is in [place] but will be visiting Friday through Sunday  Made  aware Dr Nba Ordaz does not have clinic hours on Fridays and we are not open on the weekends  [place] is aware I will call patient to reschedule appointment to tomorrow and [facility] can listen in and ask questions on the phone  Called and spoke with patient  Rescheduled appointment to tomorrow at 11:20 am with Dr Nba Ordaz    "

## 2023-06-08 ENCOUNTER — OFFICE VISIT (OUTPATIENT)
Dept: HEMATOLOGY ONCOLOGY | Facility: CLINIC | Age: 63
End: 2023-06-08
Payer: COMMERCIAL

## 2023-06-08 ENCOUNTER — TELEPHONE (OUTPATIENT)
Dept: HEMATOLOGY ONCOLOGY | Facility: CLINIC | Age: 63
End: 2023-06-08

## 2023-06-08 VITALS
SYSTOLIC BLOOD PRESSURE: 98 MMHG | TEMPERATURE: 98 F | DIASTOLIC BLOOD PRESSURE: 70 MMHG | HEART RATE: 99 BPM | RESPIRATION RATE: 16 BRPM | HEIGHT: 67 IN | OXYGEN SATURATION: 96 % | WEIGHT: 156 LBS | BODY MASS INDEX: 24.48 KG/M2

## 2023-06-08 DIAGNOSIS — C43.9 METASTATIC MELANOMA (HCC): Primary | ICD-10-CM

## 2023-06-08 DIAGNOSIS — C43.71 MALIGNANT MELANOMA OF RIGHT LOWER EXTREMITY INCLUDING HIP (HCC): ICD-10-CM

## 2023-06-08 DIAGNOSIS — C79.31 METASTASIS TO BRAIN (HCC): ICD-10-CM

## 2023-06-08 DIAGNOSIS — R05.1 ACUTE COUGH: ICD-10-CM

## 2023-06-08 DIAGNOSIS — Z79.899 HIGH RISK MEDICATION USE: ICD-10-CM

## 2023-06-08 LAB
BACTERIA BLD CULT: NORMAL
BACTERIA BLD CULT: NORMAL

## 2023-06-08 PROCEDURE — 99215 OFFICE O/P EST HI 40 MIN: CPT | Performed by: INTERNAL MEDICINE

## 2023-06-08 RX ORDER — SODIUM CHLORIDE 9 MG/ML
20 INJECTION, SOLUTION INTRAVENOUS ONCE
Status: CANCELLED | OUTPATIENT
Start: 2023-06-21

## 2023-06-08 RX ORDER — BENZONATATE 100 MG/1
100 CAPSULE ORAL 3 TIMES DAILY PRN
Qty: 20 CAPSULE | Refills: 0 | Status: SHIPPED | OUTPATIENT
Start: 2023-06-08 | End: 2023-06-13

## 2023-06-08 NOTE — TELEPHONE ENCOUNTER
Patient saw Dr Gui Martinez in the office today  Treatment has switched from [address] to [facility]  Orders placed  Will forward to infusion schedulers to get patient on the schedule  Patient will need follow up appointments with Dr Gui Martinez prior to each infusion, including first infusion to obtain consent

## 2023-06-08 NOTE — LETTER
June 8, 2023     Kermit Shabazz  [address] [facility] [address]     Patient: Melyssa Her   YOB: 1960   Date of Visit: 6/8/2023       Dear Dr Chaka Meyer: Thank you for referring Douglas Jarrett to me for evaluation  Below are my notes for this consultation  If you have questions, please do not hesitate to call me  I look forward to following your patient along with you  Sincerely,        Dimitry Ernandez MD        CC: MD Juli Rodriguez DPM Pierce Canales, MD  6/8/2023 10:15 PM  Sign when Signing Visit  [address] [place] [id number]  [phone]  [phone]     Date of Visit: 6/8/2023  Name: Melyssa Her   YOB: 1960        Subjective    VISIT DIAGNOSIS:  Diagnoses and all orders for this visit:    Metastatic melanoma ([facility] [address] )  -     Infusion Calculated Appointment Request; Future  -     Infusion Calculated Appointment Request; Future  -     Infusion Calculated Appointment Request; Future  -     Infusion Calculated Appointment Request; Future  -     Infusion Calculated Appointment Request; Future  -     Infusion Calculated Appointment Request; Future    Malignant melanoma of right lower extremity including hip ([facility] [address] )  -     Infusion Calculated Appointment Request; Future  -     Infusion Calculated Appointment Request; Future  -     Infusion Calculated Appointment Request; Future  -     Infusion Calculated Appointment Request; Future  -     Infusion Calculated Appointment Request; Future  -     Infusion Calculated Appointment Request; Future    Metastasis to brain [facility])    Acute cough  -     benzonatate (TESSALON PERLES) 100 mg capsule;  Take 1 capsule (100 mg total) by mouth 3 (three) times a day as needed for cough    High risk medication use    Other orders  -     alteplase (CATHFLO) injection 2 mg  -     sodium chloride 0 9 % infusion  -     nivolumab (OPDIVO) 70 8 mg in sodium chloride 0 9 % 100 mL IVPB  -     ipilimumab (YERVOY) 212 mg in sodium chloride 0 9 % 100 mL IVPB        Oncology History Overview Note   Radiation Oncology consult for metastatic melanoma with brain metastases, referred by Dr Monika Muñoz  59year old female presented with a lesion on the bottom of her right foot a few months ago, initially thought to be a plantar wart or callus, no improvement with OTC remedies  She saw her PCP when size increased significantly, then evaluated by Podiatry and the lesion was excised in the office on 23  Pathology demonstrated melanoma, acral type, ulcerated, breslow thickness 4 2 mm  She was referred to Med Onc, Dr Monika Muñoz and Dr Wilberto Santos, Surg Onc  Patient has family history of melanoma in her father who  of a brain mass, a younger sister who had melanoma and breast cancer diagnosed in her 45s  She underwent additional fine needle aspirate biopsy of right groin lymph node with Dr Wilberto Santos that is positive for metastatic melanoma  PET/CT revealed enlarged right liver with FDG activity, MRI abdomen is ordered for   She also underwent MRI brain with results suspicious for tiny hemorrhagic brain metastasis in right frontal lobe, left superior temporal lobe, right caudate, right medial occipital lobes, and right posterior thalamocapsular junction with tiny nonhemorrhagic metastasis in right cerebellum   Her case was discussed at [facility] on 23 with recommendations for Rad Onc referral  She started systemic therapy with [address] on 23 Skin, right foot, wide excision (Podiatry):  Melanoma, acral type, ulcerated, Breslow thickness: 4 2 mm  Pathological state pT4b  Procedure  Excision    Specimen Laterality  Right    TUMOR   Tumor Site  Skin of lower limb and hip: Right foot          Histologic Type  Acral melanoma    Maximum Tumor (Breslow) Thickness (Millimeters)  4 2 mm   Macroscopic Satellite Nodule(s)  Present    Ulceration  Present    Anatomic Qujanelle Rakers) Level  IV (Melanoma invades reticular dermis)    Mitotic Rate  4 mitoses per mm2   Microsatellite(s)  Present    Lymphovascular Invasion  Not identified    Neurotropism  Not identified    Tumor-Infiltrating Lymphocytes  Present, nonbrisk    Tumor Regression  Not identified    MARGINS     Margin Status for Invasive Melanoma  Invasive melanoma present at margin    Margin(s) Involved by Invasive Melanoma  Peripheral: One of the satellite nodules extends to the 12 o'clock end margin  Margin Status for Melanoma in situ  All margins negative for melanoma in situ    REGIONAL LYMPH NODES     Regional Lymph Node Status  Not applicable (no regional lymph nodes submitted or found)    PATHOLOGIC STAGE CLASSIFICATION (pTNM, AJCC 8th Edition)     pT Category  pT4b    pN Category  pN1c          4/24/23 Med Onc, Dr Wendy Crabtree for newly dx melanoma of right foot  Discussed that this is at least Stage IIIc, satellite lesions incompletely cleared with wide local excision in the office  Patient also describes feeling a right thigh nodule recently that was tiny, maybe a size of a marble but now has grown to a large size size of a grape  It is not painful  Recommend biopsy  Plan: Pt will see Dr Alexa Ramírez, surg onc  PET scan and MRI ordered to determine treatment options including systemic therapy vs neoadjuvant approach to resection vs surgery then treatment  4/25/23 Surg Onc, Dr Alexa Ramírez  FNA biopsy of the right inguinal lymph node performed  She tolerated this well  Will await the results of her PET/CT  If the PET/CT shows distant metastatic disease, recommend immunotherapy  Surgery to the foot could then be performed for palliative reasons  If she has stage III disease by the PET, recommend neoadjuvant immunotherapy for 3 months followed by surgical intervention at that completing adjuvant immunotherapy based on the SWOG 1801 study    Surgical intervention to the foot would likely be a transmetatarsal amputation for her best functional outcome assuming there is no significant local response to immunotherapy  She should also have a fiducial placed in the inguinal node prior to immunotherapy if she is stage III         4/25/23 Dr Brett Garcia, FNA lymph node, right groin  - Positive for malignancy  - Metastatic melanoma      4/27/23 PET/CT  1  Increased FDG activity along the plantar right foot corresponds with the history of right foot plantar melanoma  2   Hypermetabolic right pelvic adenopathy compatible with metastases  3  Asymmetrically enlarged right liver with greater FDG intensity compared to the left liver  Recommend contrast MRI liver evaluation  4  No worrisome hypermetabolic lesions in the neck or chest        5/10/23 Dr Rome Albarado, Podiatry, wound care  Pt is s/p excisional biopsy of melanoma  She has been changing her dressings daily  Prescribed gentamycin cream for presumed colonization with pseudomonas  She will change this daily  Dispensed a wedge shoe to help offload the ulcer site  Spoke with Dr Brett Garcia to coordinate the timing on the TMA  His plan includes her completing 3 cycles of immunotherapy after which we do the TMA while he does the SLN biopsy together  I  Pt signed consent  She will f/u in 2 weeks  5/11/23 MRI brain w wo contrast  Findings suspicious for tiny hemorrhagic brain metastasis in right frontal lobe, left superior temporal lobe, right caudate, right medial occipital lobes, and right posterior thalamocapsular junction with tiny nonhemorrhagic metastasis in right cerebellum  Mild perilesional vasogenic edema in right frontal lobe  No acute intracranial abnormality  Mild chronic microangiopathy        5/17/23 NEURO ONCOLOGY CASE REVIEW  PHYSICIAN RECOMMENDED PLAN:   - Radiation oncology referral  - Consider continuing immunotherapy and repeat MRI brain in 2months      5/22/23 Med Onc, Dr Sabino Guan  Pt started neoadjuvant treatment with immunotherapy, pembro [facility]) and tolerating well  Unfortunately imaging from 5/11/2023 demonstrates small subcentimeter metastatic disease  She has an appointment with radiation oncology next week  She is not having any side effects of the small brain metastases  She will continue treatment with pembrolizumab at this time  Will decide whether or not to switch her over to ipilimumab and nivolumab  Return in 3 weeks for follow-up  5/27/23 MRI abdomen (eval FDG activity in liver on PET)      Upcoming appts:  5/30/23 Podiatry  6/15/23 Dr Shonda Martell  6/15/23 Infusion   7/25/23 Dr Ke Odonnell of right lower extremity including hip ([facility] )   3/28/2023 Initial Diagnosis    Malignant melanoma of right lower extremity including hip ([facility] )     4/6/2023 Surgery    Wide excision by podiatry:  Skin, foot, right, wide excision:  Melanoma, acral type, ulcerated, Breslow thickness: 4 2 mm  PATHOLOGIC STAGE CLASSIFICATION (pTNM, AJCC 8th Edition)     pT Category  pT4b          NGS testing  NRAS Exon 3 p  Q61R  PDL1 positive 1+ 1%  TMB low 3 Mut/Mb    ALK Fusion Unknown Significance RETREG1-ALK Exon 6     4/7/2023 -  Cancer Staged    Staging form: Melanoma of the Skin, AJCC 8th Edition  - Clinical stage from 4/7/2023: Stage III (cT4b, cN1c, cM0) - Signed by Mg Askew MD on 4/24/2023 4/7/2023 -  Cancer Staged    Staging form: Melanoma of the Skin, AJCC 8th Edition  - Pathologic stage from 4/7/2023: Stage IIIC (pT4b, pN1c, cM0) - Signed by Mg Askew MD on 4/24/2023 4/25/2023 Biopsy    Lymph Node, Right Groin, FNA:  - Positive for malignancy  - Metastatic melanoma        5/4/2023 - 5/25/2023 Chemotherapy    alteplase (CATHFLO), 2 mg, Intracatheter, Every 1 Minute as needed, 2 of 6 cycles  pembrolizumab (KEYTRUDA) IVPB, 200 mg, Intravenous, Once, 2 of 6 cycles  Administration: 200 mg (5/4/2023), 200 mg (5/25/2023)     5/11/2023 -  Cancer Staged    Staging form: Melanoma of the Skin, AJCC 8th Edition  - Pathologic stage from 5/11/2023: Stage IV (pT4b, pN1c, pM1d(0)) - Signed by Mg Askew MD on 5/25/2023       6/15/2023 -  Chemotherapy    alteplase (CATHFLO), 2 mg, Intracatheter, Every 1 Minute as needed, 0 of 10 cycles  ipilimumab (YERVOY) IVPB, 3 mg/kg = 212 mg, Intravenous, Once, 0 of 4 cycles  nivolumab (OPDIVO) IVPB, 1 mg/kg = 70 8 mg, Intravenous, Once, 0 of 10 cycles     Metastatic melanoma ([facility] [address] )   4/27/2023 Initial Diagnosis    [facility] )     6/15/2023 -  Chemotherapy    alteplase (CATHFLO), 2 mg, Intracatheter, Every 1 Minute as needed, 0 of 10 cycles  ipilimumab (YERVOY) IVPB, 3 mg/kg = 212 mg, Intravenous, Once, 0 of 4 cycles  nivolumab (OPDIVO) IVPB, 1 mg/kg = 70 8 mg, Intravenous, Once, 0 of 10 cycles        Cancer Staging   Malignant melanoma of right lower extremity including hip ([facility] [address] )  Staging form: Melanoma of the Skin, AJCC 8th Edition  - Clinical stage from 4/7/2023: Stage III (cT4b, cN1c, cM0) - Signed by Mg Askew MD on 4/24/2023  - Pathologic stage from 4/7/2023: Stage IIIC (pT4b, pN1c, cM0) - Signed by Mg Askew MD on 4/24/2023  - Pathologic stage from 5/11/2023: Stage IV (pT4b, pN1c, pM1d(0)) - Signed by Mg Askew MD on 5/25/2023     Treatment Details   Treatment goal Curative   Plan Name OP Pembrolizumab Q 21 Days   Status Inactive   Start Date 5/4/2023   End Date 5/25/2023   Provider Mg Askew MD   Chemotherapy alteplase (CATHFLO), 2 mg, Intracatheter, Every 1 Minute as needed, 2 of 6 cycles    pembrolizumab (KEYTRUDA) IVPB, 200 mg, Intravenous, Once, 2 of 6 cycles  Administration: 200 mg (5/4/2023), 200 mg (5/25/2023)       Treatment Details   Treatment goal Curative   Plan Name OP Nivolumab 1 mg/kg + Ipilimumab 3 mg/kg Q 21 Days x 4, followed by Nivolumab 480mg Q 28 Days   Status Active   Start Date 6/15/2023 (Planned)   End Date 1/25/2024 (Planned)   Provider Mg Askew MD   Chemotherapy alteplase (CATHFLO), 2 mg, Intracatheter, Every 1 Minute as needed, 0 of 10 cycles    ipilimumab (YERVOY) IVPB, 3 mg/kg = 212 mg, Intravenous, Once, 0 of 4 cycles    nivolumab (OPDIVO) IVPB, 1 mg/kg = 70 8 mg, Intravenous, Once, 0 of 10 cycles          HISTORY OF PRESENT ILLNESS: Sherley Arcos is a 59 y o  female  who has metastatic melanoma here for monitoring, follow-up and surveillance  She had started treatment on adjuvant pembrolizumab 5/2/2023  She had her staging brain MRI on 5/11/2023 that demonstrated for small brain metastases  We discussed her case at neuro oncology tumor board plan was for referral to radiation oncology for discussion of radiation  He presented to the emergency room 5/28/2023 with visual changes  She was found to have left homonymous hemianopia  Felt more fatigued and rundown  Had a fever at home prior to coming to the emergency room but 100 8  She has been having difficulty taking deep breaths or climbing stairs prior to admission  PE protocol chest CT on 5/29/2023 demonstrated diffuse tree-in-bud nodularity which may represent an infectious bronchiolitis potentially with atypical mycobacterial infection  Palomo's chest CT on 6/2/2023 demonstrates increasingly prominent nodular and irregular thickening interlobar septal thickening as well as tree-in-bud nodularity although these findings may reflect infectious bronchiolitis and superimposed CHF given patient's history of malignant melanoma there is 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  Brain MRI on 5/29/2023 demonstrates punctate focus of mild increased diffusion signal in the left corona radiata without obvious low signal on ADC map  Worsening of intracranial hemorrhagic metastatic lesions associated with mild perilesional vasogenic edema without mass effect    Numerous enhancing lesions with intralesional hemorrhage involving bilateral cerebral and right cerebellar hemispheres  She was treated for pneumonia in the hospital and did start whole brain radiation she was started on high-dose steroids and will be on a taper  He did see radiation oncology and she has 3 treatments left for WBRT  She completed antibiotics in the outpatient setting  She is still having a lot of coughing  No with visual field cut difficulty seeing  She states that she can barely see TV she can make out shapes and know if someone is a man or woman but lots of details are lost   She cannot drive anymore  She states her foot is healing nicely and is able to walk on it and put pressure on it  Using an inhaler  REVIEW OF SYSTEMS:  Review of Systems   Constitutional: Positive for fatigue  Negative for appetite change, fever and unexpected weight change  HENT:   Negative for lump/mass  Eyes: Positive for eye problems  Negative for icterus  Respiratory: Positive for cough  Negative for shortness of breath and wheezing  Cardiovascular: Negative for leg swelling  Gastrointestinal: Negative for abdominal pain, constipation, diarrhea, nausea and vomiting  Genitourinary: Negative for difficulty urinating and hematuria  Musculoskeletal: Negative for arthralgias, gait problem and myalgias  Skin: Negative for itching and rash  No new, changing, or concerning lesions  Neurological: Positive for headaches  Negative for extremity weakness, gait problem, light-headedness and numbness  Hematological: Negative for adenopathy          MEDICATIONS:    Current Outpatient Medications:   •  albuterol (PROVENTIL HFA,VENTOLIN HFA) 90 mcg/act inhaler, INHALE 1 PUFF EVERY 4 HOURS AS NEEDED FOR WHEEZING, Disp: 6 7 g, Rfl: 3  •  amLODIPine (NORVASC) 5 mg tablet, Take 1 tablet (5 mg total) by mouth daily, Disp: 90 tablet, Rfl: 1  •  atenolol (TENORMIN) 50 mg tablet, TAKE 1 TABLET BY MOUTH EVERY DAY, Disp: 90 tablet, Rfl: 0  •  benzonatate (TESSALON PERLES) 100 mg capsule, Take 1 capsule (100 mg total) by mouth 3 (three) times a day as needed for cough, Disp: 20 capsule, Rfl: 0  •  benzonatate (TESSALON PERLES) 100 mg capsule, Take 1 capsule (100 mg total) by mouth 3 (three) times a day as needed for cough, Disp: 20 capsule, Rfl: 0  •  dexamethasone (DECADRON) 4 mg tablet, Take 1 tablet (4 mg total) by mouth 3 (three) times a day with meals Follow steroid taper as directed , Disp: 30 tablet, Rfl: 0  •  escitalopram (LEXAPRO) 10 mg tablet, TAKE 1 TABLET BY MOUTH EVERY DAY, Disp: 90 tablet, Rfl: 1  •  famotidine (PEPCID) 20 mg tablet, TAKE 1 TABLET BY MOUTH TWICE A DAY, Disp: 180 tablet, Rfl: 1  •  gentamicin (GARAMYCIN) 0 1 % cream, Apply topically 3 (three) times a day, Disp: 30 g, Rfl: 2  •  guaiFENesin (MUCINEX) 600 mg 12 hr tablet, Take 1 tablet (600 mg total) by mouth 2 (two) times a day for 14 days, Disp: 28 tablet, Rfl: 0  •  hydrochlorothiazide (MICROZIDE) 12 5 mg capsule, Take 12 5 mg by mouth daily, Disp: , Rfl:   •  memantine (NAMENDA TITRATION PACK), Continue 5 mg/day for 1 day, then transition to 5 mg twice daily for 1 week, then 15 mg/day given in 5 mg and 10 mg  doses for 1 week, then 10 mg twice daily, Disp: 49 tablet, Rfl: 0     ALLERGIES:  Allergies   Allergen Reactions   • Methocarbamol Shortness Of Breath and Swelling   • Tramadol Shortness Of Breath and Swelling        ACTIVE PROBLEMS:  Patient Active Problem List   Diagnosis   • Mild intermittent asthma   • Esophageal dysmotility   • Allergic rhinitis with postnasal drip   • Tobacco abuse   • NATE (obstructive sleep apnea)   • Cough   • Vitamin D deficiency   • High blood triglycerides   • Diarrhea   • Asthma   • Need for vaccination   • Unintentional weight loss   • Anemia   • Enlarged liver   • Low folic acid   • High vitamin A level   • High serum vitamin E   • Low serum vitamin B12   • Hypokalemia   • Alcoholism (HCC)   • Anxiety disorder   • Cervical spinal stenosis   • Diverticular disease of colon   • Erosive esophagitis   • Hemorrhoids   • History of gastroesophageal reflux (GERD)   • Hypertension   • Irritable bowel syndrome   • Need for influenza vaccination   • Postoperative examination   • Pure hypercholesterolemia   • Thoracic and lumbosacral neuritis   • Pre-operative clearance   • Angiomyolipoma   • Chest pain on respiration   • Left flank pain   • Change in stool habits   • Epicondylitis, lateral (tennis elbow)   • Gallbladder disease   • Limb pain   • Impaired fasting glucose   • Microhematuria   • Pelvic pain in female   • Venous insufficiency   • Annual physical exam   • Fatigue   • Foot pain   • S/P cervical spinal fusion   • Fatty liver   • Abnormal bowel movement   • Hypertensive urgency   • Hypertriglyceridemia   • Electrolyte imbalance   • Myofascial pain syndrome, cervical   • Cervicalgia   • Occipital headache   • Cervical post-laminectomy syndrome   • Malignant melanoma of right lower extremity including hip (HCC)   • Metastatic melanoma (HCC)   • Surgical wound present   • High risk medication use   • Blurred vision in bilateral left visual fields   • SIRS (systemic inflammatory response syndrome) (HCC)   • Cerebrovascular accident (CVA) ([address] )   • PNA (pneumonia)          PAST MEDICAL HISTORY:   Past Medical History:   Diagnosis Date   • Cervical disc disorder    • Melanoma ([address] )    • Stenosis of cervical spine region         PAST SURGICAL HISTORY:  Past Surgical History:   Procedure Laterality Date   • BREAST BIOPSY Left 2009    core   • BREAST BIOPSY Right 2009    core   • CERVICAL FUSION  2013    cC3-C4, C4-C5, C5 C6, C6-C7, C7-T1   •  SECTION     • ELBOW SURGERY     • KNEE SURGERY Bilateral 20YRS AGO        SOCIAL HISTORY:  Social History     Socioeconomic History   • Marital status: Single     Spouse name: None   • Number of children: None   • Years of education: None   • Highest education level: None   Occupational History   • None   Tobacco Use   • Smoking status: "Former     Packs/day: 0 02     Types: Cigarettes     Start date: 0     Quit date: 3/1/2023     Years since quittin 2   • Smokeless tobacco: Never   • Tobacco comments:     4 a day   Vaping Use   • Vaping Use: Never used   Substance and Sexual Activity   • Alcohol use: Yes     Comment: SOCIAL   • Drug use: Not Currently   • Sexual activity: Yes     Partners: Male   Other Topics Concern   • None   Social History Narrative   • None     Social Determinants of Health     Financial Resource Strain: Not on file   Food Insecurity: Not on file   Transportation Needs: Not on file   Physical Activity: Not on file   Stress: Not on file   Social Connections: Not on file   Intimate Partner Violence: Not on file   Housing Stability: Not on file        FAMILY HISTORY:  Family History   Problem Relation Age of Onset   • Cancer Mother    • Brain cancer Mother [de-identified]   • Heart disease Father    • Breast cancer Sister 48   • Breast cancer Sister 45   • Breast cancer Maternal Aunt 61   • Breast cancer Maternal Aunt 60   • Breast cancer Maternal Aunt 61   • Breast cancer Cousin 50   • Breast cancer Cousin 52   • Breast cancer Cousin 49   • Prostate cancer Maternal Uncle 55   • No Known Problems Son    • No Known Problems Maternal Grandmother    • No Known Problems Maternal Grandfather    • No Known Problems Paternal Grandmother    • No Known Problems Paternal Grandfather    • BRCA1 Negative Sister    • BRCA2 Negative Sister    • No Known Problems Sister    • No Known Problems Maternal Aunt    • No Known Problems Maternal Aunt    • No Known Problems Maternal Aunt    • No Known Problems Maternal Aunt            Objective    PHYSICAL EXAMINATION:   Blood pressure 98/70, pulse 99, temperature 98 °F (36 7 °C), temperature source Temporal, resp  rate 16, height 5' 7\" (1 702 m), weight 70 8 kg (156 lb), SpO2 96 %, not currently breastfeeding       Pain Score: 0-No pain     ECOG Performance Status      Flowsheet Row Most Recent Value   ECOG " Performance Status 1 - Restricted in physically strenuous activity but ambulatory and able to carry out work of a light or sedentary nature, e g , light house work, office work                 Physical Exam  Constitutional:       General: She is not in acute distress  Appearance: Normal appearance  She is not ill-appearing or toxic-appearing  HENT:      Head: Normocephalic and atraumatic  Mouth/Throat:      Mouth: Mucous membranes are moist       Pharynx: Oropharynx is clear  No oropharyngeal exudate  Eyes:      General: No scleral icterus  Conjunctiva/sclera: Conjunctivae normal       Comments: Visual changes with firld cuts - left homonymous hemianopia   Pulmonary:      Effort: Pulmonary effort is normal  No respiratory distress  Breath sounds: Rhonchi present  No wheezing  Abdominal:      General: There is no distension  Musculoskeletal:         General: No swelling or tenderness  Cervical back: Normal range of motion  No rigidity  Right lower leg: No edema  Left lower leg: No edema  Skin:     Coloration: Skin is not jaundiced  Findings: No rash  Comments: No concerning skin lesions  Neurological:      Mental Status: She is alert and oriented to person, place, and time  Cranial Nerves: Cranial nerve deficit present  Motor: No weakness  Gait: Gait normal    Psychiatric:         Mood and Affect: Mood normal          Behavior: Behavior normal          Thought Content: Thought content normal          Judgment: Judgment normal          I reviewed lab data in the chart      Hemoglobin   Date Value Ref Range Status   06/04/2023 13 6 11 5 - 15 4 g/dL Final   06/03/2023 13 4 11 5 - 15 4 g/dL Final   06/02/2023 13 3 11 5 - 15 4 g/dL Final   12/12/2013 14 0 11 5 - 15 4 g/dL Final     Comment:     Result Comment: New Reference Range     MCV   Date Value Ref Range Status   06/04/2023 105 (H) 82 - 98 fL Final   06/03/2023 104 (H) 82 - 98 fL Final   06/02/2023 104 (H) 82 - 98 fL Final   12/12/2013 96 82 - 98 fL Final     Comment:     Result Comment: New Reference Range     Platelets   Date Value Ref Range Status   06/04/2023 268 149 - 390 Thousands/uL Final   06/03/2023 209 149 - 390 Thousands/uL Final   06/02/2023 250 149 - 390 Thousands/uL Final   12/12/2013 251 149 - 390 Thousand/uL Final     WBC   Date Value Ref Range Status   06/04/2023 10 81 (H) 4 31 - 10 16 Thousand/uL Final   06/03/2023 7 30 4 31 - 10 16 Thousand/uL Final   06/02/2023 7 81 4 31 - 10 16 Thousand/uL Final   12/12/2013 6 62 4 31 - 10 16 Thousand/uL Final     Comment:     Result Comment: New Reference Range      Albumin   Date Value Ref Range Status   06/03/2023 3 5 3 5 - 5 0 g/dL Final   06/02/2023 3 8 3 5 - 5 0 g/dL Final   05/30/2023 3 2 (L) 3 5 - 5 0 g/dL Final     Alkaline Phosphatase   Date Value Ref Range Status   06/03/2023 223 (H) 34 - 104 U/L Final   06/02/2023 259 (H) 34 - 104 U/L Final   05/30/2023 166 (H) 34 - 104 U/L Final     ALT   Date Value Ref Range Status   06/03/2023 16 7 - 52 U/L Final     Comment:     Specimen collection should occur prior to Sulfasalazine administration due to the potential for falsely depressed results  06/02/2023 19 7 - 52 U/L Final     Comment:     Specimen collection should occur prior to Sulfasalazine administration due to the potential for falsely depressed results  05/30/2023 12 7 - 52 U/L Final     Comment:     Specimen collection should occur prior to Sulfasalazine administration due to the potential for falsely depressed results        AST   Date Value Ref Range Status   06/03/2023 18 13 - 39 U/L Final   06/02/2023 28 13 - 39 U/L Final   05/30/2023 12 (L) 13 - 39 U/L Final     BUN   Date Value Ref Range Status   06/04/2023 19 5 - 25 mg/dL Final   06/03/2023 14 5 - 25 mg/dL Final   06/02/2023 13 5 - 25 mg/dL Final   12/12/2013 19 5 - 27 mg/dL Final     Calcium   Date Value Ref Range Status   06/04/2023 9 1 8 4 - 10 2 mg/dL Final   06/03/2023 9 1 8 4 - 10 2 mg/dL Final   06/02/2023 9 6 8 4 - 10 2 mg/dL Final   12/12/2013 9 1 8 3 - 10 1 mg/dL Final     Chloride   Date Value Ref Range Status   06/04/2023 102 96 - 108 mmol/L Final   06/03/2023 104 96 - 108 mmol/L Final   06/02/2023 102 96 - 108 mmol/L Final   12/12/2013 102 101 - 111 mmol/L Final     CO2   Date Value Ref Range Status   06/04/2023 22 21 - 32 mmol/L Final   06/03/2023 24 21 - 32 mmol/L Final   06/02/2023 26 21 - 32 mmol/L Final   12/12/2013 29 21 - 31 mmol/L Final     Creatinine   Date Value Ref Range Status   06/04/2023 0 59 (L) 0 60 - 1 30 mg/dL Final     Comment:     Standardized to IDMS reference method   06/03/2023 0 45 (L) 0 60 - 1 30 mg/dL Final     Comment:     Standardized to IDMS reference method   06/02/2023 0 57 (L) 0 60 - 1 30 mg/dL Final     Comment:     Standardized to IDMS reference method   12/12/2013 0 64 0 60 - 1 30 mg/dL Final     Comment:     Result Comment: Standardized to IDMS reference method     Glucose   Date Value Ref Range Status   06/04/2023 126 65 - 140 mg/dL Final     Comment:     If the patient is fasting, the ADA then defines impaired fasting glucose as > 100 mg/dL and diabetes as > or equal to 123 mg/dL  06/03/2023 144 (H) 65 - 140 mg/dL Final     Comment:     If the patient is fasting, the ADA then defines impaired fasting glucose as > 100 mg/dL and diabetes as > or equal to 123 mg/dL  06/02/2023 132 65 - 140 mg/dL Final     Comment:     If the patient is fasting, the ADA then defines impaired fasting glucose as > 100 mg/dL and diabetes as > or equal to 123 mg/dL       Potassium   Date Value Ref Range Status   06/04/2023 3 8 3 5 - 5 3 mmol/L Final   06/03/2023 4 7 3 5 - 5 3 mmol/L Final   06/02/2023 4 5 3 5 - 5 3 mmol/L Final   12/12/2013 3 8 3 6 - 5 0 mmol/L Final     Sodium   Date Value Ref Range Status   12/12/2013 140 135 - 145 mmol/L Final     Total Bilirubin   Date Value Ref Range Status   06/03/2023 0 87 0 20 - 1 00 mg/dL Final     Comment:     Use of "this assay is not recommended for patients undergoing treatment with eltrombopag due to the potential for falsely elevated results  N-acetyl-p-benzoquinone imine (metabolite of Acetaminophen) will generate erroneously low results in samples for patients that have taken an overdose of Acetaminophen  06/02/2023 0 82 0 20 - 1 00 mg/dL Final     Comment:     Use of this assay is not recommended for patients undergoing treatment with eltrombopag due to the potential for falsely elevated results  N-acetyl-p-benzoquinone imine (metabolite of Acetaminophen) will generate erroneously low results in samples for patients that have taken an overdose of Acetaminophen  05/30/2023 0 64 0 20 - 1 00 mg/dL Final     Comment:     Use of this assay is not recommended for patients undergoing treatment with eltrombopag due to the potential for falsely elevated results  N-acetyl-p-benzoquinone imine (metabolite of Acetaminophen) will generate erroneously low results in samples for patients that have taken an overdose of Acetaminophen  LD   Date Value Ref Range Status   05/21/2023 179 81 - 234 U/L Final   05/01/2023 171 81 - 234 U/L Final     No results found for: \"TSH\"  No results found for: \"M2GDUUC\"   Free T4   Date Value Ref Range Status   05/21/2023 0 75 0 61 - 1 12 ng/dL Final     Comment:     Specimens with biotin concentrations > 10 ng/mL can lead to significant (> 10%) positive bias in result  05/03/2023 0 84 0 76 - 1 46 ng/dL Final     Comment:     Specimen collection should occur prior to Sulfasalazine administration due to the potential for falsely elevated results  RECENT IMAGING:  Procedure: XR chest 1 view portable    Result Date: 5/29/2023  Narrative: CHEST INDICATION:   chest tightness, SOB  COMPARISON: 12/12/2013  EXAM PERFORMED/VIEWS:  XR CHEST PORTABLE FINDINGS: Cardiomediastinal silhouette appears unremarkable  Mild bibasilar hypoventilatory changes   Tree-in-bud nodularity described on subsequent " CTA chest PE study is not radiographically apparent  No pneumothorax or pleural effusion  Osseous structures appear within normal limits for patient age  Impression: Mild bibasilar hypoventilatory changes  Tree-in-bud nodularity described on subsequent CTA chest PE study is not radiographically apparent  Workstation performed: [id number]     Procedure: MRI brain w wo contrast    Result Date: 5/29/2023  Narrative: MRI BRAIN WITH AND WITHOUT CONTRAST INDICATION: blurred vision  COMPARISON: Brain MRI 5/11/2023 TECHNIQUE: Multiplanar, multisequence imaging of the brain was performed before and after gadolinium administration  IV Contrast:  7 mL of Gadobutrol injection (SINGLE-DOSE) IMAGE QUALITY:   Diagnostic  FINDINGS: BRAIN PARENCHYMA: Redemonstrated numerous enhancing lesions with intralesional hemorrhage involving bilateral cerebral and right cerebellar hemispheres  The largest representative is centered in the superior right hippocampal  body measuring 1 x 0 9 cm (series 11 image 62), previously measured 0 2 cm  There is also 0 5 cm similar appearing lesion along the ependymal surface of anterior horn of right lateral ventricle (series 11 image 79), previously measured 0 2 cm  There is 0 8 cm hemorrhagic lesion in the posterior left temporal lobe without enhancement (series 5 image 11), not conspicuous in prior exam  There is mild perilesional edema without mass effect  There is punctuate focus of increased diffusion signal in the left corona radiata without obvious low signal in ADC map and no associated enhancement (series 3 image 20)  Nonspecific  foci of T2/FLAIR hyperintensities involving periventricular and subcortical white matter, most compatible with mild microangiopathic change  VENTRICLES:  Normal for the patient's age  SELLA AND PITUITARY GLAND:  Normal  ORBITS: Prior bilateral lens replacement   PARANASAL SINUSES:  Normal  VASCULATURE:  Evaluation of the major intracranial vasculature demonstrates appropriate flow voids  CALVARIUM AND SKULL BASE:  Normal  EXTRACRANIAL SOFT TISSUES:  Normal      Impression: 1  Punctate focus of mild increased diffusion signal in the left corona radiata without obvious low signal on ADC map and no enhancement could indicate  tiny subacute infarct in right clinical setting  2   Interval worsening of intracranial hemorrhagic metastatic lesions  Associated mild perilesional vasogenic edema without mass effect  Workstation performed: [id number]     Procedure: CTA ED chest PE Study    Result Date: 5/29/2023  Narrative: CTA - CHEST WITH IV CONTRAST - PULMONARY ANGIOGRAM INDICATION:   hx of metastatic melanoma with new and worsening sob  COMPARISON: None  TECHNIQUE: CTA examination of the chest was performed using angiographic technique according to a protocol specifically tailored to evaluate for pulmonary embolism  Multiplanar 2D reformatted images were created from the source data  In addition, coronal 3D MIP postprocessing was performed on the acquisition scanner  Radiation dose length product (DLP) for this visit:  277 mGy-cm   This examination, like all CT scans performed in the [facility], was performed utilizing techniques to minimize radiation dose exposure, including the use of iterative reconstruction and automated exposure control  IV Contrast:  60 mL of iohexol (OMNIPAQUE) FINDINGS: PULMONARY ARTERIAL TREE:  No pulmonary embolus is seen  LUNGS: Diffuse tree-in-bud nodularity  There is no tracheal or endobronchial lesion  PLEURA: Trace right pleural effusion  Lorena Mccarthy HEART/GREAT VESSELS:  Unremarkable for patient's age  No thoracic aortic aneurysm  MEDIASTINUM AND ARMINDA: Mediastinal and bilateral hilar adenopathy  For instance there is an 11 mm subcarinal lymph node    CHEST WALL AND LOWER NECK:   Unremarkable  VISUALIZED STRUCTURES IN THE UPPER ABDOMEN:  Unremarkable  OSSEOUS STRUCTURES:  No acute fracture or destructive osseous lesion       Impression: Diffuse tree-in-bud nodularity which may represent an infectious bronchiolitis such as with an atypical mycobacterial infection  Workstation performed: [id number]     Procedure: MRI brain w wo contrast    Result Date: 5/16/2023  Narrative: MRI BRAIN WITH AND WITHOUT CONTRAST INDICATION: C43 71: Malignant melanoma of right lower limb, including hip    melanoma staging COMPARISON: Nuclear medicine PET/CT 4/27/2023  MRI cervical spine with and without contrast 3/28/2023  MRI brain an MRA head without contrast 11/7/2013  TECHNIQUE: Multiplanar, multisequence imaging of the brain was performed before and after gadolinium administration  Imaging performed on 3 0T MRI IV Contrast:  7 mL of Gadobutrol injection (SINGLE-DOSE) IMAGE QUALITY:   Diagnostic  FINDINGS: BRAIN PARENCHYMA: 0 3 cm right frontal lobe enhancing hemorrhagic lesion with mild perilesional vasogenic edema (10:238), suspicious for hemorrhagic brain metastasis  Tiny enhancing hemorrhagic lesions in left superior temporal, right caudate, right medial occipital lobes, and right right posterior thalamocapsular junction (10:161, 158, 139, 132), suspicious for hemorrhagic brain metastasis  Tiny enhancing lesion in right cerebellum (10:81), suspicious for nonhemorrhagic brain metastasis  No mass effect or midline shift  Chronic microhemorrhage in right temporal lobe  Diffusion imaging is unremarkable  No acute infarction  Tiny right frontal and left parietal developmental venous anomalies  Small scattered hyperintensities on T2/FLAIR imaging are noted in the periventricular and subcortical white matter demonstrating an appearance that is statistically most likely to represent mild microangiopathic change  VENTRICLES:  Normal for the patient's age  SELLA AND PITUITARY GLAND:  Normal  ORBITS: Sequela of bilateral cataract surgery  PARANASAL SINUSES:  Normal  VASCULATURE:  Evaluation of the major intracranial vasculature demonstrates appropriate flow voids   CALVARIUM AND SKULL BASE:  Normal  MASTOIDS: Trace left mastoid effusion  EXTRACRANIAL SOFT TISSUES:  Normal      Impression: Findings suspicious for tiny hemorrhagic brain metastasis in right frontal lobe, left superior temporal lobe, right caudate, right medial occipital lobes, and right posterior thalamocapsular junction with tiny nonhemorrhagic metastasis in right cerebellum  Mild perilesional vasogenic edema in right frontal lobe  No acute intracranial abnormality  Mild chronic microangiopathy  The study was marked in [facility] for immediate notification  Workstation performed: [id number]             Assessment/Plan  Ms Yepez 59 yr old with metastatic melanoma now with disease progression here for disease management discussion  1  Metastatic melanoma ([facility] [address] )  2  Malignant melanoma of right lower extremity including hip ([facility] [address] )  3  Metastasis to brain [facility])  Initially diagnosed with stage III disease and started on treatment with pembrolizumab in the adjuvant setting but was found to have brain metastases  More recently she presented with visual field changes left homonymous hemianopia and numerous brain metastases  Also questionable pulmonary involvement, although unknown at this time  I discussed that given her disease progression and concern for systemic spread, I would change her treatment at this time  She was originally on pembrolizumab and I discussed changing her over to the more aggressive combination immunotherapy regimen with ipilimumab and nivolumab  I know there was some concern whether or not her visual field deficits are related to brain metastases versus immune mediated side effects from pembrolizumab  I discussed that during the neuro-oncology tumor board we evaluated her scans closely and thought is that this is due to one of the metastatic lesions causing the visual changes  She is on dexamethasone for edema    She does have a calendar of a steroid taper provided from radiation oncology as she completes whole brain and tapers off dexamethasone  She does not have a BRAF mutation, so was not able to do targeted therapy  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, although it is more challenging to target  In addition unclear if some medications used will cross the blood-brain barrier  I did discuss that I was concerned over the velocity of her disease progression, as this was certainly unforeseen when we first met her and first underwent staging imaging with brain MRI  I discussed that there are number of patients to respond to treatment, but they are just as many who do not  I discussed that we will take a day by day, treatment by treatment, and scan by scan  We will continually reassess how she is doing  Discussed the reasoning, rationale, mechanism of action of combination ipilimumab and nivolumab  We discussed the benefits, side effects, and risks of ipilimumab and nivolumab which include but are not limited to rash and autoimmune related adverse events including colitis; dermatitis; endocrinopathy including diabetes, hypophysitis, and thyroid function abnormalities; hepatitis; myocarditis; neuropathy; nephritis; pneumonitis; uveitis; and fatigue  We will continue to monitor her closely while undergoing treatment  Placed for ipilimumab and nivolumab we will obtain insurance approval   We will also get her scheduled for infusions  She and her sister had all her questions and concerns addressed  They know to call with issues or concerns prior to her next visit  - Infusion Calculated Appointment Request; Future  - Infusion Calculated Appointment Request; Future  - Infusion Calculated Appointment Request; Future  - Infusion Calculated Appointment Request; Future  - Infusion Calculated Appointment Request; Future  - Infusion Calculated Appointment Request; Future        4  Acute cough  Rated for community-acquired pneumonia with antibiotics  Has continued coughing    Order for [employer] to help with cough  We will continue to monitor how she does, as there was some concern for fluid overload as well as potential for disease spread  Repeat CT chest sooner if we need to evaluate ongoing underlying pulmonary issues  - benzonatate (TESSALON PERLES) 100 mg capsule; Take 1 capsule (100 mg total) by mouth 3 (three) times a day as needed for cough  Dispense: 20 capsule; Refill: 0    5  High risk medication use  She is on treatment with immunotherapy and we will continue to monitor for side effects and lab abnormalities  We will monitor labs with each treatment to ensure safety of continuing on treatment  She knows to watch for signs and symptoms for immune mediated side effects  Denies any immune mediated side effects at this time  Return with infusion visits - which need to be rescheduled, for Office Visit, Infusion - See Treatment Plan, labs       Tracie Carrington MD, PhD

## 2023-06-08 NOTE — TELEPHONE ENCOUNTER
Neurovascular Discharge Follow Up  Hospitalization: 6/2/23-6/5/23     Called patient and offered to schedule hospital follow up appointment with neurology  Patient reports how she will return the call since she is at her doctor office visit now  Patient has the phone number

## 2023-06-09 ENCOUNTER — TELEPHONE (OUTPATIENT)
Dept: HEMATOLOGY ONCOLOGY | Facility: CLINIC | Age: 63
End: 2023-06-09

## 2023-06-09 DIAGNOSIS — G89.3 CANCER RELATED PAIN: ICD-10-CM

## 2023-06-09 DIAGNOSIS — C79.31 METASTASIS TO BRAIN (HCC): ICD-10-CM

## 2023-06-09 DIAGNOSIS — C43.9 METASTATIC MELANOMA (HCC): Primary | ICD-10-CM

## 2023-06-09 RX ORDER — OXYCODONE HYDROCHLORIDE 5 MG/1
5 TABLET ORAL EVERY 4 HOURS PRN
Qty: 30 TABLET | Refills: 0 | Status: SHIPPED | OUTPATIENT
Start: 2023-06-09 | End: 2023-06-17

## 2023-06-09 NOTE — PROGRESS NOTES
[facility]  [address] [facility] [address]  [phone]     Date of Visit: 6/8/2023  Name: Gideon Avila   YOB: 1960        Subjective    VISIT DIAGNOSIS:  Diagnoses and all orders for this visit:    Metastatic melanoma ([address] )  -     Infusion Calculated Appointment Request; Future  -     Infusion Calculated Appointment Request; Future  -     Infusion Calculated Appointment Request; Future  -     Infusion Calculated Appointment Request; Future  -     Infusion Calculated Appointment Request; Future  -     Infusion Calculated Appointment Request; Future    Malignant melanoma of right lower extremity including hip ([address] )  -     Infusion Calculated Appointment Request; Future  -     Infusion Calculated Appointment Request; Future  -     Infusion Calculated Appointment Request; Future  -     Infusion Calculated Appointment Request; Future  -     Infusion Calculated Appointment Request; Future  -     Infusion Calculated Appointment Request; Future    Metastasis to brain (HCC)    Acute cough  -     benzonatate (TESSALON PERLES) 100 mg capsule; Take 1 capsule (100 mg total) by mouth 3 (three) times a day as needed for cough    High risk medication use    Other orders  -     alteplase (CATHFLO) injection 2 mg  -     sodium chloride 0 9 % infusion  -     nivolumab (OPDIVO) 70 8 mg in sodium chloride 0 9 % 100 mL IVPB  -     ipilimumab (YERVOY) 212 mg in sodium chloride 0 9 % 100 mL IVPB        Oncology History Overview Note   Radiation Oncology consult for metastatic melanoma with brain metastases, referred by Dr Valerie Meraz  59year old female presented with a lesion on the bottom of her right foot a few months ago, initially thought to be a plantar wart or callus, no improvement with OTC remedies  She saw her PCP when size increased significantly, then evaluated by Podiatry and the lesion was excised in the office on 4/6/23   Pathology demonstrated melanoma, acral type, ulcerated, breslow thickness 4 2 mm  She was referred to Med Onc, Dr Bradford Carrillo and Dr Lluvia Solorzano, Surg Onc  Patient has family history of melanoma in her father who  of a brain mass, a younger sister who had melanoma and breast cancer diagnosed in her 45s  She underwent additional fine needle aspirate biopsy of right groin lymph node with Dr Lluvia Solorzano that is positive for metastatic melanoma  PET/CT revealed enlarged right liver with FDG activity, MRI abdomen is ordered for   She also underwent MRI brain with results suspicious for tiny hemorrhagic brain metastasis in right frontal lobe, left superior temporal lobe, right caudate, right medial occipital lobes, and right posterior thalamocapsular junction with tiny nonhemorrhagic metastasis in right cerebellum  Her case was discussed at [facility] on 23 with recommendations for Rad Onc referral  She started systemic therapy with Keytruda on 23 Skin, right foot, wide excision (Podiatry):  Melanoma, acral type, ulcerated, Breslow thickness: 4 2 mm  Pathological state pT4b  Procedure  Excision    Specimen Laterality  Right    TUMOR   Tumor Site  Skin of lower limb and hip: Right foot          Histologic Type  Acral melanoma    Maximum Tumor (Breslow) Thickness (Millimeters)  4 2 mm   Macroscopic Satellite Nodule(s)  Present    Ulceration  Present    Anatomic (Grupo) Level  IV (Melanoma invades reticular dermis)    Mitotic Rate  4 mitoses per mm2   Microsatellite(s)  Present    Lymphovascular Invasion  Not identified    Neurotropism  Not identified    Tumor-Infiltrating Lymphocytes  Present, nonbrisk    Tumor Regression  Not identified    MARGINS     Margin Status for Invasive Melanoma  Invasive melanoma present at margin    Margin(s) Involved by Invasive Melanoma  Peripheral: One of the satellite nodules extends to the 12 o'clock end margin       Margin Status for Melanoma in situ  All margins negative for melanoma in situ REGIONAL LYMPH NODES     Regional Lymph Node Status  Not applicable (no regional lymph nodes submitted or found)    PATHOLOGIC STAGE CLASSIFICATION (pTNM, AJCC 8th Edition)     pT Category  pT4b    pN Category  pN1c          4/24/23 Med Onc, Dr Alfred Hartman for newly dx melanoma of right foot  Discussed that this is at least Stage IIIc, satellite lesions incompletely cleared with wide local excision in the office  Patient also describes feeling a right thigh nodule recently that was tiny, maybe a size of a marble but now has grown to a large size size of a grape  It is not painful  Recommend biopsy  Plan: Pt will see Dr Abe Segura, surg onc  PET scan and MRI ordered to determine treatment options including systemic therapy vs neoadjuvant approach to resection vs surgery then treatment  4/25/23 Surg Onc, Dr Abe Segura  FNA biopsy of the right inguinal lymph node performed  She tolerated this well  Will await the results of her PET/CT  If the PET/CT shows distant metastatic disease, recommend immunotherapy  Surgery to the foot could then be performed for palliative reasons  If she has stage III disease by the PET, recommend neoadjuvant immunotherapy for 3 months followed by surgical intervention at that completing adjuvant immunotherapy based on the SWOG 1801 study  Surgical intervention to the foot would likely be a transmetatarsal amputation for her best functional outcome assuming there is no significant local response to immunotherapy  She should also have a fiducial placed in the inguinal node prior to immunotherapy if she is stage III         4/25/23 Dr Abe Segura, FNA lymph node, right groin  - Positive for malignancy  - Metastatic melanoma      4/27/23 PET/CT  1  Increased FDG activity along the plantar right foot corresponds with the history of right foot plantar melanoma  2   Hypermetabolic right pelvic adenopathy compatible with metastases     3  Asymmetrically enlarged right liver with greater FDG intensity compared to the left liver  Recommend contrast MRI liver evaluation  4  No worrisome hypermetabolic lesions in the neck or chest        5/10/23 Dr Nancy Deluna, Podiatry, wound care  Pt is s/p excisional biopsy of melanoma  She has been changing her dressings daily  Prescribed gentamycin cream for presumed colonization with pseudomonas  She will change this daily  Dispensed a wedge shoe to help offload the ulcer site  Spoke with Dr Danae Shah to coordinate the timing on the TMA  His plan includes her completing 3 cycles of immunotherapy after which we do the TMA while he does the SLN biopsy together  I  Pt signed consent  She will f/u in 2 weeks  5/11/23 MRI brain w wo contrast  Findings suspicious for tiny hemorrhagic brain metastasis in right frontal lobe, left superior temporal lobe, right caudate, right medial occipital lobes, and right posterior thalamocapsular junction with tiny nonhemorrhagic metastasis in right cerebellum  Mild perilesional vasogenic edema in right frontal lobe  No acute intracranial abnormality  Mild chronic microangiopathy  5/17/23 NEURO ONCOLOGY CASE REVIEW  PHYSICIAN RECOMMENDED PLAN:   - Radiation oncology referral  - Consider continuing immunotherapy and repeat MRI brain in 2months      5/22/23 Med Onc, Dr Perlita Lebron  Pt started neoadjuvant treatment with immunotherapy, pembro (Keytruda) and tolerating well  Unfortunately imaging from 5/11/2023 demonstrates small subcentimeter metastatic disease  She has an appointment with radiation oncology next week  She is not having any side effects of the small brain metastases  She will continue treatment with pembrolizumab at this time  Will decide whether or not to switch her over to ipilimumab and nivolumab  Return in 3 weeks for follow-up        5/27/23 MRI abdomen (eval FDG activity in liver on PET)      Upcoming appts:  5/30/23 Podiatry  6/15/23 Dr Sharon Meza  6/15/23 Infusion   7/25/23   Tran Surg Onc         Malignant melanoma of right lower extremity including hip ([facility] [address] )   3/28/2023 Initial Diagnosis    Malignant melanoma of right lower extremity including hip ([facility] [address] )     4/6/2023 Surgery    Wide excision by podiatry:  Skin, foot, right, wide excision:  Melanoma, acral type, ulcerated, Breslow thickness: 4 2 mm  PATHOLOGIC STAGE CLASSIFICATION (pTNM, AJCC 8th Edition)     pT Category  pT4b          NGS testing  NRAS Exon 3 p  Q61R  PDL1 positive 1+ 1%  TMB low 3 Mut/Mb    ALK Fusion Unknown Significance RETREG1-ALK Exon 6     4/7/2023 -  Cancer Staged    Staging form: Melanoma of the Skin, AJCC 8th Edition  - Clinical stage from 4/7/2023: Stage III (cT4b, cN1c, cM0) - Signed by Cristino Alonzo MD on 4/24/2023 4/7/2023 -  Cancer Staged    Staging form: Melanoma of the Skin, AJCC 8th Edition  - Pathologic stage from 4/7/2023: Stage IIIC (pT4b, pN1c, cM0) - Signed by Cristino Alonzo MD on 4/24/2023 4/25/2023 Biopsy    Lymph Node, Right Groin, FNA:  - Positive for malignancy  - Metastatic melanoma        5/4/2023 - 5/25/2023 Chemotherapy    alteplase (CATHFLO), 2 mg, Intracatheter, Every 1 Minute as needed, 2 of 6 cycles  pembrolizumab (KEYTRUDA) IVPB, 200 mg, Intravenous, Once, 2 of 6 cycles  Administration: 200 mg (5/4/2023), 200 mg (5/25/2023)     5/11/2023 -  Cancer Staged    Staging form: Melanoma of the Skin, AJCC 8th Edition  - Pathologic stage from 5/11/2023: Stage IV (pT4b, pN1c, pM1d(0)) - Signed by Cristino Alonzo MD on 5/25/2023       6/15/2023 -  Chemotherapy    alteplase (CATHFLO), 2 mg, Intracatheter, Every 1 Minute as needed, 0 of 10 cycles  ipilimumab (YERVOY) IVPB, 3 mg/kg = 212 mg, Intravenous, Once, 0 of 4 cycles  nivolumab (OPDIVO) IVPB, 1 mg/kg = 70 8 mg, Intravenous, Once, 0 of 10 cycles     Metastatic melanoma ([facility] [address] )   4/27/2023 Initial Diagnosis    Metast[facility] )     6/15/2023 -  Chemotherapy    alteplase (CATHFLO), 2 mg, Intracatheter, Every 1 Minute as needed, 0 of 10 cycles  ipilimumab (YERVOY) IVPB, 3 mg/kg = 212 mg, Intravenous, Once, 0 of 4 cycles  nivolumab (OPDIVO) IVPB, 1 mg/kg = 70 8 mg, Intravenous, Once, 0 of 10 cycles        Cancer Staging   Malignant melanoma of right lower extremity including hip (HCC)  Staging form: Melanoma of the Skin, AJCC 8th Edition  - Clinical stage from 4/7/2023: Stage III (cT4b, cN1c, cM0) - Signed by Kwaku Willard MD on 4/24/2023  - Pathologic stage from 4/7/2023: Stage IIIC (pT4b, pN1c, cM0) - Signed by Kwaku Willard MD on 4/24/2023  - Pathologic stage from 5/11/2023: Stage IV (pT4b, pN1c, pM1d(0)) - Signed by Kwaku Willard MD on 5/25/2023     Treatment Details   Treatment goal Curative   Plan Name OP Pembrolizumab Q 21 Days   Status Inactive   Start Date 5/4/2023   End Date 5/25/2023   Provider Kwaku Willard MD   Chemotherapy alteplase (CATHFLO), 2 mg, Intracatheter, Every 1 Minute as needed, 2 of 6 cycles    pembrolizumab (KEYTRUDA) IVPB, 200 mg, Intravenous, Once, 2 of 6 cycles  Administration: 200 mg (5/4/2023), 200 mg (5/25/2023)       Treatment Details   Treatment goal Curative   Plan Name OP Nivolumab 1 mg/kg + Ipilimumab 3 mg/kg Q 21 Days x 4, followed by Nivolumab 480mg Q 28 Days   Status Active   Start Date 6/15/2023 (Planned)   End Date 1/25/2024 (Planned)   Provider Kwaku Willard MD   Chemotherapy alteplase (CATHFLO), 2 mg, Intracatheter, Every 1 Minute as needed, 0 of 10 cycles    ipilimumab (YERVOY) IVPB, 3 mg/kg = 212 mg, Intravenous, Once, 0 of 4 cycles    nivolumab (OPDIVO) IVPB, 1 mg/kg = 70 8 mg, Intravenous, Once, 0 of 10 cycles          HISTORY OF PRESENT ILLNESS: Karlo Escobedo is a 59 y o  female  who has metastatic melanoma here for monitoring, follow-up and surveillance  She had started treatment on adjuvant pembrolizumab 5/2/2023  She had her staging brain MRI on 5/11/2023 that demonstrated for small brain metastases    We discussed her case at neuro oncology tumor board plan was for referral to radiation oncology for discussion of radiation  He presented to the emergency room 5/28/2023 with visual changes  She was found to have left homonymous hemianopia  Felt more fatigued and rundown  Had a fever at home prior to coming to the emergency room but 100 8  She has been having difficulty taking deep breaths or climbing stairs prior to admission  PE protocol chest CT on 5/29/2023 demonstrated diffuse tree-in-bud nodularity which may represent an infectious bronchiolitis potentially with atypical mycobacterial infection  [facility]'s chest CT on 6/2/2023 demonstrates increasingly prominent nodular and irregular thickening interlobar septal thickening as well as tree-in-bud nodularity although these findings may reflect infectious bronchiolitis and superimposed CHF given patient's history of malignant melanoma there is 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  Brain MRI on 5/29/2023 demonstrates punctate focus of mild increased diffusion signal in the left corona radiata without obvious low signal on ADC map  Worsening of intracranial hemorrhagic metastatic lesions associated with mild perilesional vasogenic edema without mass effect  Numerous enhancing lesions with intralesional hemorrhage involving bilateral cerebral and right cerebellar hemispheres  She was treated for pneumonia in the hospital and did start whole brain radiation she was started on high-dose steroids and will be on a taper  He did see radiation oncology and she has 3 treatments left for WBRT  She completed antibiotics in the outpatient setting  She is still having a lot of coughing  No with visual field cut difficulty seeing  She states that she can barely see TV she can make out shapes and know if someone is a man or woman but lots of details are lost   She cannot drive anymore    She states her foot is healing nicely and is able to walk on it and put pressure on it  Using an inhaler  REVIEW OF SYSTEMS:  Review of Systems   Constitutional: Positive for fatigue  Negative for appetite change, fever and unexpected weight change  HENT:   Negative for lump/mass  Eyes: Positive for eye problems  Negative for icterus  Respiratory: Positive for cough  Negative for shortness of breath and wheezing  Cardiovascular: Negative for leg swelling  Gastrointestinal: Negative for abdominal pain, constipation, diarrhea, nausea and vomiting  Genitourinary: Negative for difficulty urinating and hematuria  Musculoskeletal: Negative for arthralgias, gait problem and myalgias  Skin: Negative for itching and rash  No new, changing, or concerning lesions  Neurological: Positive for headaches  Negative for extremity weakness, gait problem, light-headedness and numbness  Hematological: Negative for adenopathy          MEDICATIONS:    Current Outpatient Medications:   •  albuterol (PROVENTIL HFA,VENTOLIN HFA) 90 mcg/act inhaler, INHALE 1 PUFF EVERY 4 HOURS AS NEEDED FOR WHEEZING, Disp: 6 7 g, Rfl: 3  •  amLODIPine (NORVASC) 5 mg tablet, Take 1 tablet (5 mg total) by mouth daily, Disp: 90 tablet, Rfl: 1  •  atenolol (TENORMIN) 50 mg tablet, TAKE 1 TABLET BY MOUTH EVERY DAY, Disp: 90 tablet, Rfl: 0  •  benzonatate (TESSALON PERLES) 100 mg capsule, Take 1 capsule (100 mg total) by mouth 3 (three) times a day as needed for cough, Disp: 20 capsule, Rfl: 0  •  benzonatate (TESSALON PERLES) 100 mg capsule, Take 1 capsule (100 mg total) by mouth 3 (three) times a day as needed for cough, Disp: 20 capsule, Rfl: 0  •  dexamethasone (DECADRON) 4 mg tablet, Take 1 tablet (4 mg total) by mouth 3 (three) times a day with meals Follow steroid taper as directed , Disp: 30 tablet, Rfl: 0  •  escitalopram (LEXAPRO) 10 mg tablet, TAKE 1 TABLET BY MOUTH EVERY DAY, Disp: 90 tablet, Rfl: 1  •  famotidine (PEPCID) 20 mg tablet, TAKE 1 TABLET BY MOUTH TWICE A DAY, Disp: 180 tablet, Rfl: 1  •  gentamicin (GARAMYCIN) 0 1 % cream, Apply topically 3 (three) times a day, Disp: 30 g, Rfl: 2  •  guaiFENesin (MUCINEX) 600 mg 12 hr tablet, Take 1 tablet (600 mg total) by mouth 2 (two) times a day for 14 days, Disp: 28 tablet, Rfl: 0  •  hydrochlorothiazide (MICROZIDE) 12 5 mg capsule, Take 12 5 mg by mouth daily, Disp: , Rfl:   •  memantine (NAMENDA TITRATION PACK), Continue 5 mg/day for 1 day, then transition to 5 mg twice daily for 1 week, then 15 mg/day given in 5 mg and 10 mg  doses for 1 week, then 10 mg twice daily, Disp: 49 tablet, Rfl: 0     ALLERGIES:  Allergies   Allergen Reactions   • Methocarbamol Shortness Of Breath and Swelling   • Tramadol Shortness Of Breath and Swelling        ACTIVE PROBLEMS:  Patient Active Problem List   Diagnosis   • Mild intermittent asthma   • Esophageal dysmotility   • Allergic rhinitis with postnasal drip   • Tobacco abuse   • NATE (obstructive sleep apnea)   • Cough   • Vitamin D deficiency   • High blood triglycerides   • Diarrhea   • Asthma   • Need for vaccination   • Unintentional weight loss   • Anemia   • Enlarged liver   • Low folic acid   • High vitamin A level   • High serum vitamin E   • Low serum vitamin B12   • Hypokalemia   • Alcoholism (HCC)   • Anxiety disorder   • Cervical spinal stenosis   • Diverticular disease of colon   • Erosive esophagitis   • Hemorrhoids   • History of gastroesophageal reflux (GERD)   • Hypertension   • Irritable bowel syndrome   • Need for influenza vaccination   • Postoperative examination   • Pure hypercholesterolemia   • Thoracic and lumbosacral neuritis   • Pre-operative clearance   • Angiomyolipoma   • Chest pain on respiration   • Left flank pain   • Change in stool habits   • Epicondylitis, lateral (tennis elbow)   • Gallbladder disease   • Limb pain   • Impaired fasting glucose   • Microhematuria   • Pelvic pain in female   • Venous insufficiency   • Annual physical exam   • Fatigue   • Foot pain   • S/P cervical spinal fusion   • Fatty liver   • Abnormal bowel movement   • Hypertensive urgency   • Hypertriglyceridemia   • Electrolyte imbalance   • Myofascial pain syndrome, cervical   • Cervicalgia   • Occipital headache   • Cervical post-laminectomy syndrome   • Malignant melanoma of right lower extremity including hip (HCC)   • Metastatic melanoma (HCC)   • Surgical wound present   • High risk medication use   • Blurred vision in bilateral left visual fields   • SIRS (systemic inflammatory response syndrome) (HCC)   • Cerebrovascular accident (CVA) ([facility] [address] )   • PNA (pneumonia)          PAST MEDICAL HISTORY:   Past Medical History:   Diagnosis Date   • Cervical disc disorder    • Melanoma ([facility] [address] )    • Stenosis of cervical spine region         PAST SURGICAL HISTORY:  Past Surgical History:   Procedure Laterality Date   • BREAST BIOPSY Left 2009    core   • BREAST BIOPSY Right 2009    core   • CERVICAL FUSION  2013    cC3-C4, C4-C5, C5 C6, C6-C7, C7-T1   •  SECTION     • ELBOW SURGERY     • KNEE SURGERY Bilateral 20YRS AGO        SOCIAL HISTORY:  Social History     Socioeconomic History   • Marital status: Single     Spouse name: None   • Number of children: None   • Years of education: None   • Highest education level: None   Occupational History   • None   Tobacco Use   • Smoking status: Former     Packs/day: 0 02     Types: Cigarettes     Start date: 0     Quit date: 3/1/2023     Years since quittin 2   • Smokeless tobacco: Never   • Tobacco comments:     4 a day   Vaping Use   • Vaping Use: Never used   Substance and Sexual Activity   • Alcohol use: Yes     Comment: SOCIAL   • Drug use: Not Currently   • Sexual activity: Yes     Partners: Male   Other Topics Concern   • None   Social History Narrative   • None     Social Determinants of Health     Financial Resource Strain: Not on file   Food Insecurity: Not on file   Transportation Needs: Not on file   Physical "Activity: Not on file   Stress: Not on file   Social Connections: Not on file   Intimate Partner Violence: Not on file   Housing Stability: Not on file        FAMILY HISTORY:  Family History   Problem Relation Age of Onset   • Cancer Mother    • Brain cancer Mother [de-identified]   • Heart disease Father    • Breast cancer Sister 48   • Breast cancer Sister 45   • Breast cancer Maternal Aunt 61   • Breast cancer Maternal Aunt 60   • Breast cancer Maternal Aunt 60   • Breast cancer Cousin 50   • Breast cancer Cousin 52   • Breast cancer Cousin 52   • Prostate cancer Maternal Uncle 54   • No Known Problems Son    • No Known Problems Maternal Grandmother    • No Known Problems Maternal Grandfather    • No Known Problems Paternal Grandmother    • No Known Problems Paternal Grandfather    • BRCA1 Negative Sister    • BRCA2 Negative Sister    • No Known Problems Sister    • No Known Problems Maternal Aunt    • No Known Problems Maternal Aunt    • No Known Problems Maternal Aunt    • No Known Problems Maternal Aunt            Objective    PHYSICAL EXAMINATION:   Blood pressure 98/70, pulse 99, temperature 98 °F (36 7 °C), temperature source Temporal, resp  rate 16, height 5' 7\" (1 702 m), weight 70 8 kg (156 lb), SpO2 96 %, not currently breastfeeding  Pain Score: 0-No pain     ECOG Performance Status    Flowsheet Row Most Recent Value   ECOG Performance Status 1 - Restricted in physically strenuous activity but ambulatory and able to carry out work of a light or sedentary nature, e g , light house work, office work               Physical Exam  Constitutional:       General: She is not in acute distress  Appearance: Normal appearance  She is not ill-appearing or toxic-appearing  HENT:      Head: Normocephalic and atraumatic  Mouth/Throat:      Mouth: Mucous membranes are moist       Pharynx: Oropharynx is clear  No oropharyngeal exudate  Eyes:      General: No scleral icterus       Conjunctiva/sclera: Conjunctivae " normal       Comments: Visual changes with firld cuts - left homonymous hemianopia   Pulmonary:      Effort: Pulmonary effort is normal  No respiratory distress  Breath sounds: Rhonchi present  No wheezing  Abdominal:      General: There is no distension  Musculoskeletal:         General: No swelling or tenderness  Cervical back: Normal range of motion  No rigidity  Right lower leg: No edema  Left lower leg: No edema  Skin:     Coloration: Skin is not jaundiced  Findings: No rash  Comments: No concerning skin lesions  Neurological:      Mental Status: She is alert and oriented to person, place, and time  Cranial Nerves: Cranial nerve deficit present  Motor: No weakness  Gait: Gait normal    Psychiatric:         Mood and Affect: Mood normal          Behavior: Behavior normal          Thought Content: Thought content normal          Judgment: Judgment normal          I reviewed lab data in the chart      Hemoglobin   Date Value Ref Range Status   06/04/2023 13 6 11 5 - 15 4 g/dL Final   06/03/2023 13 4 11 5 - 15 4 g/dL Final   06/02/2023 13 3 11 5 - 15 4 g/dL Final   12/12/2013 14 0 11 5 - 15 4 g/dL Final     Comment:     Result Comment: New Reference Range     MCV   Date Value Ref Range Status   06/04/2023 105 (H) 82 - 98 fL Final   06/03/2023 104 (H) 82 - 98 fL Final   06/02/2023 104 (H) 82 - 98 fL Final   12/12/2013 96 82 - 98 fL Final     Comment:     Result Comment: New Reference Range     Platelets   Date Value Ref Range Status   06/04/2023 268 149 - 390 Thousands/uL Final   06/03/2023 209 149 - 390 Thousands/uL Final   06/02/2023 250 149 - 390 Thousands/uL Final   12/12/2013 251 149 - 390 Thousand/uL Final     WBC   Date Value Ref Range Status   06/04/2023 10 81 (H) 4 31 - 10 16 Thousand/uL Final   06/03/2023 7 30 4 31 - 10 16 Thousand/uL Final   06/02/2023 7 81 4 31 - 10 16 Thousand/uL Final   12/12/2013 6 62 4 31 - 10 16 Thousand/uL Final     Comment: Result Comment: New Reference Range      Albumin   Date Value Ref Range Status   06/03/2023 3 5 3 5 - 5 0 g/dL Final   06/02/2023 3 8 3 5 - 5 0 g/dL Final   05/30/2023 3 2 (L) 3 5 - 5 0 g/dL Final     Alkaline Phosphatase   Date Value Ref Range Status   06/03/2023 223 (H) 34 - 104 U/L Final   06/02/2023 259 (H) 34 - 104 U/L Final   05/30/2023 166 (H) 34 - 104 U/L Final     ALT   Date Value Ref Range Status   06/03/2023 16 7 - 52 U/L Final     Comment:     Specimen collection should occur prior to Sulfasalazine administration due to the potential for falsely depressed results  06/02/2023 19 7 - 52 U/L Final     Comment:     Specimen collection should occur prior to Sulfasalazine administration due to the potential for falsely depressed results  05/30/2023 12 7 - 52 U/L Final     Comment:     Specimen collection should occur prior to Sulfasalazine administration due to the potential for falsely depressed results        AST   Date Value Ref Range Status   06/03/2023 18 13 - 39 U/L Final   06/02/2023 28 13 - 39 U/L Final   05/30/2023 12 (L) 13 - 39 U/L Final     BUN   Date Value Ref Range Status   06/04/2023 19 5 - 25 mg/dL Final   06/03/2023 14 5 - 25 mg/dL Final   06/02/2023 13 5 - 25 mg/dL Final   12/12/2013 19 5 - 27 mg/dL Final     Calcium   Date Value Ref Range Status   06/04/2023 9 1 8 4 - 10 2 mg/dL Final   06/03/2023 9 1 8 4 - 10 2 mg/dL Final   06/02/2023 9 6 8 4 - 10 2 mg/dL Final   12/12/2013 9 1 8 3 - 10 1 mg/dL Final     Chloride   Date Value Ref Range Status   06/04/2023 102 96 - 108 mmol/L Final   06/03/2023 104 96 - 108 mmol/L Final   06/02/2023 102 96 - 108 mmol/L Final   12/12/2013 102 101 - 111 mmol/L Final     CO2   Date Value Ref Range Status   06/04/2023 22 21 - 32 mmol/L Final   06/03/2023 24 21 - 32 mmol/L Final   06/02/2023 26 21 - 32 mmol/L Final   12/12/2013 29 21 - 31 mmol/L Final     Creatinine   Date Value Ref Range Status   06/04/2023 0 59 (L) 0 60 - 1 30 mg/dL Final     Comment: Standardized to IDMS reference method   06/03/2023 0 45 (L) 0 60 - 1 30 mg/dL Final     Comment:     Standardized to IDMS reference method   06/02/2023 0 57 (L) 0 60 - 1 30 mg/dL Final     Comment:     Standardized to IDMS reference method   12/12/2013 0 64 0 60 - 1 30 mg/dL Final     Comment:     Result Comment: Standardized to IDMS reference method     Glucose   Date Value Ref Range Status   06/04/2023 126 65 - 140 mg/dL Final     Comment:     If the patient is fasting, the ADA then defines impaired fasting glucose as > 100 mg/dL and diabetes as > or equal to 123 mg/dL  06/03/2023 144 (H) 65 - 140 mg/dL Final     Comment:     If the patient is fasting, the ADA then defines impaired fasting glucose as > 100 mg/dL and diabetes as > or equal to 123 mg/dL  06/02/2023 132 65 - 140 mg/dL Final     Comment:     If the patient is fasting, the ADA then defines impaired fasting glucose as > 100 mg/dL and diabetes as > or equal to 123 mg/dL  Potassium   Date Value Ref Range Status   06/04/2023 3 8 3 5 - 5 3 mmol/L Final   06/03/2023 4 7 3 5 - 5 3 mmol/L Final   06/02/2023 4 5 3 5 - 5 3 mmol/L Final   12/12/2013 3 8 3 6 - 5 0 mmol/L Final     Sodium   Date Value Ref Range Status   12/12/2013 140 135 - 145 mmol/L Final     Total Bilirubin   Date Value Ref Range Status   06/03/2023 0 87 0 20 - 1 00 mg/dL Final     Comment:     Use of this assay is not recommended for patients undergoing treatment with eltrombopag due to the potential for falsely elevated results  N-acetyl-p-benzoquinone imine (metabolite of Acetaminophen) will generate erroneously low results in samples for patients that have taken an overdose of Acetaminophen  06/02/2023 0 82 0 20 - 1 00 mg/dL Final     Comment:     Use of this assay is not recommended for patients undergoing treatment with eltrombopag due to the potential for falsely elevated results    N-acetyl-p-benzoquinone imine (metabolite of Acetaminophen) will generate erroneously low results "in samples for patients that have taken an overdose of Acetaminophen  05/30/2023 0 64 0 20 - 1 00 mg/dL Final     Comment:     Use of this assay is not recommended for patients undergoing treatment with eltrombopag due to the potential for falsely elevated results  N-acetyl-p-benzoquinone imine (metabolite of Acetaminophen) will generate erroneously low results in samples for patients that have taken an overdose of Acetaminophen  LD   Date Value Ref Range Status   05/21/2023 179 81 - 234 U/L Final   05/01/2023 171 81 - 234 U/L Final     No results found for: \"TSH\"  No results found for: \"E7JRZTG\"   Free T4   Date Value Ref Range Status   05/21/2023 0 75 0 61 - 1 12 ng/dL Final     Comment:     Specimens with biotin concentrations > 10 ng/mL can lead to significant (> 10%) positive bias in result  05/03/2023 0 84 0 76 - 1 46 ng/dL Final     Comment:     Specimen collection should occur prior to Sulfasalazine administration due to the potential for falsely elevated results  RECENT IMAGING:  Procedure: XR chest 1 view portable    Result Date: 5/29/2023  Narrative: CHEST INDICATION:   chest tightness, SOB  COMPARISON: 12/12/2013  EXAM PERFORMED/VIEWS:  XR CHEST PORTABLE FINDINGS: Cardiomediastinal silhouette appears unremarkable  Mild bibasilar hypoventilatory changes  Tree-in-bud nodularity described on subsequent CTA chest PE study is not radiographically apparent  No pneumothorax or pleural effusion  Osseous structures appear within normal limits for patient age  Impression: Mild bibasilar hypoventilatory changes  Tree-in-bud nodularity described on subsequent CTA chest PE study is not radiographically apparent  Workstation performed: [id number]     Procedure: MRI brain w wo contrast    Result Date: 5/29/2023  Narrative: MRI BRAIN WITH AND WITHOUT CONTRAST INDICATION: blurred vision   COMPARISON: Brain MRI 5/11/2023 TECHNIQUE: Multiplanar, multisequence imaging of the brain was performed before " and after gadolinium administration  IV Contrast:  7 mL of Gadobutrol injection (SINGLE-DOSE) IMAGE QUALITY:   Diagnostic  FINDINGS: BRAIN PARENCHYMA: Redemonstrated numerous enhancing lesions with intralesional hemorrhage involving bilateral cerebral and right cerebellar hemispheres  The largest representative is centered in the superior right hippocampal  body measuring 1 x 0 9 cm (series 11 image 62), previously measured 0 2 cm  There is also 0 5 cm similar appearing lesion along the ependymal surface of anterior horn of right lateral ventricle (series 11 image 79), previously measured 0 2 cm  There is 0 8 cm hemorrhagic lesion in the posterior left temporal lobe without enhancement (series 5 image 11), not conspicuous in prior exam  There is mild perilesional edema without mass effect  There is punctuate focus of increased diffusion signal in the left corona radiata without obvious low signal in ADC map and no associated enhancement (series 3 image 20)  Nonspecific  foci of T2/FLAIR hyperintensities involving periventricular and subcortical white matter, most compatible with mild microangiopathic change  VENTRICLES:  Normal for the patient's age  SELLA AND PITUITARY GLAND:  Normal  ORBITS: Prior bilateral lens replacement  PARANASAL SINUSES:  Normal  VASCULATURE:  Evaluation of the major intracranial vasculature demonstrates appropriate flow voids  CALVARIUM AND SKULL BASE:  Normal  EXTRACRANIAL SOFT TISSUES:  Normal      Impression: 1  Punctate focus of mild increased diffusion signal in the left corona radiata without obvious low signal on ADC map and no enhancement could indicate  tiny subacute infarct in right clinical setting  2   Interval worsening of intracranial hemorrhagic metastatic lesions  Associated mild perilesional vasogenic edema without mass effect   Workstation performed: [id number]     Procedure: CTA ED chest PE Study    Result Date: 5/29/2023  Narrative: CTA - CHEST WITH IV CONTRAST - PULMONARY ANGIOGRAM INDICATION:   hx of metastatic melanoma with new and worsening sob  COMPARISON: None  TECHNIQUE: CTA examination of the chest was performed using angiographic technique according to a protocol specifically tailored to evaluate for pulmonary embolism  Multiplanar 2D reformatted images were created from the source data  In addition, coronal 3D MIP postprocessing was performed on the acquisition scanner  Radiation dose length product (DLP) for this visit:  277 mGy-cm   This examination, like all CT scans performed in the [facility], was performed utilizing techniques to minimize radiation dose exposure, including the use of iterative reconstruction and automated exposure control  IV Contrast:  60 mL of iohexol (OMNIPAQUE) FINDINGS: PULMONARY ARTERIAL TREE:  No pulmonary embolus is seen  LUNGS: Diffuse tree-in-bud nodularity  There is no tracheal or endobronchial lesion  PLEURA: Trace right pleural effusion  Chacha Dietz HEART/GREAT VESSELS:  Unremarkable for patient's age  No thoracic aortic aneurysm  MEDIASTINUM AND ARMINDA: Mediastinal and bilateral hilar adenopathy  For instance there is an 11 mm subcarinal lymph node    CHEST WALL AND LOWER NECK:   Unremarkable  VISUALIZED STRUCTURES IN THE UPPER ABDOMEN:  Unremarkable  OSSEOUS STRUCTURES:  No acute fracture or destructive osseous lesion  Impression: Diffuse tree-in-bud nodularity which may represent an infectious bronchiolitis such as with an atypical mycobacterial infection  Workstation performed: [id number]     Procedure: MRI brain w wo contrast    Result Date: 5/16/2023  Narrative: MRI BRAIN WITH AND WITHOUT CONTRAST INDICATION: C43 71: Malignant melanoma of right lower limb, including hip    melanoma staging COMPARISON: Nuclear medicine PET/CT 4/27/2023  MRI cervical spine with and without contrast 3/28/2023  MRI brain an MRA head without contrast 11/7/2013   TECHNIQUE: Multiplanar, multisequence imaging of the brain was performed before and after gadolinium administration  Imaging performed on 3 0T MRI IV Contrast:  7 mL of Gadobutrol injection (SINGLE-DOSE) IMAGE QUALITY:   Diagnostic  FINDINGS: BRAIN PARENCHYMA: 0 3 cm right frontal lobe enhancing hemorrhagic lesion with mild perilesional vasogenic edema (10:238), suspicious for hemorrhagic brain metastasis  Tiny enhancing hemorrhagic lesions in left superior temporal, right caudate, right medial occipital lobes, and right right posterior thalamocapsular junction (10:161, 158, 139, 132), suspicious for hemorrhagic brain metastasis  Tiny enhancing lesion in right cerebellum (10:81), suspicious for nonhemorrhagic brain metastasis  No mass effect or midline shift  Chronic microhemorrhage in right temporal lobe  Diffusion imaging is unremarkable  No acute infarction  Tiny right frontal and left parietal developmental venous anomalies  Small scattered hyperintensities on T2/FLAIR imaging are noted in the periventricular and subcortical white matter demonstrating an appearance that is statistically most likely to represent mild microangiopathic change  VENTRICLES:  Normal for the patient's age  SELLA AND PITUITARY GLAND:  Normal  ORBITS: Sequela of bilateral cataract surgery  PARANASAL SINUSES:  Normal  VASCULATURE:  Evaluation of the major intracranial vasculature demonstrates appropriate flow voids  CALVARIUM AND SKULL BASE:  Normal  MASTOIDS: Trace left mastoid effusion  EXTRACRANIAL SOFT TISSUES:  Normal      Impression: Findings suspicious for tiny hemorrhagic brain metastasis in right frontal lobe, left superior temporal lobe, right caudate, right medial occipital lobes, and right posterior thalamocapsular junction with tiny nonhemorrhagic metastasis in right cerebellum  Mild perilesional vasogenic edema in right frontal lobe  No acute intracranial abnormality  Mild chronic microangiopathy  The study was marked in [facility] for immediate notification   Workstation performed: [id number] Assessment/Plan  Ms Yepez 59 yr old with metastatic melanoma now with disease progression here for disease management discussion  1  Metastatic melanoma ([facility] [address] )  2  Malignant melanoma of right lower extremity including hip ([facility] [address] )  3  Metastasis to brain [facility])  Initially diagnosed with stage III disease and started on treatment with pembrolizumab in the adjuvant setting but was found to have brain metastases  More recently she presented with visual field changes left homonymous hemianopia and numerous brain metastases  Also questionable pulmonary involvement, although unknown at this time  I discussed that given her disease progression and concern for systemic spread, I would change her treatment at this time  She was originally on pembrolizumab and I discussed changing her over to the more aggressive combination immunotherapy regimen with ipilimumab and nivolumab  I know there was some concern whether or not her visual field deficits are related to brain metastases versus immune mediated side effects from pembrolizumab  I discussed that during the neuro-oncology tumor board we evaluated her scans closely and thought is that this is due to one of the metastatic lesions causing the visual changes  She is on dexamethasone for edema  She does have a calendar of a steroid taper provided from radiation oncology as she completes whole brain and tapers off dexamethasone  She does not have a BRAF mutation, so was not able to do targeted therapy  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, although it is more challenging to target  In addition unclear if some medications used will cross the blood-brain barrier  I did discuss that I was concerned over the velocity of her disease progression, as this was certainly unforeseen when we first met her and first underwent staging imaging with brain MRI  I discussed that there are number of patients to respond to treatment, but they are just as many who do not  I discussed that we will take a day by day, treatment by treatment, and scan by scan  We will continually reassess how she is doing  Discussed the reasoning, rationale, mechanism of action of combination ipilimumab and nivolumab  We discussed the benefits, side effects, and risks of ipilimumab and nivolumab which include but are not limited to rash and autoimmune related adverse events including colitis; dermatitis; endocrinopathy including diabetes, hypophysitis, and thyroid function abnormalities; hepatitis; myocarditis; neuropathy; nephritis; pneumonitis; uveitis; and fatigue  We will continue to monitor her closely while undergoing treatment  Placed for ipilimumab and nivolumab we will obtain insurance approval   We will also get her scheduled for infusions  She and her sister had all her questions and concerns addressed  They know to call with issues or concerns prior to her next visit  - Infusion Calculated Appointment Request; Future  - Infusion Calculated Appointment Request; Future  - Infusion Calculated Appointment Request; Future  - Infusion Calculated Appointment Request; Future  - Infusion Calculated Appointment Request; Future  - Infusion Calculated Appointment Request; Future        4  Acute cough  Rated for community-acquired pneumonia with antibiotics  Has continued coughing  Order for [employer] to help with cough  We will continue to monitor how she does, as there was some concern for fluid overload as well as potential for disease spread  Repeat CT chest sooner if we need to evaluate ongoing underlying pulmonary issues  - benzonatate (TESSALON PERLES) 100 mg capsule; Take 1 capsule (100 mg total) by mouth 3 (three) times a day as needed for cough  Dispense: 20 capsule; Refill: 0    5  High risk medication use  She is on treatment with immunotherapy and we will continue to monitor for side effects and lab abnormalities    We will monitor labs with each treatment to ensure safety of continuing on treatment  She knows to watch for signs and symptoms for immune mediated side effects  Denies any immune mediated side effects at this time  Return with infusion visits - which need to be rescheduled, for Office Visit, Infusion - See Treatment Plan, labs       Shahrzad Tamayo MD, PhD

## 2023-06-09 NOTE — TELEPHONE ENCOUNTER
Flores Cates, RN  Hello! I submitted an urgent PA for the new treatment plan, she should be good to go by 6/15  However, I see that 7/7 DOS is scheduled at [address] [place] [address] Drive  Is there any way she could be seen at [facility], since that is where all her other appointments are  Her insurance is site specific, so if she can't be seen at [facility] I will have to contact her insurance to change the site  Please let me know if it can be changed!      Thank you in advance,   Merle Beasley

## 2023-06-12 NOTE — TELEPHONE ENCOUNTER
Left message for patient to return call to the office to reschedule consult appointment with Palliative Care  Patient missed appointment on 6/12/2023

## 2023-06-12 NOTE — TELEPHONE ENCOUNTER
P/t sister calling and stated that her sister cant walk and has taken a turn for the worse and calling to see what needs to be done

## 2023-06-13 PROBLEM — N17.9 AKI (ACUTE KIDNEY INJURY) (HCC): Status: ACTIVE | Noted: 2023-01-01

## 2023-06-13 LAB
MYCOBACTERIUM SPEC CULT: NORMAL
RHODAMINE-AURAMINE STN SPEC: NORMAL

## 2023-06-13 NOTE — CONSULTS
"Consultation - Pulmonary Medicine   Rylee Meyer 59 y o  female MRN: [id number]  Unit/Bed#: ED-38 Encounter: [id number]      Assessment/Plan:    1  Acute pulmonary insufficiency likely multifaceted as listed below       -Currently on 2L-94%, does not wear home O2       -Maintain saturations greater than 88%       -Pulmonary toileting: Deep breathing cough, OOB as tolerated, IS q 1 hr       -We will need ambulatory pulse ox prior to discharge    2  Abnormal chest CT       -Tree-in-bud nodularity with inner lobular septal thickening       -Differentials: Atypical vs worsening malignancy vs volume vs pneumonitis       -BNP order has been placed       -will order thoracentesis for tomorrow for diagnostic purposes       -If thoracentesis unremarkable would likely proceed with bronchoscopy    3  Melanoma with metastatic disease to the brain and right hip        -5/4/2023-Keytruda was initiated        -6/15/2023-last chemotherapy treatment        -Still scheduled for still scheduled for 3 morestill scheduled for 3 more sessions        -Follows outpatient with radiation oncology    4  Mild persistent asthma with acute exacerbation        -Inpatient: We will order budesonide twice daily, Xopenex/Atrovent 3 times daily        -Home regimen: Symbicort 160-4 5 mcg 2 puffs twice daily, Proventil 2 puffs every 6 as needed albuterol nebulizer every 6 as needed         -No indication for steroids today-we will evaluate daily        History of Present Illness   Physician Requesting Consult: Jen Al MD  Reason for Consult / Principal Problem: Shortness of breath  Hx and PE limited by: Nothing  Chief Complaint: \" I feel so tired\"   HPI: Rylee Meyer is a 59 y o   female who presented to [address] [place] [address] with complaints of pleuritic pain  Patient has past medical history of metastatic melanoma, hypertension, asthma, and GERD  Patient was recently discharged from the hospital for similar complaints    " Patient was recently discharged on 6/1 with cefdinir for suspicious infectious process given tree-in-bud opacity  Patient returned approximately 1 day later after having increased fevers increased procalcitonin and meeting SIRS criteria  It was recommended bronchoscopy if cough does not improve however patient became frustrated and signed out AMA and was discharged with another course of cefdinir  Patient had radiation yesterday and presented today with increasing shortness of breath and fatigue  Pulmonary was consulted for pulmonary insufficiency  Patient reports that overall she feels fatigued with frequent coughing  Patient reports that she feels extremely short of breath with minimal exertion  Patient currently denying any fevers, chills, abscess, headaches or night sweats, pleuritic chest pain, or palpations  From pulmonary standpoint, patient follows with Dr Irma Jules for her COPD  Patient reports an approximate 1 pack/day 43-year smoking history  Patient reports she quit smoking in March  Patient is currently maintained on home regimen: Symbicort 160-4 5 mcg 2 puffs twice daily, Proventil 2 puffs every 6 as needed albuterol nebulizer every 6 as needed  Patient is currently not maintained on any oxygen therapy  She reports history of GERD in which she is maintained on Pepcid  Patient denies any symptoms of seasonal allergies, postnasal drip, or NATE  Patient denies any recent acute exposures to dust, mold, spices, or silica  Inpatient consult to Pulmonology  Consult performed by: PAVEL Rubin  Consult ordered by: Valerie Vieyra PA-C          Review of Systems   Constitutional: Negative for chills and fever  HENT: Negative for ear pain and sore throat  Eyes: Negative for pain and visual disturbance  Respiratory: Positive for shortness of breath  Negative for apnea, cough, choking, chest tightness, wheezing and stridor      Cardiovascular: Negative for chest pain and palpitations  Gastrointestinal: Negative for abdominal pain and vomiting  Genitourinary: Negative for dysuria and hematuria  Musculoskeletal: Negative for arthralgias and back pain  Skin: Negative for color change and rash  Neurological: Negative for seizures and syncope  Psychiatric/Behavioral: Negative for agitation and behavioral problems  All other systems reviewed and are negative        Historical Information   Past Medical History:   Diagnosis Date   • Cervical disc disorder    • Melanoma ([facility] [address] )    • Stenosis of cervical spine region      Past Surgical History:   Procedure Laterality Date   • BREAST BIOPSY Left 2009    core   • BREAST BIOPSY Right 2009    core   • CERVICAL FUSION  2013    cC3-C4, C4-C5, C5 C6, C6-C7, C7-T1   •  SECTION     • ELBOW SURGERY     • KNEE SURGERY Bilateral 20YRS AGO     Social History   Social History     Substance and Sexual Activity   Alcohol Use Yes    Comment: SOCIAL     Social History     Substance and Sexual Activity   Drug Use Not Currently     Social History     Tobacco Use   Smoking Status Former   • Packs/day: 0 02   • Types: Cigarettes   • Start date: 0   • Quit date: 3/1/2023   • Years since quittin 2   Smokeless Tobacco Never   Tobacco Comments    4 a day     E-Cigarette/Vaping   • E-Cigarette Use Never User      E-Cigarette/Vaping Substances   • Nicotine No    • THC No    • CBD No    • Flavoring No    • Other No    • Unknown No      Occupational History: Noncontributory    Family History:   Family History   Problem Relation Age of Onset   • Cancer Mother    • Brain cancer Mother [de-identified]   • Heart disease Father    • Breast cancer Sister 48   • Breast cancer Sister 45   • Breast cancer Maternal Aunt 61   • Breast cancer Maternal Aunt 60   • Breast cancer Maternal Aunt 61   • Breast cancer Cousin 50   • Breast cancer Cousin 52   • Breast cancer Cousin 52   • Prostate cancer Maternal Uncle 54   • No Known Problems Son    • No Known Problems Maternal Grandmother    • No Known Problems Maternal Grandfather    • No Known Problems Paternal Grandmother    • No Known Problems Paternal Grandfather    • BRCA1 Negative Sister    • BRCA2 Negative Sister    • No Known Problems Sister    • No Known Problems Maternal Aunt    • No Known Problems Maternal Aunt    • No Known Problems Maternal Aunt    • No Known Problems Maternal Aunt        Meds/Allergies   pertinent pulmonary meds have been reviewed    Allergies   Allergen Reactions   • Methocarbamol Shortness Of Breath and Swelling   • Tramadol Shortness Of Breath and Swelling       Objective   Vitals: Blood pressure 109/69, pulse (!) 53, temperature 97 6 °F (36 4 °C), temperature source Oral, resp  rate 20, weight 74 8 kg (164 lb 14 5 oz), SpO2 94 %, not currently breastfeeding  ra,Body mass index is 25 83 kg/m²  Intake/Output Summary (Last 24 hours) at 6/13/2023 1630  Last data filed at 6/13/2023 1427  Gross per 24 hour   Intake 50 ml   Output --   Net 50 ml     Invasive Devices     Peripheral Intravenous Line  Duration           Peripheral IV 06/13/23 Right Antecubital <1 day                Physical Exam  Constitutional:       General: She is not in acute distress  Appearance: Normal appearance  She is normal weight  She is not ill-appearing  HENT:      Head: Normocephalic and atraumatic  Nose: Nose normal  No congestion or rhinorrhea  Mouth/Throat:      Mouth: Mucous membranes are dry  Pharynx: No oropharyngeal exudate or posterior oropharyngeal erythema  Cardiovascular:      Rate and Rhythm: Normal rate and regular rhythm  Pulses: Normal pulses  Heart sounds: Normal heart sounds  No murmur heard  No friction rub  No gallop  Pulmonary:      Effort: Pulmonary effort is normal  No respiratory distress  Breath sounds: No stridor  No wheezing, rhonchi or rales  Comments: Diminished aeration at bases  Chest:      Chest wall: No tenderness     Abdominal: "General: Abdomen is flat  Bowel sounds are normal  There is no distension  Palpations: Abdomen is soft  There is no mass  Musculoskeletal:         General: No swelling or tenderness  Normal range of motion  Cervical back: Normal range of motion  No rigidity or tenderness  Skin:     General: Skin is warm and dry  Coloration: Skin is not jaundiced or pale  Neurological:      General: No focal deficit present  Mental Status: She is alert and oriented to person, place, and time  Mental status is at baseline  Psychiatric:         Mood and Affect: Mood normal          Behavior: Behavior normal          Lab Results:   I have personally reviewed pertinent lab results  , ABG: No results found for: \"BEART\", \"HYD7BUB\", \"B9YNAQTU\", \"RMP8ZUZ\", \"PHART\", \"PO2ART\", \"SOURCE\", CMP:   Lab Results   Component Value Date    ALKPHOS 296 (H) 06/13/2023    ALT 55 (H) 06/13/2023    AST 79 (H) 06/13/2023    BUN 57 (H) 06/13/2023    CALCIUM 8 6 06/13/2023    CL 96 06/13/2023    CO2 23 06/13/2023    CREATININE 1 14 06/13/2023    EGFR 51 06/13/2023    K 3 9 06/13/2023    SODIUM 135 06/13/2023   , PT/INR: No results found for: \"INR\", \"PT\"        Imaging Studies: I have personally reviewed pertinent films in PACS     Chest CT 6/2/2023 increasing nodular and irregular thickening tree-in-bud nodularity    EKG, Pathology, and Other Studies: I have personally reviewed pertinent films in PACS     Echo-EF 65%      Pulmonary Results (PFTs, PSG): I have personally reviewed pertinent films in PACS     No PFTs recorded      VTE Prophylaxis: Sequential compression device (Venodyne)     Code Status: Prior    None    Portions of the record may have been created with voice recognition software  Occasional wrong word or \"sound a like\" substitutions may have occurred due to the inherent limitations of voice recognition software  Read the chart carefully and recognize, using context, where substitutions have occurred    "

## 2023-06-13 NOTE — H&P
[facility]  H&P  Name: Milly Salazar 59 y o  female I MRN: [id number]  Unit/Bed#: ED-38 I Date of Admission: 6/13/2023   Date of Service: 6/13/2023 I Hospital Day: 0      Assessment/Plan   * LAZARA (acute kidney injury) ([facility] [address] )  Assessment & Plan  · LAZARA present upon admission with creatinine of 1 14, baseline around 0 4-0 5  · Possibly secondary to decreased oral intake  · We will give IV hydration and repeat CMP tomorrow  · Hold HCTZ    Cough  Assessment & Plan  · POA: worsening nonproductive cough with associated Bustamante  Has been ongoing for a month, recent admission and dc 6/5 for same  At that time was seen by pulmonary and recommended for bronchoscopy if symptoms do not improve, fortunately signed out AMA prior to this  During that stay RP 2 was negative, there was concern for atypical infection, pneumonitis, or lymphangitic spread  · Symptoms have not improved, will reconsult pulmonary  · Continue supportive care with antitussive and mucolytic as well as pain control  · Likely needs inpatient bronchoscopy at this point    Metastatic melanoma [facility])  Assessment & Plan  · Melanoma of the right foot diagnosed in April 2023  Recently admitted for visual field deficits with MRI demonstrating intracranial hemorrhagic metastatic lesions  · Status post whole brain radiation  · Continue Decadron  · Follow [facility] outpatient    Blurred vision in bilateral left visual fields  Assessment & Plan  · Secondary to metastatic melanoma  At baseline and stable  Chest pain on respiration  Assessment & Plan  · With complaints of chest pain, worsened with cough  Doubt cardiac etiology  Sounds pleuritic in nature secondary to coughing  · EKG nonischemic  Troponin x2 normal   · See related plan for pleuritic chest pain    Hypertension  Assessment & Plan  · Continue PTA amlodipine 5 mg daily, atenolol 50 mg daily    · Hold HCTZ 12 5 mg daily  · Continue monitoring BP       VTE Pharmacologic Prophylaxis: VTE Score: 4 Moderate Risk (Score 3-4) - Pharmacological DVT Prophylaxis Ordered: heparin  Code Status: full code  Discussion with family: Patient declined call to   Anticipated Length of Stay: Patient will be admitted on an observation basis with an anticipated length of stay of less than 2 midnights secondary to Pleuritic chest pain and cough, pending pulmonary work-up  Total Time for Visit, including Counseling / Coordination of Care: 75 minutes Greater than 50% of this total time spent on direct patient counseling and coordination of care  Chief Complaint: chest pain, cough    History of Present Illness:  Geoff Sage is a 59 y o  female with a PMH of metastatic melanoma who presents with pleuritic chest pain as well as frequent coughing  Recent hospitalization last week secondary to same  Is not getting any better  Cough is nonproductive  Chest pain is worse with coughing  Recently completed palliative radiation  Has been taking oxycodone for pain  No fevers  Review of Systems:  Review of Systems   Constitutional: Negative for activity change, appetite change, chills, fatigue and fever  HENT: Negative for congestion, rhinorrhea, sinus pressure and sore throat  Eyes: Negative for photophobia, pain and visual disturbance  Respiratory: Positive for cough and shortness of breath  Negative for wheezing  Cardiovascular: Positive for chest pain  Negative for palpitations and leg swelling  Gastrointestinal: Negative for abdominal distention, abdominal pain, constipation, diarrhea, nausea and vomiting  Endocrine: Negative for cold intolerance, heat intolerance, polydipsia and polyuria  Genitourinary: Negative for difficulty urinating, dysuria, flank pain, frequency and hematuria  Musculoskeletal: Negative for arthralgias, back pain and joint swelling  Skin: Negative for color change, pallor and rash  Allergic/Immunologic: Negative  Neurological: Negative for dizziness, syncope, weakness, light-headedness and headaches  Hematological: Negative  Psychiatric/Behavioral: Negative  Past Medical and Surgical History:   Past Medical History:   Diagnosis Date   • Cervical disc disorder    • Melanoma ([address] )    • Stenosis of cervical spine region        Past Surgical History:   Procedure Laterality Date   • BREAST BIOPSY Left     core   • BREAST BIOPSY Right     core   • CERVICAL FUSION  2013    cC3-C4, C4-C5, C5 C6, C6-C7, C7-T1   •  SECTION     • ELBOW SURGERY     • KNEE SURGERY Bilateral 20YRS AGO       Meds/Allergies:  Prior to Admission medications    Medication Sig Start Date End Date Taking? Authorizing Provider   albuterol (PROVENTIL HFA,VENTOLIN HFA) 90 mcg/act inhaler INHALE 1 PUFF EVERY 4 HOURS AS NEEDED FOR WHEEZING 23   Jas Hurst,    amLODIPine (NORVASC) 5 mg tablet Take 1 tablet (5 mg total) by mouth daily 22   Jas Hurst DO   atenolol (TENORMIN) 50 mg tablet TAKE 1 TABLET BY MOUTH EVERY DAY 23   Jas Hurst DO   benzonatate (TESSALON PERLES) 100 mg capsule Take 1 capsule (100 mg total) by mouth 3 (three) times a day as needed for cough 23   Ezzie Cranker, MD   dexamethasone (DECADRON) 4 mg tablet Take 1 tablet (4 mg total) by mouth 3 (three) times a day with meals Follow steroid taper as directed   23   Zeke Lyle MD   escitalopram (LEXAPRO) 10 mg tablet TAKE 1 TABLET BY MOUTH EVERY DAY 23   Jas Hurst DO   famotidine (PEPCID) 20 mg tablet TAKE 1 TABLET BY MOUTH TWICE A DAY 23   Jas Hurst DO   gentamicin (GARAMYCIN) 0 1 % cream Apply topically 3 (three) times a day 5/10/23   Jan Gomes DPM   guaiFENesin (MUCINEX) 600 mg 12 hr tablet Take 1 tablet (600 mg total) by mouth 2 (two) times a day for 14 days 6/1/23 6/15/23  Noe Willis MD   hydrochlorothiazide (MICROZIDE) 12 5 mg capsule Take 12 5 mg by mouth daily    Historical Provider, MD   memantine (NAMENDA TITRATION PACK) Continue 5 mg/day for 1 day, then transition to 5 mg twice daily for 1 week, then 15 mg/day given in 5 mg and 10 mg  doses for 1 week, then 10 mg twice daily 23   Mago Whitney MD   oxyCODONE (Roxicodone) 5 immediate release tablet Take 1 tablet (5 mg total) by mouth every 4 (four) hours as needed for moderate pain Max Daily Amount: 30 mg 23   Kimberly Powers MD   benzonatate (TESSALON PERLES) 100 mg capsule Take 1 capsule (100 mg total) by mouth 3 (three) times a day as needed for cough 23  Kimberly Powers MD     I have reviewed home medications with patient personally  Allergies:    Allergies   Allergen Reactions   • Methocarbamol Shortness Of Breath and Swelling   • Tramadol Shortness Of Breath and Swelling       Social History:  Marital Status: Single   Occupation: non-contributory  Patient Pre-hospital Living Situation: Home  Patient Pre-hospital Level of Mobility: walks  Patient Pre-hospital Diet Restrictions: none  Substance Use History:   Social History     Substance and Sexual Activity   Alcohol Use Yes    Comment: SOCIAL     Social History     Tobacco Use   Smoking Status Former   • Packs/day: 0 02   • Types: Cigarettes   • Start date: 0   • Quit date: 3/1/2023   • Years since quittin 2   Smokeless Tobacco Never   Tobacco Comments    4 a day     Social History     Substance and Sexual Activity   Drug Use Not Currently       Family History:  Family History   Problem Relation Age of Onset   • Cancer Mother    • Brain cancer Mother [de-identified]   • Heart disease Father    • Breast cancer Sister 48   • Breast cancer Sister 45   • Breast cancer Maternal Aunt 61   • Breast cancer Maternal Aunt 60   • Breast cancer Maternal Aunt 61   • Breast cancer Cousin 50   • Breast cancer Cousin 52   • Breast cancer Cousin 52   • Prostate cancer Maternal Uncle 55   • No Known Problems Son    • No Known Problems Maternal Grandmother    • No Known Problems Maternal Grandfather    • No Known Problems Paternal Grandmother    • No Known Problems Paternal Grandfather    • BRCA1 Negative Sister    • BRCA2 Negative Sister    • No Known Problems Sister    • No Known Problems Maternal Aunt    • No Known Problems Maternal Aunt    • No Known Problems Maternal Aunt    • No Known Problems Maternal Aunt        Physical Exam:     Vitals:   Blood Pressure: 109/69 (06/13/23 1154)  Pulse: (!) 53 (06/13/23 1445)  Temperature: 97 6 °F (36 4 °C) (06/13/23 1154)  Temp Source: Oral (06/13/23 1154)  Respirations: 20 (06/13/23 1157)  Weight - Scale: 74 8 kg (164 lb 14 5 oz) (06/13/23 1154)  SpO2: 94 % (06/13/23 1445)    Physical Exam  Vitals and nursing note reviewed  Constitutional:       General: She is not in acute distress  Appearance: Normal appearance  Comments: + frequent coughing   HENT:      Head: Normocephalic and atraumatic  Mouth/Throat:      Mouth: Mucous membranes are moist    Eyes:      General: No scleral icterus  Extraocular Movements: Extraocular movements intact  Pupils: Pupils are equal, round, and reactive to light  Cardiovascular:      Rate and Rhythm: Normal rate and regular rhythm  Heart sounds: Normal heart sounds  No murmur heard  No friction rub  Pulmonary:      Effort: Pulmonary effort is normal       Breath sounds: Normal breath sounds  No wheezing or rhonchi  Abdominal:      General: Bowel sounds are normal  There is no distension  Palpations: Abdomen is soft  Tenderness: There is no abdominal tenderness  There is no guarding  Musculoskeletal:         General: No swelling  Cervical back: Neck supple  Right lower leg: No edema  Left lower leg: No edema  Skin:     General: Skin is warm and dry  Capillary Refill: Capillary refill takes less than 2 seconds  Coloration: Skin is not jaundiced or pale     Neurological:      General: No focal deficit present  Mental Status: She is alert and oriented to person, place, and time  Mental status is at baseline  Additional Data:     Lab Results:  Results from last 7 days   Lab Units 06/13/23  1224   EOS PCT % 0   HEMATOCRIT % 45 3   HEMOGLOBIN g/dL 15 0   LYMPHS PCT % 4*   MONOS PCT % 9   NEUTROS PCT % 86*   PLATELETS Thousands/uL 189   WBC Thousand/uL 14 39*     Results from last 7 days   Lab Units 06/13/23  1224   ANION GAP mmol/L 16*   ALBUMIN g/dL 3 3*   ALK PHOS U/L 296*   ALT U/L 55*   AST U/L 79*   BUN mg/dL 57*   CALCIUM mg/dL 8 6   CHLORIDE mmol/L 96   CO2 mmol/L 23   CREATININE mg/dL 1 14   GLUCOSE RANDOM mg/dL 85   POTASSIUM mmol/L 3 9   SODIUM mmol/L 135   TOTAL BILIRUBIN mg/dL 0 96                       Imaging: Personally reviewed the following imaging: chest xray  XR chest 2 views   ED Interpretation by Tonie Elena PA-C (06/13 1301)   Persistent right pleural effusion    probable consolidation right lower lobe  EKG and Other Studies Reviewed on Admission:   · Prior pertinent studies and records reviewed in [facility]  ** Please Note: This note has been constructed using a voice recognition system   **

## 2023-06-13 NOTE — ASSESSMENT & PLAN NOTE
· POA: worsening nonproductive cough with associated Bustamante  Has been ongoing for a month, recent admission and dc 6/5 for same  At that time was seen by pulmonary and recommended for bronchoscopy if symptoms do not improve, fortunately signed out AMA prior to this  During that stay RP 2 was negative, there was concern for atypical infection, pneumonitis, or lymphangitic spread  · Symptoms have not improved, will reconsult pulmonary    · Continue supportive care with antitussive and mucolytic as well as pain control  · Likely needs inpatient bronchoscopy at this point

## 2023-06-13 NOTE — TELEPHONE ENCOUNTER
I was paged in regards to this patient  She is unable to walk, confused, and not doing well per the sister who lives in [place]  I advised that the patient be taken to the ER as she needs to be evaluated for potential reversible infections or other sinister etiologies  They agreed and will plan on taking her in      Cristiano Pandya

## 2023-06-13 NOTE — ED PROVIDER NOTES
History  Chief Complaint   Patient presents with   • Chest Pain     Reports fatigue prior to radiation treatment yesterday  Post radiation patient is having chest pain and shortness of breath with increased fatigue    • Fatigue   • Shortness of Breath     45-year-old female presenting today with concerns of chest pain, shortness of breath, fatigue after her last bout of radiation treatment  Patient completed full round of radiation for metastatic melanoma, she states that she has 10 lesions in her brain that they are treating  States that she feels nauseous without vomiting, dizzy, chest pain which she describes as a dull sitting pain in the middle of her chest that does not increase or decrease with exertion  Patient states that she has been mildly short of breath  Patient does have metastatic lung cancer as well  Denies any hemoptysis  Denies any diarrhea, constipation, belly pain  No urinary symptoms  Prior to Admission Medications   Prescriptions Last Dose Informant Patient Reported? Taking? albuterol (PROVENTIL HFA,VENTOLIN HFA) 90 mcg/act inhaler  Self No No   Sig: INHALE 1 PUFF EVERY 4 HOURS AS NEEDED FOR WHEEZING   amLODIPine (NORVASC) 5 mg tablet  Self No No   Sig: Take 1 tablet (5 mg total) by mouth daily   atenolol (TENORMIN) 50 mg tablet   No No   Sig: TAKE 1 TABLET BY MOUTH EVERY DAY   benzonatate (TESSALON PERLES) 100 mg capsule   No No   Sig: Take 1 capsule (100 mg total) by mouth 3 (three) times a day as needed for cough   dexamethasone (DECADRON) 4 mg tablet   No No   Sig: Take 1 tablet (4 mg total) by mouth 3 (three) times a day with meals Follow steroid taper as directed     escitalopram (LEXAPRO) 10 mg tablet   No No   Sig: TAKE 1 TABLET BY MOUTH EVERY DAY   famotidine (PEPCID) 20 mg tablet  Self No No   Sig: TAKE 1 TABLET BY MOUTH TWICE A DAY   gentamicin (GARAMYCIN) 0 1 % cream   No No   Sig: Apply topically 3 (three) times a day   guaiFENesin (MUCINEX) 600 mg 12 hr tablet   No No   Sig: Take 1 tablet (600 mg total) by mouth 2 (two) times a day for 14 days   hydrochlorothiazide (MICROZIDE) 12 5 mg capsule  Self Yes No   Sig: Take 12 5 mg by mouth daily   memantine (NAMENDA TITRATION PACK)   No No   Sig: Continue 5 mg/day for 1 day, then transition to 5 mg twice daily for 1 week, then 15 mg/day given in 5 mg and 10 mg  doses for 1 week, then 10 mg twice daily   oxyCODONE (Roxicodone) 5 immediate release tablet   No No   Sig: Take 1 tablet (5 mg total) by mouth every 4 (four) hours as needed for moderate pain Max Daily Amount: 30 mg      Facility-Administered Medications: None       Past Medical History:   Diagnosis Date   • Cervical disc disorder    • Melanoma ([facility] [address] )    • Stenosis of cervical spine region        Past Surgical History:   Procedure Laterality Date   • BREAST BIOPSY Left 2009    core   • BREAST BIOPSY Right 2009    core   • CERVICAL FUSION  2013    cC3-C4, C4-C5, C5 C6, C6-C7, C7-T1   •  SECTION     • ELBOW SURGERY     • KNEE SURGERY Bilateral 20YRS AGO       Family History   Problem Relation Age of Onset   • Cancer Mother    • Brain cancer Mother [de-identified]   • Heart disease Father    • Breast cancer Sister 48   • Breast cancer Sister 45   • Breast cancer Maternal Aunt 60   • Breast cancer Maternal Aunt 60   • Breast cancer Maternal Aunt 60   • Breast cancer Cousin 50   • Breast cancer Cousin 52   • Breast cancer Cousin 52   • Prostate cancer Maternal Uncle 55   • No Known Problems Son    • No Known Problems Maternal Grandmother    • No Known Problems Maternal Grandfather    • No Known Problems Paternal Grandmother    • No Known Problems Paternal Grandfather    • BRCA1 Negative Sister    • BRCA2 Negative Sister    • No Known Problems Sister    • No Known Problems Maternal Aunt    • No Known Problems Maternal Aunt    • No Known Problems Maternal Aunt    • No Known Problems Maternal Aunt      I have reviewed and agree with the history as documented  E-Cigarette/Vaping   • E-Cigarette Use Never User      E-Cigarette/Vaping Substances   • Nicotine No    • THC No    • CBD No    • Flavoring No    • Other No    • Unknown No      Social History     Tobacco Use   • Smoking status: Never   • Smokeless tobacco: Never   • Tobacco comments:     4 a day   Vaping Use   • Vaping Use: Never used   Substance Use Topics   • Alcohol use: Not Currently     Comment: SOCIAL   • Drug use: Never       Review of Systems   Constitutional: Positive for fatigue  Negative for chills, diaphoresis and fever  HENT: Negative for ear pain and sore throat  Eyes: Negative for pain and visual disturbance  Respiratory: Positive for cough and shortness of breath  Negative for apnea, choking, chest tightness, wheezing and stridor  Cardiovascular: Positive for chest pain  Negative for palpitations and leg swelling  Gastrointestinal: Positive for nausea  Negative for abdominal pain, blood in stool, constipation, diarrhea and vomiting  Genitourinary: Negative for dysuria, flank pain, frequency and hematuria  Musculoskeletal: Negative for arthralgias and back pain  Skin: Negative for color change and rash  Neurological: Positive for headaches (Similar to previous headaches, the same as postradiation headaches the patient has got before  )  Negative for dizziness, seizures, syncope, facial asymmetry, speech difficulty, weakness and light-headedness  All other systems reviewed and are negative  Physical Exam  Physical Exam  Vitals and nursing note reviewed  Constitutional:       General: She is not in acute distress  Appearance: She is well-developed  She is ill-appearing  HENT:      Head: Normocephalic and atraumatic  Eyes:      Conjunctiva/sclera: Conjunctivae normal    Cardiovascular:      Rate and Rhythm: Normal rate and regular rhythm  Pulses:           Carotid pulses are 2+ on the right side and 2+ on the left side         Radial pulses are 2+ on the right side and 2+ on the left side  Dorsalis pedis pulses are 2+ on the right side and 2+ on the left side  Posterior tibial pulses are 2+ on the right side and 2+ on the left side  Heart sounds: Normal heart sounds  Heart sounds not distant  No murmur heard  Pulmonary:      Effort: No tachypnea, accessory muscle usage or respiratory distress  Breath sounds: Normal breath sounds  No stridor  Abdominal:      Palpations: Abdomen is soft  Tenderness: There is no abdominal tenderness  Musculoskeletal:         General: No swelling  Cervical back: Neck supple  Right lower leg: No tenderness  No edema  Left lower leg: No tenderness  No edema  Skin:     General: Skin is warm and dry  Capillary Refill: Capillary refill takes less than 2 seconds  Coloration: Skin is not cyanotic or pale  Findings: No ecchymosis, erythema or rash  Nails: There is no clubbing  Neurological:      Mental Status: She is alert     Psychiatric:         Mood and Affect: Mood normal          Vital Signs  ED Triage Vitals   Temperature Pulse Respirations Blood Pressure SpO2   06/13/23 1154 06/13/23 1154 06/13/23 1157 06/13/23 1154 06/13/23 1154   97 6 °F (36 4 °C) 57 20 109/69 95 %      Temp Source Heart Rate Source Patient Position - Orthostatic VS BP Location FiO2 (%)   06/13/23 1154 06/13/23 1154 -- -- --   Oral Monitor         Pain Score       06/13/23 1730       No Pain           Vitals:    06/14/23 1218 06/14/23 1230 06/14/23 1253 06/14/23 1501   BP: 108/53 115/65 109/56 105/52   Pulse: 70 78  68         Visual Acuity  Visual Acuity    Flowsheet Row Most Recent Value   L Pupil Size (mm) 2   R Pupil Size (mm) 2   L Pupil Shape Round   R Pupil Shape Round          ED Medications  Medications   sodium chloride 0 9 % infusion ( Intravenous Restarted 6/14/23 1543)   albuterol (PROVENTIL HFA,VENTOLIN HFA) inhaler 2 puff (has no administration in time range)   amLODIPine (NORVASC) tablet 5 mg (5 mg Oral Not Given 6/14/23 0805)   atenolol (TENORMIN) tablet 50 mg (50 mg Oral Not Given 6/14/23 0805)   dexamethasone (DECADRON) tablet 4 mg (4 mg Oral Given 6/14/23 1724)   benzonatate (TESSALON PERLES) capsule 100 mg (100 mg Oral Given 6/14/23 0702)   escitalopram (LEXAPRO) tablet 10 mg (10 mg Oral Given 6/14/23 0800)   guaiFENesin (MUCINEX) 12 hr tablet 600 mg (600 mg Oral Given 6/14/23 1724)   oxyCODONE (ROXICODONE) IR tablet 5 mg (5 mg Oral Given 6/14/23 2015)   memantine (NAMENDA) tablet 5 mg (5 mg Oral Given 6/14/23 1724)   ondansetron (ZOFRAN) injection 4 mg (4 mg Intravenous Given 6/14/23 2015)   acetaminophen (TYLENOL) tablet 650 mg (650 mg Oral Given 6/13/23 1918)   heparin (porcine) subcutaneous injection 5,000 Units (5,000 Units Subcutaneous Given 6/14/23 1315)   budesonide (PULMICORT) inhalation solution 0 5 mg (0 5 mg Nebulization Given 6/14/23 1943)   levalbuterol (XOPENEX) inhalation solution 1 25 mg (1 25 mg Nebulization Given 6/14/23 1943)   ipratropium (ATROVENT) 0 02 % inhalation solution 0 5 mg (0 5 mg Nebulization Given 6/14/23 1943)   HYDROcodone Bit-Homatrop MBr (HYCODAN) oral syrup 5 mL (5 mL Oral Not Given 6/14/23 0758)   phenol (CHLORASEPTIC) 1 4 % mucosal liquid 1 spray (1 spray Mouth/Throat Given 6/14/23 1541)   morphine injection 4 mg (4 mg Intravenous Given 6/13/23 1230)   ondansetron (ZOFRAN) injection 4 mg (4 mg Intravenous Given 6/13/23 1230)   sodium chloride 0 9 % bolus 1,000 mL (1,000 mL Intravenous New Bag 6/13/23 1230)   magnesium sulfate 2 g/50 mL IVPB (premix) 2 g (0 g Intravenous Stopped 6/13/23 1427)   furosemide (LASIX) injection 20 mg (20 mg Intravenous Given 6/13/23 1822)   sodium chloride 0 9 % bolus 1,000 mL (1,000 mL Intravenous New Bag 6/14/23 0881)       Diagnostic Studies  Results Reviewed     Procedure Component Value Units Date/Time    CBC and differential [[id number]]  (Abnormal) Collected: 06/14/23 5329    Lab Status: Final result Specimen: Blood from Arm, Left Updated: 06/14/23 0641     WBC 10 63 Thousand/uL      RBC 4 03 Million/uL      Hemoglobin 13 8 g/dL      Hematocrit 41 6 %       fL      MCH 34 2 pg      MCHC 33 2 g/dL      RDW 12 5 %      MPV 10 1 fL      Platelets 750 Thousands/uL      nRBC 0 /100 WBCs      Neutrophils Relative 90 %      Immat GRANS % 1 %      Lymphocytes Relative 3 %      Monocytes Relative 6 %      Eosinophils Relative 0 %      Basophils Relative 0 %      Neutrophils Absolute 9 51 Thousands/µL      Immature Grans Absolute 0 07 Thousand/uL      Lymphocytes Absolute 0 34 Thousands/µL      Monocytes Absolute 0 68 Thousand/µL      Eosinophils Absolute 0 00 Thousand/µL      Basophils Absolute 0 03 Thousands/µL     Narrative: This is an appended report  These results have been appended to a previously verified report      Comprehensive metabolic panel [[id number]]  (Abnormal) Collected: 06/14/23 0439    Lab Status: Final result Specimen: Blood from Arm, Left Updated: 06/14/23 0543     Sodium 135 mmol/L      Potassium 4 0 mmol/L      Chloride 100 mmol/L      CO2 20 mmol/L      ANION GAP 15 mmol/L      BUN 61 mg/dL      Creatinine 1 02 mg/dL      Glucose 92 mg/dL      Glucose, Fasting 92 mg/dL      Calcium 7 5 mg/dL      Corrected Calcium 8 5 mg/dL      AST 66 U/L      ALT 52 U/L      Alkaline Phosphatase 237 U/L      Total Protein 5 3 g/dL      Albumin 2 8 g/dL      Total Bilirubin 0 83 mg/dL      eGFR 59 ml/min/1 73sq m     Narrative:      [place] guidelines for Chronic Kidney Disease (CKD):   •  Stage 1 with normal or high GFR (GFR > 90 mL/min/1 73 square meters)  •  Stage 2 Mild CKD (GFR = 60-89 mL/min/1 73 square meters)  •  Stage 3A Moderate CKD (GFR = 45-59 mL/min/1 73 square meters)  •  Stage 3B Moderate CKD (GFR = 30-44 mL/min/1 73 square meters)  •  Stage 4 Severe CKD (GFR = 15-29 mL/min/1 73 square meters)  •  Stage 5 End Stage CKD (GFR <15 mL/min/1 73 square meters)  Note: GFR calculation is accurate only with a steady state creatinine    LD,Blood [[id number]]  (Abnormal) Collected: 06/13/23 1224    Lab Status: Final result Specimen: Blood from Arm, Right Updated: 06/13/23 1757      U/L     B-Type Natriuretic Peptide(BNP) [[id number]]  (Abnormal) Collected: 06/13/23 1224    Lab Status: Final result Specimen: Blood from Arm, Right Updated: 06/13/23 1721      pg/mL     Protein, total [[id number]]     Lab Status: No result Specimen: Blood     HS Troponin I 2hr [[id number]]  (Normal) Collected: 06/13/23 1453    Lab Status: Final result Specimen: Blood from Arm, Right Updated: 06/13/23 1532     hs TnI 2hr 19 ng/L      Delta 2hr hsTnI 2 ng/L     HS Troponin 0hr (reflex protocol) [[id number]]  (Normal) Collected: 06/13/23 1224    Lab Status: Final result Specimen: Blood from Arm, Right Updated: 06/13/23 1258     hs TnI 0hr 17 ng/L     Comprehensive metabolic panel [[id number]]  (Abnormal) Collected: 06/13/23 1224    Lab Status: Final result Specimen: Blood from Arm, Right Updated: 06/13/23 1255     Sodium 135 mmol/L      Potassium 3 9 mmol/L      Chloride 96 mmol/L      CO2 23 mmol/L      ANION GAP 16 mmol/L      BUN 57 mg/dL      Creatinine 1 14 mg/dL      Glucose 85 mg/dL      Calcium 8 6 mg/dL      Corrected Calcium 9 2 mg/dL      AST 79 U/L      ALT 55 U/L      Alkaline Phosphatase 296 U/L      Total Protein 6 2 g/dL      Albumin 3 3 g/dL      Total Bilirubin 0 96 mg/dL      eGFR 51 ml/min/1 73sq m     Narrative:      [place] guidelines for Chronic Kidney Disease (CKD):   •  Stage 1 with normal or high GFR (GFR > 90 mL/min/1 73 square meters)  •  Stage 2 Mild CKD (GFR = 60-89 mL/min/1 73 square meters)  •  Stage 3A Moderate CKD (GFR = 45-59 mL/min/1 73 square meters)  •  Stage 3B Moderate CKD (GFR = 30-44 mL/min/1 73 square meters)  •  Stage 4 Severe CKD (GFR = 15-29 mL/min/1 73 square meters)  •  Stage 5 End Stage CKD (GFR <15 mL/min/1 73 square meters)  Note: GFR calculation is accurate only with a steady state creatinine    Magnesium [[id number]]  (Abnormal) Collected: 06/13/23 1224    Lab Status: Final result Specimen: Blood from Arm, Right Updated: 06/13/23 1255     Magnesium 1 6 mg/dL     CBC and differential [[id number]]  (Abnormal) Collected: 06/13/23 1224    Lab Status: Final result Specimen: Blood from Arm, Right Updated: 06/13/23 1234     WBC 14 39 Thousand/uL      RBC 4 39 Million/uL      Hemoglobin 15 0 g/dL      Hematocrit 45 3 %       fL      MCH 34 2 pg      MCHC 33 1 g/dL      RDW 12 4 %      MPV 9 9 fL      Platelets 758 Thousands/uL      nRBC 0 /100 WBCs      Neutrophils Relative 86 %      Immat GRANS % 1 %      Lymphocytes Relative 4 %      Monocytes Relative 9 %      Eosinophils Relative 0 %      Basophils Relative 0 %      Neutrophils Absolute 12 36 Thousands/µL      Immature Grans Absolute 0 10 Thousand/uL      Lymphocytes Absolute 0 59 Thousands/µL      Monocytes Absolute 1 33 Thousand/µL      Eosinophils Absolute 0 00 Thousand/µL      Basophils Absolute 0 01 Thousands/µL                  XR chest 2 views   ED Interpretation by Rhona Frazier PA-C (06/13 1301)   Persistent right pleural effusion    probable consolidation right lower lobe  Final Result by Grandville Frankel, MD (06/14 2774)      Cardiomegaly and small bilateral pleural effusions  Bilateral groundglass opacities  Correlate for CHF versus infection versus lymphangitic spread of tumor  Workstation performed: [id number]         IR IN-Patient Thoracentesis    (Results Pending)              Procedures  ECG 12 Lead Documentation Only    Date/Time: 6/13/2023 12:40 PM    Performed by: Rhona Frazier PA-C  Authorized by: Rhona Frazier PA-C    Indications / Diagnosis:  CP and SOB, Dizziness    ECG reviewed by me, the ED Provider: yes    Patient location:  ED  Previous ECG:     Previous ECG:  Compared to current    Similarity:  Changes noted    Comparison to cardiac monitor: Yes    Interpretation:     Interpretation: normal    Rate:     ECG rate:  59    ECG rate assessment: bradycardic    Rhythm:     Rhythm: sinus bradycardia    Ectopy:     Ectopy: none    QRS:     QRS axis:  Normal    QRS intervals:  Normal  Conduction:     Conduction: normal    ST segments:     ST segments:  Normal  T waves:     T waves: normal               ED Course  ED Course as of 06/14/23 2044 Tue Jun 13, 2023   1234 WBC(!): 14 39   1240 Creatinine: 1 14  LAZARA   1259 hs TnI 0hr: 17  Elevated from previous -- will trend   1259 BUN(!): 57  Likely due to dehydration   1259 Magnesium(!): 1 6  2g IV given                                             Medical Decision Making  59-year-old female, just finishing radiation therapy for brain cancer, metastatic from melanoma  Presenting with chest pain, shortness of breath, fatigue  Headache as well, no chills or fever  Headache similar to previous  Chest pain dull sitting in the middle of the chest, does not radiate and does not increase with exertion  ACS rule out  Concern for electrolyte abnormality in this patient  BMP not elevated in this patient's age  Also not fluid overloaded, doubt CHF portion to patient's symptoms  CBC showing mild leukocytosis, likely due to postradiation however still considering infectious process at this time  History of pneumonia about a week ago  Patient's labs showing LAZARA with creatinine bump to 1 14, likely due to dehydration  First troponin was 17 however EKG not showing any ischemic signs  Will have patient admitted to internal medicine team for LAZARA, hypomagnesium Fiona, dehydration and for cardiac obs  Patient at time of admission was stable  Amount and/or Complexity of Data Reviewed  Labs: ordered  Decision-making details documented in ED Course  Radiology: ordered and independent interpretation performed  Risk  Prescription drug management    Decision regarding hospitalization  Disposition  Final diagnoses:   LAZARA (acute kidney injury) ([place] [address] )   Metastatic melanoma ([place] [address] )   Hypomagnesemia   Dehydration   Fatigue     Time reflects when diagnosis was documented in both MDM as applicable and the Disposition within this note     Time User Action Codes Description Comment    6/13/2023  1:28 PM Rosalene Rajiv Add [N17 9] LAZARA (acute kidney injury) ([place] [address] )     6/13/2023  1:29 PM Rosalene Rajiv Add [C43 9] Metastatic melanoma ([place] [address] )     6/13/2023  1:29 PM Rosalene Rajiv Add [E83 42] Hypomagnesemia     6/13/2023  1:29 PM Rosalene Rajiv Add [E86 0] Dehydration     6/13/2023  1:29 PM Rosalene Rajiv Add [R53 83] Fatigue     6/13/2023  3:15 PM Gretchen Borer Add [R07 1] Chest pain on respiration     6/14/2023  7:42 AM iGlson Herman Add [J18 9] Pneumonia due to infectious organism, unspecified laterality, unspecified part of lung     6/14/2023 12:06 PM Lucian Rivero Modify [N17 9] LAZARA (acute kidney injury) ([place] [address] )     6/14/2023 12:06 PM Yessenia Ba Modify [C43 9] Metastatic melanoma ([place] [address] )     6/14/2023 12:06 PM Hudson Ba Modify [E83 42] Hypomagnesemia     6/14/2023 12:06 PM Lucian Rivero Modify [E86 0] Dehydration     6/14/2023 12:06 PM Yessenia Ba Modify [R53 83] Fatigue     6/14/2023 12:06 PM Lucian Rivero Modify [R07 1] Chest pain on respiration     6/14/2023 12:06 PM Lucian Rivero Modify [J18 9] Pneumonia due to infectious organism, unspecified laterality, unspecified part of lung       ED Disposition     ED Disposition   Admit    Condition   Stable    Date/Time   Tue Jun 13, 2023  1:28 PM    Comment   Case was discussed with Dr Ha Whipple and the patient's admission status was agreed to be Admission Status: observation status to the service of Dr Ha Whipple              Follow-up Information    None         Current Discharge Medication List      CONTINUE these medications which have NOT CHANGED    Details   albuterol (PROVENTIL HFA,VENTOLIN HFA) 90 mcg/act inhaler INHALE 1 PUFF EVERY 4 HOURS AS NEEDED FOR WHEEZING  Qty: 6 7 g, Refills: 3    Associated Diagnoses: Asthma      amLODIPine (NORVASC) 5 mg tablet Take 1 tablet (5 mg total) by mouth daily  Qty: 90 tablet, Refills: 1    Associated Diagnoses: Essential hypertension      atenolol (TENORMIN) 50 mg tablet TAKE 1 TABLET BY MOUTH EVERY DAY  Qty: 90 tablet, Refills: 0    Associated Diagnoses: Essential hypertension      benzonatate (TESSALON PERLES) 100 mg capsule Take 1 capsule (100 mg total) by mouth 3 (three) times a day as needed for cough  Qty: 20 capsule, Refills: 0    Associated Diagnoses: PNA (pneumonia)      dexamethasone (DECADRON) 4 mg tablet Take 1 tablet (4 mg total) by mouth 3 (three) times a day with meals Follow steroid taper as directed    Qty: 30 tablet, Refills: 0    Associated Diagnoses: Malignant melanoma metastatic to brain ([facility] [address] )      escitalopram (LEXAPRO) 10 mg tablet TAKE 1 TABLET BY MOUTH EVERY DAY  Qty: 90 tablet, Refills: 1    Associated Diagnoses: Essential hypertension      famotidine (PEPCID) 20 mg tablet TAKE 1 TABLET BY MOUTH TWICE A DAY  Qty: 180 tablet, Refills: 1    Associated Diagnoses: GERD (gastroesophageal reflux disease)      gentamicin (GARAMYCIN) 0 1 % cream Apply topically 3 (three) times a day  Qty: 30 g, Refills: 2    Associated Diagnoses: Surgical wound present      guaiFENesin (MUCINEX) 600 mg 12 hr tablet Take 1 tablet (600 mg total) by mouth 2 (two) times a day for 14 days  Qty: 28 tablet, Refills: 0    Associated Diagnoses: Pneumonia      hydrochlorothiazide (MICROZIDE) 12 5 mg capsule Take 12 5 mg by mouth daily      memantine (NAMENDA TITRATION PACK) Continue 5 mg/day for 1 day, then transition to 5 mg twice daily for 1 week, then 15 mg/day given in 5 mg and 10 mg  doses for 1 week, then 10 mg twice daily  Qty: 49 tablet, Refills: 0    Associated Diagnoses: Malignant neoplasm metastatic to brain (HCC)      oxyCODONE (Roxicodone) 5 immediate release tablet Take 1 tablet (5 mg total) by mouth every 4 (four) hours as needed for moderate pain Max Daily Amount: 30 mg  Qty: 30 tablet, Refills: 0    Associated Diagnoses: Metastatic melanoma ([facility] [address] ); Metastasis to brain [facility]); Cancer related pain             No discharge procedures on file      PDMP Review       Value Time User    PDMP Reviewed  Yes 6/9/2023 10:19 AM Sánchez Lee MD          ED Provider  Electronically Signed by           Vincent Ellison PA-C  06/14/23 2044

## 2023-06-13 NOTE — ASSESSMENT & PLAN NOTE
· Continue PTA amlodipine 5 mg daily, atenolol 50 mg daily    · Hold HCTZ 12 5 mg daily  · Continue monitoring BP

## 2023-06-13 NOTE — TELEPHONE ENCOUNTER
"Spoke with sister Mark Robert  Patient's symptoms have improved   Patient was able to walk and ate \"some\" dinner last night  Family did not take patient to ED as suggested by On Call doctor  Sister does not know patient's status today  Patient has appt with RT today at 36  Patient's brother will alert [facility] staff to symptoms experienced last night  Patient has f/u with Dr Lyly Madden on 6/15/2023  Family will call with further issues  "

## 2023-06-13 NOTE — ASSESSMENT & PLAN NOTE
· Melanoma of the right foot diagnosed in April 2023    Recently admitted for visual field deficits with MRI demonstrating intracranial hemorrhagic metastatic lesions  · Status post whole brain radiation  · Continue Decadron  · Follow [facility] outpatient

## 2023-06-13 NOTE — ASSESSMENT & PLAN NOTE
· With complaints of chest pain, worsened with cough  Doubt cardiac etiology  Sounds pleuritic in nature secondary to coughing  · EKG nonischemic    Troponin x2 normal   · See related plan for pleuritic chest pain

## 2023-06-13 NOTE — ASSESSMENT & PLAN NOTE
· LAZARA present upon admission with creatinine of 1 14, baseline around 0 4-0 5  · Possibly secondary to decreased oral intake    · We will give IV hydration and repeat CMP tomorrow  · Hold HCTZ

## 2023-06-14 NOTE — ASSESSMENT & PLAN NOTE
· POA: worsening nonproductive cough with associated B[place]ante  Has been ongoing for a month, recent admission and dc 6/5 for same  At that time was seen by pulmonary and recommended for bronchoscopy if symptoms do not improve, fortunately signed out AMA prior to this  During that stay RP 2 was negative, there was concern for atypical infection, pneumonitis, or lymphangitic spread  · Symptoms have not improved, pulmonary was reconsulted  · Continue supportive care with antitussive and mucolytic as well as pain control  · For inpatient bronchoscopy today as well as thoracentesis

## 2023-06-14 NOTE — ANESTHESIA POSTPROCEDURE EVALUATION
Post-Op Assessment Note    CV Status:  Stable    Pain management: adequate     Mental Status:  Sleepy and lethargic   Hydration Status:  Stable   PONV Controlled:  None   Airway Patency:  Patent      Post Op Vitals Reviewed: Yes      Staff: CRNA         No notable events documented      BP      Temp     Pulse     Resp      SpO2

## 2023-06-14 NOTE — APP STUDENT NOTE
FREDDY STUDENT  Inpatient Progress Note for TRAINING ONLY  Not Part of Legal Medical Record     Progress Note - Shala Poag 59 y o  female MRN: [id number]  Unit/Bed#: S -01 Encounter: [id number]    Assessment/Plan:  LAZARA (acute kidney injury) ([facility] )  Assessment & Plan  • LAZARA present upon admission with creatinine of 1 14, baseline around 0 4-0 5  • Improved today, 1 02  • Possibly secondary to decreased oral intake  • IV hydration via  mL/hr, repeat CMP, allow hypertension  • Hold HCTZ     Cough  Assessment & Plan  • POA: worsening nonproductive cough with associated Bustamante  Has been ongoing for a month, recent admission and dc 6/5 for same  At that time was seen by pulmonary and recommended for bronchoscopy if symptoms do not improve, fortunately signed out AMA prior to this  During that stay RP 2 was negative, there was concern for atypical infection, pneumonitis, or lymphangitic spread  • Symptoms have not improved, will reconsult pulmonary  • Continue supportive care with antitussive and mucolytic as well as pain control  • Likely needs inpatient bronchoscopy at this point  • Further decisions pending results from IR thoracentesis today  • No improvement with tessalon perles on exam 6/14, hydrocodone held due to low BP      Metastatic melanoma ([facility] )  Assessment & Plan  • Melanoma of the right foot diagnosed in April 2023   Recently admitted for visual field deficits with MRI demonstrating intracranial hemorrhagic metastatic lesions  • Status post whole brain radiation  • Continue Decadron  • Follow [facility] outpatient     Blurred vision in bilateral left visual fields  Assessment & Plan  • Secondary to metastatic melanoma  At baseline and stable      Chest pain on respiration  Assessment & Plan  • With complaints of chest pain, worsened with cough  Doubt cardiac etiology  Sounds pleuritic in nature secondary to coughing  • EKG nonischemic    Troponin x2 normal   • See related plan for pleuritic chest pain     Hypertension  Assessment & Plan  • Continue PTA amlodipine 5 mg daily, atenolol 50 mg daily  • Hold HCTZ 12 5 mg daily  • Continue monitoring BP  • Hypotensive in 90s on exam, continue monitoring with continuous IVF       VTE Pharmacologic Prophylaxis:   Pharmacologic: Pharmacologic VTE Prophylaxis contraindicated due to malignancy  Mechanical VTE Prophylaxis in Place: Yes    Patient Centered Rounds: I have performed bedside rounds with nursing staff today  Discussions with Specialists or Other Care Team Provider: pulmonolgy     Education and Discussions with Family / Patient: n/a    Time Spent for Care: 30 minutes  More than 50% of total time spent on counseling and coordination of care as described above  Current Length of Stay: 0 day(s)    Current Patient Status: Inpatient   Certification Statement: The patient will continue to require additional inpatient hospital stay due to persistent cough despite pharmacologic therapy, further diagnostic workup    Discharge Plan: >72 hours, pending findings on IR thoracentesis     Code Status: Level 1 - Full Code    Subjective:   Patient reports feeling worse than yesterday and that her cough has seemingly worsened  She is experiencing heart burn with her cough  She still is feeling nauseous with minimal appetite and has a headache  She denies any problems urinating or with bowel movements nor any fevers  Objective:     Vitals:   Temp (24hrs), Av 4 °F (36 3 °C), Min:96 9 °F (36 1 °C), Max:97 6 °F (36 4 °C)    Temp:  [96 9 °F (36 1 °C)-97 6 °F (36 4 °C)] 96 9 °F (36 1 °C)  HR:  [49-57] 55  Resp:  [16-20] 18  BP: ()/(50-69) 103/59  SpO2:  [88 %-95 %] 91 %  Body mass index is 25 83 kg/m²  Input and Output Summary (last 24 hours):        Intake/Output Summary (Last 24 hours) at 2023 1113  Last data filed at 2023 1007  Gross per 24 hour   Intake 1651 67 ml   Output --   Net 1651 67 ml       Physical Exam:     Physical Exam  Vitals and nursing note reviewed  Constitutional:       Appearance: Normal appearance  She is normal weight  Cardiovascular:      Rate and Rhythm: Normal rate and regular rhythm  Pulses: Normal pulses  Heart sounds: Normal heart sounds  Pulmonary:      Effort: Pulmonary effort is normal       Breath sounds: Wheezing present  Comments: Decreased sounds at base of lung  Skin:     General: Skin is warm and dry  Neurological:      Mental Status: She is alert and oriented to person, place, and time     Psychiatric:         Behavior: Behavior normal            Historical Information   Past Medical History:   Diagnosis Date   • Cervical disc disorder    • Melanoma ([address] )    • Stenosis of cervical spine region      Past Surgical History:   Procedure Laterality Date   • BREAST BIOPSY Left 2009    core   • BREAST BIOPSY Right 2009    core   • CERVICAL FUSION  2013    cC3-C4, C4-C5, C5 C6, C6-C7, C7-T1   •  SECTION     • ELBOW SURGERY     • KNEE SURGERY Bilateral 20YRS AGO     Social History   Social History     Substance and Sexual Activity   Alcohol Use Not Currently    Comment: SOCIAL     Social History     Substance and Sexual Activity   Drug Use Never     Social History     Tobacco Use   Smoking Status Never   Smokeless Tobacco Never   Tobacco Comments    4 a day     Family History: non-contributory    Meds/Allergies   all medications and allergies reviewed  Allergies   Allergen Reactions   • Methocarbamol Shortness Of Breath and Swelling   • Tramadol Shortness Of Breath and Swelling       Additional Data:     Labs:    Results from last 7 days   Lab Units 23  0439   EOS PCT % 0   HEMATOCRIT % 41 6   HEMOGLOBIN g/dL 13 8   LYMPHS PCT % 3*   MONOS PCT % 6   NEUTROS PCT % 90*   PLATELETS Thousands/uL 141*   WBC Thousand/uL 10 63*     Results from last 7 days   Lab Units 23  0439   ANION GAP mmol/L 15*   ALBUMIN g/dL 2 8*   ALK PHOS U/L 237*   ALT U/L 52   AST U/L 66*   BUN mg/dL 61*   CALCIUM mg/dL 7 5*   CHLORIDE mmol/L 100   CO2 mmol/L 20*   CREATININE mg/dL 1 02   GLUCOSE RANDOM mg/dL 92   POTASSIUM mmol/L 4 0   SODIUM mmol/L 135   TOTAL BILIRUBIN mg/dL 0 83                         * I Have Reviewed All Lab Data Listed Above  * Additional Pertinent Lab Tests Reviewed: Stephanie 66 Admission Reviewed    Imaging:    Imaging Reports Reviewed Today Include:     Chest xray 6/13: Cardiomegaly and small bilateral pleural effusions  Bilateral groundglass opacities  Correlate for CHF versus infection versus lymphangitic spread of tumor      Recent Cultures (last 7 days):           Last 24 Hours Medication List:   Current Facility-Administered Medications   Medication Dose Route Frequency Provider Last Rate   • acetaminophen  650 mg Oral Q6H PRN Reginaldo Nelson PA-C     • albuterol  2 puff Inhalation Q4H PRN Reginaldo Nelson PA-C     • amLODIPine  5 mg Oral Daily Reginaldo Nelson PA-C     • atenolol  50 mg Oral Daily Reginaldo Nelson PA-C     • benzonatate  100 mg Oral TID PRN Reginaldo Nelson PA-C     • budesonide  0 5 mg Nebulization Q12H PAVEL Bose     • dexamethasone  4 mg Oral TID With Meals Reginaldo Nelson PA-C     • escitalopram  10 mg Oral Daily Reginaldo Nelson PA-C     • guaiFENesin  600 mg Oral BID Reginaldo Nelson PA-C     • HYDROcodone Bit-Homatrop MBr  5 mL Oral Q4H PRN Reginaldo Nelson PA-C     • ipratropium  0 5 mg Nebulization TID PAVEL Boes     • levalbuterol  1 25 mg Nebulization TID PAVEL Bose     • memantine  5 mg Oral BID Reginaldo Nelson PA-C     • ondansetron  4 mg Intravenous Q6H PRN Reginaldo Nelson PA-C     • oxyCODONE  5 mg Oral Q4H PRN Reginaldo Nelson PA-C     • sodium chloride  100 mL/hr Intravenous Continuous Reginaldo Nelson PA-C 100 mL/hr (06/14/23 1007)        Today, Patient Was Seen By: Issa Abdul    ** Please Note: Dictation voice to text software may have been used in the creation of this document  **

## 2023-06-14 NOTE — PLAN OF CARE
Problem: PAIN - ADULT  Goal: Verbalizes/displays adequate comfort level or baseline comfort level  Description: Interventions:  - Encourage patient to monitor pain and request assistance  - Assess pain using appropriate pain scale  - Administer analgesics based on type and severity of pain and evaluate response  - Implement non-pharmacological measures as appropriate and evaluate response  - Consider cultural and social influences on pain and pain management  - Notify physician/advanced practitioner if interventions unsuccessful or patient reports new pain  Outcome: Progressing     Problem: SAFETY ADULT  Goal: Patient will remain free of falls  Description: INTERVENTIONS:  - Educate patient/family on patient safety including physical limitations  - Instruct patient to call for assistance with activity   - Consult OT/PT to assist with strengthening/mobility   - Keep Call bell within reach  - Keep bed low and locked with side rails adjusted as appropriate  - Keep care items and personal belongings within reach  - Initiate and maintain comfort rounds  - Make Fall Risk Sign visible to staff  - Offer Toileting every 2 Hours, in advance of need  - Initiate/Maintain bed alarm  - Obtain necessary fall risk management equipment  - Apply yellow socks and bracelet for high fall risk patients  - Consider moving patient to room near nurses station  Outcome: Progressing  Goal: Maintain or return to baseline ADL function  Description: INTERVENTIONS:  -  Assess patient's ability to carry out ADLs; assess patient's baseline for ADL function and identify physical deficits which impact ability to perform ADLs (bathing, care of mouth/teeth, toileting, grooming, dressing, etc )  - Assess/evaluate cause of self-care deficits   - Assess range of motion  - Assess patient's mobility; develop plan if impaired  - Assess patient's need for assistive devices and provide as appropriate  - Encourage maximum independence but intervene and supervise when necessary  - Involve family in performance of ADLs  - Assess for home care needs following discharge   - Consider OT consult to assist with ADL evaluation and planning for discharge  - Provide patient education as appropriate  Outcome: Progressing  Goal: Maintains/Returns to pre admission functional level  Description: INTERVENTIONS:  - Perform BMAT or MOVE assessment daily    - Set and communicate daily mobility goal to care team and patient/family/caregiver  - Collaborate with rehabilitation services on mobility goals if consulted  - Perform Range of Motion 2 times a day  - Reposition patient every 2 hours  - Dangle patient 2 times a day  - Stand patient 3 times a day  - Ambulate patient 3 times a day  - Out of bed to chair 3 times a day   - Out of bed for meals 3 times a day  - Out of bed for toileting  - Record patient progress and toleration of activity level   Outcome: Progressing     Problem: DISCHARGE PLANNING  Goal: Discharge to home or other facility with appropriate resources  Description: INTERVENTIONS:  - Identify barriers to discharge w/patient and caregiver  - Arrange for needed discharge resources and transportation as appropriate  - Identify discharge learning needs (meds, wound care, etc )  - Arrange for interpretive services to assist at discharge as needed  - Refer to Case Management Department for coordinating discharge planning if the patient needs post-hospital services based on physician/advanced practitioner order or complex needs related to functional status, cognitive ability, or social support system  Outcome: Progressing     Problem: Knowledge Deficit  Goal: Patient/family/caregiver demonstrates understanding of disease process, treatment plan, medications, and discharge instructions  Description: Complete learning assessment and assess knowledge base    Interventions:  - Provide teaching at level of understanding  - Provide teaching via preferred learning methods  Outcome: Progressing     Problem: Nutrition/Hydration-ADULT  Goal: Nutrient/Hydration intake appropriate for improving, restoring or maintaining nutritional needs  Description: Monitor and assess patient's nutrition/hydration status for malnutrition  Collaborate with interdisciplinary team and initiate plan and interventions as ordered  Monitor patient's weight and dietary intake as ordered or per policy  Utilize nutrition screening tool and intervene as necessary  Determine patient's food preferences and provide high-protein, high-caloric foods as appropriate       INTERVENTIONS:  - Monitor oral intake, urinary output, labs, and treatment plans  - Assess nutrition and hydration status and recommend course of action  - Evaluate amount of meals eaten  - Assist patient with eating if necessary   - Allow adequate time for meals  - Recommend/ encourage appropriate diets, oral nutritional supplements, and vitamin/mineral supplements  - Order, calculate, and assess calorie counts as needed  - Recommend, monitor, and adjust tube feedings  based on assessed needs  - Assess need for intravenous fluids  - Provide nutrition/hydration education as appropriate  - Include patient/family/caregiver in decisions related to nutrition  Outcome: Progressing

## 2023-06-14 NOTE — TELEPHONE ENCOUNTER
Lab Inquiry   Who are you speaking with? Sister     If it is not the patient, are they listed on an active communication consent form? Yes   Name of ordering provider Dr Rodrigo Oquendo   What is being requested? Lab results to be reviewed   Lab draw location [facility]   What is the best call back number?  [phone]

## 2023-06-14 NOTE — ASSESSMENT & PLAN NOTE
· Continue PTA amlodipine 5 mg daily, atenolol 50 mg daily, HCTZ 12 5 mg daily  · Holding hydrochlorothiazide upon admission secondary to LAZARA  · With hypotension of 90/50 this morning, hold all antihypertensives and give IV hydration    · Continue monitoring BP

## 2023-06-14 NOTE — ANESTHESIA PREPROCEDURE EVALUATION
Procedure:  BRONCHOSCOPY    Relevant Problems   CARDIO   (+) Chest pain on respiration   (+) High blood triglycerides   (+) Hypertension   (+) Hypertensive urgency   (+) Hypertriglyceridemia   (+) Pure hypercholesterolemia      GI/HEPATIC   (+) Enlarged liver   (+) Erosive esophagitis   (+) Fatty liver      /RENAL   (+) LAZARA (acute kidney injury) ([facility] )      HEMATOLOGY   (+) Anemia      MUSCULOSKELETAL   (+) Epicondylitis, lateral (tennis elbow)   (+) Myofascial pain syndrome, cervical      NEURO/PSYCH   (+) Anxiety disorder   (+) Cerebrovascular accident (CVA) (HCC)   (+) Myofascial pain syndrome, cervical   (+) Occipital headache      PULMONARY   (+) Asthma   (+) Mild intermittent asthma   (+) NATE (obstructive sleep apnea)   (+) PNA (pneumonia)        Physical Exam    Airway       Dental       Cardiovascular  Cardiovascular exam normal    Pulmonary  Pulmonary exam normal     Other Findings      Acute pulmonary insufficiency likely multifaceted as listed below       -Currently on 2L-94%, does not wear home O2       -Maintain saturations greater than 88%       -Pulmonary toileting: Deep breathing cough, OOB as tolerated, IS q 1 hr       -We will need ambulatory pulse ox prior to discharge     2  Abnormal chest CT       -Tree-in-bud nodularity with inner lobular septal thickening       -Differentials: Atypical vs worsening malignancy vs volume vs pneumonitis       -BNP order has been placed       -will order thoracentesis for tomorrow for diagnostic purposes       -If thoracentesis unremarkable would likely proceed with bronchoscopy     3  Melanoma with metastatic disease to the brain and right hip        -5/4/2023-Keytruda was initiated        -6/15/2023-last chemotherapy treatment        -Still scheduled for still scheduled for 3 morestill scheduled for 3 more sessions        -Follows outpatient with radiation oncology     4  Mild persistent asthma with acute exacerbation        -Inpatient:  We will order budesonide twice daily, Xopenex/Atrovent 3 times daily        -Home regimen: Symbicort 160-4 5 mcg 2 puffs twice daily, Proventil 2 puffs every 6 as needed albuterol nebulizer every 6 as needed         -No indication for steroids today-we will evaluate daily    Anesthesia Plan  ASA Score- 4     Anesthesia Type- general with ASA Monitors  Additional Monitors:   Airway Plan: LMA  Plan Factors-Exercise tolerance (METS): >4 METS  Chart reviewed  EKG reviewed  Imaging results reviewed  Existing labs reviewed  Patient summary reviewed  Patient is not a current smoker  Patient not instructed to abstain from smoking on day of procedure  Obstructive sleep apnea risk education given perioperatively  Induction-     Postoperative Plan-     Informed Consent- Anesthetic plan and risks discussed with patient  I personally reviewed this patient with the CRNA  Discussed and agreed on the Anesthesia Plan with the MANSOOR Coker

## 2023-06-14 NOTE — ASSESSMENT & PLAN NOTE
· LAZARA present upon admission with creatinine of 1 14, baseline around 0 4-0 5  · Improving today to 1 02  · Possibly secondary to decreased oral intake    · We will give IV hydration and repeat CMP tomorrow  · Hold HCTZ

## 2023-06-14 NOTE — ASSESSMENT & PLAN NOTE
· Melanoma of the right foot diagnosed in April 2023    Recently admitted for visual field deficits with MRI demonstrating intracranial hemorrhagic metastatic lesions  · Status post whole brain radiation  · Continue Decadron  · Follow [facility] outpatient

## 2023-06-14 NOTE — PROGRESS NOTES
"Progress Note - Pulmonary   Lucas Colón 59 y o  female MRN: [id number]  Unit/Bed#: S -01 Encounter: [id number]    Assessment/Plan:    1  Acute pulmonary insufficiency likely multifaceted as listed below        -Currently on 2 L-94%, does not wear home O2        -Maintain saturations greater than 88%        -Pulmonary toileting: Deep breathing cough, OOB as tolerated, IS q 1 hr        -Recommend ambulatory pulse ox prior to discharge    2  Abnormal chest CT        -Tree-in-bud nodularity with inner lobular septal thickening        -6/14/2023-bronchoscopy with BAL-collapsible airway-clear return        -Cultures pending        -Differential: Atypical vs worsening malignancy vs volume vs pneumonitis        -Thoracentesis placed for IR    3  Melanoma with metastatic disease to the brain and right hip        -5/4/2023-Keytruda was initiated        -6/15/2023-last chemotherapy treatment        -Patient still has 3 more sessions of chemotherapy scheduled        -Follows with oncology    4  Mild persistent asthma with acute exacerbation        -Inpatient: Budesonide twice daily, pending/Atrovent 3 times daily        -Home regimen: Symbicort 160-4 5 mcg 2 puffs twice daily, Proventil 2 puffs every 6 as needed albuterol nebulizer every 6 as needed         -No indication for steroid therapy at this time    Chief Complaint:    \"is it nighime    Subjective:    Sacha Beardendenver was pleasantly confused after her bronchoscopy  Patient is having a frequent dry cough and stating her throat is sore  No significant overnight events reported  Patient currently denying any fevers, chills, abscess, headaches, night sweats, pleuritic chest pain, or palpations  Objective:    Vitals: Blood pressure 109/56, pulse 78, temperature 98 °F (36 7 °C), temperature source Oral, resp  rate 19, height 5' 7\" (1 702 m), weight 74 8 kg (164 lb 14 5 oz), SpO2 95 %, not currently breastfeeding  ra,Body mass index is 25 83 kg/m²        Intake/Output " "Summary (Last 24 hours) at 6/14/2023 1455  Last data filed at 6/14/2023 1203  Gross per 24 hour   Intake 2501 67 ml   Output --   Net 2501 67 ml       Invasive Devices     Peripheral Intravenous Line  Duration           Peripheral IV 06/13/23 Right Antecubital 1 day                Physical Exam:   Physical Exam  Constitutional:       General: She is not in acute distress  Appearance: Normal appearance  She is normal weight  She is not ill-appearing  HENT:      Head: Normocephalic and atraumatic  Nose: Nose normal  No congestion or rhinorrhea  Mouth/Throat:      Mouth: Mucous membranes are dry  Pharynx: Oropharynx is clear  No oropharyngeal exudate or posterior oropharyngeal erythema  Cardiovascular:      Rate and Rhythm: Normal rate and regular rhythm  Pulses: Normal pulses  Heart sounds: Normal heart sounds  No murmur heard  No friction rub  No gallop  Pulmonary:      Effort: Pulmonary effort is normal  No tachypnea, bradypnea, accessory muscle usage or respiratory distress  Breath sounds: Decreased air movement present  No stridor  No wheezing, rhonchi or rales  Comments: Diminished aeration  Chest:      Chest wall: No tenderness  Abdominal:      General: Abdomen is flat  Bowel sounds are normal  There is no distension  Palpations: Abdomen is soft  There is no mass  Musculoskeletal:         General: No swelling or tenderness  Normal range of motion  Cervical back: Normal range of motion  No rigidity or tenderness  Skin:     General: Skin is warm and dry  Coloration: Skin is not jaundiced or pale  Neurological:      General: No focal deficit present  Mental Status: She is alert and oriented to person, place, and time  Mental status is at baseline  Psychiatric:         Mood and Affect: Mood normal          Behavior: Behavior normal          Labs: I have personally reviewed pertinent lab results  , ABG: No results found for: \"BEART\", " "\"XMV7CAA\", \"N2THROUZ\", \"UKM5VHR\", \"PHART\", \"PO2ART\", \"SOURCE\", BNP: No results found for: \"BNP\", CBC:   Lab Results   Component Value Date    HCT 41 6 06/14/2023    HGB 13 8 06/14/2023    MCH 34 2 06/14/2023    MCHC 33 2 06/14/2023     (H) 06/14/2023    MPV 10 1 06/14/2023    NRBC 0 06/14/2023     (L) 06/14/2023    RBC 4 03 06/14/2023    RDW 12 5 06/14/2023    WBC 10 63 (H) 06/14/2023   , CMP:   Lab Results   Component Value Date    ALKPHOS 237 (H) 06/14/2023    ALT 52 06/14/2023    AST 66 (H) 06/14/2023    BUN 61 (H) 06/14/2023    CALCIUM 7 5 (L) 06/14/2023     06/14/2023    CO2 20 (L) 06/14/2023    CREATININE 1 02 06/14/2023    EGFR 59 06/14/2023    K 4 0 06/14/2023    SODIUM 135 06/14/2023       Imaging and other studies: I have personally reviewed pertinent films in PACS     Chest CT 6/2/2023 increasing nodular and irregular thickening tree-in-bud nodularity  "

## 2023-06-14 NOTE — TELEPHONE ENCOUNTER
Neurovascular Discharge Follow Up  Hospitalization: 6/2/23-6/5/23    Per chart review, patient is currently admitted to the hospital as of yesterday on 6/13/23

## 2023-06-14 NOTE — PROGRESS NOTES
[facility]  Progress Note  Name: Diego Davila I  MRN: [id number]  Unit/Bed#: S -01 I Date of Admission: 6/13/2023   Date of Service: 6/14/2023 I Hospital Day: 0    Assessment/Plan   * LAZARA (acute kidney injury) ([facility] [place] [facility] )  Assessment & Plan  · LAZARA present upon admission with creatinine of 1 14, baseline around 0 4-0 5  · Improving today to 1 02  · Possibly secondary to decreased oral intake  · We will give IV hydration and repeat CMP tomorrow  · Hold HCTZ    Cough  Assessment & Plan  · POA: worsening nonproductive cough with associated Bustamante  Has been ongoing for a month, recent admission and dc 6/5 for same  At that time was seen by pulmonary and recommended for bronchoscopy if symptoms do not improve, fortunately signed out AMA prior to this  During that stay RP 2 was negative, there was concern for atypical infection, pneumonitis, or lymphangitic spread  · Symptoms have not improved, pulmonary was reconsulted  · Continue supportive care with antitussive and mucolytic as well as pain control  · For inpatient bronchoscopy today as well as thoracentesis  Metastatic melanoma ([facility] [place] [facility] )  Assessment & Plan  · Melanoma of the right foot diagnosed in April 2023  Recently admitted for visual field deficits with MRI demonstrating intracranial hemorrhagic metastatic lesions  · Status post whole brain radiation  · Continue Decadron  · Follow [facility] outpatient    Blurred vision in bilateral left visual fields  Assessment & Plan  · Secondary to metastatic melanoma  At baseline and stable  Chest pain on respiration  Assessment & Plan  · With complaints of chest pain, worsened with cough  Doubt cardiac etiology  Sounds pleuritic in nature secondary to coughing  · EKG nonischemic  Troponin x2 normal   · See related plan for pleuritic chest pain    Hypertension  Assessment & Plan  · Continue PTA amlodipine 5 mg daily, atenolol 50 mg daily, HCTZ 12 5 mg daily    · Holding hydrochlorothiazide upon admission secondary to LAZARA  · With hypotension of 90/50 this morning, hold all antihypertensives and give IV hydration  · Continue monitoring BP           VTE Pharmacologic Prophylaxis: VTE Score: 4 Moderate Risk (Score 3-4) - Pharmacological DVT Prophylaxis Contraindicated  Sequential Compression Devices Ordered  Patient Centered Rounds: I performed bedside rounds with nursing staff today  Discussions with Specialists or Other Care Team Provider: [email or web address], rn     Education and Discussions with Family / Patient: Patient declined call to   Time Spent for Care: 45 minutes  More than 50% of total time spent on counseling and coordination of care as described above  Current Length of Stay: 0 day(s)  Current Patient Status: Inpatient   Certification Statement: The patient, admitted on an observation basis, will now require > 2 midnight hospital stay due to Acute respiratory insufficiency with LAZARA requiring IV hydration, bronchoscopy today and thoracentesis with continued pulmonary evaluation and monitoring of respiratory status  Discharge Plan: Anticipate discharge in 48 hrs to home  Code Status: Level 1 - Full Code    Subjective:   Reports chest pain and coughing  Mild shortness of breath  Feels lightheadedness and does not feel very well at all  Agreeable to bronchoscopy today  Objective:     Vitals:   Temp (24hrs), Av 4 °F (36 3 °C), Min:96 9 °F (36 1 °C), Max:97 6 °F (36 4 °C)    Temp:  [96 9 °F (36 1 °C)-97 6 °F (36 4 °C)] 96 9 °F (36 1 °C)  HR:  [49-57] 55  Resp:  [16-20] 18  BP: ()/(50-69) 103/59  SpO2:  [88 %-95 %] 91 %  Body mass index is 25 83 kg/m²  Input and Output Summary (last 24 hours): Intake/Output Summary (Last 24 hours) at 2023 1106  Last data filed at 2023 1007  Gross per 24 hour   Intake 1651 67 ml   Output --   Net 1651 67 ml       Physical Exam:   Physical Exam  Vitals and nursing note reviewed     Constitutional:       General: She is not in acute distress  Appearance: Normal appearance  HENT:      Head: Normocephalic  Mouth/Throat:      Mouth: Mucous membranes are moist    Eyes:      General: No scleral icterus  Pupils: Pupils are equal, round, and reactive to light  Cardiovascular:      Rate and Rhythm: Normal rate and regular rhythm  Heart sounds: No murmur heard  Pulmonary:      Effort: Pulmonary effort is normal  No respiratory distress  Breath sounds: Normal breath sounds  No wheezing, rhonchi or rales  Abdominal:      General: Bowel sounds are normal  There is no distension  Palpations: Abdomen is soft  Tenderness: There is no abdominal tenderness  Musculoskeletal:         General: No swelling  Right lower leg: No edema  Left lower leg: No edema  Skin:     Capillary Refill: Capillary refill takes less than 2 seconds  Neurological:      General: No focal deficit present  Mental Status: She is alert and oriented to person, place, and time  Mental status is at baseline  Additional Data:     Labs:  Results from last 7 days   Lab Units 06/14/23  0439   EOS PCT % 0   HEMATOCRIT % 41 6   HEMOGLOBIN g/dL 13 8   LYMPHS PCT % 3*   MONOS PCT % 6   NEUTROS PCT % 90*   PLATELETS Thousands/uL 141*   WBC Thousand/uL 10 63*     Results from last 7 days   Lab Units 06/14/23  0439   ANION GAP mmol/L 15*   ALBUMIN g/dL 2 8*   ALK PHOS U/L 237*   ALT U/L 52   AST U/L 66*   BUN mg/dL 61*   CALCIUM mg/dL 7 5*   CHLORIDE mmol/L 100   CO2 mmol/L 20*   CREATININE mg/dL 1 02   GLUCOSE RANDOM mg/dL 92   POTASSIUM mmol/L 4 0   SODIUM mmol/L 135   TOTAL BILIRUBIN mg/dL 0 83                       Lines/Drains:  Invasive Devices     Peripheral Intravenous Line  Duration           Peripheral IV 06/13/23 Right Antecubital <1 day                      Imaging: No pertinent imaging reviewed      Recent Cultures (last 7 days):         Last 24 Hours Medication List:   Current Facility-Administered Medications Medication Dose Route Frequency Provider Last Rate   • acetaminophen  650 mg Oral Q6H PRN Livier Rosario PA-C     • albuterol  2 puff Inhalation Q4H PRN Livier Rosario PA-C     • amLODIPine  5 mg Oral Daily Livier Rosario PA-C     • atenolol  50 mg Oral Daily Livier Rosario PA-C     • benzonatate  100 mg Oral TID PRN Livier Rosario PA-C     • budesonide  0 5 mg Nebulization Q12H Isha Lior, CRNP     • dexamethasone  4 mg Oral TID With Meals Livier Rosario PA-C     • escitalopram  10 mg Oral Daily Livier Rosario PA-C     • guaiFENesin  600 mg Oral BID Livier Rosario PA-C     • HYDROcodone Bit-Homatrop MBr  5 mL Oral Q4H PRN Livier Rosario PA-C     • ipratropium  0 5 mg Nebulization TID Isha Lior, CRNP     • levalbuterol  1 25 mg Nebulization TID Isha Lior, CRNP     • memantine  5 mg Oral BID Livier Rosario PA-C     • ondansetron  4 mg Intravenous Q6H PRN Livier Rosario PA-C     • oxyCODONE  5 mg Oral Q4H PRN Livier Rosario PA-C     • sodium chloride  100 mL/hr Intravenous Continuous Livier Rosario PA-C 100 mL/hr (06/14/23 1007)        Today, Patient Was Seen By: Ant Rizzo PA-C    **Please Note: This note may have been constructed using a voice recognition system  **

## 2023-06-15 NOTE — SEDATION DOCUMENTATION
400 mL cloudy yellow fluid drained from right pleural space  Specimens sent to lab  Not enough fluid seen on left to safely preform thoracentesis

## 2023-06-15 NOTE — ASSESSMENT & PLAN NOTE
· With complaints of chest pain, worsened with cough  Doubt cardiac etiology  Sounds pleuritic in nature secondary to coughing  · EKG nonischemic    Troponin x2 normal   · Palliative care consulted

## 2023-06-15 NOTE — ASSESSMENT & PLAN NOTE
· Continue PTA amlodipine 5 mg daily, atenolol 50 mg daily  · Holding hydrochlorothiazide upon admission secondary to LAZARA  · Hypotensive to 90/50 yesterday and received IV hydration with significant improvement today  · Encourage PO fluid intake  · Continue monitoring BP

## 2023-06-15 NOTE — PROGRESS NOTES
"Progress Note - Pulmonary   Rylee Meyer 59 y o  female MRN: [id number]  Unit/Bed#: S -01 Encouter:[id number]    Assessment/Plan:    1   Acute pulmonary insufficiency likely multifaceted as listed below        -Currently on 1 L-94%, when on RA sats around 89-91% , does not wear home O2        -Maintain saturations greater than 88%        -Pulmonary toileting: Deep breathing cough, OOB as tolerated, IS q 1 hr        -Recommend ambulatory pulse ox prior to discharge     2   Abnormal chest CT        -Tree-in-bud nodularity with inner lobular septal thickening        -6/14/2023-bronchoscopy with BAL-collapsible airway-clear return        -Cultures, cytology pending        -Differential: Atypical vs worsening malignancy vs volume vs pneumonitis        -Diagnostic thoracentesis placed for IR - pending      3   Melanoma with metastatic disease to the brain and right hip        -5/4/2023-Keytruda was initiated        -6/15/2023-last chemotherapy treatment        -Patient still has 3 more sessions of chemotherapy scheduled        -Follows with oncology     4  Mild persistent asthma with acute exacerbation        -Inpatient: Budesonide twice daily, pending/Atrovent 3 times daily        -Home regimen: Symbicort 160-4 5 mcg 2 puffs twice daily, Proventil 2 puffs every 6 as needed albuterol nebulizer every 6 as needed  Resume at DC           -No indication for steroid therapy at this time      Chief Complaint: On adm, Cough with chest wall pain     Subjective:  No overnight events  Patient reported feeling \"bad\" overnight and this morning given chest wall pain and dry cough  Denied fever, chills, dyspnea, headache  Objective:    Vitals: Blood pressure 112/57, pulse 61, temperature 97 9 °F (36 6 °C), resp  rate 17, height 5' 7\" (1 702 m), weight 74 8 kg (164 lb 14 5 oz), SpO2 90 %, not currently breastfeeding  RA,Body mass index is 25 83 kg/m²        Intake/Output Summary (Last 24 hours) at 6/15/2023 1050  Last " data filed at 6/14/2023 1203  Gross per 24 hour   Intake 900 ml   Output --   Net 900 ml       Invasive Devices     Peripheral Intravenous Line  Duration           Peripheral IV 06/13/23 Right Antecubital 1 day                Physical Exam:   Physical Exam  Vitals and nursing note reviewed  Constitutional:       General: She is not in acute distress  Appearance: Normal appearance  She is ill-appearing  She is not diaphoretic  HENT:      Head: Normocephalic and atraumatic  Mouth/Throat:      Mouth: Mucous membranes are moist       Pharynx: Oropharynx is clear  Eyes:      General: No scleral icterus  Conjunctiva/sclera: Conjunctivae normal    Cardiovascular:      Rate and Rhythm: Normal rate and regular rhythm  Pulses: Normal pulses  Heart sounds: Normal heart sounds  No murmur heard  No gallop  Pulmonary:      Effort: Pulmonary effort is normal  No respiratory distress  Breath sounds: Normal breath sounds  Decreased air movement (b/l bases) present  No wheezing or rales  Abdominal:      General: Bowel sounds are normal  There is no distension  Palpations: Abdomen is soft  There is no mass  Tenderness: There is no abdominal tenderness  Musculoskeletal:         General: No tenderness  Normal range of motion  Cervical back: Normal range of motion and neck supple  Right lower leg: No edema  Left lower leg: No edema  Skin:     General: Skin is warm and dry  Capillary Refill: Capillary refill takes less than 2 seconds  Coloration: Skin is pale  Skin is not jaundiced  Neurological:      Mental Status: She is alert and oriented to person, place, and time  Mental status is at baseline  Psychiatric:         Mood and Affect: Affect is blunt  Behavior: Behavior normal          Labs: Prior labs reviewed  TOday's labs are pending collection  Imaging and other studies: I have personally reviewed pertinent reports     and I have personally reviewed pertinent films in PACS

## 2023-06-15 NOTE — BRIEF OP NOTE (RAD/CATH)
INTERVENTIONAL RADIOLOGY PROCEDURE NOTE    Date: 6/15/2023    Procedure:   Procedure Summary     Date:  Room / Location:     Anesthesia Start:  Anesthesia Stop:     Procedure:  Diagnosis:     Scheduled Providers:  Responsible Provider:     Anesthesia Type: Not recorded ASA Status: Not recorded          Preoperative diagnosis:   1  LAZARA (acute kidney injury) ([facility] )    2  [facility] ([facility] )    3  Hypomagnesemia    4  Dehydration    5  Fatigue    6  Chest pain on respiration    7  Pneumonia due to infectious organism, unspecified laterality, unspecified part of lung         Postoperative diagnosis: Same  Surgeon: Lola Jay MD     Assistant: None  No qualified resident was available  Blood loss: None    Specimens: right pleural fluid     Findings: Small right pleural effusion, thoracentesis performed  Trace left effusion, to small for thoracentesis  Complications: None immediate      Anesthesia: local

## 2023-06-15 NOTE — PROGRESS NOTES
[facility]  Progress Note  Name: Josef Epstein  MRN: [id number]  Unit/Bed#: S -01 I Date of Admission: 6/13/2023   Date of Service: 6/15/2023 I Hospital Day: 1    Assessment/Plan   Blurred vision in bilateral left visual fields  Assessment & Plan  · Secondary to metastatic melanoma  At baseline and stable  Metastatic melanoma ([address] )  Assessment & Plan  · Melanoma of the right foot diagnosed in April 2023  Recently admitted for visual field deficits with MRI demonstrating intracranial hemorrhagic metastatic lesions  · Status post whole brain radiation  · Continue Decadron  · Follow [facility] outpatient    Chest pain on respiration  Assessment & Plan  · With complaints of chest pain, worsened with cough  Doubt cardiac etiology  Sounds pleuritic in nature secondary to coughing  · EKG nonischemic  Troponin x2 normal   · Palliative care consulted    Hypertension  Assessment & Plan  · Continue PTA amlodipine 5 mg daily, atenolol 50 mg daily  · Holding hydrochlorothiazide upon admission secondary to LAZARA  · Hypotensive to 90/50 yesterday and received IV hydration with significant improvement today  · Encourage PO fluid intake  · Continue monitoring BP    Cough  Assessment & Plan  · POA: worsening nonproductive cough with associated Bustamante  Has been ongoing for a month, recent admission and dc 6/5 for same  At that time was seen by pulmonary and recommended for bronchoscopy if symptoms do not improve, fortunately signed out AMA prior to this    During that stay RP 2 was negative, there was concern for atypical infection, pneumonitis, or lymphangitic spread  · Pt was satting 89-91 percent on my exam earlier in the day and 2L NC was placed with improvement  · Symptoms have not improved, pulmonary was reconsulted  · Continue supportive care with antitussive and mucolytic as well as pain control  · S/p bronchoscopy 6/15 and for thoracentesis today to rule out malignancy  · Continue nebs "and [facility]   · Appreciate palliative care consult    * LAZARA (acute kidney injury) ([facility] [address] )  Assessment & Plan  · LAZARA present upon admission with creatinine of 1 14, baseline around 0 4-0 5  · Improved, Possibly secondary to decreased oral intake which is also improved  · Unable to obtain BMP yet today, will follow up  · S/p IV hydration, can encourage PO fluid intake for now  · Hold HCTZ      VTE Pharmacologic Prophylaxis: VTE Score: 4 Moderate Risk (Score 3-4) - Pharmacological DVT Prophylaxis Ordered: heparin  Patient Centered Rounds: I performed bedside rounds with nursing staff today  Discussions with Specialists or Other Care Team Provider: Jhonny angel, nursing    Education and Discussions with Family / Patient: Attempted to update  (significant other) via phone  Left voicemail  Total Time Spent on Date of Encounter in care of patient: 45 minutes This time was spent on one or more of the following: performing physical exam; counseling and coordination of care; obtaining or reviewing history; documenting in the medical record; reviewing/ordering tests, medications or procedures; communicating with other healthcare professionals and discussing with patient's family/caregivers  Current Length of Stay: 1 day(s)  Current Patient Status: Inpatient   Certification Statement: The patient will continue to require additional inpatient hospital stay due to pdg bronch results for further tx planning and Pal care consult  Discharge Plan: Anticipate discharge in 24-48 hrs to home  Code Status: Level 1 - Full Code    Subjective:   Patient states her breathing is still \"crappy\" today and she is nauseous  She is feeling better after receiving nausea medication and would like to try to eat  She has been drinking  She feels weak and dizzy  Reports pleuritic chest pain  Denies fever or chills  Denies vomiting, abd pain or diarrhea   Her last BM was yesterday and was normal     Objective:     Vitals:   Temp " (24hrs), Av 2 °F (36 8 °C), Min:97 9 °F (36 6 °C), Max:98 5 °F (36 9 °C)    Temp:  [97 9 °F (36 6 °C)-98 5 °F (36 9 °C)] 97 9 °F (36 6 °C)  HR:  [61-68] 61  Resp:  [17-24] 17  BP: (105-112)/(52-60) 112/57  SpO2:  [88 %-96 %] 90 %  Body mass index is 25 83 kg/m²  Input and Output Summary (last 24 hours):   No intake or output data in the 24 hours ending 06/15/23 1319    Physical Exam:   Physical Exam  Vitals and nursing note reviewed  Constitutional:       General: She is not in acute distress  Appearance: Normal appearance  She is normal weight  She is not ill-appearing or toxic-appearing  HENT:      Head: Normocephalic and atraumatic  Right Ear: External ear normal       Left Ear: External ear normal       Nose: Nose normal       Mouth/Throat:      Mouth: Mucous membranes are moist       Pharynx: Oropharynx is clear  Eyes:      Conjunctiva/sclera: Conjunctivae normal    Cardiovascular:      Rate and Rhythm: Normal rate and regular rhythm  Heart sounds: Normal heart sounds  No murmur heard  No friction rub  No gallop  Pulmonary:      Effort: Pulmonary effort is normal  No respiratory distress  Breath sounds: Normal breath sounds  No stridor  No wheezing, rhonchi or rales  Comments: Decreased breath sounds at bases, required 2L NC to maintain sat >92%  Abdominal:      General: Abdomen is flat  Bowel sounds are normal  There is no distension  Palpations: Abdomen is soft  There is no mass  Tenderness: There is no abdominal tenderness  There is no guarding or rebound  Hernia: No hernia is present  Musculoskeletal:      Cervical back: Normal range of motion  Right lower leg: No edema  Left lower leg: No edema  Skin:     General: Skin is warm and dry  Neurological:      Mental Status: She is alert  Mental status is at baseline     Psychiatric:         Mood and Affect: Mood normal           Additional Data:     Labs:  Results from last 7 days   Lab Units 06/14/23  0439   EOS PCT % 0   HEMATOCRIT % 41 6   HEMOGLOBIN g/dL 13 8   LYMPHS PCT % 3*   MONOS PCT % 6   NEUTROS PCT % 90*   PLATELETS Thousands/uL 141*   WBC Thousand/uL 10 63*     Results from last 7 days   Lab Units 06/14/23  0439   ANION GAP mmol/L 15*   ALBUMIN g/dL 2 8*   ALK PHOS U/L 237*   ALT U/L 52   AST U/L 66*   BUN mg/dL 61*   CALCIUM mg/dL 7 5*   CHLORIDE mmol/L 100   CO2 mmol/L 20*   CREATININE mg/dL 1 02   GLUCOSE RANDOM mg/dL 92   POTASSIUM mmol/L 4 0   SODIUM mmol/L 135   TOTAL BILIRUBIN mg/dL 0 83                       Lines/Drains:  Invasive Devices     Peripheral Intravenous Line  Duration           Peripheral IV 06/13/23 Right Antecubital 2 days                      Imaging: Reviewed radiology reports from this admission including: chest CT scan    Recent Cultures (last 7 days):   Results from last 7 days   Lab Units 06/14/23  1208   GRAM STAIN RESULT  No Polys or Bacteria seen       Last 24 Hours Medication List:   Current Facility-Administered Medications   Medication Dose Route Frequency Provider Last Rate   • acetaminophen  650 mg Oral Q6H PRN Patricio Lee PA-C     • albuterol  2 puff Inhalation Q4H PRN Patricio Lee PA-C     • amLODIPine  5 mg Oral Daily Patricio Lee PA-C     • atenolol  50 mg Oral Daily Patricio Lee PA-C     • benzonatate  100 mg Oral TID PRN Patricio Lee PA-C     • budesonide  0 5 mg Nebulization Q12H PAVEL Kim     • dexamethasone  4 mg Oral TID With Meals Patricio Lee PA-C     • escitalopram  10 mg Oral Daily Patricio Lee PA-C     • heparin (porcine)  5,000 Units Subcutaneous [facility] Patricio Lee PA-C     • HYDROcodone Bit-Homatrop MBr  5 mL Oral Q4H PRN Patricio Lee PA-C     • ipratropium  0 5 mg Nebulization TID Rhunetiffanie Rubin, CRNP     • levalbuterol  1 25 mg Nebulization TID Rhunenichellee Caryn, CRNP     • memantine  5 mg Oral BID Patricio Lee PA-C     • ondansetron  4 mg Intravenous Q6H PRN Yaritza Jurado TONY Rollins     • oxyCODONE  5 mg Oral Q4H PRN Ranjana Garcia PA-C     • phenol  1 spray Mouth/Throat Q2H PRN PAVEL Corrales          Today, Patient Was Seen By: Phan Johnson PA-C    **Please Note: This note may have been constructed using a voice recognition system  **

## 2023-06-15 NOTE — CONSULTS
Consultation - [id number] [facility] ZACH Yepez 59 y o  female MRN: [id number]  Unit/Bed#: S -01 Encounter: [id number]      Physician Requesting Consult: Antoine Archer MD  Reason for Consult / Principal Problem: metastatic melanoma, symptom management      Assessment/Plan:  1  Metastatic melanoma, brain and R hip s/p Keytruda + radiation, scheduled for chemotx 6/15/23  2  Acute pulmonary insufficiency  3  Abnormal CT chest, s/p bronchoscopy  4  Asthma-acute exacerbation  5  LAZARA  6  Generalized weakness  7  Dehydration  8  Poor po intake  9  Cough  10  Cancer related pain  11  Palliative care encounter  12  Goals of care    Code Status: Level 1 - Full Code  Advance Directive and Living Will:    Not in chart  Power of :  No  POLST:  No  Emergency contact/sister/: [phone]    Plan:  1  Symptom management  Cancer related pain  · Start oxycodone 5mg PO q4h PRN for moderate to severe pain  · Risks, benefits, and possible side effects of medications explained to patient and patient verbalizes understanding  · Cautioned patient on interactions between hycodan and oxycodone, using hycodan only @ hs    GERD  · Start protonix 40mg once daily    2  Goals:  Level 1 code status  · Disease focused care  Will continue discussions regarding [address] as patient's clinical presentation evolves  · Encouraged follow up with Palliative Medicine on an outpatient basis after discharge for continued symptom management  Our office will contact patient to schedule a hospital follow up  3   Social support:  • Does not have a lot of help at home  • S/O is unwell himself  • Son does not live nearby and lost his license  · Supportive listening provided  · Normalized experience of patient/family  · Provided anxiety containment  · Provided anticipatory guidance  · Home structure/ living situation - lives with s/o Fernie    4    Follow up  • Palliative Care will continue to follow for symptom management and goals of care discussions will be ongoing  Please reach out via [facility] if questions or concerns arise  History of Present Illness   HPI: Saadia Aranda is a 59y o  year old female who presents with cough and pleuritic chest pain  Patient 2 recent hospitalizations for similar symptoms, seen by Pulmonology  PMH significant for metastatic melanoma on Keytruda/chemotherapy/radiation, asthma  Patient initially presented with lesion on bottom of R foot a few moths ago, excised by Podiatry 4/6/23 and found to have melanoma/acral type/ulcerated/breslow thickness 4 2mm  Patient developed R inguinal lymphadenopathy, FNA of R groin LN positive for metastatic melanoma  PET/CT scan revealed enlarged R liver with FDG activity  MRI brain showed hemorrhagic brain metastases R frontal lobe, L superior temporal lobe, R caudate, R medial occipital lobes, and R posterior thalamocapsular junction with tiny metastasis in R cerebellum  Patient started on Keytruda 5/4/23  [facility]ac[facility], last seen 6/8/2023  Pulmonology following, patient scheduled for IR thoracentesis  Patient referred to palliative for outpt, missed appointment on 6/12/2023  Palliative care consulted for symptom management  Patient seen with her best friend Elizabeth Horner present  She’s sitting on the edge of the bed and is comfortable at the time of our visit  Discussed ongoing pain  She describes pain is worst in her chest becomes severe with coughing and causes a decrease in her level of activity and ability to perform ADLs  At home, she is this only Tylenol for the pain and typically just tries to go to bed and pretend it’s not there  She took two doses of oxycodone 5 mg since admission and does find it helpful  Will try to use this for pain management while she is admitted and see if it’s adequate over the next day or two  Encouraged her to ask for it more frequently and before pain becomes a 10/10   Cautioned her on using her cough medicine and oxycodone at the same time  Additionally, patient states she is struggling with acid reflux  Says it has been an ongoing problem  for her, denies vomiting, and it does not prevent her from eating  reflux becomes worse at night when she lies flat  I am concerned about patient’s ability to manage her own medications at home  During conversation, she confuses, omeprazole, ibuprofen, and albuterol  She says that no one helps her with her medication‘s  She has a significant other Fernie in the house who is unwell himself  She does have a 45 you’re your old son however, he does not have a drivers license and he’s only available to help her sometimes  Will review with case management to see if we have any options for assistance in the home  Review of Systems   All other systems reviewed and are negative        Historical Information   Past Medical History:   Diagnosis Date   • Cervical disc disorder    • Melanoma ([facility] [place] [address] )    • Stenosis of cervical spine region      Patient Active Problem List   Diagnosis   • Mild intermittent asthma   • Esophageal dysmotility   • Allergic rhinitis with postnasal drip   • Tobacco abuse   • NATE (obstructive sleep apnea)   • Cough   • Vitamin D deficiency   • High blood triglycerides   • Diarrhea   • Asthma   • Need for vaccination   • Unintentional weight loss   • Anemia   • Enlarged liver   • Low folic acid   • High vitamin A level   • High serum vitamin E   • Low serum vitamin B12   • Hypokalemia   • Alcoholism (HCC)   • Anxiety disorder   • Cervical spinal stenosis   • Diverticular disease of colon   • Erosive esophagitis   • Hemorrhoids   • History of gastroesophageal reflux (GERD)   • Hypertension   • Irritable bowel syndrome   • Need for influenza vaccination   • Postoperative examination   • Pure hypercholesterolemia   • Thoracic and lumbosacral neuritis   • Pre-operative clearance   • Angiomyolipoma   • Chest pain on respiration   • Left flank pain   • Change in stool habits   • Epicondylitis, lateral (tennis elbow)   • Gallbladder disease   • Limb pain   • Impaired fasting glucose   • Microhematuria   • Pelvic pain in female   • Venous insufficiency   • Annual physical exam   • Fatigue   • Foot pain   • S/P cervical spinal fusion   • Fatty liver   • Abnormal bowel movement   • Hypertensive urgency   • Hypertriglyceridemia   • Electrolyte imbalance   • Myofascial pain syndrome, cervical   • Cervicalgia   • Occipital headache   • Cervical post-laminectomy syndrome   • Malignant melanoma of right lower extremity including hip (HCC)   • Metastatic melanoma ([facility] [address] )   • Surgical wound present   • High risk medication use   • Blurred vision in bilateral left visual fields   • SIRS (systemic inflammatory response syndrome) (HCC)   • Cerebrovascular accident (CVA) ([facility] [address] )   • PNA (pneumonia)   • LAZARA (acute kidney injury) ([facility] [address] )     Past Surgical History:   Procedure Laterality Date   • BREAST BIOPSY Left 2009    core   • BREAST BIOPSY Right 2009    core   • CERVICAL FUSION  2013    cC3-C4, C4-C5, C5 C6, C6-C7, C7-T1   •  SECTION     • ELBOW SURGERY     • KNEE SURGERY Bilateral 20YRS AGO     Social History     Socioeconomic History   • Marital status: Single     Spouse name: None   • Number of children: None   • Years of education: None   • Highest education level: None   Occupational History   • None   Tobacco Use   • Smoking status: Never   • Smokeless tobacco: Never   • Tobacco comments:     4 a day   Vaping Use   • Vaping Use: Never used   Substance and Sexual Activity   • Alcohol use: Not Currently     Comment: SOCIAL   • Drug use: Never   • Sexual activity: Yes     Partners: Male   Other Topics Concern   • None   Social History Narrative   • None     Social Determinants of Health     Financial Resource Strain: Not on file   Food Insecurity: Not on file   Transportation Needs: Not on file   Physical Activity: Not on file   Stress: Not on file   Social Connections: Not on file   Intimate Partner Violence: Not on file   Housing Stability: Not on file     Family History   Problem Relation Age of Onset   • Cancer Mother    • Brain cancer Mother [de-identified]   • Heart disease Father    • Breast cancer Sister 48   • Breast cancer Sister 45   • Breast cancer Maternal Aunt 61   • Breast cancer Maternal Aunt 60   • Breast cancer Maternal Aunt 60   • Breast cancer Cousin 50   • Breast cancer Cousin 52   • Breast cancer Cousin 52   • Prostate cancer Maternal Uncle 54   • No Known Problems Son    • No Known Problems Maternal Grandmother    • No Known Problems Maternal Grandfather    • No Known Problems Paternal Grandmother    • No Known Problems Paternal Grandfather    • BRCA1 Negative Sister    • BRCA2 Negative Sister    • No Known Problems Sister    • No Known Problems Maternal Aunt    • No Known Problems Maternal Aunt    • No Known Problems Maternal Aunt    • No Known Problems Maternal Aunt        Meds/Allergies   all current active meds have been reviewed and current meds:   Current Facility-Administered Medications   Medication Dose Route Frequency   • acetaminophen (TYLENOL) tablet 650 mg  650 mg Oral Q6H PRN   • albuterol (PROVENTIL HFA,VENTOLIN HFA) inhaler 2 puff  2 puff Inhalation Q4H PRN   • amLODIPine (NORVASC) tablet 5 mg  5 mg Oral Daily   • atenolol (TENORMIN) tablet 50 mg  50 mg Oral Daily   • benzonatate (TESSALON PERLES) capsule 100 mg  100 mg Oral TID PRN   • budesonide (PULMICORT) inhalation solution 0 5 mg  0 5 mg Nebulization Q12H   • dexamethasone (DECADRON) tablet 4 mg  4 mg Oral TID With Meals   • escitalopram (LEXAPRO) tablet 10 mg  10 mg Oral Daily   • heparin (porcine) subcutaneous injection 5,000 Units  5,000 Units Subcutaneous Q8H [address]   • HYDROcodone Bit-Homatrop MBr (HYCODAN) oral syrup 5 mL  5 mL Oral Q4H PRN   • ipratropium (ATROVENT) 0 02 % inhalation solution 0 5 mg  0 5 mg Nebulization TID   • levalbuterol (XOPENEX) inhalation solution 1 25 mg  1 25 "mg Nebulization TID   • memantine (NAMENDA) tablet 5 mg  5 mg Oral BID   • ondansetron (ZOFRAN) injection 4 mg  4 mg Intravenous Q6H PRN   • oxyCODONE (ROXICODONE) IR tablet 5 mg  5 mg Oral Q4H PRN   • phenol (CHLORASEPTIC) 1 4 % mucosal liquid 1 spray  1 spray Mouth/Throat Q2H PRN       Allergies   Allergen Reactions   • Methocarbamol Shortness Of Breath and Swelling   • Tramadol Shortness Of Breath and Swelling       I have reviewed the patient's controlled substance dispensing history in the Prescription Drug Monitoring Program in compliance with the [facility] regulations before prescribing any controlled substances  Last fill:  6/9/23: oxyIR 5mg, #30    Objective   /57   Pulse 61   Temp 97 9 °F (36 6 °C)   Resp 17   Ht 5' 7\" (1 702 m) Comment: last ht assessment  Wt 74 8 kg (164 lb 14 5 oz)   SpO2 90%   BMI 25 83 kg/m²   Physical Exam  Vitals and nursing note reviewed  Constitutional:       Appearance: She is normal weight  She is ill-appearing  Interventions: Nasal cannula in place  HENT:      Head: Normocephalic and atraumatic  Right Ear: External ear normal       Left Ear: External ear normal       Nose: Nose normal    Cardiovascular:      Rate and Rhythm: Normal rate  Pulmonary:      Effort: Pulmonary effort is normal  No respiratory distress  Abdominal:      General: There is no distension  Tenderness: There is no guarding  Musculoskeletal:      Right lower leg: No edema  Left lower leg: No edema  Skin:     General: Skin is dry  Coloration: Skin is pale  Findings: Bruising present  Neurological:      Mental Status: She is alert and oriented to person, place, and time  Psychiatric:         Mood and Affect: Mood normal          Behavior: Behavior normal          Thought Content: Thought content normal          Judgment: Judgment normal        Lab Results: I have personally reviewed pertinent labs    Imaging Studies: I have personally reviewed pertinent " reports  EKG, Pathology, and Other Studies: I have personally reviewed pertinent reports  Counseling / Coordination of Care  Total floor / unit time spent today 70 minutes  Greater than 50% of total time was spent with the patient and / or family counseling and / or coordination of care  A description of the counseling / coordination of care: Reviewed chart  Introduced role of Palliative Medicine  Reviewed expected disease trajectory, prognosis, and discussed symptom management  Spent time supportive listening regarding frustrations of diagnosis and treatment options available at this time      Nadja Lobato, MSN, CRNP, [facility]

## 2023-06-15 NOTE — ASSESSMENT & PLAN NOTE
· Melanoma of the right foot diagnosed in April 2023    Recently admitted for visual field deficits with MRI demonstrating intracranial hemorrhagic metastatic lesions  · Status post whole brain radiation  · Continue Decadron  · Follow [facility] outpatient

## 2023-06-15 NOTE — ASSESSMENT & PLAN NOTE
· LAZARA present upon admission with creatinine of 1 14, baseline around 0 4-0 5  · Improved, Possibly secondary to decreased oral intake which is also improved  · Unable to obtain BMP yet today, will follow up  · S/p IV hydration, can encourage PO fluid intake for now  · Hold HCTZ

## 2023-06-15 NOTE — UTILIZATION REVIEW
Initial Clinical Review  OBSERVATION 6/13/23 @ 1330 CONVERTED TO INPATIENT ADMISSION 6/14/23 @ 0719 DUE TO CONTINUED STAY REQUIRED TO EVALUATE AND TREAT PATIENT WITH LAZARA, COUGH WITH ONGOING WORKUP FOR COUGH  PULMONOLOGY FOLLOWING  Admission: Date/Time/Statement:   Admission Orders (From admission, onward)     Ordered        06/14/23 0719  Inpatient Admission  Once            06/13/23 1330  Place in Observation  Once                      Orders Placed This Encounter   Procedures   • Inpatient Admission     Standing Status:   Standing     Number of Occurrences:   1     Order Specific Question:   Level of Care     Answer:   Med Surg [16]     Order Specific Question:   Estimated length of stay     Answer:   More than 2 Midnights     Order Specific Question:   Certification     Answer:   I certify that inpatient services are medically necessary for this patient for a duration of greater than two midnights  See H&P and MD Progress Notes for additional information about the patient's course of treatment  ED Arrival Information     Expected   -    Arrival   6/13/2023 11:37    Acuity   Urgent            Means of arrival   Wheelchair    Escorted by   Self    Service   Hospitalist    Admission type   Emergency            Arrival complaint   SOB/ chest pain            Chief Complaint   Patient presents with   • Chest Pain     Reports fatigue prior to radiation treatment yesterday  Post radiation patient is having chest pain and shortness of breath with increased fatigue    • Fatigue   • Shortness of Breath       Initial Presentation: 59 y o  female PMH of asthma,  metastatic melanoma , on Keytruda-latest radiation tx 6/12 who presents with pleuritic chest pain as well as frequent coughing  Recent hospitalization last week secondary to same with discharge 6/5/23-(ot was recommended for bronch by pulm but left AMA before this )  Is not getting any better  Cough is nonproductive  Chest pain is worse with coughing  Pt reports nausea, mild SOB  Pt feels weak s/p radiation, has post radiation HA  On exam, pt frequently coughing, normal breath sounds ,baseline blurred vision   ON O2 @ 2 L   Labs WBC 14 39, Mag 1 6, elevated LFT's, creat 1 14 from baseline 0 4-0 5  Troponin  Normal x 2   EKG nonischemic  CXR shows R pleural effusion , probable consolidation R lower lobe   Pt admitted as OBS with LAZARA, cough, chest pain   PLan -IVF, repeat BMP in am, hold HCTZ  Pulmonology consult, Antitussive, mucolytic, pain control  Continue home decadron ,     Pulmonology consult- has had approximate 5 admissions in the past approximately 6 weeks  The patient reports that her breathing has acutely worsened  On previous imaging, the patient does have thickened interstitium as well as bilateral pleural effusions  There is concern for volume overload versus malignancy  recommend IR evaluation for thoracentesis with cytology given the concern for cancer  If there is insufficient fluid for thoracentesis, would plan for bronchoscopy on Thursday  wean supplemental oxygen as tolerated to maintain SPO2 greater than 88%  Encourage I-S and OOB  Nebs for asthma   Date: 6/14 Converted to IP  Creat improving today to 1 02 with IVF  Pt hypotensive this am- given IVF bolus, continue IVF, holding all antihypertensives   Continues with chest pain and coughing, feels lightheaded, and does not feel well at all  For bronchoscopy today  6/14/23 @ 1157   Bronchoscopy with general anesthesia  FINDINGS:  • Bronchoalveolar lavage was performed in the RLL; the fluid appeared clear  • All observed locations appeared normal, including the pharynx, larynx, vocal cords, trachea, main flaco, left lung and right lung  • Dynamic airway collapse with 25-50% obstruction in the RLL  Pulmonology-Pt tolerated bronchoscopy well today- f/u cytology, cx and cell count   Pt has significant effusions noted on recent imaging    Would recommend IR consult for thoracentesis- primarily "a diagnostic thoracentesis needed to look for malignancy within pleural fluid  Continue budesonide with Atrovent and Xopenex for asthma  Currently on 2 L-94%, does not wear home O2     Date 6/15 day 2  Pulm -Currently on 1 L-94%, when on RA sats around 89-91% , does not wear home O2  Patient reported feeling \"bad\" overnight and this morning given chest wall pain and dry cough  Has decreased air movement bilat bases  Has some SOB   For thoracentesis today   IVF infusing , for labs tomorrow am   F/U bronch cx and testing   Recommend palliative care consult  Medicine- BP improved today  Encourage po intakee  Unable to obtain labs yet today   satting 89-91 percent on my exam earlier in the day and 2L NC was placed with improvement  COntinue nebs, scheduled inhaler  Pt feels \"crappy\", nauseated, weak and dizzy  Nausea improved with antiemetic  Reports pleuritic chest pain  Breath sounds decreased at bases  Palliative care consult- Metastatic melanoma, brain and R hip s/p Keytruda + radiation, scheduled for chemotx 6/15/23  Oycodone 5mg PO q4h PRN for moderate to severe pain  Start ProtonixIV for GERD  Disease focused care  IR- thoracentesis-400 mL cloudy yellow fluid drained from right pleural space  Specimens sent to lab        ED Triage Vitals   Temperature Pulse Respirations Blood Pressure SpO2   06/13/23 1154 06/13/23 1154 06/13/23 1157 06/13/23 1154 06/13/23 1154   97 6 °F (36 4 °C) 57 20 109/69 95 %      Temp Source Heart Rate Source Patient Position - Orthostatic VS BP Location FiO2 (%)   06/13/23 1154 06/13/23 1154 -- -- --   Oral Monitor         Pain Score       06/13/23 1730       No Pain          Wt Readings from Last 1 Encounters:   06/13/23 74 8 kg (164 lb 14 5 oz)     Additional Vital Signs:   /Time Temp Pulse Resp BP MAP (mmHg) SpO2 Calculated FIO2 (%) - Nasal Cannula O2 Flow Rate (L/min) Nasal Cannula O2 Flow Rate (L/min) O2 Device   06/15/23 0853 -- -- -- -- -- 90 % -- 1 L/min -- Nasal cannula " 06/15/23 0713 -- -- -- -- -- 92 % -- 1 L/min -- Nasal cannula   06/15/23 06:37:59 97 9 °F (36 6 °C) 61 17 112/57 75 91 % -- -- -- --   06/14/23 22:26:39 98 1 °F (36 7 °C) 65 24 Abnormal  106/60 75 94 % -- -- -- --   06/14/23 1943 -- -- -- -- -- 96 % 32 -- 3 L/min Nasal cannula   06/14/23 1700 -- -- -- -- -- -- 32 -- 3 L/min Nasal cannula   06/14/23 15:01:39 98 5 °F (36 9 °C) 68 17 105/52 70 88 % Abnormal  -- -- -- --   06/14/23 1431 -- -- -- -- -- 95 % 36 -- 4 L/min Nasal cannula   06/14/23 1404 -- -- -- -- -- 94 % 36 -- 4 L/min Nasal cannula   06/14/23 12:53:09 98 °F (36 7 °C) -- -- 109/56 74 88 % Abnormal  -- 6 L/min -- None (Room air)   06/14/23 1230 -- 78 19 115/65 84 93 % -- -- -- None (Room air)   06/14/23 1218 97 °F (36 1 °C) Abnormal  70 22 108/53 -- 92 % -- 5 L/min -- Simple mask   06/14/23 1100 -- -- -- -- -- -- 28 -- 2 L/min Nasal cannula   06/14/23 1049 96 9 °F (36 1 °C) Abnormal  55 18 103/59 -- 91 % -- -- -- None (Room air)   06/14/23 0804 -- -- -- 90/50 -- -- -- -- -- --   06/14/23 0714 -- -- -- -- -- 93 % 24 -- 1 L/min Nasal cannula   06/14/23 06:02:33 97 6 °F (36 4 °C) 54 Abnormal  16 103/63 76 90 % -- -- -- --   06/14/23 06:02:07 97 6 °F (36 4 °C) 54 Abnormal  16 103/63 76 88 % Abnormal  -- -- -- --   06/13/23 21:07:54 97 4 °F (36 3 °C) Abnormal  49 Abnormal  16 109/59 76 92 % 28 -- 2 L/min Nasal cannula   06/13/23 18:19:45 -- 51 Abnormal  -- 111/62 78 90 % -- -- -- --     Pertinent Labs/Diagnostic Test Results:    6/13 ECG- Sinus bradycardia   Nonspecific ST abnormality    XR chest 2 views   ED Interpretation by Lucila Lopez PA-C (06/13 2460)   Persistent right pleural effusion    probable consolidation right lower lobe  Final Result by Annalise Brink MD (06/14 1082)      Cardiomegaly and small bilateral pleural effusions  Bilateral groundglass opacities  Correlate for CHF versus infection versus lymphangitic spread of tumor              Workstation performed: [id number]         IR IN-Patient Thoracentesis    (Results Pending)         Results from last 7 days   Lab Units 06/14/23  0439 06/13/23  1224   HEMATOCRIT % 41 6 45 3   HEMOGLOBIN g/dL 13 8 15 0   NEUTROS ABS Thousands/µL 9 51* 12 36*   PLATELETS Thousands/uL 141* 189   WBC Thousand/uL 10 63* 14 39*         Results from last 7 days   Lab Units 06/14/23  0439 06/13/23  1224   ANION GAP mmol/L 15* 16*   BUN mg/dL 61* 57*   CALCIUM mg/dL 7 5* 8 6   CHLORIDE mmol/L 100 96   CO2 mmol/L 20* 23   CREATININE mg/dL 1 02 1 14   EGFR ml/min/1 73sq m 59 51   POTASSIUM mmol/L 4 0 3 9   MAGNESIUM mg/dL  --  1 6*   SODIUM mmol/L 135 135     Results from last 7 days   Lab Units 06/14/23  0439 06/13/23  1224   ALBUMIN g/dL 2 8* 3 3*   ALK PHOS U/L 237* 296*   ALT U/L 52 55*   AST U/L 66* 79*   TOTAL BILIRUBIN mg/dL 0 83 0 96   TOTAL PROTEIN g/dL 5 3* 6 2*         Results from last 7 days   Lab Units 06/14/23  0439 06/13/23  1224   GLUCOSE RANDOM mg/dL 92 85               Results from last 7 days   Lab Units 06/13/23  1453 06/13/23  1224   HS TNI 0HR ng/L  --  17   HS TNI 2HR ng/L 19  --    HSTNI D2 ng/L 2  --                                  Results from last 7 days   Lab Units 06/13/23  1224   BNP pg/mL 125*                       Results from last 7 days   Lab Units 06/14/23  1208   GRAM STAIN RESULT  No Polys or Bacteria seen     Results from last 7 days   Lab Units 06/14/23  1218   TOTAL COUNTED  100               ED Treatment:   Medication Administration from 06/13/2023 1137 to 06/13/2023 1746       Date/Time Order Dose Route Action     06/13/2023 1230 EDT morphine injection 4 mg 4 mg Intravenous Given     06/13/2023 1230 EDT ondansetron (ZOFRAN) injection 4 mg 4 mg Intravenous Given     06/13/2023 1230 EDT sodium chloride 0 9 % bolus 1,000 mL 1,000 mL Intravenous New Bag     06/13/2023 1427 EDT magnesium sulfate 2 g/50 mL IVPB (premix) 2 g 0 g Intravenous Stopped     06/13/2023 1327 EDT magnesium sulfate 2 g/50 mL IVPB (premix) 2 g 2 g Intravenous New Bag        Past Medical History:   Diagnosis Date   • Cervical disc disorder    • Melanoma ([facility] )    • Stenosis of cervical spine region      Present on Admission:  • Metastatic melanoma ([facility] )  • Hypertension  • Chest pain on respiration  • Cough  • Blurred vision in bilateral left visual fields      Admitting Diagnosis: Dehydration [E86 0]  Hypomagnesemia [E83 42]  Chest pain [R07 9]  Fatigue [R53 83]  SOB (shortness of breath) [R06 02]  Metastatic melanoma (HCC) [C43 9]  LAZARA (acute kidney injury) ([facility] ) [N17 9]  Chest pain on respiration [R07 1]  Age/Sex: 59 y o  female  Admission Orders:  Scheduled Medications:  amLODIPine, 5 mg, Oral, Daily  atenolol, 50 mg, Oral, Daily  budesonide, 0 5 mg, Nebulization, Q12H  dexamethasone, 4 mg, Oral, TID With Meals  escitalopram, 10 mg, Oral, Daily  heparin (porcine), 5,000 Units, Subcutaneous, Q8H ZAIDA  ipratropium, 0 5 mg, Nebulization, TID  levalbuterol, 1 25 mg, Nebulization, TID  memantine, 5 mg, Oral, BID    sodium chloride 0 9 % bolus 1,000 mL  Dose: 1,000 mL  Freq: Once Route: IV  Last Dose: 1,000 mL (06/14/23 0829)  Start: 06/14/23 0815 End: 06/14/23 0929  guaiFENesin (MUCINEX) 12 hr tablet 600 mg  Dose: 600 mg  Freq: 2 times daily Route: PO  Start: 06/13/23 1800 End: 06/15/23 0846  furosemide (LASIX) injection 20 mg  Dose: 20 mg  Freq:  Once Route: IV  Start: 06/13/23 1730 End: 06/13/23 1822  pantoprazole (PROTONIX) injection 40 mg  Dose: 40 mg  Freq: Every 24 hours scheduled Route: IV  Start: 06/15/23 1400      Continuous IV Infusions:  sodium chloride, 100 mL/hr, Intravenous, Continuous End: 06/15/23 1216      PRN Meds:  acetaminophen, 650 mg, Oral, Q6H PRN x1 6/13, x1 6/15  albuterol, 2 puff, Inhalation, Q4H PRN  benzonatate, 100 mg, Oral, TID PRN x1 6/13, x1 6/14  HYDROcodone Bit-Homatrop MBr, 5 mL, Oral, Q4H PRN  ondansetron, 4 mg, Intravenous, Q6H PRNx1 6/14, x1 6/15  oxyCODONE, 5 mg, Oral, Q4H PRN x1 6/14, x1 6/15    phenol, 1 spray, Mouth/Throat, Q2H PRN x1 6/14, x1 6/15    OOB as shena   SCD's      IP CONSULT TO PULMONOLOGY    Network Utilization Review Department  ATTENTION: Please call with any questions or concerns to [phone] and carefully listen to the prompts so that you are directed to the right person  All voicemails are confidential   [place] all requests for admission clinical reviews, approved or denied determinations and any other requests to dedicated fax number below belonging to the campus where the patient is receiving treatment   List of dedicated fax numbers for the Facilities:  [address] DENIALS (Administrative/Medical Necessity) [phone]   [address] (Maternity/NICU/Pediatrics) [phone]   [address] Katelyn Jovel [phone]   [address] [phone]   [address] [address] [place] [address] [phone]   [address] [place] Halie Marte [facility] 134 815 [facility] [phone]

## 2023-06-15 NOTE — ASSESSMENT & PLAN NOTE
· POA: worsening nonproductive cough with associated [place]  Has been ongoing for a month, recent admission and dc 6/5 for same  At that time was seen by pulmonary and recommended for bronchoscopy if symptoms do not improve, fortunately signed out AMA prior to this    During that stay RP 2 was negative, there was concern for atypical infection, pneumonitis, or lymphangitic spread  · Pt was satting 89-91 percent on my exam earlier in the day and 2L NC was placed with improvement  · Symptoms have not improved, pulmonary was reconsulted  · Continue supportive care with antitussive and mucolytic as well as pain control  · S/p bronchoscopy 6/15 and for thoracentesis today to rule out malignancy  · Continue [facility] & longterm IH   · Appreciate palliative care consult

## 2023-06-16 PROBLEM — E87.20 ACIDOSIS: Status: ACTIVE | Noted: 2023-01-01

## 2023-06-16 NOTE — ASSESSMENT & PLAN NOTE
· Cough likely multifactorial  · S/p bronchoscopy with pulmonary  · Follow-up on bronchoalveolar lavage  · S/p thoracocentesis follow-up on pleural fluid studies  · Pulmonary inputs noted  · Pulmonary following

## 2023-06-16 NOTE — ASSESSMENT & PLAN NOTE
Early AM admit for acute on chronic symptomatic anemia. Currently being transfused multiple units. Trend CBC.  T/c inpatient hem consult    Claudette Fofana DO  Internal Medicine, Hospitalist  Pager: [phone]  [address] [place] [address] Group · Acute kidney injury present on admission  · Baseline creatinine 0 4-0 5  · Likely prerenal with decreased p o  intake  · We will provide IV fluids  · Avoid nephrotoxins  · Monitor postvoid residuals  · Monitor kidney function

## 2023-06-16 NOTE — PROGRESS NOTES
"Progress Note - Pulmonary   Marvel Pont 59 y o  female MRN: [id number]  Unit/Bed#: S -01 Encounter: [id number]    Assessment/Plan:  Patient is a 21-year-old female with past medical history significant for metastatic melanoma  The patient underwent bronchoscopy with cultures pending  The initial read on the microbiology is no polys and no bacteria  For the patient's overall condition, likely related to her cancer  Discussed with palliative care who is adjusting medications to help with her symptoms although they are nonspecific  From a respiratory standpoint, the patient is stable  She has no Sapone ox requirement  Cultures are negative  We will await results of cytology from thoracentesis  Plan of care discussed with SLBLADIMIR  Chief Complaint:   I feel like crap    Subjective:   Underwent thoracentesis  Patient reports she feels like crap  The patient generally feels unwell  She cannot elaborate other than to say she feels poorly  She denies fevers, chills, nausea or vomiting  She does have some abdominal discomfort  Objective:     Vitals: Blood pressure 103/61, pulse 58, temperature 97 5 °F (36 4 °C), resp  rate 17, height 5' 7\" (1 702 m), weight 74 8 kg (164 lb 14 5 oz), SpO2 92 %, not currently breastfeeding  ,Body mass index is 25 83 kg/m²  No intake or output data in the 24 hours ending 06/16/23 1027    Invasive Devices       Peripheral Intravenous Line  Duration             Peripheral IV 06/13/23 Right Antecubital 2 days                    Physical Exam  Vitals and nursing note reviewed  Constitutional:       Appearance: She is well-developed  She is not ill-appearing, toxic-appearing or diaphoretic  HENT:      Head: Normocephalic and atraumatic  Eyes:      Pupils: Pupils are equal, round, and reactive to light  Cardiovascular:      Rate and Rhythm: Normal rate  Pulmonary:      Breath sounds: Normal breath sounds   No decreased breath sounds, wheezing, rhonchi or " rales    Abdominal:      Palpations: Abdomen is soft  Tenderness: There is no abdominal tenderness  There is no guarding or rebound  Musculoskeletal:      Cervical back: Normal range of motion and neck supple  Right lower leg: No tenderness  No edema  Left lower leg: No tenderness  No edema  Skin:     General: Skin is warm and dry  Psychiatric:         Mood and Affect: Mood is not anxious  Behavior: Behavior is not agitated  Labs: I have personally reviewed pertinent lab results  Lab Results   Component Value Date    SODIUM 138 06/16/2023    K 4 4 06/16/2023     06/16/2023    CO2 12 (L) 06/16/2023    BUN 79 (H) 06/16/2023    CREATININE 1 47 (H) 06/16/2023    GLUC 88 06/16/2023    CALCIUM 7 9 (L) 06/16/2023     Lab Results   Component Value Date    WBC 11 64 (H) 06/16/2023    HGB 13 9 06/16/2023    HCT 43 9 06/16/2023     (H) 06/16/2023     (L) 06/16/2023     Imaging and other studies: I have personally reviewed pertinent reports     and I have personally reviewed pertinent films in PACS

## 2023-06-16 NOTE — PROGRESS NOTES
Progress Note - Palliative & Supportive Care  Teri Walalce  59 y o   female  S /S -01   MRN: [id number]  Encounter: [id number]     Assessment  1  Metastatic melanoma, brain and R hip s/p Keytruda + radiation, scheduled for chemotx 6/15/23  2  Acute pulmonary insufficiency  3  Abnormal CT chest, s/p bronchoscopy  4  Asthma-acute exacerbation  5  LAZARA  6  Generalized weakness  7  Dehydration  8  Poor po intake  9  Cough  10  Cancer related pain  11  Palliative care encounter  12  Goals of care     Code Status: Level 1 - Full Code  Advance Directive and Living Will:    Not in chart  Power of :  No  POLST:  No  Emergency contact/sister/: [phone]     Plan:  1  Symptom management  Cancer related pain  • Decrease oxycodone to 2 5mg PO q4h PRN for moderate to severe pain as patient is more lethargic today  • Risks, benefits, and possible side effects of medications explained to patient and patient verbalizes understanding     • Cautioned patient on interactions between hycodan and oxycodone, using hycodan only @ hs     GERD  • Start protonix 40mg once daily     2    Goals:  Level 1 code status  • Disease focused care  Will continue discussions regarding [address] as patient's clinical presentation evolves  • Encouraged follow up with Palliative Medicine on an outpatient basis after discharge for continued symptom management  Our office will contact patient to schedule a hospital follow up      3  Social support:  • Does not have a lot of help at home  • S/O is unwell himself  • Son does not live nearby and lost his license  • Supportive listening provided  • Normalized experience of patient/family  • Provided anxiety containment  • Provided anticipatory guidance  • Home structure/ living situation - lives with s/o Fernie     4  Follow up  • Palliative Care will continue to follow for symptom management and goals of care discussions will be ongoing    Please reach out via Georgette Chu if "questions or concerns arise  5  Care Coordination  · Reviewed case with Dr Tamara James  · Reviewed case with RN Dav Contreras    24 Hour History  Chart reviewed before visit  More lethargic today  Does wake easily to voice but says \"I am just so tired\"  Repeatedly says \"I just feel awful  \" Other than intermittent nausea she is unable to pinpoint specific symptoms today  VS on monitor are WNL  Reviewed med list  One dose of oxy 5 at 2227 last night, none since  No hycodan since admission  No other sedating meds were given based on MAR review  Seems unlikely that oxy is causing lethargy this morning since she has taken it earlier this admission without this issue but discussed trying half a dose with family to see if she is still comfortable but does not feel so lethargic  Also discontinued hycodan to ensure they are not used together  RN to administer zofran now    Pepito Thurston and Roxy present  Said she does sleep a lot at home and \"gets like this sometimes  \"    Will re visit patient this afternoon to see if she is feeling better or can provide more details of symptoms    Returned to patient's room at 2:45  She is sleeping comfortably with no family present  VS on monitor are WNL  Did not wake her  Review of Systems   All other systems reviewed and are negative      Medications    Current Facility-Administered Medications:   •  acetaminophen (TYLENOL) tablet 650 mg, 650 mg, Oral, Q6H PRN, Hoang Jamison PA-C, 650 mg at 06/15/23 1014  •  albuterol (PROVENTIL HFA,VENTOLIN HFA) inhaler 2 puff, 2 puff, Inhalation, Q4H PRN, Hoang Jamison PA-C  •  amLODIPine (NORVASC) tablet 5 mg, 5 mg, Oral, Daily, Hoang Jamison PA-C, 5 mg at 06/15/23 0846  •  atenolol (TENORMIN) tablet 50 mg, 50 mg, Oral, Daily, Hoang Jamison PA-C, 50 mg at 06/15/23 0846  •  benzonatate (TESSALON PERLES) capsule 100 mg, 100 mg, Oral, TID PRN, Hoang Jamison PA-C, 100 mg at 06/14/23 4396  •  budesonide (PULMICORT) inhalation solution 0 5 " "mg, 0 5 mg, Nebulization, Q12H, Aliene Reyna, CRNP, 0 5 mg at 06/16/23 0719  •  dexamethasone (DECADRON) tablet 4 mg, 4 mg, Oral, TID With Meals, Murtannere Jarrell, PA-C, 4 mg at 06/15/23 1656  •  escitalopram (LEXAPRO) tablet 10 mg, 10 mg, Oral, Daily, Murtravis Vieyra, PA-C, 10 mg at 06/15/23 0846  •  heparin (porcine) subcutaneous injection 5,000 Units, 5,000 Units, Subcutaneous, Q8H [address], 5,000 Units at 06/16/23 0551 **AND** Platelet count, , , Once, Valerie Vieyra PA-C  •  HYDROcodone Bit-Homatrop MBr (HYCODAN) oral syrup 5 mL, 5 mL, Oral, Q4H PRN, Valerie Vieyra PA-C  •  ipratropium (ATROVENT) 0 02 % inhalation solution 0 5 mg, 0 5 mg, Nebulization, TID, Aliene Reyna, CRNP, 0 5 mg at 06/16/23 0719  •  levalbuterol (Sofia Co) inhalation solution 1 25 mg, 1 25 mg, Nebulization, TID, Aliene Reyna, CRNP, 1 25 mg at 06/16/23 0719  •  memantine (NAMENDA) tablet 5 mg, 5 mg, Oral, BID, Murtravis Vieyra, PA-C, 5 mg at 06/15/23 1656  •  ondansetron (ZOFRAN) injection 4 mg, 4 mg, Intravenous, Q6H PRN, Valerie Vieyra, PA-C, 4 mg at 06/15/23 1017  •  oxyCODONE (ROXICODONE) IR tablet 5 mg, 5 mg, Oral, Q4H PRN, PAVEL Aguiar, 5 mg at 06/15/23 2227  •  pantoprazole (PROTONIX) injection 40 mg, 40 mg, Intravenous, Q24H [address], ZEYAD AguiarNP, 40 mg at 06/15/23 1430  •  phenol (CHLORASEPTIC) 1 4 % mucosal liquid 1 spray, 1 spray, Mouth/Throat, Q2H PRN, Shana Reyna, CRNP, 1 spray at 06/16/23 0740    Objective  /61   Pulse 58   Temp 97 5 °F (36 4 °C)   Resp 17   Ht 5' 7\" (1 702 m) Comment: last ht assessment  Wt 74 8 kg (164 lb 14 5 oz)   SpO2 92%   BMI 25 83 kg/m²   Physical Exam  Vitals and nursing note reviewed  Constitutional:       General: She is not in acute distress  Appearance: She is ill-appearing  She is not toxic-appearing  HENT:      Head: Normocephalic and atraumatic        Right Ear: External ear normal       Left Ear: External ear normal    Eyes:      " General: No scleral icterus  Right eye: No discharge  Left eye: No discharge  Extraocular Movements: Extraocular movements intact  Conjunctiva/sclera: Conjunctivae normal       Pupils: Pupils are equal, round, and reactive to light  Cardiovascular:      Rate and Rhythm: Bradycardia present  Pulmonary:      Effort: Pulmonary effort is normal  No tachypnea, bradypnea, accessory muscle usage or respiratory distress  Comments: Not wearing O2 today, SpO2 remains 92 to 95% throughout visit  Abdominal:      General: There is no distension  Musculoskeletal:      Cervical back: Normal range of motion  Right lower leg: No edema  Left lower leg: No edema  Skin:     General: Skin is dry  Coloration: Skin is not pale  Neurological:      Mental Status: She is easily aroused  She is lethargic  Cranial Nerves: No dysarthria or facial asymmetry  Psychiatric:         Attention and Perception: Attention normal          Mood and Affect: Mood and affect normal          Speech: Speech normal          Behavior: Behavior normal  Behavior is cooperative  Thought Content: Thought content normal          Cognition and Memory: Cognition and memory normal          Judgment: Judgment normal      Lab Results:   I have personally reviewed pertinent labs  , CBC:   Lab Results   Component Value Date    WBC 11 64 (H) 06/16/2023    HGB 13 9 06/16/2023    HCT 43 9 06/16/2023     (H) 06/16/2023     (L) 06/16/2023    RBC 4 11 06/16/2023    MCH 33 8 06/16/2023    MCHC 31 7 06/16/2023    RDW 13 0 06/16/2023    MPV 10 5 06/16/2023   , CMP:   Lab Results   Component Value Date    SODIUM 138 06/16/2023    K 4 4 06/16/2023     06/16/2023    CO2 12 (L) 06/16/2023    BUN 79 (H) 06/16/2023    CREATININE 1 47 (H) 06/16/2023    CALCIUM 7 9 (L) 06/16/2023    EGFR 37 06/16/2023     Imaging Studies: I have personally reviewed pertinent reports    EKG, Pathology, and Other Studies: "I have personally reviewed pertinent reports  Counseling / Coordination of Care  Total floor / unit time spent today 45 minutes  Greater than 50% of total time was spent with the patient and / or family counseling and / or coordinating of care  A description of the counseling / coordination of care: Chart reviewed,  provided medical updates, determined goals of care, discussed palliative care and symptom management, provided anticipatory guidance, determined competency and POA/HCA, determined social/family support, provided psychosocial support  Reviewed with TONI GALLEGO and RENE Morse MSN, CRNP, [facility]  Palliative & Supportive Care    Portions of this document may have been created using dictation software and as such some \"sound alike\" terms may have been generated by the system  Do not hesitate to contact me with any questions or clarifications      "

## 2023-06-16 NOTE — ASSESSMENT & PLAN NOTE
Patient's blood pressures reviewed episodes of hypotension noted  We will hold amlodipine  Atenolol reduced to 25 mg daily  HCTZ on hold  Avoid hypotension  Monitor orthostatics, monitor blood pressures

## 2023-06-16 NOTE — PROGRESS NOTES
[facility]  Progress Note  Name: Saadia Aranda I  MRN: [id number]  Unit/Bed#: S -01 I Date of Admission: 6/13/2023   Date of Service: 6/16/2023 I Hospital Day: 2    Assessment/Plan   * LAZARA (acute kidney injury) ([facility] )  Assessment & Plan  · Acute kidney injury present on admission  · Baseline creatinine 0 4-0 5  · Likely prerenal with decreased p o  intake  · We will provide IV fluids  · Avoid nephrotoxins  · Monitor postvoid residuals  · Monitor kidney function    Acidosis  Assessment & Plan  Likely metabolic acidosis with acute renal failure  We will provide IV fluids monitor  If persistent or worsens will consider nephrology evaluation    Blurred vision in bilateral left visual fields  Assessment & Plan  · Due to metastatic melanoma  · Outpatient follow-up    Metastatic melanoma ([facility] )  Assessment & Plan  · History of metastatic melanoma  · Continue dexamethasone  · S/p whole brain radiation  · Outpatient heme-onc follow-up  · Palliative care inputs noted    Hypertension  Assessment & Plan  Patient's blood pressures reviewed episodes of hypotension noted  We will hold amlodipine  Atenolol reduced to 25 mg daily  HCTZ on hold  Avoid hypotension  Monitor orthostatics, monitor blood pressures      Cough  Assessment & Plan  · Cough likely multifactorial  · S/p bronchoscopy with pulmonary  · Follow-up on bronchoalveolar lavage  · S/p thoracocentesis follow-up on pleural fluid studies  · Pulmonary inputs noted  · Pulmonary following                   VTE Pharmacologic Prophylaxis:  High Risk (Score >/= 5) - Pharmacological DVT Prophylaxis Ordered: heparin  Sequential Compression Devices Ordered  Patient Centered Rounds: I performed bedside rounds with nursing staff today  Discussions with Specialists or Other Care Team Provider: Case management    Education and Discussions with Family / Patient: Discussed with the patient, family at bedside updated questions answered       Total Time Spent on Date of Encounter in care of patient: 35 minutes This time was spent on one or more of the following: performing physical exam; counseling and coordination of care; obtaining or reviewing history; documenting in the medical record; reviewing/ordering tests, medications or procedures; communicating with other healthcare professionals and discussing with patient's family/caregivers  Current Length of Stay: 2 day(s)  Current Patient Status: Inpatient   Certification Statement: The patient will continue to require additional inpatient hospital stay due to As outlined  Discharge Plan: Awaiting clinical and symptomatic improvement    Code Status: Level 1 - Full Code    Subjective:     Patient reports nausea poor p o  intake  Her appetite is poor  She reports constipation for few days  Discussed Dulcolax suppository she is agreeable  Encourage adequate hydration increase p o  intake as able  Increased out of bed into a chair  Reports feeling very tired presently  History chart labs medications reviewed    Objective:     Vitals:   Temp (24hrs), Av 8 °F (36 6 °C), Min:97 5 °F (36 4 °C), Max:98 1 °F (36 7 °C)    Temp:  [97 5 °F (36 4 °C)-98 1 °F (36 7 °C)] 97 8 °F (36 6 °C)  HR:  [53-62] 62  Resp:  [17-18] 17  BP: ()/(53-61) 96/53  SpO2:  [90 %-97 %] 90 %  Body mass index is 25 83 kg/m²       Input and Output Summary (last 24 hours):   No intake or output data in the 24 hours ending 23 1555    Physical Exam:   Physical Exam       Comfortably in bed  Features of protein calorie malnutrition noted  Neck supple  Lungs diminished breath sounds bilaterally  Heart sounds S1 and S2 noted  Abdomen soft nontender  Awake and alert obey simple commands  No rash    Additional Data:     Labs:  Results from last 7 days   Lab Units 23  0620 23  0439   WBC Thousand/uL 11 64* 10 63*   HEMOGLOBIN g/dL 13 9 13 8   HEMATOCRIT % 43 9 41 6   PLATELETS Thousands/uL 142* 141*   BANDS PCT % 5  --    NEUTROS PCT %  --  90*   LYMPHS PCT %  --  3*   LYMPHO PCT % 6*  --    MONOS PCT %  --  6   MONO PCT % 2*  --    EOS PCT % 0 0     Results from last 7 days   Lab Units 06/16/23  0620 06/14/23  0439   SODIUM mmol/L 138 135   POTASSIUM mmol/L 4 4 4 0   CHLORIDE mmol/L 105 100   CO2 mmol/L 12* 20*   BUN mg/dL 79* 61*   CREATININE mg/dL 1 47* 1 02   ANION GAP mmol/L 21* 15*   CALCIUM mg/dL 7 9* 7 5*   ALBUMIN g/dL  --  2 8*   TOTAL BILIRUBIN mg/dL  --  0 83   ALK PHOS U/L  --  237*   ALT U/L  --  52   AST U/L  --  66*   GLUCOSE RANDOM mg/dL 88 92                       Lines/Drains:  Invasive Devices     Peripheral Intravenous Line  Duration           Peripheral IV 06/16/23 Right;Ventral (anterior) Forearm <1 day                      Imaging: Reviewed radiology reports from this admission including: chest xray, chest CT scan and procedure reports    Recent Cultures (last 7 days):   Results from last 7 days   Lab Units 06/15/23  1554 06/14/23  1208   GRAM STAIN RESULT  Rare Polys  No organisms seen No Polys or Bacteria seen   BODY FLUID CULTURE, STERILE  No growth  --        Last 24 Hours Medication List:   Current Facility-Administered Medications   Medication Dose Route Frequency Provider Last Rate   • acetaminophen  650 mg Oral Q6H PRN Emma Raza PA-C     • albuterol  2 puff Inhalation Q4H PRN Emma Raza PA-C     • [START ON 6/17/2023] atenolol  25 mg Oral Daily Jazzmine Riojas MD     • benzonatate  100 mg Oral TID PRN Emma Raza PA-C     • bisacodyl  10 mg Rectal Daily Jazzmine Riojas MD     • budesonide  0 5 mg Nebulization Q12H PAVEL Gore     • dexamethasone  4 mg Oral TID With Meals Emma Raza PA-C     • escitalopram  10 mg Oral Daily Emma Raza PA-C     • heparin (porcine)  5,000 Units Subcutaneous [facility] Emma Raza PA-C     • ipratropium  0 5 mg Nebulization TID PAVEL Gore     • levalbuterol  1 25 mg Nebulization TID PAVEL Gore     • memantine  5 mg Oral BID Mary Jo Scherer PA-C     • multi-electrolyte  75 mL/hr Intravenous Continuous Sabi Lainez MD 75 mL/hr (06/16/23 1235)   • ondansetron  4 mg Intravenous Q6H PRN Mary Jo Scherer PA-C     • oxyCODONE  2 5 mg Oral Q4H PRN PAVEL Ordaz     • pantoprazole  40 mg Intravenous Q24H [facility] PAVEL Velasco     • phenol  1 spray Mouth/Throat Q2H PRN PAVEL Eduardo     • polyethylene glycol  17 g Oral Daily Sabi Lainez MD     • senna-docusate sodium  1 tablet Oral BID Sabi Lainez MD          Today, Patient Was Seen By: Sabi Lainez MD    **Please Note: This note may have been constructed using a voice recognition system  **

## 2023-06-16 NOTE — ASSESSMENT & PLAN NOTE
Likely metabolic acidosis with acute renal failure  We will provide IV fluids monitor  If persistent or worsens will consider nephrology evaluation

## 2023-06-16 NOTE — ASSESSMENT & PLAN NOTE
· History of metastatic melanoma  · Continue dexamethasone  · S/p whole brain radiation  · Outpatient heme-onc follow-up  · Palliative care inputs noted

## 2023-06-16 NOTE — PLAN OF CARE
Problem: PAIN - ADULT  Goal: Verbalizes/displays adequate comfort level or baseline comfort level  Description: Interventions:  - Encourage patient to monitor pain and request assistance  - Assess pain using appropriate pain scale  - Administer analgesics based on type and severity of pain and evaluate response  - Implement non-pharmacological measures as appropriate and evaluate response  - Consider cultural and social influences on pain and pain management  - Notify physician/advanced practitioner if interventions unsuccessful or patient reports new pain  Outcome: Progressing     Problem: SAFETY ADULT  Goal: Patient will remain free of falls  Description: INTERVENTIONS:  - Educate patient/family on patient safety including physical limitations  - Instruct patient to call for assistance with activity   - Consult OT/PT to assist with strengthening/mobility   - Keep Call bell within reach  - Keep bed low and locked with side rails adjusted as appropriate  - Keep care items and personal belongings within reach  - Initiate and maintain comfort rounds  - Make Fall Risk Sign visible to staff  - Offer Toileting every  Hours, in advance of need  - Initiate/Maintain alarm  - Obtain necessary fall risk management equipment:   - Apply yellow socks and bracelet for high fall risk patients  - Consider moving patient to room near nurses station  Outcome: Progressing  Goal: Maintain or return to baseline ADL function  Description: INTERVENTIONS:  -  Assess patient's ability to carry out ADLs; assess patient's baseline for ADL function and identify physical deficits which impact ability to perform ADLs (bathing, care of mouth/teeth, toileting, grooming, dressing, etc )  - Assess/evaluate cause of self-care deficits   - Assess range of motion  - Assess patient's mobility; develop plan if impaired  - Assess patient's need for assistive devices and provide as appropriate  - Encourage maximum independence but intervene and supervise when necessary  - Involve family in performance of ADLs  - Assess for home care needs following discharge   - Consider OT consult to assist with ADL evaluation and planning for discharge  - Provide patient education as appropriate  Outcome: Progressing  Goal: Maintains/Returns to pre admission functional level  Description: INTERVENTIONS:  - Perform BMAT or MOVE assessment daily    - Set and communicate daily mobility goal to care team and patient/family/caregiver  - Collaborate with rehabilitation services on mobility goals if consulted  - Perform Range of Motion times a day  - Reposition patient every  hours  - Dangle patient times a day  - Stand patient  times a day  - Ambulate patient  times a day  - Out of bed to chair  times a day   - Out of bed for meals times a day  - Out of bed for toileting  - Record patient progress and toleration of activity level   Outcome: Progressing     Problem: DISCHARGE PLANNING  Goal: Discharge to home or other facility with appropriate resources  Description: INTERVENTIONS:  - Identify barriers to discharge w/patient and caregiver  - Arrange for needed discharge resources and transportation as appropriate  - Identify discharge learning needs (meds, wound care, etc )  - Arrange for interpretive services to assist at discharge as needed  - Refer to Case Management Department for coordinating discharge planning if the patient needs post-hospital services based on physician/advanced practitioner order or complex needs related to functional status, cognitive ability, or social support system  Outcome: Progressing     Problem: Knowledge Deficit  Goal: Patient/family/caregiver demonstrates understanding of disease process, treatment plan, medications, and discharge instructions  Description: Complete learning assessment and assess knowledge base    Interventions:  - Provide teaching at level of understanding  - Provide teaching via preferred learning methods  Outcome: Progressing Problem: Nutrition/Hydration-ADULT  Goal: Nutrient/Hydration intake appropriate for improving, restoring or maintaining nutritional needs  Description: Monitor and assess patient's nutrition/hydration status for malnutrition  Collaborate with interdisciplinary team and initiate plan and interventions as ordered  Monitor patient's weight and dietary intake as ordered or per policy  Utilize nutrition screening tool and intervene as necessary  Determine patient's food preferences and provide high-protein, high-caloric foods as appropriate       INTERVENTIONS:  - Monitor oral intake, urinary output, labs, and treatment plans  - Assess nutrition and hydration status and recommend course of action  - Evaluate amount of meals eaten  - Assist patient with eating if necessary   - Allow adequate time for meals  - Recommend/ encourage appropriate diets, oral nutritional supplements, and vitamin/mineral supplements  - Order, calculate, and assess calorie counts as needed  - Recommend, monitor, and adjust tube feedings  based on assessed needs  - Assess need for intravenous fluids  - Provide nutrition/hydration education as appropriate  - Include patient/family/caregiver in decisions related to nutrition  Outcome: Progressing

## 2023-06-16 NOTE — CASE MANAGEMENT
Case Management Assessment & Discharge Planning Note    Patient name Nereida Royce  Location S /S -01 MRN [id number]  : 1960 Date 2023       Current Admission Date: 2023  Current Admission Diagnosis:LAZARA (acute kidney injury) [facility])   Patient Active Problem List    Diagnosis Date Noted   • LAZARA (acute kidney injury) ([facility] ) 2023   • PNA (pneumonia) 2023   • Blurred vision in bilateral left visual fields 2023   • SIRS (systemic inflammatory response syndrome) ([facility] ) 2023   • Cerebrovascular accident ([facility]A) ([facility] ) 2023   • High risk medication use 2023   • Surgical wound present 05/10/2023   • Metastatic melanoma ([facility] ) 2023   • Malignant melanoma of right lower extremity including hip ([facility] ) 2023   • Myofascial pain syndrome, cervical 2023   • Cervicalgia 2023   • Occipital headache 2023   • Cervical post-laminectomy syndrome 2023   • Electrolyte imbalance 2023   • Abnormal bowel movement 2023   • Hypertriglyceridemia 2023   • Hypertensive urgency 2023   • Fatty liver 2022   • S/P cervical spinal fusion 2022   • Fatigue 2022   • Foot pain 2022   • Annual physical exam 2021   • History of gastroesophageal reflux (GERD) 2021   • Need for influenza vaccination 2021   • Postoperative examination 2021   • Pre-operative clearance 2021   • Enlarged liver    • Low folic acid    • High vitamin A level 2020   • High serum vitamin E 2020   • Low serum vitamin B12 2020   • Anemia 10/01/2020   • Alcoholism ([facility] ) 2020   • Diverticular disease of colon 2020   • Erosive esophagitis 2020   • Hemorrhoids 2020   • Irritable bowel syndrome 2020   • Pure hypercholesterolemia 2020   • Vitamin D deficiency 2020   • High blood triglycerides 2020   • Diarrhea 2020   • Need for vaccination 09/01/2020   • Unintentional weight loss 09/01/2020   • Mild intermittent asthma 01/06/2020   • Esophageal dysmotility 01/06/2020   • Allergic rhinitis with postnasal drip 01/06/2020   • Tobacco abuse 01/06/2020   • NATE (obstructive sleep apnea) 01/06/2020   • Cough 01/06/2020   • Pelvic pain in female 01/10/2018   • 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   • Hypokalemia 11/20/2017   • Impaired fasting glucose 11/20/2017   • Epicondylitis, lateral (tennis elbow) 05/15/2017   • Venous insufficiency 05/15/2017   • Limb pain 12/02/2013   • Left flank pain 11/22/2013   • Change in stool habits 05/13/2013   • Gallbladder disease 05/13/2013   • Anxiety disorder 04/08/2013   • Cervical spinal stenosis 01/09/2013   • Microhematuria 01/02/2013   • Chest pain on respiration 12/07/2012   • Asthma 12/05/2012   • Hypertension 03/19/2012   • Thoracic and lumbosacral neuritis 03/19/2012      LOS (days): 2  Geometric Mean LOS (GMLOS) (days): 2 80  Days to GMLOS:0 6     OBJECTIVE:  PATIENT READMITTED TO HOSPITAL  Risk of Unplanned Readmission Score: 28 78         Current admission status: Inpatient       Preferred Pharmacy:   [facility]/pharmacy #[id number] Mooney [address] [place] [address]  Phone: [phone] Fax: [phone] [facility] [place] [address]  Phone: [phone] Fax: [phone]    Primary Care Provider: Vic Dill DO    Primary Insurance: [address] [place] [address]  Secondary Insurance:     ASSESSMENT:  Armani Helms [employer]     Depoi, [address] Representative - Sister   Primary Phone: [phone] (Mobile)               Advance Directives  Does patient have a [address]?: Yes  Does patient have Advance Directives?: Yes  Advance Directives: Living will  Primary Contact: Patient's sister Kimble Ganser    Readmission Root Cause  30 Day Readmission: Yes  Who directed you to return to the hospital?: Family, Specialist  Did you understand whom to contact if you had questions or problems?: Yes  Did you get your prescriptions before you left the hospital?: No  Reason[de-identified] Preference for own pharmacy  Were you able to get your prescriptions filled when you left the hospital?: Yes  Did you take your medications as prescribed?: Yes  Were you able to get to your follow-up appointments?: Yes  During previous admission, was a post-acute recommendation made?: No  Patient was readmitted due to: LAZARA  Action Plan: Palliative following/Bron[place] and Thora done  Possible need for inpatient rehab or [facility] pending PT/OT    Patient Information  Admitted from[de-identified] Home  Mental Status: Other (Comment) (Patient sleeping at bedside)  During Assessment patient was accompanied by: Friend, Other-Comment (Significant other)  Assessment information provided by[de-identified] Friend, Other - please comment (Significant other)  Primary Caregiver: Other (Comment)  Caregiver's Name[de-identified] Manish Reyes  Caregiver's Relationship to Patient[de-identified] Significant Other  Caregiver's Telephone Number[de-identified] [phone]  Support Systems: Self, Spouse/significant other, Family members, [address] of Residence: [address] [facility] [address] do you live in?: [facility] entry access options   Select all that apply : Stairs  Number of steps to enter home : 3  Do the steps have railings?: Yes  Type of Current Residence: [address] [place] [address] home  Upon entering residence, is there a bedroom on the main floor (no further steps)?: No  A bedroom is located on the following floor levels of residence (select all that apply):: 2nd Floor  Upon entering residence, is there a bathroom on the main floor (no further steps)?: No  Indicate which floors of current residence have a bathroom (select all the apply):: 2nd Floor  Number of steps to 2nd floor from main floor: One Flight (3 steps/landing/11 steps)  In the last 12 months, was there a time when you were not able to pay the mortgage or rent on time?: No  In the last 12 months, was there a time when you did not have a steady place to sleep or slept in a shelter (including now)?: No  Homeless/housing insecurity resource given?: No  Living Arrangements: Lives w/ Spouse/significant other    Activities of Daily Living Prior to Admission  Functional Status: Independent  Completes ADLs independently?: Yes  Ambulates independently?: Yes  Does patient use assisted devices?: No  Does patient currently own DME?: No  Does patient have a history of Outpatient Therapy (PT/OT)?: No  Does the patient have a history of Short-Term Rehab?: No  Does patient have a history of HHC?: No  Does patient currently have Clinical [employer]ju[employer] Jonathan?: No    Patient Information Continued  Income Source: Employed  Does patient have prescription coverage?: Yes  Food insecurity resource given?: No  Does patient receive dialysis treatments?: No  Does patient have a history of substance abuse?: No  Does patient have a history of Mental Health Diagnosis?: No    PHQ 2/9 Screening   Reviewed PHQ 2/9 Depression Screening Score?: No    Means of Transportation  Means of Transport to Appts[de-identified] Family transport  In the past 12 months, has lack of transportation kept you from medical appointments or from getting medications?: No  In the past 12 months, has lack of transportation kept you from meetings, work, or from getting things needed for daily living?: No  Was application for public transport provided?: No    DISCHARGE DETAILS:    Discharge planning discussed with[de-identified] patient's significant other Pako Haddad, friend Rolando Hernandes at bedside  Freedom of Choice: Yes     CM contacted family/caregiver?: Yes  Were Treatment Team discharge recommendations reviewed with patient/caregiver?: Yes  Did patient/caregiver verbalize understanding of patient care needs?: Yes  Were patient/caregiver advised of the risks associated with not following Treatment Team discharge recommendations?: Yes    Contacts  Patient Contacts: Fernie  Relationship to Patient[de-identified] Other (Comment) (SO)  Contact Method: In Person  Reason/Outcome: Continuity of Care, Discharge Planning, Emergency Contact    Other Referral/Resources/Interventions Provided:  Referral Comments: PT/OT evals pending    CM spoke with patient's significant other Lucila Youngr and friend Radha Carter at bedside  Patient currently asleep in bed  Patient lives with her significant other Gaytan Plater in a two level home  Patient does not have a bedroom or bathroom on first floor  Prior to last radiation treatment, patient was ambulating and caring for herself  Patient's significant other would provide help at home  After radiation treatment, patient very weak and not eating  Patient has 14 steps to second level  No DME at home  CM discussed having PT/OT evaluation to assist with discharge plan of care/needs  CM discussed Home VNA services with SO and friend at bedside  CM also offered to f/u with patient once feeling better and alert/awake  Patient's sister Dudley Peters is POA  Patient's SO other Gaytan Plater can also be updated at [phone]  CM answered all questions/concerns at this time  CM will continue to f/u to assist with discharge plan of care/needs  CM reviewed discharge planning process including the following: identifying caregivers at home, preference for d/c planning needs,   availability of [facility] to [facility] program, availability of treatment team to discuss questions or concerns patient and/or family may have regarding diagnosis, plan of care, old or new medications and discharge planning   CM will continue to follow for care coordination and update assessment as appropriate

## 2023-06-16 NOTE — OCCUPATIONAL THERAPY NOTE
Occupational Therapy Cancellation note        Patient Name: Geoff ROBERSON Date: 6/16/2023 06/16/23 2291   Note Type   Note type Cancelled Session   Cancel Reasons Refusal   Additional Comments OT orders recieved, chart reviewed  chart reviewed  Spoke with TRENT Mcgovern who reports pt lethargic, but appropriate to see  Met with pt at bedside, pt declining participation at this time 2/2 fatigue, agreeable to future session   Will f/u as schedule allows and pt appropriate     Quince Nails

## 2023-06-17 NOTE — PROGRESS NOTES
Pt given zofran and IV dilaudid  Pt resting much comfortably now  Pt placed on comfort care  Hospice consult ordered  Will continue to monitor

## 2023-06-17 NOTE — TREATMENT TEAM
Renal short note- initially renal consult was noted this morning but also this morning also noted that the patient is on comfort care measures and hospice consult has been ordered  I reviewed with primary team attending  Renal consult is being removed for now by primary team   Please contact renal team if any questions or issues arise or change in goals or management arise  Thank you

## 2023-06-17 NOTE — PLAN OF CARE
Problem: PAIN - ADULT  Goal: Verbalizes/displays adequate comfort level or baseline comfort level  Description: Interventions:  - Encourage patient to monitor pain and request assistance  - Assess pain using appropriate pain scale  - Administer analgesics based on type and severity of pain and evaluate response  - Implement non-pharmacological measures as appropriate and evaluate response  - Consider cultural and social influences on pain and pain management  - Notify physician/advanced practitioner if interventions unsuccessful or patient reports new pain  Outcome: Progressing     Problem: SAFETY ADULT  Goal: Patient will remain free of falls  Description: INTERVENTIONS:  - Educate patient/family on patient safety including physical limitations  - Instruct patient to call for assistance with activity   - Consult OT/PT to assist with strengthening/mobility   - Keep Call bell within reach  - Keep bed low and locked with side rails adjusted as appropriate  - Keep care items and personal belongings within reach  - Initiate and maintain comfort rounds  - Make Fall Risk Sign visible to staff  - Offer Toileting every  Hours, in advance of need  - Initiate/Maintain alarm  - Obtain necessary fall risk management equipment:   - Apply yellow socks and bracelet for high fall risk patients  - Consider moving patient to room near nurses station  Outcome: Progressing  Goal: Maintain or return to baseline ADL function  Description: INTERVENTIONS:  -  Assess patient's ability to carry out ADLs; assess patient's baseline for ADL function and identify physical deficits which impact ability to perform ADLs (bathing, care of mouth/teeth, toileting, grooming, dressing, etc )  - Assess/evaluate cause of self-care deficits   - Assess range of motion  - Assess patient's mobility; develop plan if impaired  - Assess patient's need for assistive devices and provide as appropriate  - Encourage maximum independence but intervene and supervise when necessary  - Involve family in performance of ADLs  - Assess for home care needs following discharge   - Consider OT consult to assist with ADL evaluation and planning for discharge  - Provide patient education as appropriate  Outcome: Progressing  Goal: Maintains/Returns to pre admission functional level  Description: INTERVENTIONS:  - Perform BMAT or MOVE assessment daily    - Set and communicate daily mobility goal to care team and patient/family/caregiver  - Collaborate with rehabilitation services on mobility goals if consulted  - Perform Range of Motion  times a day  - Reposition patient every hours  - Dangle patient  times a day  - Stand patient  times a day  - Ambulate patient  times a day  - Out of bed to chair  times a day   - Out of bed for meals  times a day  - Out of bed for toileting  - Record patient progress and toleration of activity level   Outcome: Progressing     Problem: DISCHARGE PLANNING  Goal: Discharge to home or other facility with appropriate resources  Description: INTERVENTIONS:  - Identify barriers to discharge w/patient and caregiver  - Arrange for needed discharge resources and transportation as appropriate  - Identify discharge learning needs (meds, wound care, etc )  - Arrange for interpretive services to assist at discharge as needed  - Refer to Case Management Department for coordinating discharge planning if the patient needs post-hospital services based on physician/advanced practitioner order or complex needs related to functional status, cognitive ability, or social support system  Outcome: Progressing     Problem: Knowledge Deficit  Goal: Patient/family/caregiver demonstrates understanding of disease process, treatment plan, medications, and discharge instructions  Description: Complete learning assessment and assess knowledge base    Interventions:  - Provide teaching at level of understanding  - Provide teaching via preferred learning methods  Outcome: Progressing Problem: Nutrition/Hydration-ADULT  Goal: Nutrient/Hydration intake appropriate for improving, restoring or maintaining nutritional needs  Description: Monitor and assess patient's nutrition/hydration status for malnutrition  Collaborate with interdisciplinary team and initiate plan and interventions as ordered  Monitor patient's weight and dietary intake as ordered or per policy  Utilize nutrition screening tool and intervene as necessary  Determine patient's food preferences and provide high-protein, high-caloric foods as appropriate       INTERVENTIONS:  - Monitor oral intake, urinary output, labs, and treatment plans  - Assess nutrition and hydration status and recommend course of action  - Evaluate amount of meals eaten  - Assist patient with eating if necessary   - Allow adequate time for meals  - Recommend/ encourage appropriate diets, oral nutritional supplements, and vitamin/mineral supplements  - Order, calculate, and assess calorie counts as needed  - Recommend, monitor, and adjust tube feedings  based on assessed needs  - Assess need for intravenous fluids  - Provide nutrition/hydration education as appropriate  - Include patient/family/caregiver in decisions related to nutrition  Outcome: Progressing     Problem: Prexisting or High Potential for Compromised Skin Integrity  Goal: Skin integrity is maintained or improved  Description: INTERVENTIONS:  - Identify patients at risk for skin breakdown  - Assess and monitor skin integrity  - Assess and monitor nutrition and hydration status  - Monitor labs   - Assess for incontinence   - Turn and reposition patient  - Assist with mobility/ambulation  - Relieve pressure over bony prominences  - Avoid friction and shearing  - Provide appropriate hygiene as needed including keeping skin clean and dry  - Evaluate need for skin moisturizer/barrier cream  - Collaborate with interdisciplinary team   - Patient/family teaching  - Consider wound care consult   Outcome: Progressing     Problem: MOBILITY - ADULT  Goal: Maintain or return to baseline ADL function  Description: INTERVENTIONS:  -  Assess patient's ability to carry out ADLs; assess patient's baseline for ADL function and identify physical deficits which impact ability to perform ADLs (bathing, care of mouth/teeth, toileting, grooming, dressing, etc )  - Assess/evaluate cause of self-care deficits   - Assess range of motion  - Assess patient's mobility; develop plan if impaired  - Assess patient's need for assistive devices and provide as appropriate  - Encourage maximum independence but intervene and supervise when necessary  - Involve family in performance of ADLs  - Assess for home care needs following discharge   - Consider OT consult to assist with ADL evaluation and planning for discharge  - Provide patient education as appropriate  Outcome: Progressing  Goal: Maintains/Returns to pre admission functional level  Description: INTERVENTIONS:  - Perform BMAT or MOVE assessment daily    - Set and communicate daily mobility goal to care team and patient/family/caregiver  - Collaborate with rehabilitation services on mobility goals if consulted  - Perform Range of Motion  times a day  - Reposition patient every  hours    - Dangle patient  times a day  - Stand patient  times a day  - Ambulate patient  times a day  - Out of bed to chair  times a day   - Out of bed for meal times a day  - Out of bed for toileting  - Record patient progress and toleration of activity level   Outcome: Progressing

## 2023-06-17 NOTE — SOCIAL WORK
"Pt is a 58 yo woman with LAZARA who came to [facility] for EOL symptom management  Alvaro Valle and MSW called sister Dudley Peters prior to pt arrival      Dudley Peters reported she is [facility] and has copy of witnessed living will signed last week  Dudley Peters sent MSW photos of living will  MSW printed and placed in chart  Pt has a son Paty Genao and significant other Gaytan Plater of 7 years  PT first spouse  at the age of 51 in his sleep  Pt worked for [employer]  Liked the [employer] and [employer]  Pt described as kind, generous, and a homebody  Pt has a dog Thor with her SO  Dudley Peters verbalized she has read the When Death is Near booklet and knows signs of EOL from her father's death  Pt is non-Restoration      Pt reportedly would ask what she did to deserve this  Dudley Peters verbalized she had taken her sister out for food last Saturday and the decline has been incredibly rapid  Pt reportedly always wanted to be a grandmother  Pt nash Razo's SO had a son two days ago and pt was able to see pictures of the baby  Baby reportedly came a month early  Dudley Peters verbalized she feels the baby came early so that she could die knowing she was a grandmother  Dudley Peters verbalized her wishes the pt got to see the baby, but the baby and baby's mother are both in the hospital      [address]  MSW reviewed LOC, TOD procedure, facility information  Dudley Peters verbalized that if staff feels the pt will not last till Thursday to inform her so she can get a flight   Dudley Peters provided nash Razo's info  Dudley Petesr open to BR supports  MSW received a call form son Paty Genao as pt arrived at the [facility]  Pt presented as uncomfortable and was moaning  MSW spoke with Paty Genao while pt was transitioned  carloz Matthewses may wait to come till tomorrow as he is tired from going back and forth from the hospital where the pt was at and where his SO and son is at   Paty Genao stated that he was with the pt this morning and he told the pt she looked just like his grandson and the pt responded \"baby\" to " "him  Cooper Mejia verbalized he is unsure whether it is better to loose a parent slowly or suddenly, reffering to his father's sudden death  MSW validated Dung's losses  Cooper Mejia was open to BR supports  Cooper Mejia verbalized that pt SO Melissa Hernandez came to the hospital today and tried to have the pt sign something but she was unable to  Cooper Mejia verbalized he did not know what it is and that \"it rubbed me the wrong way\"  Cooper Mejia verbalized his trust in any decisions his aunt Mustapha Cassette is making and that \"her opinion matters very much to me\"  MSW Aksh call pt SO Fernie  Melissa Mary stated that she was not responsive  Melissa Hernandez stated that they have been together for 7  Melissa Hernandez stated that this Saturday pt went to dinner with her sister  Melissa Hernandez stated that he tore both of his quad muscles last year will not bring  Fernie stated to \"just keep her from being in pain\"  Melissa Hernandez stated that he showed her pictures of her grandson yesterday and she is happy to be a grandmother  Fernie verbalized that the dog is \"besides himself\" and he is trying to comfort the animal  Fernie verbalized he recognizes he does not want to be there when she is dying  Melissa Hernandez stated that pt told him \"not afraid to die, but I don't want to die\"  Fernie refused BR follow up  Melissa Hernandez stated that he has \"been through it a couple times, and I've gotten pretty good at it too\"  MSW validate Fernie's losses  Plan at this time is for MSW to continue to provide emotional support and education regarding EOL signs and symptoms to pt family as accepted  MSW will assess family's coping and amend SRAs as needed      "

## 2023-06-17 NOTE — PROGRESS NOTES
Pt lactic came back at 15  Notified slim  Currently sodium bicarb running at 125ml/hr  Will continue to monitor

## 2023-06-17 NOTE — DISCHARGE SUMMARY
Discharge Summary - [facility] Internal Medicine    Patient Information: Dante Moeller 59 y o  female MRN: [id number]  Unit/Bed#: S -01 Encounter: [id number]    Discharging Physician / Practitioner: Polly Solorzano MD  PCP: Solomon Kiser DO  Admission Date: 6/13/2023  Discharge Date: 06/17/23    Disposition:     Other: Inpatient hospice    Reason for Admission: Chest pain, cough    Discharge Diagnoses:     Principal Problem:    LAZARA (acute kidney injury) ([facility] [address] )  Active Problems:    Cough    Hypertension    Chest pain on respiration    Metastatic melanoma ([facility] [address] )    Blurred vision in bilateral left visual fields    Acidosis  Resolved Problems:    * No resolved hospital problems  *      Consultations During Hospital Stay:  · Pulmonary      Procedures Performed:     · Chest x-ray  Cardiomegaly and small bilateral pleural effusions  Bilateral groundglass opacities  Correlate for CHF versus infection versus lymphangitic spread of tumor  · Therapeutic right thoracocentesis  · CT abdomen pelvis    New retroperitoneal, right pelvic sidewall and right inguinal lymphadenopathy when compared to a CT abdomen/pelvis dated November 7, 2022  The findings are nonspecific with differential considerations including metastatic disease, lymphoma, infection,   among other etiologies  Consider tissue sampling for further evaluation      No acute intra-abdominal abnormality  Small amount of intra-abdominal ascites      Stable hepatomegaly and hepatic steatosis      Partially visualized small bilateral pleural effusions with moderate bibasilar atelectasis versus early infiltrates  Interstitial edema is also noted  Abdominal ultrasound  Nonspecific gallbladder wall thickening  Negative sonographic Manning sign      Severe hepatomegaly  Hepatocellular disease      Bronchoscopy    Patient tolerated procedure well  There was collapse of the airways noted  Lavage completed in right lower lobe      Hospital Course: "    Lucas oClón is a 59 y o  female patient who originally presented to the hospital on 6/13/2023 due to chest pain and cough  Patient with history of metastatic melanoma presented with cough chest pain  She was seen in consultation with pulmonary and underwent bronchoscopy with results as outlined above  She was also noted to have acute kidney injury, she has extensive metastatic disease including hepatic involvement  She was noted to have lactic acidosis encephalopathy  Given her extensive metastatic disease discussed with the patient and her Sister Jose Avila who is also the POA, overall guarded prognosis explained  They have agreed to transition to comfort care status and request hospice evaluation  Patient evaluated by hospice and is deemed appropriate for inpatient hospice unit  Arrangements underway with case management for transfer to inpatient hospice unit  Kindly review the chart for details  Condition at Discharge: To inpatient hospice unit     Discharge Day Visit / Exam:     Subjective:        \" I feel lousy\"  Patient reports feeling tired fatigued  Overnight events noted    Vitals: Blood Pressure: 122/63 (06/17/23 0754)  Pulse: 68 (06/17/23 0640)  Temperature: 97 7 °F (36 5 °C) (06/17/23 0640)  Temp Source: Oral (06/16/23 2128)  Respirations: (!) 24 (06/17/23 0640)  Height: 5' 7\" (170 2 cm) (last ht assessment) (06/14/23 1439)  Weight - Scale: 74 8 kg (164 lb 14 5 oz) (06/13/23 1154)  SpO2: 91 % (06/17/23 1300)  Exam:   Physical Exam    Sitting up in bed  Features of protein calorie malnutrition noted   Asthenia noted  Lungs diminished breath sounds bilaterally  Heart sounds S1-S2 noted  Encephalopathy noted  No rash    Discharge instructions/Information to patient and family:   See after visit summary for information provided to patient and family        Discharge plan discussed with the patient, plan discussed with her spouse Rhett Xaio, discharge plan discussed with sister and POA " Charo Millard in detail questions answered      Provisions for Follow-Up Care:  See after visit summary for information related to follow-up care and any pertinent home health orders  Planned Readmission: no     Discharge Statement:  I spent 45 minutes discharging the patient  This time was spent on the day of discharge  I had direct contact with the patient on the day of discharge  Greater than 50% of the total time was spent examining patient, answering all patient questions, arranging and discussing plan of care with patient as well as directly providing post-discharge instructions  Additional time then spent on discharge activities  Discharge Medications:  See after visit summary for reconciled discharge medications provided to patient and family        ** Please Note: This note has been constructed using a voice recognition system **

## 2023-06-17 NOTE — CASE MANAGEMENT
Case Management Discharge Planning Note    Patient name Marvel Sorensen  Location S /S -01 MRN [id number]  : 1960 Date 2023       Current Admission Date: 2023  Current Admission Diagnosis:LAZARA (acute kidney injury) [facility])   Patient Active Problem List    Diagnosis Date Noted   • Acidosis 2023   • LAZARA (acute kidney injury) ([address] ) 2023   • PNA (pneumonia) 2023   • Blurred vision in bilateral left visual fields 2023   • SIRS (systemic inflammatory response syndrome) ([address] ) 2023   • Cerebrovascular accident (CVA) ([facility] [address] ) 2023   • High risk medication use 2023   • Surgical wound present 05/10/2023   • Metastatic melanoma ([facility] [address] ) 2023   • Malignant melanoma of right lower extremity including hip ([facility] [address] ) 2023   • Myofascial pain syndrome, cervical 2023   • Cervicalgia 2023   • Occipital headache 2023   • Cervical post-laminectomy syndrome 2023   • Electrolyte imbalance 2023   • Abnormal bowel movement 2023   • Hypertriglyceridemia 2023   • Hypertensive urgency 2023   • Fatty liver 2022   • S/P cervical spinal fusion 2022   • Fatigue 2022   • Foot pain 2022   • Annual physical exam 2021   • History of gastroesophageal reflux (GERD) 2021   • Need for influenza vaccination 2021   • Postoperative examination 2021   • Pre-operative clearance 2021   • Enlarged liver 96/15/9592   • Low folic acid    • High vitamin A level 2020   • High serum vitamin E 2020   • Low serum vitamin B12 2020   • Anemia 10/01/2020   • Alcoholism ([address] ) 2020   • Diverticular disease of colon 2020   • Erosive esophagitis 2020   • Hemorrhoids 2020   • Irritable bowel syndrome 2020   • Pure hypercholesterolemia 2020   • Vitamin D deficiency 2020   • High blood triglycerides 2020   • Diarrhea 2020   • Need for vaccination 09/01/2020   • Unintentional weight loss 09/01/2020   • Mild intermittent asthma 01/06/2020   • Esophageal dysmotility 01/06/2020   • Allergic rhinitis with postnasal drip 01/06/2020   • Tobacco abuse 01/06/2020   • NATE (obstructive sleep apnea) 01/06/2020   • Cough 01/06/2020   • Pelvic pain in female 01/10/2018   • 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   • Hypokalemia 11/20/2017   • Impaired fasting glucose 11/20/2017   • Epicondylitis, lateral (tennis elbow) 05/15/2017   • Venous insufficiency 05/15/2017   • Limb pain 12/02/2013   • Left flank pain 11/22/2013   • Change in stool habits 05/13/2013   • Gallbladder disease 05/13/2013   • Anxiety disorder 04/08/2013   • Cervical spinal stenosis 01/09/2013   • Microhematuria 01/02/2013   • Chest pain on respiration 12/07/2012   • Asthma 12/05/2012   • Hypertension 03/19/2012   • Thoracic and lumbosacral neuritis 03/19/2012      LOS (days): 3  Geometric Mean LOS (GMLOS) (days): 2 80  Days to GMLOS:-0 4     OBJECTIVE:  Risk of Unplanned Readmission Score: 36 25         Current admission status: Inpatient   Preferred Pharmacy:   [facility]/pharmacy #[id number] Sorto [address] [place] [address]  Phone: [phone] Fax: [phone] ([facility]) [facility], [address] [facility] [address] [place] [address]  Phone: [phone] Fax: [phone]    Primary Care Provider: Carl Georges DO    Primary Insurance: [facility]  Secondary Insurance:     DISCHARGE DETAILS:  CM spoke with patient's sister Sandra Madsen at   CM introduced self and role  CM discussed hospice consult with patient's sister Sandra Madsen  Patient's sister stated no preference  Sister open to referral to [facility] hosp[facility]  Patient's sister has her living will and is her medical advocate  Sister is currently in [place]  Patient's sister planned to leave this Thursday to come to [place]   Sister stated she will look for another flight if needing to come sooner  CM discussed hospice referral process and possible options  Sister stated home hospice is not an option  Patient's significant other has his own health problems and would not be able to care for patient in the home setting  CM discussed with sister sending referral [facility]  Hospice liaison will evaluate and reach out directly to discuss plan of care  RENE provided  contact number for any f/u questions/concerns  CM sent referral via [facility] to [id number]  Waiting for evaluation

## 2023-06-17 NOTE — QUICK NOTE
Notified for ongoing lethargy x 1-2 days, critical lab value    Objective:     Vitals:   Temp (24hrs), Av 7 °F (36 5 °C), Min:97 4 °F (36 3 °C), Max:98 1 °F (36 7 °C)    Temp:  [97 4 °F (36 3 °C)-98 1 °F (36 7 °C)] 97 4 °F (36 3 °C)  HR:  [58-74] 74  Resp:  [17-28] 28  BP: ()/() 127/75  SpO2:  [90 %-95 %] 95 %  Body mass index is 25 83 kg/m²  Input and Output Summary (last 24 hours): Intake/Output Summary (Last 24 hours) at 2023 0313  Last data filed at 2023 2101  Gross per 24 hour   Intake --   Output 130 ml   Net -130 ml       Physical Exam:   Physical Exam  Constitutional:       Appearance: She is ill-appearing  Comments: Drowsy, responds to speech, follows commands, somewhat delayed   HENT:      Head: Normocephalic and atraumatic  Right Ear: External ear normal       Left Ear: External ear normal       Mouth/Throat:      Mouth: Mucous membranes are dry  Eyes:      General: No scleral icterus  Extraocular Movements: Extraocular movements intact  Conjunctiva/sclera: Conjunctivae normal       Pupils: Pupils are equal, round, and reactive to light  Cardiovascular:      Rate and Rhythm: Normal rate and regular rhythm  Pulses: Normal pulses  Heart sounds: Normal heart sounds  Pulmonary:      Breath sounds: No wheezing, rhonchi or rales  Comments: tachypneic   Abdominal:      General: Bowel sounds are normal  There is no distension  Palpations: There is hepatomegaly  Tenderness: There is abdominal tenderness in the right upper quadrant  There is no right CVA tenderness, left CVA tenderness, guarding or rebound  Musculoskeletal:      Right lower leg: No edema  Left lower leg: No edema  Skin:     General: Skin is warm and dry  Neurological:      Mental Status: She is oriented to person, place, and time  LAZARA, lactic acidosis  · Worsening kidney function now with lactate of 11 3    Nursing reports urine unmeasured urine output  Bladder scans have been normal   · Bicarb 150 meq bolus, bicarb drip   · Repeat labs  · Consider laboy for I&Os  · Consult nephrology     Transaminitis, RUQ tenderness  · POA  Possibly in setting of Keytruda? Began Mario [address] 5/4/2023  Last received 6/15/2023  · PET CT 4/27/2023  · Asymmetrically enlarged right liver with greater FDG uptake compared to left  · Was scheduled for MRI liver 6/17 as outpatient  · CT AP pending  · Check RUQ US     SIRS  · Leukocytosis, tachypnea  Has remained afebrile    · Lactate 11 and uptrending   · Procalcitonin 9  · Began cefepime, flagyl  · Await imaging

## 2023-06-17 NOTE — CASE MANAGEMENT
Case Management Discharge Planning Note    Patient name Milly Salazar  Location S /S -01 MRN [id number]  : 1960 Date 2023       Current Admission Date: 2023  Current Admission Diagnosis:LAZARA (acute kidney injury) [facility])   Patient Active Problem List    Diagnosis Date Noted   • Acidosis 2023   • LAZARA (acute kidney injury) ([facility] ) 2023   • PNA (pneumonia) 2023   • Blurred vision in bilateral left visual fields 2023   • SIRS (systemic inflammatory response syndrome) ([facility] ) 2023   • Cerebrovascular accident (CVA) ([facility] ) 2023   • High risk medication use 2023   • Surgical wound present 05/10/2023   • Metastatic melanoma ([facility] ) 2023   • Malignant melanoma of right lower extremity including hip ([facility] ) 2023   • Myofascial pain syndrome, cervical 2023   • Cervicalgia 2023   • Occipital headache 2023   • Cervical post-laminectomy syndrome 2023   • Electrolyte imbalance 2023   • Abnormal bowel movement 2023   • Hypertriglyceridemia 2023   • Hypertensive urgency 2023   • Fatty liver 2022   • S/P cervical spinal fusion 2022   • Fatigue 2022   • Foot pain 2022   • Annual physical exam 2021   • History of gastroesophageal reflux (GERD) 2021   • Need for influenza vaccination 2021   • Postoperative examination 2021   • Pre-operative clearance 2021   • Enlarged liver    • Low folic acid    • High vitamin A level 2020   • High serum vitamin E 2020   • Low serum vitamin B12 2020   • Anemia 10/01/2020   • Alcoholism ([address] ) 2020   • Diverticular disease of colon 2020   • Erosive esophagitis 2020   • Hemorrhoids 2020   • Irritable bowel syndrome 2020   • Pure hypercholesterolemia 2020   • Vitamin D deficiency 2020   • High blood triglycerides 2020   • Diarrhea 2020   • Need for vaccination 09/01/2020   • Unintentional weight loss 09/01/2020   • Mild intermittent asthma 01/06/2020   • Esophageal dysmotility 01/06/2020   • Allergic rhinitis with postnasal drip 01/06/2020   • Tobacco abuse 01/06/2020   • NATE (obstructive sleep apnea) 01/06/2020   • Cough 01/06/2020   • Pelvic pain in female 01/10/2018   • 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   • Hypokalemia 11/20/2017   • Impaired fasting glucose 11/20/2017   • Epicondylitis, lateral (tennis elbow) 05/15/2017   • Venous insufficiency 05/15/2017   • Limb pain 12/02/2013   • Left flank pain 11/22/2013   • Change in stool habits 05/13/2013   • Gallbladder disease 05/13/2013   • Anxiety disorder 04/08/2013   • Cervical spinal stenosis 01/09/2013   • Microhematuria 01/02/2013   • Chest pain on respiration 12/07/2012   • Asthma 12/05/2012   • Hypertension 03/19/2012   • Thoracic and lumbosacral neuritis 03/19/2012      LOS (days): 3  Geometric Mean LOS (GMLOS) (days): 2 80  Days to GMLOS:-0 6     OBJECTIVE:  Risk of Unplanned Readmission Score: 32 35         Current admission status: Inpatient   Preferred Pharmacy:   [facility]/pharmacy #[id number] Jeremy Miranda [address] [place] [address]  Phone: [phone] Fax: [address] [place] [address]  Phone: [phone] Fax: [phone]    Primary Care Provider: Ritika Ryan DO    Primary Insurance: [address] [place] [address]  Secondary Insurance:     DISCHARGE DETAILS:  CM updated per [employer], patient's transport time is [phone] via S with [facility] updated nursing, SLIM and hospice liaison with transport time                                                                                                       Accepting Facility Name, [facility] : [address]  Receiving Facility/Agency Phone Number: [phone]

## 2023-06-17 NOTE — HOSPICE NOTE
Pt approved for GIP hospice services at [facility] by Dr Trent Goldberg  Spoke to pt sister, Rachna Boyer, via TC to explain hospice services and answer questions   agreeable to hospice services at [facility]  Consents sent via DocuSign to Rachna Boyer  Once liaison has received consents back will forward consents and OOH DNR to CM  OOH DNR will need to be signed by MD/DO  Transport requested to [facility] for 1600 or after

## 2023-06-17 NOTE — PROGRESS NOTES
Pt respirations at 24  Pt keeps stating that she feels shitty  But can't describe it for me  She can't sit still  I offer patient pain medicine she has refused it  I asked her three times but still refused  Notified slim with the following  Slim to come see patient  Will continue to monitor

## 2023-06-17 NOTE — CASE MANAGEMENT
Case Management Discharge Planning Note    Patient name Geoff Sage  Location S /S -01 MRN [id number]  : 1960 Date 2023       Current Admission Date: 2023  Current Admission Diagnosis:LAZARA (acute kidney injury) [facility])   Patient Active Problem List    Diagnosis Date Noted   • Acidosis 2023   • LAZARA (acute kidney injury) ([facility] [address] [facility] ) 2023   • PNA (pneumonia) 2023   • Blurred vision in bilateral left visual fields 2023   • SIRS (systemic inflammatory response syndrome) ([facility] [address] ) 2023   • Cerebrovascular accident (CVA) ([facility] ) 2023   • High risk medication use 2023   • Surgical wound present 05/10/2023   • Metastatic melanoma ([facility] ) 2023   • Malignant melanoma of right lower extremity including hip ([facility] [address] ) 2023   • Myofascial pain syndrome, cervical 2023   • Cervicalgia 2023   • Occipital headache 2023   • Cervical post-laminectomy syndrome 2023   • Electrolyte imbalance 2023   • Abnormal bowel movement 2023   • Hypertriglyceridemia 2023   • Hypertensive urgency 2023   • Fatty liver 2022   • S/P cervical spinal fusion 2022   • Fatigue 2022   • Foot pain 2022   • Annual physical exam 2021   • History of gastroesophageal reflux (GERD) 2021   • Need for influenza vaccination 2021   • Postoperative examination 2021   • Pre-operative clearance 2021   • Enlarged liver    • Low folic acid    • High vitamin A level 2020   • High serum vitamin E 2020   • Low serum vitamin B12 2020   • Anemia 10/01/2020   • Alcoholism ([facility] [address] ) 2020   • Diverticular disease of colon 2020   • Erosive esophagitis 2020   • Hemorrhoids 2020   • Irritable bowel syndrome 2020   • Pure hypercholesterolemia 2020   • Vitamin D deficiency 2020   • High blood triglycerides 2020   • Diarrhea 2020   • Need for vaccination 09/01/2020   • Unintentional weight loss 09/01/2020   • Mild intermittent asthma 01/06/2020   • Esophageal dysmotility 01/06/2020   • Allergic rhinitis with postnasal drip 01/06/2020   • Tobacco abuse 01/06/2020   • NATE (obstructive sleep apnea) 01/06/2020   • Cough 01/06/2020   • Pelvic pain in female 01/10/2018   • 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   • Hypokalemia 11/20/2017   • Impaired fasting glucose 11/20/2017   • Epicondylitis, lateral (tennis elbow) 05/15/2017   • Venous insufficiency 05/15/2017   • Limb pain 12/02/2013   • Left flank pain 11/22/2013   • Change in stool habits 05/13/2013   • Gallbladder disease 05/13/2013   • Anxiety disorder 04/08/2013   • Cervical spinal stenosis 01/09/2013   • Microhematuria 01/02/2013   • Chest pain on respiration 12/07/2012   • Asthma 12/05/2012   • Hypertension 03/19/2012   • Thoracic and lumbosacral neuritis 03/19/2012      LOS (days): 3  Geometric Mean LOS (GMLOS) (days): 2 80  Days to GMLOS:-0 5     OBJECTIVE:  Risk of Unplanned Readmission Score: 36 03         Current admission status: Inpatient   Preferred Pharmacy:   [facility]/pharmacy #[id number] Reyna [address] [place] [address]  Phone: [phone] Fax: [facility] [facility] [address] [facility]  [address] [facility] [address]  Phone: [phone] Fax: [phone]    Primary Care Provider: Reggie Zavala DO    Primary Insurance: [facility]  Secondary Insurance:     DISCHARGE DETAILS:  CM updated per [facility] liaison, patient is approved for [facility]  Hospice house able to accept today  CM sent referral via Roundtrip for BLS transport  Waiting for  time  CM updated SLIM and nursing

## 2023-06-17 NOTE — PLAN OF CARE
Problem: Anticipatory grief  Goal: Patient/caregiver/family will explore reactions to and verbalize acceptance of impending loss  Outcome: Progressing     Problem: Coping and Emotional Distress  Goal: Patient/caregiver/family will demonstrate acceptance of terminal disease and understanding of disease progression while employing appropriate coping mechanisms  Description: Patient/caregiver/family will demonstrate acceptance of terminal disease and understanding of disease progression while employing appropriate coping mechanisms  Outcome: Progressing     Problem: Impaired family dynamics  Goal: Patient/caregiver will demonstrate normal or improving family dynamics with regard to patient's terminal illness  Outcome: Progressing     Problem: Advance Directive, Legal Document Needs  Goal: Patient will fill out appropriate document  Description: Patient will fill out appropriate document and will assess at each visit  Outcome: Progressing     Problem: Emotional support needs  Goal: Patient/family is receiving emotional support  Description: Patient/family is receiving emotional support and will assess at each visit  Outcome: Progressing     Problem: Spiritual support needs  Goal: Patient/family is receiving spiritual support as needed  Description: Patient/family is receiving spiritual support as needed and will assess at each visit      Outcome: Progressing

## 2023-06-18 LAB
BACTERIA BRONCH AEROBE CULT: NORMAL
BACTERIA SPEC BFLD CULT: NO GROWTH
GRAM STN SPEC: NORMAL

## 2023-06-19 ENCOUNTER — TELEPHONE (OUTPATIENT)
Dept: INTERNAL MEDICINE CLINIC | Facility: CLINIC | Age: 63
End: 2023-06-19

## 2023-06-19 NOTE — UTILIZATION REVIEW
NOTIFICATION OF ADMISSION DISCHARGE   This is a Notification of Discharge from [facility]  Please be advised that this patient has been discharge from our facility  Below you will find the admission and discharge date and time including the patient’s disposition  UTILIZATION REVIEW CONTACT:  Tee Lloyd MA  Utilization   Network Utilization Review Department  Phone: [phone] x carefully listen to the prompts  All voicemails are confidential   Email: [email or web address]     ADMISSION INFORMATION  PRESENTATION DATE: 2023 11:48 AM  OBERVATION ADMISSION DATE:   INPATIENT ADMISSION DATE: 23  7:19 AM   DISCHARGE DATE: 2023  5:48 PM   DISPOSITION:    IMPORTANT INFORMATION:  Send all requests for admission clinical reviews, approved or denied determinations and any other requests to dedicated fax number below belonging to the campus where the patient is receiving treatment   List of dedicated fax numbers:  [address] DENIALS (Administrative/Medical Necessity) [phone]   [address] (Maternity/NICU/Pediatrics) [phone]   [facility] [phone]   [facility] [phone]   [address] [phone]   [facility] [phone]   90 [facility] [phone]   [address] [place] [address] [phone]   [facility]  [phone]   [facility] [phone]   [facility] [address] [facility] [phone]

## 2023-06-20 LAB
MYCOBACTERIUM SPEC CULT: NORMAL
RHODAMINE-AURAMINE STN SPEC: NORMAL

## 2023-06-21 PROCEDURE — 88342 IMHCHEM/IMCYTCHM 1ST ANTB: CPT | Performed by: PATHOLOGY

## 2023-06-21 PROCEDURE — 88112 CYTOPATH CELL ENHANCE TECH: CPT | Performed by: PATHOLOGY

## 2023-06-21 PROCEDURE — 88305 TISSUE EXAM BY PATHOLOGIST: CPT | Performed by: PATHOLOGY

## 2023-06-21 PROCEDURE — 88341 IMHCHEM/IMCYTCHM EA ADD ANTB: CPT | Performed by: PATHOLOGY

## 2023-06-22 ENCOUNTER — HOME CARE VISIT (OUTPATIENT)
Dept: HOME HOSPICE | Facility: HOSPICE | Age: 63
End: 2023-06-22
Payer: COMMERCIAL

## 2023-06-22 LAB
BACTERIA BLD CULT: NORMAL
BACTERIA BLD CULT: NORMAL

## 2023-06-22 NOTE — CASE COMMUNICATION
"Sacha SERRANO  Pradipjaime, Bereavement Final IDG 23 (1MR, 1LR) Due: 7/15/23   : 1960  SOC: 23  DOD: 23, <24hrs  Diagnosis: Metastatic Melanoma  Primary: Sister - Federica Wren was a 59year old woman at the [place] [address] less than 24 hours  Sister Jose Avila  Son Daniel Viramontes  Significant Other of 7 years is Fernie and their dog is Kale  Jose Avila stated she knew the signs of end of life from her father's death  Robanton Avila voiced her sister's decline being  rapid  It was shared Sacha Solis always wanted to be a grandmother and Dung's significant other just had a son only days ago  Sacha Beardendenver did get to see pictures of the baby who came a month early  Jose Avila said she feels the baby came early so she could die knowing she was a grandmother  Daniel Viramontes voiced being tired with everything going on as his son and significant other remain in the hospital  He said he did not know whether it was better to lose a paren t slowly or suddenly, referring to his father's death  Fernie wanted Sacha Solis comfortable and did not want to be there when she   He declined bereavement follow-up  Kyle Look to be followed in bereavement  Sacha Solis reportedly believed in a Higher Power, but did not subscribe to any Scientologist  TOD: Jose Margarita was notified by phone of her sister's passing  She voiced knowing it would be soon  She notified Geovanny Enciso called to check  in on Sacha Specter before Jose Avila notified him, so RN informed him of Sacha Specter' passing  He said, \"You know it is coming, but it doesn't make it any easier  \" Neither Fernie nor Jose [facility]ila [facility]  They felt he was probably too exhausted to come to the [place] [address]  Permission was given tot call the  home  Call Assignments:  Genevieve Armenta to reassess son Kristen Worthy at  and request his address for bereavement follow-up, as able  (Due: 23)   Lasha Dunlap to reassess sister Sonal Fraire at [facility]   (Due: 7/15/23)  "

## 2023-06-27 LAB
MYCOBACTERIUM SPEC CULT: NORMAL
RHODAMINE-AURAMINE STN SPEC: NORMAL

## 2023-07-05 LAB
MYCOBACTERIUM SPEC CULT: NORMAL
RHODAMINE-AURAMINE STN SPEC: NORMAL

## 2023-07-11 LAB
MYCOBACTERIUM SPEC CULT: NORMAL
RHODAMINE-AURAMINE STN SPEC: NORMAL

## 2023-07-14 ENCOUNTER — VBI (OUTPATIENT)
Dept: ADMINISTRATIVE | Facility: OTHER | Age: 63
End: 2023-07-14

## 2023-07-14 NOTE — TELEPHONE ENCOUNTER
23 2:46 PM     VB [employer] SmartForm used to document caregap status. Patient is .     Sandra Wilson MA

## 2023-07-17 LAB
MYCOBACTERIUM SPEC CULT: NORMAL
RHODAMINE-AURAMINE STN SPEC: NORMAL

## 2024-03-05 NOTE — PROGRESS NOTES
"This patient was seen on 3/28/23  My role is Foot , Ankle, and Wound Specialist    SUBJECTIVE    Chief Complaint:  Right foot lesion     Patient ID: Dolores Solis is a 59 y o  female  Girtha Crumble is here for the first time with a cc of a growing lesion on the plantar Right foot that started two months ago  She notes it bleeds and drains  She assumed it was a \"wart\" and has been treating it with topical OTS wart remover  She denies any other masses/lesions, no recent weight loss, no new aches nor pain, no fevers  The following portions of the patient's history were reviewed and updated as appropriate: allergies, current medications, past family history, past medical history, past social history, past surgical history and problem list     Review of Systems   Constitutional: Negative  Respiratory: Negative  Gastrointestinal: Negative  Skin: Positive for color change and wound  OBJECTIVE      /76   Pulse 67   Ht 5' 7\" (1 702 m)   Wt 72 2 kg (159 lb 3 2 oz)   BMI 24 93 kg/m²     Foot/Ankle Musculoskeletal Exam    General    Neurological: alert       Physical Exam  Vitals and nursing note reviewed  Constitutional:       General: She is not in acute distress  Appearance: Normal appearance  She is normal weight  She is not ill-appearing or toxic-appearing  HENT:      Head: Normocephalic and atraumatic  Cardiovascular:      Pulses: Normal pulses  Comments: No tenderness nor masses in the popliteal nor inguinal areas  Pulmonary:      Effort: Pulmonary effort is normal    Abdominal:      General: Abdomen is flat  There is no distension  Palpations: Abdomen is soft  Tenderness: There is no abdominal tenderness  There is no guarding  Musculoskeletal:         General: Normal range of motion  Skin:     Capillary Refill: Capillary refill takes less than 2 seconds  Findings: Lesion present        Comments: I note a dark pigmented lesion on the plantar forefoot that " Patient canceled her appointment today with Dr. Becerril. Writer spoke with Dr. Becerril who wanted met o follow up with the patient to ensure she is feeling ok and that he is Happy to see her at any time.    I feel fine, there Is no drainage.   Patient wanting to reschedule to 03/07/2024 at 1115    Appointment details and Address verified and confirmed    measures about 7cm long and 3 5cm wide  There are some small shallow ulcers in the center  There are tiny satellite pigmented lesions in the skin around it and also distal to it tucked into the sulcus behind the lesser toes  Neurological:      Mental Status: She is alert  ASSESSMENT     Diagnoses and all orders for this visit:    Pigmented skin lesion suspicious for malignant neoplasm    Plantar wart of right foot  -     Ambulatory Referral to Podiatry    Cellulitis of plantar aspect of foot  -     Ambulatory Referral to Podiatry         Problem List Items Addressed This Visit        Musculoskeletal and Integument    Plantar wart of right foot       Other    Pigmented skin lesion suspicious for malignant neoplasm - Primary   Other Visit Diagnoses     Cellulitis of plantar aspect of foot                  PLAN    I am suspicious of malignancy  I explained to her that we need to schedule her for an incisional biopsy next week  I told her that she needs to have a  bring her as I will be anesthetizing the foot so she won't be able to drive home  I told her to stop using the wart medication and to cover it with a simple DSD